# Patient Record
Sex: FEMALE | Race: WHITE | NOT HISPANIC OR LATINO | Employment: OTHER | URBAN - NONMETROPOLITAN AREA
[De-identification: names, ages, dates, MRNs, and addresses within clinical notes are randomized per-mention and may not be internally consistent; named-entity substitution may affect disease eponyms.]

---

## 2017-01-09 ENCOUNTER — HOSPITAL ENCOUNTER (OUTPATIENT)
Dept: PHYSICAL THERAPY | Facility: HOSPITAL | Age: 62
Setting detail: THERAPIES SERIES
End: 2017-01-09
Payer: COMMERCIAL

## 2017-01-09 PROCEDURE — 97110 THERAPEUTIC EXERCISES: CPT | Mod: GP

## 2017-01-09 PROCEDURE — 40000718 ZZHC STATISTIC PT DEPARTMENT ORTHO VISIT

## 2017-01-12 ENCOUNTER — HOSPITAL ENCOUNTER (OUTPATIENT)
Dept: PHYSICAL THERAPY | Facility: HOSPITAL | Age: 62
Setting detail: THERAPIES SERIES
End: 2017-01-12
Payer: COMMERCIAL

## 2017-01-12 PROCEDURE — 97110 THERAPEUTIC EXERCISES: CPT | Mod: GP

## 2017-01-12 PROCEDURE — 40000718 ZZHC STATISTIC PT DEPARTMENT ORTHO VISIT

## 2017-01-16 ENCOUNTER — HOSPITAL ENCOUNTER (OUTPATIENT)
Dept: PHYSICAL THERAPY | Facility: HOSPITAL | Age: 62
Setting detail: THERAPIES SERIES
End: 2017-01-16
Payer: COMMERCIAL

## 2017-01-16 PROCEDURE — 97110 THERAPEUTIC EXERCISES: CPT | Mod: GP

## 2017-01-16 PROCEDURE — 40000718 ZZHC STATISTIC PT DEPARTMENT ORTHO VISIT

## 2017-01-17 ENCOUNTER — OFFICE VISIT (OUTPATIENT)
Dept: CHIROPRACTIC MEDICINE | Facility: OTHER | Age: 62
End: 2017-01-17
Attending: CHIROPRACTOR
Payer: COMMERCIAL

## 2017-01-17 DIAGNOSIS — M99.02 SEGMENTAL AND SOMATIC DYSFUNCTION OF THORACIC REGION: ICD-10-CM

## 2017-01-17 DIAGNOSIS — M99.01 SEGMENTAL AND SOMATIC DYSFUNCTION OF CERVICAL REGION: ICD-10-CM

## 2017-01-17 DIAGNOSIS — M99.03 SEGMENTAL AND SOMATIC DYSFUNCTION OF LUMBAR REGION: Primary | ICD-10-CM

## 2017-01-17 DIAGNOSIS — M54.50 ACUTE BILATERAL LOW BACK PAIN WITHOUT SCIATICA: ICD-10-CM

## 2017-01-17 PROCEDURE — 98941 CHIROPRACT MANJ 3-4 REGIONS: CPT | Mod: AT | Performed by: CHIROPRACTOR

## 2017-01-18 ENCOUNTER — OFFICE VISIT (OUTPATIENT)
Dept: SLEEP MEDICINE | Facility: HOSPITAL | Age: 62
End: 2017-01-18
Attending: INTERNAL MEDICINE
Payer: COMMERCIAL

## 2017-01-18 DIAGNOSIS — F51.02 ADJUSTMENT INSOMNIA: Primary | ICD-10-CM

## 2017-01-18 PROCEDURE — 99211 OFF/OP EST MAY X REQ PHY/QHP: CPT

## 2017-01-18 PROCEDURE — 99211 OFF/OP EST MAY X REQ PHY/QHP: CPT | Performed by: INTERNAL MEDICINE

## 2017-01-18 RX ORDER — CLONAZEPAM 1 MG/1
1 TABLET ORAL
Qty: 20 TABLET | Refills: 0 | Status: SHIPPED | OUTPATIENT
Start: 2017-01-18 | End: 2017-02-22

## 2017-01-18 NOTE — PROGRESS NOTES
62 y/o self referred for insomnia. I see her  who has RBD. She has been dealing with his scary dreams/acting out and over the last 4 months shes developed sleep initiation insomnia. She was previously a good sleeper, now it takes her 2-3 h to fall asleep. She routinely reads before bed, when she turns out the light she tends to wake up more. She has worked on behavioral techniques ie reciting the rosary. Doesn't feel she's been anxious but her husbands situation zack wrt possible future Parkinsons certainly concerns her. Non snorer, really not sleepy during the day, no RLS. On lexapro for mild depression.   There were no vitals taken for this visit.    A/ Prob psychophysiological Insomnia  She tried her husbands clonazepam 1mg and it was very effective. For now I gave her a script for this, we talked about CBT I and sleep hygiene , I suspect over time this will improve. I told her she should only take the clonazepam 2 or max 3 times weekly.

## 2017-01-18 NOTE — PROGRESS NOTES
Subjective Finding:    Chief compalint: Patient presents with:  Back Pain: recovering from knee surgery  Neck Pain  , Pain Scale: 5/10, Intensity: sharp and ache, Duration: 2 days, Change since last visit: worse, Radiating: no.    Date of injury:     Activities that the pain restricts:   Home/household activities: no.  Work duties: no.  Hobbies/social: no.  Sleep: no.  Makes symptoms better: activity and rest.  Makes symptoms worse: lumbar extension and lumbar flexion.  Have you seen anyone else for the symptoms? No.  Work related: no.  Automobile related injury: no.    Objective and Assessment:    Posture Analysis:   High shoulder: .  Head tilt: .  High iliac crest: .  Head carriage: neutral.  Thoracic Kyphosis: neutral.  Lumbar Lordosis: neutral.    Lumbar Range of Motion: flexion decreased and extension decreased.  Cervical Range of Motion: flexion decreased.  Thoracic Range of Motion: .  Extremity Range of Motion: .    Palpation:   Quad lumb: right, referred pain: no    Segmental dysfunction pre-treatment: C2, T6, T7, L4 and L5.    Assessment post-treatment:  Cervical: ROM increased.  Thoracic: pain and tenderness decreased.  Lumbar: pain and tenderness decreased.    Comments: .        Complicating Factors: .    Plan / Procedure:    Expected release date: .  Treatment plan: prn.  Instructed patient: ice 20 minutes every other hour as needed.  Short term goals: reduce pain and increase ROM.  Long term goals: increase patient functional independence.  Prognosis: excellent.

## 2017-01-23 ENCOUNTER — HOSPITAL ENCOUNTER (OUTPATIENT)
Dept: PHYSICAL THERAPY | Facility: HOSPITAL | Age: 62
Setting detail: THERAPIES SERIES
End: 2017-01-23
Payer: COMMERCIAL

## 2017-01-23 PROCEDURE — 97110 THERAPEUTIC EXERCISES: CPT | Mod: GP

## 2017-01-23 PROCEDURE — 40000718 ZZHC STATISTIC PT DEPARTMENT ORTHO VISIT

## 2017-01-24 ENCOUNTER — OFFICE VISIT (OUTPATIENT)
Dept: CHIROPRACTIC MEDICINE | Facility: OTHER | Age: 62
End: 2017-01-24
Attending: CHIROPRACTOR
Payer: COMMERCIAL

## 2017-01-24 DIAGNOSIS — M99.03 SEGMENTAL AND SOMATIC DYSFUNCTION OF LUMBAR REGION: Primary | ICD-10-CM

## 2017-01-24 DIAGNOSIS — M99.01 SEGMENTAL AND SOMATIC DYSFUNCTION OF CERVICAL REGION: ICD-10-CM

## 2017-01-24 DIAGNOSIS — M54.50 ACUTE BILATERAL LOW BACK PAIN WITHOUT SCIATICA: ICD-10-CM

## 2017-01-24 DIAGNOSIS — M99.02 SEGMENTAL AND SOMATIC DYSFUNCTION OF THORACIC REGION: ICD-10-CM

## 2017-01-24 PROCEDURE — 98941 CHIROPRACT MANJ 3-4 REGIONS: CPT | Mod: AT | Performed by: CHIROPRACTOR

## 2017-01-26 ENCOUNTER — HOSPITAL ENCOUNTER (OUTPATIENT)
Dept: PHYSICAL THERAPY | Facility: HOSPITAL | Age: 62
Setting detail: THERAPIES SERIES
End: 2017-01-26
Payer: COMMERCIAL

## 2017-01-26 PROCEDURE — 97110 THERAPEUTIC EXERCISES: CPT | Mod: GP

## 2017-01-26 PROCEDURE — 40000718 ZZHC STATISTIC PT DEPARTMENT ORTHO VISIT

## 2017-01-30 NOTE — PROGRESS NOTES
Subjective Finding:    Chief compalint: Patient presents with:  Back Pain: with hip pain  Neck Pain  , Pain Scale: 5/10, Intensity: sharp and ache, Duration: 2 days, Change since last visit: worse, Radiating: no.    Date of injury:     Activities that the pain restricts:   Home/household activities: no.  Work duties: no.  Hobbies/social: no.  Sleep: no.  Makes symptoms better: activity and rest.  Makes symptoms worse: lumbar extension and lumbar flexion.  Have you seen anyone else for the symptoms? No.  Work related: no.  Automobile related injury: no.    Objective and Assessment:    Posture Analysis:   High shoulder: .  Head tilt: .  High iliac crest: .  Head carriage: neutral.  Thoracic Kyphosis: neutral.  Lumbar Lordosis: neutral.    Lumbar Range of Motion: flexion decreased and extension decreased.  Cervical Range of Motion: flexion decreased.  Thoracic Range of Motion: .  Extremity Range of Motion: .    Palpation:   Quad lumb: right, referred pain: no    Segmental dysfunction pre-treatment: C2, T6, T7, L4 and L5.    Assessment post-treatment:  Cervical: ROM increased.  Thoracic: pain and tenderness decreased.  Lumbar: pain and tenderness decreased.    Comments: .        Complicating Factors: .    Plan / Procedure:    Expected release date: .  Treatment plan: prn.  Instructed patient: ice 20 minutes every other hour as needed.  Short term goals: reduce pain and increase ROM.  Long term goals: increase patient functional independence.  Prognosis: excellent.

## 2017-02-01 ENCOUNTER — HOSPITAL ENCOUNTER (OUTPATIENT)
Dept: PHYSICAL THERAPY | Facility: HOSPITAL | Age: 62
Setting detail: THERAPIES SERIES
End: 2017-02-01
Payer: COMMERCIAL

## 2017-02-01 PROCEDURE — 97110 THERAPEUTIC EXERCISES: CPT | Mod: GP

## 2017-02-01 PROCEDURE — 40000718 ZZHC STATISTIC PT DEPARTMENT ORTHO VISIT

## 2017-02-03 ENCOUNTER — HOSPITAL ENCOUNTER (OUTPATIENT)
Dept: PHYSICAL THERAPY | Facility: HOSPITAL | Age: 62
Setting detail: THERAPIES SERIES
End: 2017-02-03
Payer: COMMERCIAL

## 2017-02-03 PROCEDURE — 40000718 ZZHC STATISTIC PT DEPARTMENT ORTHO VISIT

## 2017-02-03 PROCEDURE — 97110 THERAPEUTIC EXERCISES: CPT | Mod: GP

## 2017-02-06 ENCOUNTER — HOSPITAL ENCOUNTER (OUTPATIENT)
Dept: PHYSICAL THERAPY | Facility: HOSPITAL | Age: 62
Setting detail: THERAPIES SERIES
End: 2017-02-06
Payer: COMMERCIAL

## 2017-02-06 PROCEDURE — 97110 THERAPEUTIC EXERCISES: CPT | Mod: GP

## 2017-02-06 PROCEDURE — 40000718 ZZHC STATISTIC PT DEPARTMENT ORTHO VISIT

## 2017-02-06 NOTE — PROGRESS NOTES
Outpatient Physical Therapy Progress Note     Patient: Aileen Szymanski  : 1955    Referring Provider: Dr. Quinones    Therapy Diagnosis: Left knee revision     Client Self Report: Patient notes she does well with her normal daily activities for the most part.  Pain is not interfering, but she feels some closed chain weakness especially with stairs-going down more so than going up.  Notes a fatigue like feeling.  Also of note is an area just inferior and lateral to the joint line that is quite tender to palpation.  She reports improved ROM after cross friction massage over that area.      Objective Measurements:  ROM is approx -5 to 100 degrees.  MMT reveals 5/5 strength, but with there ex there is some functional weakness noted.        Goals:  Goal Identifier LTG 1   Goal Description Improve strength to 5/5 to allow step over step gait with pain < 2/10.   Target Date 16   Date Met      Progress:     Goal Identifier LTG 2   Goal Description Improve flexion to 110 degrees.   Target Date 16   Date Met      Progress:     Goal Identifier LTG 3   Goal Description Amb community distances without AD and pain < 2/10.   Target Date 16   Date Met      Progress:     Goal Identifier     Goal Description     Target Date     Date Met      Progress:     Goal Identifier     Goal Description     Target Date     Date Met      Progress:     Goal Identifier     Goal Description     Target Date     Date Met      Progress:     Goal Identifier     Goal Description     Target Date     Date Met      Progress:     Goal Identifier     Goal Description     Target Date     Date Met      Progress:     Progress Toward Goals:   Progress limited due to functional weakness          Plan:  Continue therapy per current plan of care.    Discharge:  No

## 2017-02-10 ENCOUNTER — HOSPITAL ENCOUNTER (OUTPATIENT)
Dept: PHYSICAL THERAPY | Facility: HOSPITAL | Age: 62
Setting detail: THERAPIES SERIES
End: 2017-02-10
Payer: COMMERCIAL

## 2017-02-10 PROCEDURE — 97110 THERAPEUTIC EXERCISES: CPT | Mod: GP

## 2017-02-10 PROCEDURE — 40000718 ZZHC STATISTIC PT DEPARTMENT ORTHO VISIT

## 2017-02-13 ENCOUNTER — HOSPITAL ENCOUNTER (OUTPATIENT)
Dept: PHYSICAL THERAPY | Facility: HOSPITAL | Age: 62
Setting detail: THERAPIES SERIES
End: 2017-02-13
Payer: COMMERCIAL

## 2017-02-13 PROCEDURE — 40000718 ZZHC STATISTIC PT DEPARTMENT ORTHO VISIT

## 2017-02-13 PROCEDURE — 97110 THERAPEUTIC EXERCISES: CPT | Mod: GP

## 2017-02-16 ENCOUNTER — HOSPITAL ENCOUNTER (OUTPATIENT)
Dept: PHYSICAL THERAPY | Facility: HOSPITAL | Age: 62
Setting detail: THERAPIES SERIES
End: 2017-02-16
Payer: COMMERCIAL

## 2017-02-16 PROCEDURE — 40000718 ZZHC STATISTIC PT DEPARTMENT ORTHO VISIT

## 2017-02-16 PROCEDURE — 97110 THERAPEUTIC EXERCISES: CPT | Mod: GP

## 2017-02-20 ENCOUNTER — HOSPITAL ENCOUNTER (OUTPATIENT)
Dept: PHYSICAL THERAPY | Facility: HOSPITAL | Age: 62
Setting detail: THERAPIES SERIES
End: 2017-02-20
Payer: COMMERCIAL

## 2017-02-20 PROCEDURE — 40000718 ZZHC STATISTIC PT DEPARTMENT ORTHO VISIT

## 2017-02-20 PROCEDURE — 97110 THERAPEUTIC EXERCISES: CPT | Mod: GP

## 2017-03-14 ENCOUNTER — OFFICE VISIT (OUTPATIENT)
Dept: CHIROPRACTIC MEDICINE | Facility: OTHER | Age: 62
End: 2017-03-14
Attending: CHIROPRACTOR
Payer: COMMERCIAL

## 2017-03-14 ENCOUNTER — OFFICE VISIT (OUTPATIENT)
Dept: SLEEP MEDICINE | Facility: HOSPITAL | Age: 62
End: 2017-03-14
Attending: INTERNAL MEDICINE
Payer: COMMERCIAL

## 2017-03-14 VITALS
RESPIRATION RATE: 12 BRPM | HEIGHT: 65 IN | SYSTOLIC BLOOD PRESSURE: 108 MMHG | BODY MASS INDEX: 27.99 KG/M2 | OXYGEN SATURATION: 99 % | HEART RATE: 63 BPM | DIASTOLIC BLOOD PRESSURE: 72 MMHG | WEIGHT: 168 LBS

## 2017-03-14 DIAGNOSIS — M99.03 SEGMENTAL AND SOMATIC DYSFUNCTION OF LUMBAR REGION: ICD-10-CM

## 2017-03-14 DIAGNOSIS — F51.01 PRIMARY INSOMNIA: Primary | ICD-10-CM

## 2017-03-14 DIAGNOSIS — M99.02 SEGMENTAL AND SOMATIC DYSFUNCTION OF THORACIC REGION: ICD-10-CM

## 2017-03-14 DIAGNOSIS — M54.50 ACUTE BILATERAL LOW BACK PAIN WITHOUT SCIATICA: ICD-10-CM

## 2017-03-14 DIAGNOSIS — M99.01 SEGMENTAL AND SOMATIC DYSFUNCTION OF CERVICAL REGION: Primary | ICD-10-CM

## 2017-03-14 PROCEDURE — 98941 CHIROPRACT MANJ 3-4 REGIONS: CPT | Mod: AT | Performed by: CHIROPRACTOR

## 2017-03-14 PROCEDURE — 99211 OFF/OP EST MAY X REQ PHY/QHP: CPT | Performed by: INTERNAL MEDICINE

## 2017-03-14 PROCEDURE — 99212 OFFICE O/P EST SF 10 MIN: CPT

## 2017-03-14 RX ORDER — CLONAZEPAM 1 MG/1
TABLET ORAL
Qty: 60 TABLET | Refills: 0 | Status: SHIPPED | OUTPATIENT
Start: 2017-03-14 | End: 2017-05-19

## 2017-03-14 NOTE — PROGRESS NOTES
"Sleep is good on clonazepam 1 mg hs, finds she really can't do without it tho. She feels depressed despite Lexapro 20, Winter is hard on her.   /72  Pulse 63  Resp 12  Ht 5' 5\" (1.651 m)  Wt 168 lb (76.2 kg)  SpO2 99%  BMI 27.96 kg/m2    A/ Insomnia and daytime fatigue, for now I'm OK with taking clonipin every nite, will add melatonin and try more intense light exposure during the day, less in the evening hours.  "

## 2017-03-14 NOTE — NURSING NOTE
Patient ID checked with name and date of birth. Reviewed allergies and home medications. Took Vitals and brief history.

## 2017-03-14 NOTE — MR AVS SNAPSHOT
"              After Visit Summary   3/14/2017    Aileen Szymanski    MRN: [id number]           Patient Information     Date Of Birth          1955        Visit Information        Provider Department      3/14/2017 2:00 PM Raphael Hyatt MD [facility]        Today's Diagnoses     Primary insomnia    -  1       Follow-ups after your visit        Your next 10 appointments already scheduled     Mar 27, 2017  9:30 AM CDT   (Arrive by 9:15 AM)   Office Visit with Precious Vogel MD   [facility] ([facility])    [address]  [facility] [address]   [phone]           Bring a current list of meds and any records pertaining to this visit.  For Physicals, please bring immunization records and any forms needing to be filled out.    Please arrive 15 minutes early to complete paperwork and register.              Who to contact     If you have questions or need follow up information about today's clinic visit or your schedule please contact [facility] directly at [phone].  Normal or non-critical lab and imaging results will be communicated to you by [employer], letter or phone within 4 business days after the clinic has received the results. If you do not hear from us within 7 days, please contact the clinic through [employer] or phone. If you have a critical or abnormal lab result, we will notify you by phone as soon as possible.  Submit refill requests through [employer] or call your pharmacy and they will forward the refill request to us. Please allow 3 business days for your refill to be completed.          Additional Information About Your Visit        [employer] Information     [employer] lets you send messages to your doctor, view your test results, renew your prescriptions, schedule appointments and more. To sign up, go to [email or web address] . Click on \"Log in\" on the left side of the screen, which will take you to the Welcome page. Then click on \"Sign up Now\" on the right side of the " "page.     You will be asked to enter the access code listed below, as well as some personal information. Please follow the directions to create your username and password.     Your access code is: [email or web address]  Expires: 2017  2:40 PM     Your access code will  in 90 days. If you need help or a new code, please call your [place] clinic or [phone].        Care EveryWhere ID     This is your Care EveryWhere ID. This could be used by other organizations to access your [place] medical records  [id number]        Your Vitals Were     Pulse Respirations Height Pulse Oximetry BMI (Body Mass Index)       63 12 5' 5\" (1.651 m) 99% 27.96 kg/m2        Blood Pressure from Last 3 Encounters:   17 108/72   16 144/87   16 112/72    Weight from Last 3 Encounters:   17 168 lb (76.2 kg)   16 175 lb 3.2 oz (79.5 kg)   16 168 lb (76.2 kg)              Today, you had the following     No orders found for display         Today's Medication Changes          These changes are accurate as of: 3/14/17  2:40 PM.  If you have any questions, ask your nurse or doctor.               These medicines have changed or have updated prescriptions.        Dose/Directions    * clonazePAM 1 MG tablet   Commonly known as:  klonoPIN   This may have changed:  Another medication with the same name was added. Make sure you understand how and when to take each.   Used for:  Adjustment insomnia   Changed by:  Raphael Hyatt MD        Dose:  1 mg   Take 1 tablet (1 mg) by mouth nightly as needed for anxiety   Quantity:  20 tablet   Refills:  0       * clonazePAM 1 MG tablet   Commonly known as:  klonoPIN   This may have changed:  You were already taking a medication with the same name, and this prescription was added. Make sure you understand how and when to take each.   Used for:  Primary insomnia   Changed by:  Raphael Hyatt MD        1 po qhs   Quantity:  60 tablet   Refills:  0       * Notice:  This " list has 2 medication(s) that are the same as other medications prescribed for you. Read the directions carefully, and ask your doctor or other care provider to review them with you.         Where to get your medicines      Some of these will need a paper prescription and others can be bought over the counter.  Ask your nurse if you have questions.     Bring a paper prescription for each of these medications     clonazePAM 1 MG tablet                Primary Care Provider    None       No address on file        Thank you!     Thank you for choosing [facility]  for your care. Our goal is always to provide you with excellent care. Hearing back from our patients is one way we can continue to improve our services. Please take a few minutes to complete the written survey that you may receive in the mail after your visit with us. Thank you!             Your Updated Medication List - Protect others around you: Learn how to safely use, store and throw away your medicines at www.disposemymeds.org.          This list is accurate as of: 3/14/17  2:40 PM.  Always use your most recent med list.                   Brand Name Dispense Instructions for use    * clonazePAM 1 MG tablet    klonoPIN    20 tablet    Take 1 tablet (1 mg) by mouth nightly as needed for anxiety       * clonazePAM 1 MG tablet    klonoPIN    60 tablet    1 po qhs       escitalopram 20 MG tablet    LEXAPRO    90 tablet    Take 1 tablet (20 mg) by mouth daily       simvastatin 40 MG tablet    ZOCOR    90 tablet    TAKE 1 TABLET BY MOUTH DAILY WITH SUPPER. GENER IC FOR ZOCOR.       sulindac 200 MG tablet    CLINORIL    90 tablet    Take 1 tablet (200 mg) by mouth 2 times daily       TYLENOL PO      Take 500 mg by mouth every 4 hours as needed for mild pain or fever       VITAMIN D (CHOLECALCIFEROL) PO      Take 2,000 Units by mouth 2 times daily       * Notice:  This list has 2 medication(s) that are the same as other medications prescribed for you. Read the  directions carefully, and ask your doctor or other care provider to review them with you.

## 2017-03-14 NOTE — MR AVS SNAPSHOT
After Visit Summary   3/14/2017    Aileen Szymanski    MRN: [id number]           Patient Information     Date Of Birth          1955        Visit Information        Provider Department      3/14/2017 11:20 AM [facility]        Today's Diagnoses     Segmental and somatic dysfunction of cervical region    -  1    Acute bilateral low back pain without sciatica        Segmental and somatic dysfunction of lumbar region        Segmental and somatic dysfunction of thoracic region           Follow-ups after your visit        Your next 10 appointments already scheduled     Mar 27, 2017  9:30 AM CDT   (Arrive by 9:15 AM)   Office Visit with Precious Vogel MD   [facility] ([facility])    [address]  [facility] [address]   [phone]           Bring a current list of meds and any records pertaining to this visit.  For Physicals, please bring immunization records and any forms needing to be filled out.    Please arrive 15 minutes early to complete paperwork and register.              Who to contact     If you have questions or need follow up information about today's clinic visit or your schedule please contact  [facility] directly at [phone].  Normal or non-critical lab and imaging results will be communicated to you by [employer], letter or phone within 4 business days after the clinic has received the results. If you do not hear from us within 7 days, please contact the clinic through [employer] or phone. If you have a critical or abnormal lab result, we will notify you by phone as soon as possible.  Submit refill requests through [employer] or call your pharmacy and they will forward the refill request to us. Please allow 3 business days for your refill to be completed.          Additional Information About Your Visit        [employer] Information     [employer] lets you send messages to your doctor, view your test results, renew your  "prescriptions, schedule appointments and more. To sign up, go to [email or web address] . Click on \"Log in\" on the left side of the screen, which will take you to the Welcome page. Then click on \"Sign up Now\" on the right side of the page.     You will be asked to enter the access code listed below, as well as some personal information. Please follow the directions to create your username and password.     Your access code is: [email or web address]  Expires: 2017  2:40 PM     Your access code will  in 90 days. If you need help or a new code, please call your [place] clinic or [phone].        Care EveryWhere ID     This is your Care EveryWhere ID. This could be used by other organizations to access your [place] medical records  [id number]         Blood Pressure from Last 3 Encounters:   17 108/72   16 144/87   16 112/72    Weight from Last 3 Encounters:   17 168 lb (76.2 kg)   16 175 lb 3.2 oz (79.5 kg)   16 168 lb (76.2 kg)              We Performed the Following     CHIROPRAC MANIP,SPINAL,3-4 REGIONS          Today's Medication Changes          These changes are accurate as of: 3/14/17 11:59 PM.  If you have any questions, ask your nurse or doctor.               These medicines have changed or have updated prescriptions.        Dose/Directions    * clonazePAM 1 MG tablet   Commonly known as:  klonoPIN   This may have changed:  Another medication with the same name was added. Make sure you understand how and when to take each.   Used for:  Adjustment insomnia   Changed by:  Raphael Hyatt MD        Dose:  1 mg   Take 1 tablet (1 mg) by mouth nightly as needed for anxiety   Quantity:  20 tablet   Refills:  0       * clonazePAM 1 MG tablet   Commonly known as:  klonoPIN   This may have changed:  You were already taking a medication with the same name, and this prescription was added. Make sure you understand how and when to take each.   Used for:  Primary insomnia "   Changed by:  Raphael Hyatt MD        1 po qhs   Quantity:  60 tablet   Refills:  0       * Notice:  This list has 2 medication(s) that are the same as other medications prescribed for you. Read the directions carefully, and ask your doctor or other care provider to review them with you.         Where to get your medicines      Some of these will need a paper prescription and others can be bought over the counter.  Ask your nurse if you have questions.     Bring a paper prescription for each of these medications     clonazePAM 1 MG tablet                Primary Care Provider    None       No address on file        Thank you!     Thank you for choosing  [facility]  for your care. Our goal is always to provide you with excellent care. Hearing back from our patients is one way we can continue to improve our services. Please take a few minutes to complete the written survey that you may receive in the mail after your visit with us. Thank you!             Your Updated Medication List - Protect others around you: Learn how to safely use, store and throw away your medicines at www.disposemymeds.org.          This list is accurate as of: 3/14/17 11:59 PM.  Always use your most recent med list.                   Brand Name Dispense Instructions for use    * clonazePAM 1 MG tablet    klonoPIN    20 tablet    Take 1 tablet (1 mg) by mouth nightly as needed for anxiety       * clonazePAM 1 MG tablet    klonoPIN    60 tablet    1 po qhs       escitalopram 20 MG tablet    LEXAPRO    90 tablet    Take 1 tablet (20 mg) by mouth daily       simvastatin 40 MG tablet    ZOCOR    90 tablet    TAKE 1 TABLET BY MOUTH DAILY WITH SUPPER. GENER IC FOR ZOCOR.       sulindac 200 MG tablet    CLINORIL    90 tablet    Take 1 tablet (200 mg) by mouth 2 times daily       TYLENOL PO      Take 500 mg by mouth every 4 hours as needed for mild pain or fever       VITAMIN D (CHOLECALCIFEROL) PO      Take 2,000 Units by mouth 2 times  daily       * Notice:  This list has 2 medication(s) that are the same as other medications prescribed for you. Read the directions carefully, and ask your doctor or other care provider to review them with you.

## 2017-03-15 NOTE — PROGRESS NOTES
Subjective Finding:    Chief compalint: Patient presents with:  Neck Pain: from travel  Back Pain  , Pain Scale: 5/10, Intensity: sharp and ache, Duration: 2 days, Change since last visit: worse, Radiating: no.    Date of injury:     Activities that the pain restricts:   Home/household activities: no.  Work duties: no.  Hobbies/social: no.  Sleep: no.  Makes symptoms better: activity and rest.  Makes symptoms worse: lumbar extension and lumbar flexion.  Have you seen anyone else for the symptoms? No.  Work related: no.  Automobile related injury: no.    Objective and Assessment:    Posture Analysis:   High shoulder: .  Head tilt: .  High iliac crest: .  Head carriage: neutral.  Thoracic Kyphosis: neutral.  Lumbar Lordosis: neutral.    Lumbar Range of Motion: flexion decreased and extension decreased.  Cervical Range of Motion: flexion decreased.  Thoracic Range of Motion: .  Extremity Range of Motion: .    Palpation:   Quad lumb: right, referred pain: no    Segmental dysfunction pre-treatment: C2, T6, T7, L4 and L5.    Assessment post-treatment:  Cervical: ROM increased.  Thoracic: pain and tenderness decreased.  Lumbar: pain and tenderness decreased.    Comments: .        Complicating Factors: .    Plan / Procedure:    Expected release date: .  Treatment plan: prn.  Instructed patient: ice 20 minutes every other hour as needed.  Short term goals: reduce pain and increase ROM.  Long term goals: increase patient functional independence.  Prognosis: excellent.

## 2017-03-27 ENCOUNTER — OFFICE VISIT (OUTPATIENT)
Dept: FAMILY MEDICINE | Facility: OTHER | Age: 62
End: 2017-03-27
Attending: FAMILY MEDICINE
Payer: COMMERCIAL

## 2017-03-27 VITALS
WEIGHT: 168 LBS | DIASTOLIC BLOOD PRESSURE: 80 MMHG | OXYGEN SATURATION: 98 % | SYSTOLIC BLOOD PRESSURE: 102 MMHG | TEMPERATURE: 97.4 F | BODY MASS INDEX: 27.96 KG/M2 | HEART RATE: 64 BPM

## 2017-03-27 DIAGNOSIS — Z12.31 ENCOUNTER FOR SCREENING MAMMOGRAM FOR BREAST CANCER: ICD-10-CM

## 2017-03-27 DIAGNOSIS — F33.9 EPISODE OF RECURRENT MAJOR DEPRESSIVE DISORDER, UNSPECIFIED DEPRESSION EPISODE SEVERITY (H): ICD-10-CM

## 2017-03-27 DIAGNOSIS — Z76.89 ESTABLISHING CARE WITH NEW DOCTOR, ENCOUNTER FOR: Primary | ICD-10-CM

## 2017-03-27 DIAGNOSIS — Z23 NEED FOR SHINGLES VACCINE: ICD-10-CM

## 2017-03-27 DIAGNOSIS — E78.5 HYPERLIPIDEMIA, UNSPECIFIED HYPERLIPIDEMIA TYPE: ICD-10-CM

## 2017-03-27 DIAGNOSIS — Z11.59 NEED FOR HEPATITIS C SCREENING TEST: ICD-10-CM

## 2017-03-27 DIAGNOSIS — E55.9 VITAMIN D DEFICIENCY: ICD-10-CM

## 2017-03-27 LAB
ALBUMIN SERPL-MCNC: 3.6 G/DL (ref 3.4–5)
ALP SERPL-CCNC: 75 U/L (ref 40–150)
ALT SERPL W P-5'-P-CCNC: 11 U/L (ref 0–50)
ANION GAP SERPL CALCULATED.3IONS-SCNC: 5 MMOL/L (ref 3–14)
AST SERPL W P-5'-P-CCNC: 13 U/L (ref 0–45)
BILIRUB SERPL-MCNC: 0.6 MG/DL (ref 0.2–1.3)
BUN SERPL-MCNC: 17 MG/DL (ref 7–30)
CALCIUM SERPL-MCNC: 8.8 MG/DL (ref 8.5–10.1)
CHLORIDE SERPL-SCNC: 106 MMOL/L (ref 94–109)
CHOLEST SERPL-MCNC: 233 MG/DL
CO2 SERPL-SCNC: 31 MMOL/L (ref 20–32)
CREAT SERPL-MCNC: 0.86 MG/DL (ref 0.52–1.04)
GFR SERPL CREATININE-BSD FRML MDRD: 67 ML/MIN/1.7M2
GLUCOSE SERPL-MCNC: 86 MG/DL (ref 70–99)
HDLC SERPL-MCNC: 88 MG/DL
LDLC SERPL CALC-MCNC: 125 MG/DL
NONHDLC SERPL-MCNC: 145 MG/DL
POTASSIUM SERPL-SCNC: 4.1 MMOL/L (ref 3.4–5.3)
PROT SERPL-MCNC: 7.3 G/DL (ref 6.8–8.8)
SODIUM SERPL-SCNC: 142 MMOL/L (ref 133–144)
TRIGL SERPL-MCNC: 99 MG/DL

## 2017-03-27 PROCEDURE — 80061 LIPID PANEL: CPT | Performed by: FAMILY MEDICINE

## 2017-03-27 PROCEDURE — 82306 VITAMIN D 25 HYDROXY: CPT | Mod: 90 | Performed by: FAMILY MEDICINE

## 2017-03-27 PROCEDURE — 86803 HEPATITIS C AB TEST: CPT | Mod: 90 | Performed by: FAMILY MEDICINE

## 2017-03-27 PROCEDURE — 99000 SPECIMEN HANDLING OFFICE-LAB: CPT | Performed by: FAMILY MEDICINE

## 2017-03-27 PROCEDURE — 36415 COLL VENOUS BLD VENIPUNCTURE: CPT | Performed by: FAMILY MEDICINE

## 2017-03-27 PROCEDURE — 99214 OFFICE O/P EST MOD 30 MIN: CPT | Performed by: FAMILY MEDICINE

## 2017-03-27 PROCEDURE — 80053 COMPREHEN METABOLIC PANEL: CPT | Performed by: FAMILY MEDICINE

## 2017-03-27 RX ORDER — ASPIRIN 325 MG/1
325 TABLET, FILM COATED ORAL DAILY
COMMUNITY
End: 2017-11-02

## 2017-03-27 RX ORDER — ESCITALOPRAM OXALATE 20 MG/1
20 TABLET ORAL DAILY
Qty: 90 TABLET | Refills: 3 | Status: SHIPPED | OUTPATIENT
Start: 2017-03-27 | End: 2018-04-30

## 2017-03-27 ASSESSMENT — ANXIETY QUESTIONNAIRES
3. WORRYING TOO MUCH ABOUT DIFFERENT THINGS: SEVERAL DAYS
7. FEELING AFRAID AS IF SOMETHING AWFUL MIGHT HAPPEN: NOT AT ALL
5. BEING SO RESTLESS THAT IT IS HARD TO SIT STILL: NOT AT ALL
1. FEELING NERVOUS, ANXIOUS, OR ON EDGE: SEVERAL DAYS
2. NOT BEING ABLE TO STOP OR CONTROL WORRYING: SEVERAL DAYS
6. BECOMING EASILY ANNOYED OR IRRITABLE: SEVERAL DAYS
GAD7 TOTAL SCORE: 5
IF YOU CHECKED OFF ANY PROBLEMS ON THIS QUESTIONNAIRE, HOW DIFFICULT HAVE THESE PROBLEMS MADE IT FOR YOU TO DO YOUR WORK, TAKE CARE OF THINGS AT HOME, OR GET ALONG WITH OTHER PEOPLE: SOMEWHAT DIFFICULT

## 2017-03-27 ASSESSMENT — PATIENT HEALTH QUESTIONNAIRE - PHQ9: 5. POOR APPETITE OR OVEREATING: SEVERAL DAYS

## 2017-03-27 ASSESSMENT — PAIN SCALES - GENERAL: PAINLEVEL: NO PAIN (0)

## 2017-03-27 NOTE — NURSING NOTE
"Chief Complaint   Patient presents with     Establish Care       Initial /80 (BP Location: Right arm, Patient Position: Chair)  Pulse 64  Temp 97.4  F (36.3  C)  Wt 168 lb (76.2 kg)  SpO2 98%  BMI 27.96 kg/m2 Estimated body mass index is 27.96 kg/(m^2) as calculated from the following:    Height as of 3/14/17: 5' 5\" (1.651 m).    Weight as of this encounter: 168 lb (76.2 kg).  Medication Reconciliation: complete       Lucila Jaimes      "

## 2017-03-27 NOTE — MR AVS SNAPSHOT
"              After Visit Summary   3/27/2017    Aileen Szymanski    MRN: [id number]           Patient Information     Date Of Birth          1955        Visit Information        Provider Department      3/27/2017 9:30 AM Precious Vogel MD [facility]        Today's Diagnoses     Establishing care with new doctor, encounter for    -  1    Hyperlipidemia, unspecified hyperlipidemia type        Vitamin D deficiency        Episode of recurrent major depressive disorder, unspecified depression episode severity (H)        Need for hepatitis C screening test        Encounter for screening mammogram for breast cancer        Need for shingles vaccine          Care Instructions    Will call with lab results.  Mammogram ordered.  Flu shot declined.  Script for Zostavax given.  Continue current medications.  Will reassess need for statin based on today's labs.        Follow-ups after your visit        Who to contact     If you have questions or need follow up information about today's clinic visit or your schedule please contact [facility] directly at [phone].  Normal or non-critical lab and imaging results will be communicated to you by Tizor Systemshart, letter or phone within 4 business days after the clinic has received the results. If you do not hear from us within 7 days, please contact the clinic through Tizor Systemshart or phone. If you have a critical or abnormal lab result, we will notify you by phone as soon as possible.  Submit refill requests through [facility] or call your pharmacy and they will forward the refill request to us. Please allow 3 business days for your refill to be completed.          Additional Information About Your Visit        [facility] Information     [facility] lets you send messages to your doctor, view your test results, renew your prescriptions, schedule appointments and more. To sign up, go to [email or web address] . Click on \"Log in\" on the left side of the screen, which " "will take you to the Welcome page. Then click on \"Sign up Now\" on the right side of the page.     You will be asked to enter the access code listed below, as well as some personal information. Please follow the directions to create your username and password.     Your access code is: [email or web address]  Expires: 2017  2:40 PM     Your access code will  in 90 days. If you need help or a new code, please call your [facility] or [phone].        Care EveryWhere ID     This is your Care EveryWhere ID. This could be used by other organizations to access your [place] medical records  [id number]        Your Vitals Were     Pulse Temperature Pulse Oximetry BMI (Body Mass Index)          64 97.4  F (36.3  C) 98% 27.96 kg/m2         Blood Pressure from Last 3 Encounters:   17 102/80   17 108/72   16 144/87    Weight from Last 3 Encounters:   17 168 lb (76.2 kg)   17 168 lb (76.2 kg)   16 175 lb 3.2 oz (79.5 kg)              We Performed the Following     Comprehensive metabolic panel     Hepatitis C antibody     Lipid Profile (Chol, Trig, HDL, LDL calc)     MA Screening Digital Bilateral     Vitamin D Deficiency          Today's Medication Changes          These changes are accurate as of: 3/27/17 10:12 AM.  If you have any questions, ask your nurse or doctor.               Start taking these medicines.        Dose/Directions    zoster vaccine live (PF) injection   Commonly known as:  ZOSTAVAX   Used for:  Need for shingles vaccine   Started by:  Precious Vogel MD        Dose:  1 each   Inject 0.65 mLs Subcutaneous once for 1 dose   Quantity:  0.65 mL   Refills:  0         These medicines have changed or have updated prescriptions.        Dose/Directions    escitalopram 20 MG tablet   Commonly known as:  LEXAPRO   This may have changed:  additional instructions        Dose:  20 mg   Take 1 tablet (20 mg) by mouth daily   Quantity:  90 tablet   Refills:  3         Stop " taking these medicines if you haven't already. Please contact your care team if you have questions.     TYLENOL PO   Stopped by:  Precious Vogel MD                Where to get your medicines      These medications were sent to [id number] - [place], [place] - [address]  [address] [address]     Phone:  [phone]     escitalopram 20 MG tablet         Some of these will need a paper prescription and others can be bought over the counter.  Ask your nurse if you have questions.     Bring a paper prescription for each of these medications     zoster vaccine live (PF) injection                Primary Care Provider    None       No address on file        Thank you!     Thank you for choosing [facility]  for your care. Our goal is always to provide you with excellent care. Hearing back from our patients is one way we can continue to improve our services. Please take a few minutes to complete the written survey that you may receive in the mail after your visit with us. Thank you!             Your Updated Medication List - Protect others around you: Learn how to safely use, store and throw away your medicines at www.disposemymeds.org.          This list is accurate as of: 3/27/17 10:12 AM.  Always use your most recent med list.                   Brand Name Dispense Instructions for use    aspirin - buffered 325 MG Tabs tablet    ASCRIPTIN     Take 325 mg by mouth daily       * clonazePAM 1 MG tablet    klonoPIN    20 tablet    Take 1 tablet (1 mg) by mouth nightly as needed for anxiety       * clonazePAM 1 MG tablet    klonoPIN    60 tablet    1 po qhs       escitalopram 20 MG tablet    LEXAPRO    90 tablet    Take 1 tablet (20 mg) by mouth daily       MELATONIN PO      Take 6 mg by mouth nightly as needed       sulindac 200 MG tablet    CLINORIL    90 tablet    Take 1 tablet (200 mg) by mouth 2 times daily       VITAMIN D (CHOLECALCIFEROL) PO      Take 2,000 Units by mouth  2 times daily       zoster vaccine live (PF) injection    ZOSTAVAX    0.65 mL    Inject 0.65 mLs Subcutaneous once for 1 dose       * Notice:  This list has 2 medication(s) that are the same as other medications prescribed for you. Read the directions carefully, and ask your doctor or other care provider to review them with you.

## 2017-03-27 NOTE — PROGRESS NOTES
Outpatient Physical Therapy Discharge Note     Patient: Aileen Szymanski  : 1955    Referring Provider: Dr. Quinones    Therapy Diagnosis: Left knee revision     Client Self Report: Hoping for injection on 17, but was told it is not possible.  Patient reports Fri she walked all day in [place] at the [employer] and felt great, other than going down stairs which causes pain.  Sat she did well, but last night had restless legs and today is much more stiff and painful.  Dr. Quinones recommended she try [place] technique with a PT in [place].    Goals:  Goal Identifier LTG 1   Goal Description Improve strength to 5/5 to allow step over step gait with pain < 2/10.   Target Date 16   Date Met      Progress:     Goal Identifier LTG 2   Goal Description Improve flexion to 110 degrees.   Target Date 16   Date Met      Progress:     Goal Identifier LTG 3   Goal Description Amb community distances without AD and pain < 2/10.   Target Date 16   Date Met      Progress:     Goal Identifier     Goal Description     Target Date     Date Met      Progress:     Goal Identifier     Goal Description     Target Date     Date Met      Progress:     Goal Identifier     Goal Description     Target Date     Date Met      Progress:     Goal Identifier     Goal Description     Target Date     Date Met      Progress:     Goal Identifier     Goal Description     Target Date     Date Met      Progress:     Progress Toward Goals:   Progress limited due to lateral knee pain and restricted knee flexion.          Plan:  Discharge from therapy.    Discharge:  yes

## 2017-03-27 NOTE — PROGRESS NOTES
SUBJECTIVE:  Aileen is a 62 year old female who comes in today for establish care.  Has history of insomnia, followed by Dr. Hyatt, and treated with Klonopin HS and Melatonin.  Works well.    Has hyperlipidemia, but stopped her Zocor for some reason a year ago.  No specific reason or side effect.    Has depression, on medication for years, stable with Lexapro.  Some seasonal affective component.  Declines counseling, which she has done with Wade Childers in the past.    Has history of vitamin D deficiency.    Is overdue for physical - mammogram, pelvic, etc.  Colonoscopy up to date.  Declines flu shot.    Current Outpatient Prescriptions   Medication     MELATONIN PO     aspirin - buffered (ASCRIPTIN) 325 MG TABS tablet     escitalopram (LEXAPRO) 20 MG tablet     zoster vaccine live, PF, (ZOSTAVAX) injection     clonazePAM (KLONOPIN) 1 MG tablet     VITAMIN D, CHOLECALCIFEROL, PO     [DISCONTINUED] clonazePAM (KLONOPIN) 1 MG tablet     sulindac (CLINORIL) 200 MG tablet     [DISCONTINUED] escitalopram (LEXAPRO) 20 MG tablet     No current facility-administered medications for this visit.         Allergies   Allergen Reactions     Lovenox [Enoxaparin] Other (See Comments)     Bleeding       Past Medical History:   Diagnosis Date     Dizziness and giddiness 07/31/2007     Enthesopathy of knee, unspecified 06/13/2002     Nonallopathic lesion of cervical region, not elsewhere classified 07/25/2001     Nonallopathic lesion of thoracic region, not elsewhere classified 06/13/2002     Pain in joint, lower leg 06/24/2002     Past Surgical History:   Procedure Laterality Date     ABDOMEN SURGERY  2008     lap band     ABDOMEN SURGERY  2007    hysteroscopy     ARTHROPLASTY REVISION KNEE Left 10/2016     CHOLECYSTECTOMY      removal     COLONOSCOPY  04/13/2010    Dr Quevedo; negative      left knee replacement  7/21/2014    Dr Quinones/ [facility]     Family History   Problem Relation Age of Onset     Lung Cancer Mother       HEART DISEASE Mother      Chronic Obstructive Pulmonary Disease Father      Social History     Social History     Marital status:      Spouse name: N/A     Number of children: N/A     Years of education: N/A     Occupational History     Not on file.     Social History Main Topics     Smoking status: Never Smoker     Smokeless tobacco: Not on file     Alcohol use Yes      Comment: rare     Drug use: No     Sexual activity: Yes     Other Topics Concern     Parent/Sibling W/ Cabg, Mi Or Angioplasty Before 65f 55m? Yes     mother MI age 60     Blood Transfusions No     PERMITS     Social History Narrative         ROS:  General: negative for, fever, chills, weight gain  Skin: negative for, rash  Resp: No dyspnea on exertion and No cough  CV: negative for, palpitations, chest pain and lower extremity edema  GI: negative for, poor appetite, nausea, vomiting, abdominal pain, constipation and diarrhea  : negative for, dysuria, frequency and hematuria  Musculoskeletal: positive for arthritis and joint pain  Psychiatric: positive for as above and depression stable  Endocrine: negative for and diabetes    PHQ-9 SCORE 3/27/2017   Total Score 11     AURELIANO-7 SCORE 3/27/2017   Total Score 5       OBJECTIVE:  Vitals:    03/27/17 0932   BP: 102/80   BP Location: Right arm   Patient Position: Chair   Pulse: 64   Temp: 97.4  F (36.3  C)   SpO2: 98%   Weight: 168 lb (76.2 kg)     GENERAL APPEARANCE: healthy, alert and no distress  NECK: no adenopathy, no asymmetry, masses, or scars and thyroid normal to palpation  RESP: lungs clear to auscultation - no rales, rhonchi or wheezes  CV: regular rates and rhythm, normal S1 S2, no S3 or S4 and no murmur, click or rub -  MS: extremities normal- no gross deformities noted, no evidence of inflammation in joints, FROM in all extremities.  PSYCH: mentation appears normal. and affect normal/bright    ASSESSMENT/ORDERS:    ICD-10-CM    1. Establishing care with new doctor, encounter for  Z71.89    2. Hyperlipidemia, unspecified hyperlipidemia type E78.5 Comprehensive metabolic panel     Lipid Profile (Chol, Trig, HDL, LDL calc)   3. Vitamin D deficiency E55.9 Vitamin D Deficiency   4. Episode of recurrent major depressive disorder, unspecified depression episode severity (H) F33.9    5. Need for hepatitis C screening test Z11.59 Hepatitis C antibody   6. Encounter for screening mammogram for breast cancer Z12.31 MA Screening Digital Bilateral     MA Screening Digital Bilateral   7. Need for shingles vaccine Z23 zoster vaccine live, PF, (ZOSTAVAX) injection     PLAN:    Patient Instructions   Will call with lab results.  Mammogram ordered.  Flu shot declined.  Script for Zostavax given.  Continue current medications.  Will reassess need for statin based on today's labs.    Encouraged preventative physical.      Precious Valdez

## 2017-03-27 NOTE — PATIENT INSTRUCTIONS
Will call with lab results.  Mammogram ordered.  Flu shot declined.  Script for Zostavax given.  Continue current medications.  Will reassess need for statin based on today's labs.

## 2017-03-28 LAB
DEPRECATED CALCIDIOL+CALCIFEROL SERPL-MC: 27 UG/L (ref 20–75)
HCV AB SERPL QL IA: NORMAL

## 2017-03-28 RX ORDER — ERGOCALCIFEROL 1.25 MG/1
50000 CAPSULE, LIQUID FILLED ORAL
Qty: 8 CAPSULE | Refills: 0 | Status: SHIPPED | OUTPATIENT
Start: 2017-03-28 | End: 2017-05-17

## 2017-03-28 ASSESSMENT — ANXIETY QUESTIONNAIRES: GAD7 TOTAL SCORE: 5

## 2017-03-28 ASSESSMENT — PATIENT HEALTH QUESTIONNAIRE - PHQ9: SUM OF ALL RESPONSES TO PHQ QUESTIONS 1-9: 11

## 2017-03-30 ENCOUNTER — OFFICE VISIT (OUTPATIENT)
Dept: CHIROPRACTIC MEDICINE | Facility: OTHER | Age: 62
End: 2017-03-30
Attending: CHIROPRACTOR
Payer: COMMERCIAL

## 2017-03-30 DIAGNOSIS — M99.01 SEGMENTAL AND SOMATIC DYSFUNCTION OF CERVICAL REGION: ICD-10-CM

## 2017-03-30 DIAGNOSIS — M99.03 SEGMENTAL AND SOMATIC DYSFUNCTION OF LUMBAR REGION: Primary | ICD-10-CM

## 2017-03-30 DIAGNOSIS — M99.02 SEGMENTAL AND SOMATIC DYSFUNCTION OF THORACIC REGION: ICD-10-CM

## 2017-03-30 DIAGNOSIS — M54.50 ACUTE BILATERAL LOW BACK PAIN WITHOUT SCIATICA: ICD-10-CM

## 2017-03-30 PROCEDURE — 98941 CHIROPRACT MANJ 3-4 REGIONS: CPT | Mod: AT | Performed by: CHIROPRACTOR

## 2017-03-30 NOTE — MR AVS SNAPSHOT
"              After Visit Summary   3/30/2017    Aileen Szymanski    MRN: [id number]           Patient Information     Date Of Birth          1955        Visit Information        Provider Department      3/30/2017 9:50 AM Sul[facility]        Today's Diagnoses     Segmental and somatic dysfunction of lumbar region    -  1    Acute bilateral low back pain without sciatica        Segmental and somatic dysfunction of thoracic region        Segmental and somatic dysfunction of cervical region           Follow-ups after your visit        Who to contact     If you have questions or need follow up information about today's clinic visit or your schedule please contact  [facility] directly at [phone].  Normal or non-critical lab and imaging results will be communicated to you by Flareohart, letter or phone within 4 business days after the clinic has received the results. If you do not hear from us within 7 days, please contact the clinic through MyChart or phone. If you have a critical or abnormal lab result, we will notify you by phone as soon as possible.  Submit refill requests through [facility] or call your pharmacy and they will forward the refill request to us. Please allow 3 business days for your refill to be completed.          Additional Information About Your Visit        MyChart Information     [facility] lets you send messages to your doctor, view your test results, renew your prescriptions, schedule appointments and more. To sign up, go to [email or web address] . Click on \"Log in\" on the left side of the screen, which will take you to the Welcome page. Then click on \"Sign up Now\" on the right side of the page.     You will be asked to enter the access code listed below, as well as some personal information. Please follow the directions to create your username and password.     Your access code is: [email or web address]  Expires: 6/12/2017  2:40 PM     Your access code will "  in 90 days. If you need help or a new code, please call your [place] clinic or [phone].        Care EveryWhere ID     This is your Care EveryWhere ID. This could be used by other organizations to access your [place] medical records  [id number]         Blood Pressure from Last 3 Encounters:   17 102/80   17 108/72   16 144/87    Weight from Last 3 Encounters:   17 168 lb (76.2 kg)   17 168 lb (76.2 kg)   16 175 lb 3.2 oz (79.5 kg)              We Performed the Following     CHIROPRAC MANIP,SPINAL,3-4 REGIONS        Primary Care Provider    None       No address on file        Thank you!     Thank you for choosing  [facility]  for your care. Our goal is always to provide you with excellent care. Hearing back from our patients is one way we can continue to improve our services. Please take a few minutes to complete the written survey that you may receive in the mail after your visit with us. Thank you!             Your Updated Medication List - Protect others around you: Learn how to safely use, store and throw away your medicines at www.disposemymeds.org.          This list is accurate as of: 3/30/17 12:05 PM.  Always use your most recent med list.                   Brand Name Dispense Instructions for use    aspirin - buffered 325 MG Tabs tablet    ASCRIPTIN     Take 325 mg by mouth daily       clonazePAM 1 MG tablet    klonoPIN    60 tablet    1 po qhs       escitalopram 20 MG tablet    LEXAPRO    90 tablet    Take 1 tablet (20 mg) by mouth daily       MELATONIN PO      Take 6 mg by mouth nightly as needed       sulindac 200 MG tablet    CLINORIL    90 tablet    Take 1 tablet (200 mg) by mouth 2 times daily       VITAMIN D (CHOLECALCIFEROL) PO      Take 2,000 Units by mouth 2 times daily       vitamin D 62816 UNIT capsule    ERGOCALCIFEROL    8 capsule    Take 1 capsule (50,000 Units) by mouth every 7 days for 8 doses

## 2017-03-30 NOTE — PROGRESS NOTES
Subjective Finding:    Chief compalint: Patient presents with:  Back Pain  Neck Pain  , Pain Scale: 5/10, Intensity: sharp and ache, Duration: 2 days, Change since last visit: worse, Radiating: no.    Date of injury:     Activities that the pain restricts:   Home/household activities: no.  Work duties: no.  Hobbies/social: no.  Sleep: no.  Makes symptoms better: activity and rest.  Makes symptoms worse: lumbar extension and lumbar flexion.  Have you seen anyone else for the symptoms? No.  Work related: no.  Automobile related injury: no.    Objective and Assessment:    Posture Analysis:   High shoulder: .  Head tilt: .  High iliac crest: .  Head carriage: neutral.  Thoracic Kyphosis: neutral.  Lumbar Lordosis: neutral.    Lumbar Range of Motion: flexion decreased and extension decreased.  Cervical Range of Motion: flexion decreased.  Thoracic Range of Motion: .  Extremity Range of Motion: .    Palpation:   Quad lumb: right, referred pain: no    Segmental dysfunction pre-treatment: C2, T6, T7, L4 and L5.    Assessment post-treatment:  Cervical: ROM increased.  Thoracic: pain and tenderness decreased.  Lumbar: pain and tenderness decreased.    Comments: .        Complicating Factors: .    Plan / Procedure:    Expected release date: .  Treatment plan: prn.  Instructed patient: ice 20 minutes every other hour as needed.  Short term goals: reduce pain and increase ROM.  Long term goals: increase patient functional independence.  Prognosis: excellent.

## 2017-05-18 ENCOUNTER — OFFICE VISIT (OUTPATIENT)
Dept: CHIROPRACTIC MEDICINE | Facility: OTHER | Age: 62
End: 2017-05-18
Attending: CHIROPRACTOR
Payer: COMMERCIAL

## 2017-05-18 DIAGNOSIS — M99.02 SEGMENTAL AND SOMATIC DYSFUNCTION OF THORACIC REGION: ICD-10-CM

## 2017-05-18 DIAGNOSIS — M99.01 SEGMENTAL AND SOMATIC DYSFUNCTION OF CERVICAL REGION: ICD-10-CM

## 2017-05-18 DIAGNOSIS — M54.50 ACUTE BILATERAL LOW BACK PAIN WITHOUT SCIATICA: ICD-10-CM

## 2017-05-18 DIAGNOSIS — M99.03 SEGMENTAL AND SOMATIC DYSFUNCTION OF LUMBAR REGION: Primary | ICD-10-CM

## 2017-05-18 PROCEDURE — 98941 CHIROPRACT MANJ 3-4 REGIONS: CPT | Mod: AT | Performed by: CHIROPRACTOR

## 2017-05-18 NOTE — MR AVS SNAPSHOT
After Visit Summary   5/18/2017    Aileen Szymanski    MRN: [id number]           Patient Information     Date Of Birth          1955        Visit Information        Provider Department      5/18/2017 11:00 AM Sulaiman Hollingsworth DC  [facility] [place] [facility]        Today's Diagnoses     Segmental and somatic dysfunction of lumbar region    -  1    Acute bilateral low back pain without sciatica        Segmental and somatic dysfunction of thoracic region        Segmental and somatic dysfunction of cervical region           Follow-ups after your visit        Who to contact     If you have questions or need follow up information about today's clinic visit or your schedule please contact  [facility] directly at [phone].  Normal or non-critical lab and imaging results will be communicated to you by MyChart, letter or phone within 4 business days after the clinic has received the results. If you do not hear from us within 7 days, please contact the clinic through PodPonicshart or phone. If you have a critical or abnormal lab result, we will notify you by phone as soon as possible.  Submit refill requests through [employer] or call your pharmacy and they will forward the refill request to us. Please allow 3 business days for your refill to be completed.          Additional Information About Your Visit        MyChart Information     [facility] gives you secure access to your electronic health record. If you see a primary care provider, you can also send messages to your care team and make appointments. If you have questions, please call your primary care clinic.  If you do not have a primary care provider, please call [phone] and they will assist you.        Care EveryWhere ID     This is your Care EveryWhere ID. This could be used by other organizations to access your [place] medical records  [id number]         Blood Pressure from Last 3 Encounters:   03/27/17 102/80   03/14/17 108/72   09/30/16  144/87    Weight from Last 3 Encounters:   03/27/17 168 lb (76.2 kg)   03/14/17 168 lb (76.2 kg)   09/30/16 175 lb 3.2 oz (79.5 kg)              We Performed the Following     CHIROPRAC MANIP,SPINAL,3-4 REGIONS        Primary Care Provider    None       No address on file        Thank you!     Thank you for choosing  [facility]  for your care. Our goal is always to provide you with excellent care. Hearing back from our patients is one way we can continue to improve our services. Please take a few minutes to complete the written survey that you may receive in the mail after your visit with us. Thank you!             Your Updated Medication List - Protect others around you: Learn how to safely use, store and throw away your medicines at www.disposemymeds.org.          This list is accurate as of: 5/18/17 11:59 PM.  Always use your most recent med list.                   Brand Name Dispense Instructions for use    aspirin - buffered 325 MG Tabs tablet    ASCRIPTIN     Take 325 mg by mouth daily       escitalopram 20 MG tablet    LEXAPRO    90 tablet    Take 1 tablet (20 mg) by mouth daily       MELATONIN PO      Take 6 mg by mouth nightly as needed       sulindac 200 MG tablet    CLINORIL    90 tablet    Take 1 tablet (200 mg) by mouth 2 times daily       VITAMIN D (CHOLECALCIFEROL) PO      Take 2,000 Units by mouth 2 times daily

## 2017-05-19 DIAGNOSIS — F51.01 PRIMARY INSOMNIA: ICD-10-CM

## 2017-05-23 RX ORDER — CLONAZEPAM 1 MG/1
TABLET ORAL
Qty: 60 TABLET | Refills: 0 | Status: SHIPPED | OUTPATIENT
Start: 2017-05-23 | End: 2017-07-18

## 2017-05-24 ENCOUNTER — TELEPHONE (OUTPATIENT)
Dept: FAMILY MEDICINE | Facility: OTHER | Age: 62
End: 2017-05-24

## 2017-05-24 NOTE — TELEPHONE ENCOUNTER
2:08 PM    Reason for Call: Phone Call    Description: Pt called to set up follow up for 05/31/2017. She would like to do her fasting labs on Friday 05/26/17 in the AM to recheck her lipids, vitamin D and hemoglobin. Please call her back at [phone]    Was an appointment offered for this call? Yes    Preferred method for responding to this message: Telephone Call    If we cannot reach you directly, may we leave a detailed response at the number you provided? Yes    Can this message wait until your PCP/provider returns, if available today? Not applicable    Tejal Britt

## 2017-05-24 NOTE — TELEPHONE ENCOUNTER
Vit d level was checked 2 months ago.  It was low at 27, so given weekly replacement.  No need to recheck.  Just resume 2000 units OTC daily.    Lipids were also checked 2 months ago.  Did she lose weight or change diet?  Or why would we recheck again now?

## 2017-05-24 NOTE — TELEPHONE ENCOUNTER
Patient wants cholestrol and vitamin D rechecked as she had low vitamin D level and restarted cholestrol medication. Please order labs

## 2017-05-26 DIAGNOSIS — Z12.31 VISIT FOR SCREENING MAMMOGRAM: Primary | ICD-10-CM

## 2017-05-26 PROCEDURE — G0202 SCR MAMMO BI INCL CAD: HCPCS | Mod: TC | Performed by: RADIOLOGY

## 2017-05-31 ENCOUNTER — TELEPHONE (OUTPATIENT)
Dept: FAMILY MEDICINE | Facility: OTHER | Age: 62
End: 2017-05-31

## 2017-05-31 DIAGNOSIS — E78.2 MIXED HYPERLIPIDEMIA: Primary | ICD-10-CM

## 2017-05-31 RX ORDER — SIMVASTATIN 40 MG
40 TABLET ORAL AT BEDTIME
Qty: 90 TABLET | Refills: 3 | Status: SHIPPED | OUTPATIENT
Start: 2017-05-31 | End: 2018-04-30

## 2017-06-13 ENCOUNTER — OFFICE VISIT (OUTPATIENT)
Dept: CHIROPRACTIC MEDICINE | Facility: OTHER | Age: 62
End: 2017-06-13
Attending: CHIROPRACTOR
Payer: COMMERCIAL

## 2017-06-13 DIAGNOSIS — M99.01 SEGMENTAL AND SOMATIC DYSFUNCTION OF CERVICAL REGION: ICD-10-CM

## 2017-06-13 DIAGNOSIS — M54.50 ACUTE BILATERAL LOW BACK PAIN WITHOUT SCIATICA: ICD-10-CM

## 2017-06-13 DIAGNOSIS — M99.03 SEGMENTAL AND SOMATIC DYSFUNCTION OF LUMBAR REGION: Primary | ICD-10-CM

## 2017-06-13 DIAGNOSIS — M99.02 SEGMENTAL AND SOMATIC DYSFUNCTION OF THORACIC REGION: ICD-10-CM

## 2017-06-13 PROCEDURE — 98941 CHIROPRACT MANJ 3-4 REGIONS: CPT | Mod: AT | Performed by: CHIROPRACTOR

## 2017-06-13 NOTE — MR AVS SNAPSHOT
After Visit Summary   6/13/2017    Aileen Szymanski    MRN: [id number]           Patient Information     Date Of Birth          1955        Visit Information        Provider Department      6/13/2017 11:20 AM Sulaiman Hollingsworth DC  [facility] Mario Alberto Boswell        Today's Diagnoses     Segmental and somatic dysfunction of lumbar region    -  1    Acute bilateral low back pain without sciatica        Segmental and somatic dysfunction of thoracic region        Segmental and somatic dysfunction of cervical region           Follow-ups after your visit        Who to contact     If you have questions or need follow up information about today's clinic visit or your schedule please contact  [facility] MARIO ALBERTO BOSWELL directly at [phone].  Normal or non-critical lab and imaging results will be communicated to you by [employer]hart, letter or phone within 4 business days after the clinic has received the results. If you do not hear from us within 7 days, please contact the clinic through [employer]hart or phone. If you have a critical or abnormal lab result, we will notify you by phone as soon as possible.  Submit refill requests through [employer] or call your pharmacy and they will forward the refill request to us. Please allow 3 business days for your refill to be completed.          Additional Information About Your Visit        MyChart Information     [employer] gives you secure access to your electronic health record. If you see a primary care provider, you can also send messages to your care team and make appointments. If you have questions, please call your primary care clinic.  If you do not have a primary care provider, please call [phone] and they will assist you.        Care EveryWhere ID     This is your Care EveryWhere ID. This could be used by other organizations to access your [place] medical records  [id number]         Blood Pressure from Last 3 Encounters:   03/27/17 102/80   03/14/17 108/72   09/30/16  144/87    Weight from Last 3 Encounters:   03/27/17 168 lb (76.2 kg)   03/14/17 168 lb (76.2 kg)   09/30/16 175 lb 3.2 oz (79.5 kg)              We Performed the Following     CHIROPRAC MANIP,SPINAL,3-4 REGIONS        Primary Care Provider Office Phone # Fax #    Precious Vogel [phone]       [facility] [address] [place] [address] AVE  [place] [address]        Thank you!     Thank you for choosing  [facility]  for your care. Our goal is always to provide you with excellent care. Hearing back from our patients is one way we can continue to improve our services. Please take a few minutes to complete the written survey that you may receive in the mail after your visit with us. Thank you!             Your Updated Medication List - Protect others around you: Learn how to safely use, store and throw away your medicines at www.disposemymeds.org.          This list is accurate as of: 6/13/17 11:59 PM.  Always use your most recent med list.                   Brand Name Dispense Instructions for use    aspirin - buffered 325 MG Tabs tablet    ASCRIPTIN     Take 325 mg by mouth daily       clonazePAM 1 MG tablet    klonoPIN    60 tablet    1 po qhs       escitalopram 20 MG tablet    LEXAPRO    90 tablet    Take 1 tablet (20 mg) by mouth daily       MELATONIN PO      Take 6 mg by mouth nightly as needed       simvastatin 40 MG tablet    ZOCOR    90 tablet    Take 1 tablet (40 mg) by mouth At Bedtime       sulindac 200 MG tablet    CLINORIL    90 tablet    Take 1 tablet (200 mg) by mouth 2 times daily       VITAMIN D (CHOLECALCIFEROL) PO      Take 2,000 Units by mouth 2 times daily

## 2017-07-07 ENCOUNTER — OFFICE VISIT (OUTPATIENT)
Dept: CHIROPRACTIC MEDICINE | Facility: OTHER | Age: 62
End: 2017-07-07
Attending: CHIROPRACTOR
Payer: COMMERCIAL

## 2017-07-07 DIAGNOSIS — M99.03 SEGMENTAL AND SOMATIC DYSFUNCTION OF LUMBAR REGION: ICD-10-CM

## 2017-07-07 DIAGNOSIS — M99.01 SEGMENTAL AND SOMATIC DYSFUNCTION OF CERVICAL REGION: Primary | ICD-10-CM

## 2017-07-07 DIAGNOSIS — M54.2 CERVICALGIA: ICD-10-CM

## 2017-07-07 DIAGNOSIS — M99.02 SEGMENTAL AND SOMATIC DYSFUNCTION OF THORACIC REGION: ICD-10-CM

## 2017-07-07 PROCEDURE — 98941 CHIROPRACT MANJ 3-4 REGIONS: CPT | Mod: AT | Performed by: CHIROPRACTOR

## 2017-07-07 NOTE — MR AVS SNAPSHOT
After Visit Summary   7/7/2017    Aileen Szymanski    MRN: [id number]           Patient Information     Date Of Birth          1955        Visit Information        Provider Department      7/7/2017 12:10 PM Sulaiman Hollingsworth DC  [facility]        Today's Diagnoses     Segmental and somatic dysfunction of cervical region    -  1    Cervicalgia        Segmental and somatic dysfunction of lumbar region        Segmental and somatic dysfunction of thoracic region           Follow-ups after your visit        Who to contact     If you have questions or need follow up information about today's clinic visit or your schedule please contact  [facility] MARIO ALBERTO BOSWELL directly at [phone].  Normal or non-critical lab and imaging results will be communicated to you by [employer]hart, letter or phone within 4 business days after the clinic has received the results. If you do not hear from us within 7 days, please contact the clinic through [employer]hart or phone. If you have a critical or abnormal lab result, we will notify you by phone as soon as possible.  Submit refill requests through eyeOS or call your pharmacy and they will forward the refill request to us. Please allow 3 business days for your refill to be completed.          Additional Information About Your Visit        MyChart Information     [employer]OS gives you secure access to your electronic health record. If you see a primary care provider, you can also send messages to your care team and make appointments. If you have questions, please call your primary care clinic.  If you do not have a primary care provider, please call [phone] and they will assist you.        Care EveryWhere ID     This is your Care EveryWhere ID. This could be used by other organizations to access your [place] medical records  [id number]         Blood Pressure from Last 3 Encounters:   03/27/17 102/80   03/14/17 108/72   09/30/16 144/87    Weight from Last 3 Encounters:    03/27/17 168 lb (76.2 kg)   03/14/17 168 lb (76.2 kg)   09/30/16 175 lb 3.2 oz (79.5 kg)              We Performed the Following     CHIROPRAC MANIP,SPINAL,3-4 REGIONS        Primary Care Provider Office Phone # Fax #    Precious Vogel [phone]       [facility] [address] [facility] [address]        Equal Access to Services     [facility] : Hadii aad ku hadasho Soomaali, waaxda luqadaha, qaybta kaalmada adeegyada, waxay idiin hayaan adeeg kharash la'aan . So [facility] [phone].    ATENCIÓN: Si habla español, tiene a ambrosio disposición servicios gratuitos de asistencia lingüística. [facility] [phone].    We comply with applicable federal civil rights laws and [place] laws. We do not discriminate on the basis of race, color, national origin, age, disability sex, sexual orientation or gender identity.            Thank you!     Thank you for choosing  [facility]  for your care. Our goal is always to provide you with excellent care. Hearing back from our patients is one way we can continue to improve our services. Please take a few minutes to complete the written survey that you may receive in the mail after your visit with us. Thank you!             Your Updated Medication List - Protect others around you: Learn how to safely use, store and throw away your medicines at www.disposemymeds.org.          This list is accurate as of: 7/7/17 11:59 PM.  Always use your most recent med list.                   Brand Name Dispense Instructions for use Diagnosis    aspirin - buffered 325 MG Tabs tablet    ASCRIPTIN     Take 325 mg by mouth daily        clonazePAM 1 MG tablet    klonoPIN    60 tablet    1 po qhs    Primary insomnia       escitalopram 20 MG tablet    LEXAPRO    90 tablet    Take 1 tablet (20 mg) by mouth daily        MELATONIN PO      Take 6 mg by mouth nightly as needed        simvastatin 40 MG tablet    ZOCOR    90 tablet    Take 1 tablet (40 mg) by mouth At  Bedtime    Mixed hyperlipidemia       sulindac 200 MG tablet    CLINORIL    90 tablet    Take 1 tablet (200 mg) by mouth 2 times daily        VITAMIN D (CHOLECALCIFEROL) PO      Take 2,000 Units by mouth 2 times daily

## 2017-07-18 DIAGNOSIS — F51.01 PRIMARY INSOMNIA: ICD-10-CM

## 2017-07-18 RX ORDER — CLONAZEPAM 1 MG/1
TABLET ORAL
Qty: 60 TABLET | Refills: 0 | Status: SHIPPED | OUTPATIENT
Start: 2017-07-18 | End: 2017-09-13

## 2017-08-15 ENCOUNTER — OFFICE VISIT (OUTPATIENT)
Dept: CHIROPRACTIC MEDICINE | Facility: OTHER | Age: 62
End: 2017-08-15
Attending: CHIROPRACTOR
Payer: COMMERCIAL

## 2017-08-15 DIAGNOSIS — M99.01 SEGMENTAL AND SOMATIC DYSFUNCTION OF CERVICAL REGION: Primary | ICD-10-CM

## 2017-08-15 DIAGNOSIS — M54.2 CERVICALGIA: ICD-10-CM

## 2017-08-15 DIAGNOSIS — M99.02 SEGMENTAL AND SOMATIC DYSFUNCTION OF THORACIC REGION: ICD-10-CM

## 2017-08-15 DIAGNOSIS — M99.03 SEGMENTAL AND SOMATIC DYSFUNCTION OF LUMBAR REGION: ICD-10-CM

## 2017-08-15 PROCEDURE — 98941 CHIROPRACT MANJ 3-4 REGIONS: CPT | Mod: AT | Performed by: CHIROPRACTOR

## 2017-08-15 NOTE — MR AVS SNAPSHOT
After Visit Summary   8/15/2017    Aileen Szymanski    MRN: [id number]           Patient Information     Date Of Birth          1955        Visit Information        Provider Department      8/15/2017 11:40 AM Sulaiman Hollingsworth DC  [facility]        Today's Diagnoses     Segmental and somatic dysfunction of cervical region    -  1    Cervicalgia        Segmental and somatic dysfunction of lumbar region        Segmental and somatic dysfunction of thoracic region           Follow-ups after your visit        Who to contact     If you have questions or need follow up information about today's clinic visit or your schedule please contact  [facility] MARIO ALBERTO BOSWELL directly at [phone].  Normal or non-critical lab and imaging results will be communicated to you by [facility], letter or phone within 4 business days after the clinic has received the results. If you do not hear from us within 7 days, please contact the clinic through [facility] or phone. If you have a critical or abnormal lab result, we will notify you by phone as soon as possible.  Submit refill requests through [employer] or call your pharmacy and they will forward the refill request to us. Please allow 3 business days for your refill to be completed.          Additional Information About Your Visit        MyChart Information     [employer] gives you secure access to your electronic health record. If you see a primary care provider, you can also send messages to your care team and make appointments. If you have questions, please call your primary care clinic.  If you do not have a primary care provider, please call [phone] and they will assist you.        Care EveryWhere ID     This is your Care EveryWhere ID. This could be used by other organizations to access your [place] medical records  [id number]         Blood Pressure from Last 3 Encounters:   03/27/17 102/80   03/14/17 108/72   09/30/16 144/87    Weight from Last 3  Encounters:   03/27/17 168 lb (76.2 kg)   03/14/17 168 lb (76.2 kg)   09/30/16 175 lb 3.2 oz (79.5 kg)              We Performed the Following     CHIROPRAC MANIP,SPINAL,3-4 REGIONS        Primary Care Provider Office Phone # Fax #    Precious Vogel [phone]       [facility] [address]  [facility] [address]        Equal Access to Services     [facility] : Hadii aad ku hadasho Soomaali, waaxda luqadaha, qaybta kaalmada adeegyada, waxay idiin hayaan adeeg kharash la'aan . So [facility] [phone].    ATENCIÓN: Si habla español, tiene a ambrosio disposición servicios gratuitos de asistencia lingüística. [facility] [phone].    We comply with applicable federal civil rights laws and [place] laws. We do not discriminate on the basis of race, color, national origin, age, disability sex, sexual orientation or gender identity.            Thank you!     Thank you for choosing  [facility] [place]  [facility] your [facility]. Our goal is always to provide you with excellent care. Hearing back from our patients is one way we can continue to improve our services. Please take a few minutes to complete the written survey that you may receive in the mail after your visit with us. Thank you!             Your Updated Medication List - Protect others around you: Learn how to safely use, store and throw away your medicines at www.disposemymeds.org.          This list is accurate as of: 8/15/17  2:19 PM.  Always use your most recent med list.                   Brand Name Dispense Instructions for use Diagnosis    aspirin - buffered 325 MG Tabs tablet    ASCRIPTIN     Take 325 mg by mouth daily        clonazePAM 1 MG tablet    klonoPIN    60 tablet    1 po qhs    Primary insomnia       escitalopram 20 MG tablet    LEXAPRO    90 tablet    Take 1 tablet (20 mg) by mouth daily        MELATONIN PO      Take 6 mg by mouth nightly as needed        simvastatin 40 MG tablet    ZOCOR    90 tablet    Take 1 tablet (40 mg)  by mouth At Bedtime    Mixed hyperlipidemia       sulindac 200 MG tablet    CLINORIL    90 tablet    Take 1 tablet (200 mg) by mouth 2 times daily        VITAMIN D (CHOLECALCIFEROL) PO      Take 2,000 Units by mouth 2 times daily

## 2017-08-31 ENCOUNTER — TELEPHONE (OUTPATIENT)
Dept: FAMILY MEDICINE | Facility: OTHER | Age: 62
End: 2017-08-31

## 2017-08-31 PROBLEM — F32.9 MAJOR DEPRESSIVE DISORDER WITHOUT PSYCHOTIC FEATURES: Status: ACTIVE | Noted: 2017-08-31

## 2017-09-13 DIAGNOSIS — F51.01 PRIMARY INSOMNIA: ICD-10-CM

## 2017-09-14 RX ORDER — CLONAZEPAM 1 MG/1
TABLET ORAL
Qty: 60 TABLET | Refills: 0 | Status: SHIPPED | OUTPATIENT
Start: 2017-09-14 | End: 2017-11-10

## 2017-09-20 ENCOUNTER — TELEPHONE (OUTPATIENT)
Dept: FAMILY MEDICINE | Facility: OTHER | Age: 62
End: 2017-09-20

## 2017-09-20 ASSESSMENT — ANXIETY QUESTIONNAIRES
7. FEELING AFRAID AS IF SOMETHING AWFUL MIGHT HAPPEN: NOT AT ALL
IF YOU CHECKED OFF ANY PROBLEMS ON THIS QUESTIONNAIRE, HOW DIFFICULT HAVE THESE PROBLEMS MADE IT FOR YOU TO DO YOUR WORK, TAKE CARE OF THINGS AT HOME, OR GET ALONG WITH OTHER PEOPLE: NOT DIFFICULT AT ALL
4. TROUBLE RELAXING: NOT AT ALL
6. BECOMING EASILY ANNOYED OR IRRITABLE: NOT AT ALL
2. NOT BEING ABLE TO STOP OR CONTROL WORRYING: NOT AT ALL
GAD7 TOTAL SCORE: 0
5. BEING SO RESTLESS THAT IT IS HARD TO SIT STILL: NOT AT ALL
3. WORRYING TOO MUCH ABOUT DIFFERENT THINGS: NOT AT ALL
1. FEELING NERVOUS, ANXIOUS, OR ON EDGE: NOT AT ALL

## 2017-09-20 ASSESSMENT — PATIENT HEALTH QUESTIONNAIRE - PHQ9: SUM OF ALL RESPONSES TO PHQ QUESTIONS 1-9: 0

## 2017-09-21 ASSESSMENT — ANXIETY QUESTIONNAIRES: GAD7 TOTAL SCORE: 0

## 2017-10-10 ENCOUNTER — OFFICE VISIT (OUTPATIENT)
Dept: CHIROPRACTIC MEDICINE | Facility: OTHER | Age: 62
End: 2017-10-10
Attending: CHIROPRACTOR
Payer: COMMERCIAL

## 2017-10-10 DIAGNOSIS — M99.03 SEGMENTAL AND SOMATIC DYSFUNCTION OF LUMBAR REGION: Primary | ICD-10-CM

## 2017-10-10 DIAGNOSIS — M99.02 SEGMENTAL AND SOMATIC DYSFUNCTION OF THORACIC REGION: ICD-10-CM

## 2017-10-10 DIAGNOSIS — M99.01 SEGMENTAL AND SOMATIC DYSFUNCTION OF CERVICAL REGION: ICD-10-CM

## 2017-10-10 DIAGNOSIS — M54.50 ACUTE BILATERAL LOW BACK PAIN WITHOUT SCIATICA: ICD-10-CM

## 2017-10-10 PROCEDURE — 98941 CHIROPRACT MANJ 3-4 REGIONS: CPT | Mod: AT | Performed by: CHIROPRACTOR

## 2017-10-10 NOTE — MR AVS SNAPSHOT
After Visit Summary   10/10/2017    Aileen Szymanski    MRN: [id number]           Patient Information     Date Of Birth          1955        Visit Information        Provider Department      10/10/2017 11:20 AM Sulaiman Hollingsworth DC  [facility]        Today's Diagnoses     Segmental and somatic dysfunction of lumbar region    -  1    Acute bilateral low back pain without sciatica        Segmental and somatic dysfunction of thoracic region        Segmental and somatic dysfunction of cervical region           Follow-ups after your visit        Who to contact     If you have questions or need follow up information about today's clinic visit or your schedule please contact  [facility] MARIO ALBERTO BOSWELL directly at [phone].  Normal or non-critical lab and imaging results will be communicated to you by in2appshart, letter or phone within 4 business days after the clinic has received the results. If you do not hear from us within 7 days, please contact the clinic through in2appshart or phone. If you have a critical or abnormal lab result, we will notify you by phone as soon as possible.  Submit refill requests through [employer]um or call your pharmacy and they will forward the refill request to us. Please allow 3 business days for your refill to be completed.          Additional Information About Your Visit        MyChart Information     [employer] gives you secure access to your electronic health record. If you see a primary care provider, you can also send messages to your care team and make appointments. If you have questions, please call your primary care clinic.  If you do not have a primary care provider, please call [phone] and they will assist you.        Care EveryWhere ID     This is your Care EveryWhere ID. This could be used by other organizations to access your [place] medical records  [id number]         Blood Pressure from Last 3 Encounters:   03/27/17 102/80   03/14/17 108/72   09/30/16  144/87    Weight from Last 3 Encounters:   03/27/17 168 lb (76.2 kg)   03/14/17 168 lb (76.2 kg)   09/30/16 175 lb 3.2 oz (79.5 kg)              We Performed the Following     CHIROPRAC MANIP,SPINAL,3-4 REGIONS        Primary Care Provider Office Phone # Fax #    Precious Vogel [phone]       [facility] [address] [address]        Equal Access to Services     [facility]: Hadii aad ku hadasho Soomaali, waaxda luqadaha, qaybta kaalmada adeegyada, waxay idiin hayaan adeeg kharash lanoman . So [facility] [phone].    ATENCIÓN: Si habla español, tiene a ambrosio disposición servicios gratuitos de asistencia lingüística. [facility] [phone].    We comply with applicable federal civil rights laws and [place] laws. We do not discriminate on the basis of race, color, national origin, age, disability, sex, sexual orientation, or gender identity.            Thank you!     Thank you for choosing  [facility] [place]  [facility] your [facility]. Our goal is always to provide you with excellent care. Hearing back from our patients is one way we can continue to improve our services. Please take a few minutes to complete the written survey that you may receive in the mail after your visit with us. Thank you!             Your Updated Medication List - Protect others around you: Learn how to safely use, store and throw away your medicines at www.disposemymeds.org.          This list is accurate as of: 10/10/17 11:59 PM.  Always use your most recent med list.                   Brand Name Dispense Instructions for use Diagnosis    aspirin - buffered 325 MG Tabs tablet    ASCRIPTIN     Take 325 mg by mouth daily        clonazePAM 1 MG tablet    klonoPIN    60 tablet    1 po qhs    Primary insomnia       escitalopram 20 MG tablet    LEXAPRO    90 tablet    Take 1 tablet (20 mg) by mouth daily        MELATONIN PO      Take 6 mg by mouth nightly as needed        simvastatin 40 MG tablet    ZOCOR    90  tablet    Take 1 tablet (40 mg) by mouth At Bedtime    Mixed hyperlipidemia       sulindac 200 MG tablet    CLINORIL    90 tablet    Take 1 tablet (200 mg) by mouth 2 times daily        VITAMIN D (CHOLECALCIFEROL) PO      Take 2,000 Units by mouth 2 times daily

## 2017-11-02 ENCOUNTER — APPOINTMENT (OUTPATIENT)
Dept: CT IMAGING | Facility: HOSPITAL | Age: 62
End: 2017-11-02
Attending: INTERNAL MEDICINE
Payer: COMMERCIAL

## 2017-11-02 ENCOUNTER — HOSPITAL ENCOUNTER (EMERGENCY)
Facility: HOSPITAL | Age: 62
Discharge: HOME | End: 2017-11-03
Attending: INTERNAL MEDICINE | Admitting: INTERNAL MEDICINE
Payer: COMMERCIAL

## 2017-11-02 ENCOUNTER — APPOINTMENT (OUTPATIENT)
Dept: GENERAL RADIOLOGY | Facility: HOSPITAL | Age: 62
End: 2017-11-02
Attending: INTERNAL MEDICINE
Payer: COMMERCIAL

## 2017-11-02 DIAGNOSIS — S02.40CA: ICD-10-CM

## 2017-11-02 DIAGNOSIS — S02.2XXA CLOSED FRACTURE OF NASAL BONE, INITIAL ENCOUNTER: ICD-10-CM

## 2017-11-02 DIAGNOSIS — S92.351A CLOSED DISPLACED FRACTURE OF FIFTH METATARSAL BONE OF RIGHT FOOT, INITIAL ENCOUNTER: ICD-10-CM

## 2017-11-02 PROCEDURE — 99283 EMERGENCY DEPT VISIT LOW MDM: CPT | Performed by: INTERNAL MEDICINE

## 2017-11-02 PROCEDURE — 70450 CT HEAD/BRAIN W/O DYE: CPT | Mod: TC

## 2017-11-02 PROCEDURE — 25000125 ZZHC RX 250: Performed by: INTERNAL MEDICINE

## 2017-11-02 PROCEDURE — 70486 CT MAXILLOFACIAL W/O DYE: CPT | Mod: TC

## 2017-11-02 PROCEDURE — 25000132 ZZH RX MED GY IP 250 OP 250 PS 637: Performed by: INTERNAL MEDICINE

## 2017-11-02 PROCEDURE — 99284 EMERGENCY DEPT VISIT MOD MDM: CPT | Mod: 25

## 2017-11-02 PROCEDURE — 73610 X-RAY EXAM OF ANKLE: CPT | Mod: TC,RT

## 2017-11-02 RX ORDER — HYDROCODONE BITARTRATE AND ACETAMINOPHEN 5; 325 MG/1; MG/1
1-2 TABLET ORAL EVERY 6 HOURS PRN
Qty: 10 TABLET | Refills: 0 | Status: SHIPPED | OUTPATIENT
Start: 2017-11-02 | End: 2017-11-05

## 2017-11-02 RX ORDER — MULTIVITAMIN,THERAPEUTIC
1 TABLET ORAL DAILY
COMMUNITY

## 2017-11-02 RX ORDER — HYDROCODONE BITARTRATE AND ACETAMINOPHEN 5; 325 MG/1; MG/1
1 TABLET ORAL ONCE
Status: COMPLETED | OUTPATIENT
Start: 2017-11-02 | End: 2017-11-02

## 2017-11-02 RX ORDER — ONDANSETRON 4 MG/1
4 TABLET, ORALLY DISINTEGRATING ORAL ONCE
Status: COMPLETED | OUTPATIENT
Start: 2017-11-02 | End: 2017-11-02

## 2017-11-02 RX ADMIN — ONDANSETRON 4 MG: 4 TABLET, ORALLY DISINTEGRATING ORAL at 22:20

## 2017-11-02 RX ADMIN — HYDROCODONE BITARTRATE AND ACETAMINOPHEN 1 TABLET: 5; 325 TABLET ORAL at 23:02

## 2017-11-02 RX ADMIN — AMOXICILLIN AND CLAVULANATE POTASSIUM 1 TABLET: 875; 125 TABLET, FILM COATED ORAL at 23:44

## 2017-11-02 NOTE — ED AVS SNAPSHOT
[facility]    [address]    [place] [address]    Phone:  [phone]                                       Aileen Szymanski   MRN: [id number]    Department:  [facility]   Date of Visit:  11/2/2017           After Visit Summary Signature Page     I have received my discharge instructions, and my questions have been answered. I have discussed any challenges I see with this plan with the nurse or doctor.    ..........................................................................................................................................  Patient/Patient Representative Signature      ..........................................................................................................................................  Patient Representative Print Name and Relationship to Patient    ..................................................               ................................................  Date                                            Time    ..........................................................................................................................................  Reviewed by Signature/Title    ...................................................              ..............................................  Date                                                            Time

## 2017-11-02 NOTE — ED AVS SNAPSHOT
[facility]    [address] [place] [address]    [place] [address]    Phone:  [phone]                                       Aileen Szymanski   MRN: [id number]    Department:  [facility]   Date of Visit:  11/2/2017           Patient Information     Date Of Birth          1955        Your diagnoses for this visit were:     Closed fracture of nasal bone, initial encounter     Maxillary fracture, right side, initial encounter for closed fracture (H)     Closed displaced fracture of fifth metatarsal bone of right foot, initial encounter        You were seen by Estrada Eng MD.      Follow-up Information     Schedule an appointment as soon as possible for a visit with Destiny Vanegas MD.    Specialty:  Otolaryngology    Contact information:    [facility]  [address] [place] [address]  [phone]          Follow up with Devan Johns MD. Call in 1 day.    Specialty:  Plastic Surgery    Contact information:    [facility]  [address] [facility] [address]  [facility] [address]  [phone]          Discharge Instructions         Facial Fracture     You have a broken bone, or fracture, in your face. This may be a small crack in the bone. Or it may be a major break, with the bone moved out of place.  Depending on where the break is, you may have pain when you chew. You may also have nasal congestion, sinus pain, and nose bleeding.   During the first 24 hours after injury, you may have more swelling or bruising where the break is, or around your eyes. A blow to the face strong enough to cause a broken bone may also cause a concussion or more serious brain injury.  Home care    Use an ice pack on the injured area for no more than 15 to 20 minutes at a time. Do this every 1 to 2 hours for the first 24 to 48 hours. Then use the ice pack as needed to ease pain and swelling. To make an ice pack, put ice cubes in a plastic bag that seals at the top. Wrap the bag in  a clean, thin towel or cloth. Never put ice or an ice pack directly on the skin.    You may use over-the-counter pain medicine to control pain, unless another pain medicine was prescribed. Talk with your provider before using this medicine if you have chronic liver or kidney disease.    Sleep with your head raised on 2 or more pillows to ease swelling.    If you have facial pain when eating, don t eat crunchy or chewy foods. A softer diet will be more comfortable for the first 2 to 3 weeks.    If you were given antibiotics to prevent an infection, take them as directed until you have finished the prescription.    If your nose bleeds, sit up and lean forward. Pinch your nostrils together for 10 to 15 minutes. If the bleeding doesn t stop, keep pinching your nostrils and call your healthcare provider. Don t blow your nose for 12 hours after the bleeding stops. This will allow a strong blood clot to form. Don t pick your nose.  Special note on concussions  If you had any symptoms of a concussion today, don t return to sports or any activity that could result in another head injury.  These are symptoms of a concussion:    Nausea    Vomiting    Dizziness    Confusion    Headache    Memory loss    Loss of consciousness  Wait until all of your symptoms are gone and your provider says it s OK to resume your activity. Having a second head injury before you fully recover from the first one can lead to serious brain injury.  Follow-up care  Follow up with your healthcare provider in 1 week, or as advised. This is to make sure the bone is healing as it should.  If you had X-rays or CT scans taken, you will be told of any new findings that may affect your care.  When to seek medical advice  Call your healthcare provider right away if any of these occur:    Swelling or pain in your face that gets worse    Redness, warmth, or pus draining from the injured area    Fever of 100.4 F (38 C) or above lasting for 24 to 48  hours    Double vision  Call 911   Call 911 if you have:    Repeated vomiting    Severe headache or dizziness    Headache or dizziness that gets worse    Abnormal drowsiness, or unable to wake up as usual    Confusion or change in behavior or speech    Convulsion, or seizure   Date Last Reviewed: 12/3/2015    8320-3861 [employer]. [address] [place] [address]. All rights reserved. This information is not intended as a substitute for professional medical care. Always follow your healthcare professional's instructions.          Understanding Fifth Metatarsal Fracture    A fifth metatarsal fracture is a type of broken bone in your foot. You have 5 metatarsals. They are the middle bones in your feet, between your toes and your anklebones (tarsals). The fifth metatarsal connects your smallest toe to your ankle. These bones help with arch support and balance.     How to say it  met--KHAI-sal   What causes a fifth metatarsal fracture?  A direct blow to the bone is often the cause of a fracture of the fifth metatarsal. That may happen if you drop a heavy object on your foot or land wrong on your foot or ankle. Twisting activities can also break the bone. Pivoting while playing basketball is one example.  Repeatedly placing too much stress on the bone can also cause a fracture of the fifth metatarsal. This is called a stress fracture. People who do physical activities like dancing or running tend to be more prone to stress fractures.  Symptoms of a fifth metatarsal fracture  Sudden pain along the outside of your foot is the main symptom. A stress fracture may develop more slowly. You may feel chronic pain for a period of time. Your foot may also swell up and bruise. You may have trouble walking.  Treatment for a fifth metatarsal fracture  Treatment for this type of fracture depends on where the bone is broken and how severe the breakage is. Healing can take up to several months. Treatment may  include:    Cold therapy. Putting ice on the area may reduce swelling and pain, especially in the first few days after injury.    Elevation. Propping up the foot so it is above the level of your heart may ease swelling.    Prescription or over-the-counter pain medicines. These help reduce pain and swelling.    Immobilization. Devices such as a splint, cast, or walking boot can protect the bone and ease pain. They can help keep the bone in place so it heals properly. You may need to avoid putting any weight on the broken bone for a period of time. Severe fractures usually need a longer limit on weight-bearing activities.    Stretching and strengthening exercises. Certain exercises can help you regain flexibility and strength in your foot.    Surgery. You usually will not need surgery. But you may need it if the bone is broken into 2 or more pieces and is not aligned (displaced), doesn t heal properly, or takes a long time to heal.  Possible complications of a fifth metatarsal fracture    The bone doesn t heal correctly    Acute compartment syndrome. This is when pressure builds up in the muscles of the foot and affects blood flow.     When to call your healthcare provider  Call your healthcare provider right away if you have any of these:    Fever of 100.4 F (38 C) or higher, or as directed    Symptoms that don t get better, or get worse    Numbness or coldness in your foot    Toe nails that turn blue or grey in color    New symptoms   Date Last Reviewed: 3/10/2016    1592-7369 [employer]. [address] [place] [address]. All rights reserved. This information is not intended as a substitute for professional medical care. Always follow your healthcare professional's instructions.             Review of your medicines      START taking        Dose / Directions Last dose taken    amoxicillin-clavulanate 875-125 MG per tablet   Commonly known as:  AUGMENTIN   Dose:  1 tablet   Quantity:  10 tablet         Take 1 tablet by mouth 2 times daily for 5 days   Refills:  0        HYDROcodone-acetaminophen 5-325 MG per tablet   Commonly known as:  NORCO   Dose:  1-2 tablet   Quantity:  10 tablet        Take 1-2 tablets by mouth every 6 hours as needed for moderate to severe pain   Refills:  0          Our records show that you are taking the medicines listed below. If these are incorrect, please call your family doctor or clinic.        Dose / Directions Last dose taken    clonazePAM 1 MG tablet   Commonly known as:  klonoPIN   Quantity:  60 tablet        1 po qhs   Refills:  0        escitalopram 20 MG tablet   Commonly known as:  LEXAPRO   Dose:  20 mg   Quantity:  90 tablet        Take 1 tablet (20 mg) by mouth daily   Refills:  3        MELATONIN PO   Dose:  5 mg        Take 5 mg by mouth nightly as needed   Refills:  0        multivitamin, therapeutic Tabs tablet   Dose:  1 tablet        Take 1 tablet by mouth daily   Refills:  0        simvastatin 40 MG tablet   Commonly known as:  ZOCOR   Dose:  40 mg   Quantity:  90 tablet        Take 1 tablet (40 mg) by mouth At Bedtime   Refills:  3        sulindac 200 MG tablet   Commonly known as:  CLINORIL   Dose:  200 mg   Quantity:  90 tablet        Take 1 tablet (200 mg) by mouth 2 times daily   Refills:  0        VITAMIN D (CHOLECALCIFEROL) PO   Dose:  3000 Units        Take 3,000 Units by mouth daily   Refills:  0                Prescriptions were sent or printed at these locations (2 Prescriptions)                   Other Prescriptions                Printed at Department/Unit printer (2 of 2)         amoxicillin-clavulanate (AUGMENTIN) 875-125 MG per tablet               HYDROcodone-acetaminophen (NORCO) 5-325 MG per tablet                Procedures and tests performed during your visit     Ankle XR, G/E 3 views, right    CT Head w/o Contrast    CT Maxillofacial w/o Contrast      Orders Needing Specimen Collection     None      Pending Results     Date and Time  Order Name Status Description    11/2/2017 2133 Ankle XR, G/E 3 views, right In process     11/2/2017 2133 CT Head w/o Contrast In process     11/2/2017 2133 CT Maxillofacial w/o Contrast In process             Pending Culture Results     No orders found from 10/31/2017 to 11/3/2017.            Thank you for choosing [facility]       Thank you for choosing [facility] for your care. Our goal is always to provide you with excellent care. Hearing back from our patients is one way we can continue to improve our services. Please take a few minutes to complete the written survey that you may receive in the mail after you visit with us. Thank you!        [facility] Information     [facility] gives you secure access to your electronic health record. If you see a primary care provider, you can also send messages to your care team and make appointments. If you have questions, please call your primary care clinic.  If you do not have a primary care provider, please call [phone] and they will assist you.        Care EveryWhere ID     This is your Care EveryWhere ID. This could be used by other organizations to access your [facility] medical records  [id number]        Equal Access to Services     ALLEN AGUIRRE : Jayce Elam, nikole luevano, lilian jansen. So [facility] [phone].    ATENCIÓN: Si habla español, tiene a ambrosio disposición servicios gratuitos de asistencia lingüística. Joelle al [phone].    We comply with applicable federal civil rights laws and [place] laws. We do not discriminate on the basis of race, color, national origin, age, disability, sex, sexual orientation, or gender identity.            After Visit Summary       This is your record. Keep this with you and show to your community pharmacist(s) and doctor(s) at your next visit.

## 2017-11-03 VITALS
TEMPERATURE: 98.8 F | DIASTOLIC BLOOD PRESSURE: 76 MMHG | RESPIRATION RATE: 16 BRPM | SYSTOLIC BLOOD PRESSURE: 110 MMHG | OXYGEN SATURATION: 98 % | HEART RATE: 69 BPM

## 2017-11-03 NOTE — DISCHARGE INSTRUCTIONS
Facial Fracture     You have a broken bone, or fracture, in your face. This may be a small crack in the bone. Or it may be a major break, with the bone moved out of place.  Depending on where the break is, you may have pain when you chew. You may also have nasal congestion, sinus pain, and nose bleeding.   During the first 24 hours after injury, you may have more swelling or bruising where the break is, or around your eyes. A blow to the face strong enough to cause a broken bone may also cause a concussion or more serious brain injury.  Home care    Use an ice pack on the injured area for no more than 15 to 20 minutes at a time. Do this every 1 to 2 hours for the first 24 to 48 hours. Then use the ice pack as needed to ease pain and swelling. To make an ice pack, put ice cubes in a plastic bag that seals at the top. Wrap the bag in a clean, thin towel or cloth. Never put ice or an ice pack directly on the skin.    You may use over-the-counter pain medicine to control pain, unless another pain medicine was prescribed. Talk with your provider before using this medicine if you have chronic liver or kidney disease.    Sleep with your head raised on 2 or more pillows to ease swelling.    If you have facial pain when eating, don t eat crunchy or chewy foods. A softer diet will be more comfortable for the first 2 to 3 weeks.    If you were given antibiotics to prevent an infection, take them as directed until you have finished the prescription.    If your nose bleeds, sit up and lean forward. Pinch your nostrils together for 10 to 15 minutes. If the bleeding doesn t stop, keep pinching your nostrils and call your healthcare provider. Don t blow your nose for 12 hours after the bleeding stops. This will allow a strong blood clot to form. Don t pick your nose.  Special note on concussions  If you had any symptoms of a concussion today, don t return to sports or any activity that could result in another head injury.  These  are symptoms of a concussion:    Nausea    Vomiting    Dizziness    Confusion    Headache    Memory loss    Loss of consciousness  Wait until all of your symptoms are gone and your provider says it s OK to resume your activity. Having a second head injury before you fully recover from the first one can lead to serious brain injury.  Follow-up care  Follow up with your healthcare provider in 1 week, or as advised. This is to make sure the bone is healing as it should.  If you had X-rays or CT scans taken, you will be told of any new findings that may affect your care.  When to seek medical advice  Call your healthcare provider right away if any of these occur:    Swelling or pain in your face that gets worse    Redness, warmth, or pus draining from the injured area    Fever of 100.4 F (38 C) or above lasting for 24 to 48 hours    Double vision  Call 911   Call 911 if you have:    Repeated vomiting    Severe headache or dizziness    Headache or dizziness that gets worse    Abnormal drowsiness, or unable to wake up as usual    Confusion or change in behavior or speech    Convulsion, or seizure   Date Last Reviewed: 12/3/2015    3225-3178 [employer]. [address] [place] [address]. All rights reserved. This information is not intended as a substitute for professional medical care. Always follow your healthcare professional's instructions.          Understanding Fifth Metatarsal Fracture    A fifth metatarsal fracture is a type of broken bone in your foot. You have 5 metatarsals. They are the middle bones in your feet, between your toes and your anklebones (tarsals). The fifth metatarsal connects your smallest toe to your ankle. These bones help with arch support and balance.     How to say it  met--KHAI-sal   What causes a fifth metatarsal fracture?  A direct blow to the bone is often the cause of a fracture of the fifth metatarsal. That may happen if you drop a heavy object on your foot or  land wrong on your foot or ankle. Twisting activities can also break the bone. Pivoting while playing basketball is one example.  Repeatedly placing too much stress on the bone can also cause a fracture of the fifth metatarsal. This is called a stress fracture. People who do physical activities like dancing or running tend to be more prone to stress fractures.  Symptoms of a fifth metatarsal fracture  Sudden pain along the outside of your foot is the main symptom. A stress fracture may develop more slowly. You may feel chronic pain for a period of time. Your foot may also swell up and bruise. You may have trouble walking.  Treatment for a fifth metatarsal fracture  Treatment for this type of fracture depends on where the bone is broken and how severe the breakage is. Healing can take up to several months. Treatment may include:    Cold therapy. Putting ice on the area may reduce swelling and pain, especially in the first few days after injury.    Elevation. Propping up the foot so it is above the level of your heart may ease swelling.    Prescription or over-the-counter pain medicines. These help reduce pain and swelling.    Immobilization. Devices such as a splint, cast, or walking boot can protect the bone and ease pain. They can help keep the bone in place so it heals properly. You may need to avoid putting any weight on the broken bone for a period of time. Severe fractures usually need a longer limit on weight-bearing activities.    Stretching and strengthening exercises. Certain exercises can help you regain flexibility and strength in your foot.    Surgery. You usually will not need surgery. But you may need it if the bone is broken into 2 or more pieces and is not aligned (displaced), doesn t heal properly, or takes a long time to heal.  Possible complications of a fifth metatarsal fracture    The bone doesn t heal correctly    Acute compartment syndrome. This is when pressure builds up in the muscles of the  foot and affects blood flow.     When to call your healthcare provider  Call your healthcare provider right away if you have any of these:    Fever of 100.4 F (38 C) or higher, or as directed    Symptoms that don t get better, or get worse    Numbness or coldness in your foot    Toe nails that turn blue or grey in color    New symptoms   Date Last Reviewed: 3/10/2016    1160-3130 [employer]. [address] [place] [address], [place] [id number]. All rights reserved. This information is not intended as a substitute for professional medical care. Always follow your healthcare professional's instructions.

## 2017-11-03 NOTE — ED NOTES
Patient states that she is still nauseated and that Zofran has not started to help yet. She states she feels too nauseated to take her pain medication at this time. Patient states she will use her call light when she feels she is able to take the pain medication.  remains in room.

## 2017-11-03 NOTE — PROGRESS NOTES
Right Ankle XR - IMPRESSION: Findings suggest lateral ligamentous injury possibly including an avulsion fracture at the fibular attachment. The ankle mortise is congruent without evidence of fracture or dislocation otherwise.  Report routed to PCP, Dr. SAULO Vogel.

## 2017-11-03 NOTE — ED NOTES
Patient states that her stomach is less nauseated and that she feels comfortable taking the pain medicine now. Pain medicine is given to patient.

## 2017-11-03 NOTE — PROGRESS NOTES
CT Maxillofacial w/o Contrast - IMPRESSION: Comminuted nasal bone fracture displaced to the left.  There is a fracture fragment at the tip of the nose which is angulated  about 45 degrees convex anteriorly there appears to be a nasal septal fracture seen as well. There is widening of the nasal frontal suture.  Advised to call and schedule and appt ASAP with Dr. Vanegas.  Appt scheduled with Dr. Vanegas for 11/6/17.  Advised to call Dr. Devan Johns at [facility] Surgery in one day.  Report routed to PCP, Dr. SAULO Vogel.

## 2017-11-03 NOTE — PROGRESS NOTES
CT Head w/o Contrast - Impression - Normal brain comminuted nasal bone fracture.  Advised to call and schedule and appt ASAP with Dr. Vanegas.  Appt scheduled with Dr. Vanegas for 11/6/17.  Advised to call Dr. Devan Johns at [facility] Plastic Surgery in one day.  Report routed to PCP, Dr. SAULO Vogel.

## 2017-11-03 NOTE — ED NOTES
Discharge instructions reviewed with patient, disk given to patient, and Rx x2. Pt verbalized understanding and wheeled to exit.

## 2017-11-06 ENCOUNTER — OFFICE VISIT (OUTPATIENT)
Dept: OTOLARYNGOLOGY | Facility: OTHER | Age: 62
End: 2017-11-06
Attending: NURSE PRACTITIONER
Payer: COMMERCIAL

## 2017-11-06 ENCOUNTER — OFFICE VISIT (OUTPATIENT)
Dept: OTOLARYNGOLOGY | Facility: OTHER | Age: 62
End: 2017-11-06
Attending: OTOLARYNGOLOGY
Payer: COMMERCIAL

## 2017-11-06 VITALS
TEMPERATURE: 97.5 F | RESPIRATION RATE: 16 BRPM | OXYGEN SATURATION: 98 % | SYSTOLIC BLOOD PRESSURE: 132 MMHG | DIASTOLIC BLOOD PRESSURE: 78 MMHG | HEART RATE: 80 BPM | WEIGHT: 165 LBS | HEIGHT: 65 IN | BODY MASS INDEX: 27.49 KG/M2

## 2017-11-06 VITALS
BODY MASS INDEX: 28.32 KG/M2 | HEIGHT: 65 IN | TEMPERATURE: 97.7 F | SYSTOLIC BLOOD PRESSURE: 120 MMHG | WEIGHT: 170 LBS | DIASTOLIC BLOOD PRESSURE: 80 MMHG | HEART RATE: 78 BPM

## 2017-11-06 DIAGNOSIS — S02.2XXB: ICD-10-CM

## 2017-11-06 DIAGNOSIS — S02.2XXA CLOSED FRACTURE OF NASAL BONE, INITIAL ENCOUNTER: ICD-10-CM

## 2017-11-06 DIAGNOSIS — J34.3 NASAL TURBINATE HYPERTROPHY: ICD-10-CM

## 2017-11-06 DIAGNOSIS — Z01.818 PREOP GENERAL PHYSICAL EXAM: Primary | ICD-10-CM

## 2017-11-06 DIAGNOSIS — J34.2 DNS (DEVIATED NASAL SEPTUM): ICD-10-CM

## 2017-11-06 DIAGNOSIS — S02.2XXB: Primary | ICD-10-CM

## 2017-11-06 LAB
HGB BLD-MCNC: 13 G/DL (ref 11.7–15.7)
INR PPP: 1 (ref 0.8–1.2)

## 2017-11-06 PROCEDURE — 36415 COLL VENOUS BLD VENIPUNCTURE: CPT | Performed by: NURSE PRACTITIONER

## 2017-11-06 PROCEDURE — 85018 HEMOGLOBIN: CPT | Performed by: NURSE PRACTITIONER

## 2017-11-06 PROCEDURE — 99204 OFFICE O/P NEW MOD 45 MIN: CPT | Mod: 25 | Performed by: OTOLARYNGOLOGY

## 2017-11-06 PROCEDURE — 85610 PROTHROMBIN TIME: CPT | Performed by: NURSE PRACTITIONER

## 2017-11-06 PROCEDURE — 99214 OFFICE O/P EST MOD 30 MIN: CPT | Performed by: NURSE PRACTITIONER

## 2017-11-06 PROCEDURE — 31231 NASAL ENDOSCOPY DX: CPT | Performed by: OTOLARYNGOLOGY

## 2017-11-06 ASSESSMENT — PAIN SCALES - GENERAL
PAINLEVEL: NO PAIN (0)
PAINLEVEL: MILD PAIN (2)

## 2017-11-06 NOTE — PATIENT INSTRUCTIONS
Thank you for allowing Dr. Vanegas and our ENT team to participate in your care.  If you have a scheduling or an appointment question please contact [facility] Unit Coordinator at their direct line [phone].   ALL nursing questions or concerns can be directed to your ENT nurse at: [phone] Jamie Roxana    Use Shannon Nasal Spray 5-6 times daily  Follow up in surgery tomorrow. Nothing to eat/drink after midnight    HOW TO PREPARE-      You need to have a scheduled Pre-Op with your primary care physician within 30 days of your scheduled surgery. You should be set up with this before you leave today.       You need a friend or family member available to drive you home AND stay with you for 24 hours after you leave the hospital. You will not be allowed to drive yourself. IF you need to take a taxi or the bus you MUST have a responsible person to ride with you. YOUR PROCEDURE WILL BE CANCELLED IF YOU DO NOT HAVE A RESPONSIBLE ADULT TO DRIVE YOU HOME.       You CANNOT have anything to eat or drink after midnight the night before your surgery, including water and coffee. Your stomach needs to be completely empty. Do NOT chew gum, suck on hard candy, or smoke. You can brush your teeth the morning of surgery.       [facility] will call you  1-2 weeks prior to your surgery date at  [phone] or toll free [phone]. Please have your medication and allergy lists ready.      Stop your aspirin or other NSAIDs(Ibuprofen, Motrin, Aleve, Celebrex, Naproxen, etc...) 7 days before your surgery.      Hospital admitting will call you the day before your surgery with your exact arrival time.       Please call your primary care physician if you should become ill within 24 hours of scheduled surgery. (ex.vomiting, diarrhea, fever)          You will need to wash the night before AND the morning of you procedure with a liquid antibacterial soap, like Dial.

## 2017-11-06 NOTE — NURSING NOTE
"Chief Complaint   Patient presents with     Pre-Op Exam     Pt will be having a closed reduction nasal septal fracture on 11/7/17 [place] [facility]       Initial /80 (BP Location: Right arm, Cuff Size: Adult Regular)  Pulse 78  Temp 97.7  F (36.5  C) (Tympanic)  Ht 5' 5\" (1.651 m)  Wt 170 lb (77.1 kg)  BMI 28.29 kg/m2 Estimated body mass index is 28.29 kg/(m^2) as calculated from the following:    Height as of this encounter: 5' 5\" (1.651 m).    Weight as of this encounter: 170 lb (77.1 kg).  Medication Reconciliation: complete   Cely Guadarrama LPN      "

## 2017-11-06 NOTE — PROGRESS NOTES
Otolaryngology Consultation    Patient: Aileen Szymanski  : 1955    Patient presents with:  Consult: Nasal fracture consult seen in ER.      HPI:  Aileen Szymanski is a 62 year old female seen today for a nasal fracture.  This was sustained on  after a fall onto steps.  She landed on her face onto cement steps wearing glasses.  Her glasses left a laceration across her upper dorsum.  Aileen had initial heavy epistaxis which resolved.  She presented to the ED and CT facial bones was performed confirming a nasal bone fracture.  She has had congestion but no chronic clear rhinorrhea  No change in vision  She denies maxillary numbness or dental paresthesias    Aileen denies any prior nasal fractures or nasal surgery    Aileen states she is safe, no repeated falls or trauma        Current Outpatient Rx   Medication Sig Dispense Refill     multivitamin, therapeutic (THERA-VIT) TABS tablet Take 1 tablet by mouth daily       amoxicillin-clavulanate (AUGMENTIN) 875-125 MG per tablet Take 1 tablet by mouth 2 times daily for 5 days 10 tablet 0     clonazePAM (KLONOPIN) 1 MG tablet 1 po qhs (Patient taking differently: 1 mg At Bedtime 1 po qhs) 60 tablet 0     simvastatin (ZOCOR) 40 MG tablet Take 1 tablet (40 mg) by mouth At Bedtime 90 tablet 3     MELATONIN PO Take 5 mg by mouth nightly as needed        escitalopram (LEXAPRO) 20 MG tablet Take 1 tablet (20 mg) by mouth daily 90 tablet 3     sulindac (CLINORIL) 200 MG tablet Take 1 tablet (200 mg) by mouth 2 times daily 90 tablet 0     VITAMIN D, CHOLECALCIFEROL, PO Take 3,000 Units by mouth daily          Allergies: Lovenox [enoxaparin]     Past Medical History:   Diagnosis Date     Dizziness and giddiness 2007     Enthesopathy of knee, unspecified 2002     Major depressive disorder without psychotic features 2017     Nonallopathic lesion of cervical region, not elsewhere classified 2001     Nonallopathic lesion of thoracic region, not elsewhere  "classified 06/13/2002     Pain in joint, lower leg 06/24/2002       Past Surgical History:   Procedure Laterality Date     ABDOMEN SURGERY  2008     lap band     ABDOMEN SURGERY  2007    hysteroscopy     ARTHROPLASTY REVISION KNEE Left 10/2016     CHOLECYSTECTOMY      removal     COLONOSCOPY  04/13/2010    Dr Quevedo; negative      left knee replacement  7/21/2014    Dr Quinones/ [facility]       ENT family history reviewed    Social History   Substance Use Topics     Smoking status: Never Smoker     Smokeless tobacco: Never Used     Alcohol use Yes      Comment: rare       Review of Systems  ROS: 10 point ROS neg other than the symptoms noted above in the HPI and neck and joint pain, eye floaters, depression    Physical Exam  /78  Pulse 80  Temp 97.5  F (36.4  C) (Tympanic)  Resp 16  Ht 5' 5\" (1.651 m)  Wt 165 lb (74.8 kg)  SpO2 98%  BMI 27.46 kg/m2  General - The patient is well nourished and well developed, and appears to have good nutritional status.  Alert and oriented to person and place, answers questions and cooperates with examination appropriately.   Head and Face - Normocephalic and atraumatic, with infraorbital echymosis.    The facial nerve is intact, with strong symmetric movements.  Voice and Breathing - The patient was breathing comfortably without the use of accessory muscles. There was no wheezing, stridor, or stertor.  The patients voice was clear and strong, and had appropriate pitch and quality.  Ears -The external auditory canals are patent, the tympanic membranes are intact without effusion, retraction or mass.  Bony landmarks are intact.  Eyes - Extraocular movements intact, and the pupils were reactive to light.  Sclera were not icteric or injected, conjunctiva were pink and moist.  No IOR palpable step off  Jaw opening adequete  Mouth - Examination of the oral cavity showed pink, healthy oral mucosa. No lesions or ulcerations noted.  The tongue was mobile and midline, " and the dentition were in good condition.    Throat - The walls of the oropharynx were smooth, pink, moist, symmetric, and had no lesions or ulcerations.  The tonsillar pillars and soft palate were symmetric.  The uvula was midline on elevation.    Neck - Normal midline excursion of the laryngotracheal complex during swallowing.  Full range of motion on passive movement.  Palpation of the occipital, submental, submandibular, internal jugular chain, and supraclavicular nodes did not demonstrate any abnormal lymph nodes or masses.  Palpation of the thyroid was soft and smooth, with no nodules or goiter appreciated.  The trachea was mobile and midline.  Nose - External contour is asymmetric. Dorsum skewed left with mid dorsal depression and step off.  There is a healing small laceration across the upper dorsum.  The Nasal mucosa is pink and moist with no abnormal mucus.  The septum and turbinates were evaluated: caudal DNS left with mid deviation right, 80% obstructions.  No polyps, masses, or purulence noted on examination.  No clear rhinorrhea with neck flexion    To evaluate the nose and sinuses in the post operative state, I performed rigid nasal endoscopy. The LPN had previously sprayed both nares with lidocaine and neosynephrine.    I began with the LEFT side using a 0 degree rigid nasal endoscope, and then similarly examined the RIGHT side    Findings:  Inferior turbinates:  Edematous  Right mid septal with fracture site evident with mucosal breach and oozingMiddle turbinate and middle meatus:  No purulence, no polyposis, but severe mucosal edema  Nasopharynx is without mass  The patient tolerated the procedure well    CT facial bones reviewed with Aileen:    There is non-displaced nasal frontal fracture with widening of the nasalfrontal suture  No anterior table frontal sinus fracture  Bilaterally displaced nasal fractures with an angulated downward comminuted and depressed [facility] bone [facility]   There is a nasal septal fracture with DNS left and bilateral  inferior turbinate hypertrophy     The maxilla, mandible, pterygoids are intact    Impression and Plan- Aileen Szymanski is a 62 year old female with:    ICD-10-CM    1. Fracture of nasal septum, open, initial encounter S02.2XXB    2. Closed fracture of nasal bone, initial encounter S02.2XXA    3. DNS (deviated nasal septum) J34.2    4. Nasal turbinate hypertrophy J34.3          Finish her augmentin prescribed in the ED  Nasal saline  Risks and complications of closed reduction nasal septal fracture were discussed include general anesthesia, bleeding, infection, septal perforation, anosmia, epiphora, CSF rhinorrhea, atrophic rhinitis, scar formation, numbness over the incision, need for additional surgery and no guarantee is made that the nasal bones will be completely straight.  Understanding is expressed, all questions are answered and wishes are to proceed with surgical intervention    Rhinoplasty photos taken    eDstiny Vanegas D.O.  Otolaryngology/Head and Neck Surgery  Allergy

## 2017-11-06 NOTE — MR AVS SNAPSHOT
After Visit Summary   11/6/2017    Aileen Szymanski    MRN: [id number]           Patient Information     Date Of Birth          1955        Visit Information        Provider Department      11/6/2017 3:30 PM Lexie Hicks APRN CNP [facility]        Today's Diagnoses     Preop general physical exam    -  1    Fracture of nasal septum, open, initial encounter        Closed fracture of nasal bone, initial encounter          Care Instructions      Before Your Surgery      Call your surgeon if there is any change in your health. This includes signs of a cold or flu (such as a sore throat, runny nose, cough, rash or fever).    Do not smoke, drink alcohol or take over the counter medicine (unless your surgeon or primary care doctor tells you to) for the 24 hours before and after surgery.    If you take prescribed drugs: Follow your doctor s orders about which medicines to take and which to stop until after surgery.    Eating and drinking prior to surgery: follow the instructions from your surgeon    Take a shower or bath the night before surgery. Use the soap your surgeon gave you to gently clean your skin. If you do not have soap from your surgeon, use your regular soap. Do not shave or scrub the surgery site.  Wear clean pajamas and have clean sheets on your bed.           Follow-ups after your visit        Follow-up notes from your care team     Return if symptoms worsen or fail to improve.      Your next 10 appointments already scheduled     Nov 07, 2017   Procedure with Destiny Vanegas MD   [facility] ([address] [address] [phone]              Who to contact     If you have questions or need follow up information about today's clinic visit or your schedule please contact [facility] directly at [phone].  Normal or non-critical lab and imaging results will be communicated to you by Rob  "letter or phone within 4 business days after the clinic has received the results. If you do not hear from us within 7 days, please contact the clinic through [employer] or phone. If you have a critical or abnormal lab result, we will notify you by phone as soon as possible.  Submit refill requests through [employer] or call your pharmacy and they will forward the refill request to us. Please allow 3 business days for your refill to be completed.          Additional Information About Your Visit        [employer] Information     [employer] gives you secure access to your electronic health record. If you see a primary care provider, you can also send messages to your care team and make appointments. If you have questions, please call your primary care clinic.  If you do not have a primary care provider, please call [phone] and they will assist you.        Care EveryWhere ID     This is your Care EveryWhere ID. This could be used by other organizations to access your [facility] medical records  [id number]        Your Vitals Were     Pulse Temperature Height BMI (Body Mass Index)          78 97.7  F (36.5  C) (Tympanic) 5' 5\" (1.651 m) 28.29 kg/m2         Blood Pressure from Last 3 Encounters:   11/06/17 120/80   11/06/17 132/78   11/03/17 110/76    Weight from Last 3 Encounters:   11/06/17 170 lb (77.1 kg)   11/06/17 165 lb (74.8 kg)   03/27/17 168 lb (76.2 kg)              We Performed the Following     EKG 12-LEAD CLINIC READ     Hemoglobin     INR          Today's Medication Changes          These changes are accurate as of: 11/6/17 11:59 PM.  If you have any questions, ask your nurse or doctor.               These medicines have changed or have updated prescriptions.        Dose/Directions    clonazePAM 1 MG tablet   Commonly known as:  klonoPIN   This may have changed:    - how much to take  - when to take this  - additional instructions   Used for:  Primary insomnia        1 po qhs   Quantity:  60 tablet   Refills:  0    "             Primary Care Provider Office Phone # Fax #    Precious Vogel [phone]       [facility] [address]  [place] [address]        Equal Access to Services     ALLEN AGUIRRE : Hadii sharan ku hadrichardo Soomaali, waaxda luqadaha, qaybta kaalmada adeegyada, waxstefan askewn brendon dick laShannoncandace alvarez. So [facility] [phone].    ATENCIÓN: Si habla español, tiene a ambrosio disposición servicios gratuitos de asistencia lingüística. Llame al [phone].    We comply with applicable federal civil rights laws and [place] laws. We do not discriminate on the basis of race, color, national origin, age, disability, sex, sexual orientation, or gender identity.            Thank you!     Thank you for choosing [facility]  for your care. Our goal is always to provide you with excellent care. Hearing back from our patients is one way we can continue to improve our services. Please take a few minutes to complete the written survey that you may receive in the mail after your visit with us. Thank you!             Your Updated Medication List - Protect others around you: Learn how to safely use, store and throw away your medicines at www.disposemymeds.org.          This list is accurate as of: 11/6/17 11:59 PM.  Always use your most recent med list.                   Brand Name Dispense Instructions for use Diagnosis    amoxicillin-clavulanate 875-125 MG per tablet    AUGMENTIN    10 tablet    Take 1 tablet by mouth 2 times daily for 5 days        clonazePAM 1 MG tablet    klonoPIN    60 tablet    1 po qhs    Primary insomnia       escitalopram 20 MG tablet    LEXAPRO    90 tablet    Take 1 tablet (20 mg) by mouth daily        MELATONIN PO      Take 5 mg by mouth nightly as needed        multivitamin, therapeutic Tabs tablet      Take 1 tablet by mouth daily        simvastatin 40 MG tablet    ZOCOR    90 tablet    Take 1 tablet (40 mg) by mouth At Bedtime    Mixed hyperlipidemia        sulindac 200 MG tablet    CLINORIL    90 tablet    Take 1 tablet (200 mg) by mouth 2 times daily        VITAMIN D (CHOLECALCIFEROL) PO      Take 3,000 Units by mouth daily

## 2017-11-06 NOTE — PROGRESS NOTES
[facility] [place]  [address]  [place] [address]  [phone]  Dept: [phone]    PRE-OP EVALUATION:  Today's date: 2017    Aileen Szymanski (: 1955) presents for pre-operative evaluation assessment as requested by Dr. Vanegas.  She requires evaluation and anesthesia risk assessment prior to undergoing surgery/procedure for treatment of nasal septal fracture .  Proposed procedure: closed reduction nasal septal fracture    Date of Surgery/ Procedure: 17  Time of Surgery/ Procedure: unknown  Hospital/Surgical Facility: [facility]    Primary Physician: Precious Vogel  Type of Anesthesia Anticipated: General    Patient has a Health Care Directive or Living Will:  YES at home    1. NO - Do you have a history of heart attack, stroke, stent, bypass or surgery on an artery in the head, neck, heart or legs?  2. NO - Do you ever have any pain or discomfort in your chest?  3. NO - Do you have a history of  Heart Failure?  4. NO - Are you troubled by shortness of breath when: walking on the level, up a slight hill or at night?  5. NO - Do you currently have a cold, bronchitis or other respiratory infection?  6. NO - Do you have a cough, shortness of breath or wheezing?  7. NO - Do you sometimes get pains in the calves of your legs when you walk?  8. NO - Do you or anyone in your family have previous history of blood clots?  9. NO - Do you or does anyone in your family have a serious bleeding problem such as prolonged bleeding following surgeries or cuts?  10. NO - Have you ever had problems with anemia or been told to take iron pills?  11. NO - Have you had any abnormal blood loss such as black, tarry or bloody stools, or abnormal vaginal bleeding?  12. YES - HAVE YOU EVER HAD A BLOOD TRANSFUSION? Yes,   13. NO - Have you or any of your relatives ever had problems with anesthesia?  14. NO - Do you have sleep apnea, excessive snoring or daytime drowsiness?  15. NO - Do you have any  prosthetic heart valves?  16. YES - DO YOU HAVE PROSTHETIC JOINTS? Left knee  17. NO - Is there any chance that you may be pregnant?    HPI:                                                      Brief HPI related to upcoming procedure: Aileen [place] [employer] [place] night, fell directly on face. Did not lose consciousness. Went into ED same night where imaging was done and showed comminuted nasal bone fracture displaced to the left. Head CT showed normal brain, comminuted nasal bone fracture.  No double vision reported today. No fever, recent infection or signs of bleeding.     Denies prior sinus/nasal surgery    Did take 1 ibuprofen today this AM. No aspirin use.     See problem list for active medical problems.  Problems all longstanding and stable, except as noted/documented.  See ROS for pertinent symptoms related to these conditions.                                                                                                  .    MEDICAL HISTORY:                                                    Patient Active Problem List    Diagnosis Date Noted     Major depressive disorder without psychotic features 08/31/2017     Priority: Medium     Mood disorder (H) 09/19/2016     Priority: Medium     Mixed hyperlipidemia 09/19/2016     Priority: Medium      Past Medical History:   Diagnosis Date     Dizziness and giddiness 07/31/2007     Enthesopathy of knee, unspecified 06/13/2002     Major depressive disorder without psychotic features 8/31/2017     Nonallopathic lesion of cervical region, not elsewhere classified 07/25/2001     Nonallopathic lesion of thoracic region, not elsewhere classified 06/13/2002     Pain in joint, lower leg 06/24/2002     Past Surgical History:   Procedure Laterality Date     ABDOMEN SURGERY  2008     lap band     ABDOMEN SURGERY  2007    hysteroscopy     ARTHROPLASTY REVISION KNEE Left 10/2016     CHOLECYSTECTOMY      removal     COLONOSCOPY  04/13/2010     Dr Quevedo; negative      left knee replacement  7/21/2014    Dr Quinones/ [facility]     Current Outpatient Prescriptions   Medication Sig Dispense Refill     multivitamin, therapeutic (THERA-VIT) TABS tablet Take 1 tablet by mouth daily       amoxicillin-clavulanate (AUGMENTIN) 875-125 MG per tablet Take 1 tablet by mouth 2 times daily for 5 days 10 tablet 0     clonazePAM (KLONOPIN) 1 MG tablet 1 po qhs (Patient taking differently: 1 mg At Bedtime 1 po qhs) 60 tablet 0     simvastatin (ZOCOR) 40 MG tablet Take 1 tablet (40 mg) by mouth At Bedtime 90 tablet 3     MELATONIN PO Take 5 mg by mouth nightly as needed        escitalopram (LEXAPRO) 20 MG tablet Take 1 tablet (20 mg) by mouth daily 90 tablet 3     sulindac (CLINORIL) 200 MG tablet Take 1 tablet (200 mg) by mouth 2 times daily 90 tablet 0     VITAMIN D, CHOLECALCIFEROL, PO Take 3,000 Units by mouth daily        OTC products: None, except as noted above    Allergies   Allergen Reactions     Lovenox [Enoxaparin] Other (See Comments)     Bleeding      Latex Allergy: NO    Social History   Substance Use Topics     Smoking status: Never Smoker     Smokeless tobacco: Never Used     Alcohol use Yes      Comment: rare     History   Drug Use No       REVIEW OF SYSTEMS:                                                    C: NEGATIVE for fever, chills, change in weight  I: NEGATIVE for worrisome rashes, moles or lesions  E: NEGATIVE for vision changes or irritation  ENT/MOUTH: POSITIVE for nasal pain  R: NEGATIVE for significant cough or SOB  B: NEGATIVE for masses, tenderness or discharge  CV: NEGATIVE for chest pain, palpitations or peripheral edema  GI: NEGATIVE for nausea, abdominal pain, heartburn, or change in bowel habits. History of gastric bypass with port.  : NEGATIVE for frequency, dysuria, or hematuria  MUSCULOSKELETAL: chronic left knee pain s/p left knee replacement, right foot pain d/t recent 5th metatarsal fracture  N: NEGATIVE for weakness,  "dizziness or paresthesias  E: NEGATIVE for temperature intolerance, skin/hair changes  H: NEGATIVE for bleeding problems  PSYCHIATRIC: Treated for depression, controlled.     EXAM:                                                    /80 (BP Location: Right arm, Cuff Size: Adult Regular)  Pulse 78  Temp 97.7  F (36.5  C) (Tympanic)  Ht 5' 5\" (1.651 m)  Wt 170 lb (77.1 kg)  BMI 28.29 kg/m2    GENERAL APPEARANCE: healthy, alert and no distress     EYES: EOMI, PERRL, bilateral ecchymosis under eyes     HENT: ear canals and TM's normal and nose and mouth without ulcers or lesions. Tender with palpation around nose.      NECK: no adenopathy, no asymmetry, masses, or scars and thyroid normal to palpation     RESP: lungs clear to auscultation - no rales, rhonchi or wheezes     CV: regular rates and rhythm, normal S1 S2, no S3 or S4 and no murmur, click or rub     ABDOMEN:  soft, nontender, no HSM or masses and bowel sounds normal. Palpable gastric bypass port LUQ.     MS: extremities normal- no gross deformities noted, no evidence of inflammation in joints, FROM in all extremities.     SKIN: Ecchymosis under bilateral eyes, left knee and left calf. No other rashes or lesions.      NEURO: Normal strength and tone, sensory exam grossly normal, mentation intact and speech normal     PSYCH: mentation appears normal. and affect normal/bright     LYMPHATICS: No axillary, cervical, or supraclavicular nodes    DIAGNOSTICS:                                                    EKG: Sinus rhythm with occasional PVC, otherwise normal EKG.   Labs Resulted Today:   Results for orders placed or performed in visit on 11/06/17   INR   Result Value Ref Range    INR 1.00 0.80 - 1.20   Hemoglobin   Result Value Ref Range    Hemoglobin 13.0 11.7 - 15.7 g/dL     IMPRESSION:                                                    Reason for surgery/procedure: nasal septal fracture  Diagnosis/reason for consult: Pre-operative examination    The " proposed surgical procedure is considered LOW risk.    REVISED CARDIAC RISK INDEX  The patient has the following serious cardiovascular risks for perioperative complications such as (MI, PE, VFib and 3  AV Block):  No serious cardiac risks  INTERPRETATION: 0 risks: Class I (very low risk - 0.4% complication rate)    The patient has the following additional risks for perioperative complications:  No identified additional risks      ICD-10-CM    1. Preop general physical exam Z01.818 INR     Hemoglobin     EKG 12-LEAD CLINIC READ   2. Fracture of nasal septum, open, initial encounter S02.2XXB    3. Closed fracture of nasal bone, initial encounter S02.2XXA        RECOMMENDATIONS:                                                      Hold all AM medications, can take at HS. Instructed to take no further ibuprofen/aspirin/NSAID products. Can take tylenol PRN for pain.     APPROVAL GIVEN to proceed with proposed procedure, without further diagnostic evaluation     Signed Electronically by: ULYSSES Nielsen CNP    Copy of this evaluation report is provided to requesting physician.    [facility] Preop Guidelines

## 2017-11-06 NOTE — MR AVS SNAPSHOT
After Visit Summary   11/6/2017    Aileen Szymanski    MRN: [id number]           Patient Information     Date Of Birth          1955        Visit Information        Provider Department      11/6/2017 2:30 PM Destiny Vanegas MD [facility]        Care Instructions    Thank you for allowing Dr. Vanegas and our ENT team to participate in your care.  If you have a scheduling or an appointment question please contact [facility] Unit Coordinator at their direct line [phone].   ALL nursing questions or concerns can be directed to your ENT nurse at: [phone] - [place]    Use [place] Nasal Spray 5-6 times daily  Follow up in surgery tomorrow. Nothing to eat/drink after midnight    HOW TO PREPARE-      You need to have a scheduled Pre-Op with your primary care physician within 30 days of your scheduled surgery. You should be set up with this before you leave today.       You need a friend or family member available to drive you home AND stay with you for 24 hours after you leave the hospital. You will not be allowed to drive yourself. IF you need to take a taxi or the bus you MUST have a responsible person to ride with you. YOUR PROCEDURE WILL BE CANCELLED IF YOU DO NOT HAVE A RESPONSIBLE ADULT TO DRIVE YOU HOME.       You CANNOT have anything to eat or drink after midnight the night before your surgery, including water and coffee. Your stomach needs to be completely empty. Do NOT chew gum, suck on hard candy, or smoke. You can brush your teeth the morning of surgery.       [facility] will call you  1-2 weeks prior to your surgery date at  [phone] or toll free [phone]. Please have your medication and allergy lists ready.      Stop your aspirin or other NSAIDs(Ibuprofen, Motrin, Aleve, Celebrex, Naproxen, etc...) 7 days before your surgery.      Hospital admitting will call you the day before your surgery with your exact arrival time.       Please  call your primary care physician if you should become ill within 24 hours of scheduled surgery. (ex.vomiting, diarrhea, fever)          You will need to wash the night before AND the morning of you procedure with a liquid antibacterial soap, like Dial.           Follow-ups after your visit        Your next 10 appointments already scheduled     Nov 07, 2017 11:30 AM CST   Return Visit with Sulaiman Hollingsworth DC   [facility] [place] [facility] ([facility])    [address]  [place] [address]   [phone]              Who to contact     If you have questions or need follow up information about today's clinic visit or your schedule please contact [facility] directly at [phone].  Normal or non-critical lab and imaging results will be communicated to you by MyChart, letter or phone within 4 business days after the clinic has received the results. If you do not hear from us within 7 days, please contact the clinic through [facility] or phone. If you have a critical or abnormal lab result, we will notify you by phone as soon as possible.  Submit refill requests through [employer] or call your pharmacy and they will forward the refill request to us. Please allow 3 business days for your refill to be completed.          Additional Information About Your Visit        MyChart Information     [employer] gives you secure access to your electronic health record. If you see a primary care provider, you can also send messages to your care team and make appointments. If you have questions, please call your primary care clinic.  If you do not have a primary care provider, please call [phone] and they will assist you.        Care EveryWhere ID     This is your Care EveryWhere ID. This could be used by other organizations to access your [facility] medical records  [id number]        Your Vitals Were     Pulse Temperature Respirations Height Pulse Oximetry BMI (Body Mass Index)    80 97.5  F (36.4  C) (Tympanic) 16 5'  "5\" (1.651 m) 98% 27.46 kg/m2       Blood Pressure from Last 3 Encounters:   11/06/17 132/78   11/03/17 110/76   03/27/17 102/80    Weight from Last 3 Encounters:   11/06/17 165 lb (74.8 kg)   03/27/17 168 lb (76.2 kg)   03/14/17 168 lb (76.2 kg)              Today, you had the following     No orders found for display         Today's Medication Changes          These changes are accurate as of: 11/6/17  3:34 PM.  If you have any questions, ask your nurse or doctor.               These medicines have changed or have updated prescriptions.        Dose/Directions    clonazePAM 1 MG tablet   Commonly known as:  klonoPIN   This may have changed:    - how much to take  - when to take this  - additional instructions   Used for:  Primary insomnia        1 po qhs   Quantity:  60 tablet   Refills:  0                Primary Care Provider Office Phone # Fax #    Precious Vogel [phone]       [facility] [address] [place] [address] AVE  [place] [address]        Equal Access to Services     [facility] AH: Hadii sharan ku hadasho Soomaali, waaxda luqadaha, qaybta kaalmada adeegyada, lilian cordoba . So [facility] [phone].    ATENCIÓN: Si habla español, tiene a ambrosio disposición servicios gratuitos de asistencia lingüística. Llame al [phone].    We comply with applicable federal civil rights laws and [place] laws. We do not discriminate on the basis of race, color, national origin, age, disability, sex, sexual orientation, or gender identity.            Thank you!     Thank you for choosing [facility]  for your care. Our goal is always to provide you with excellent care. Hearing back from our patients is one way we can continue to improve our services. Please take a few minutes to complete the written survey that you may receive in the mail after your visit with us. Thank you!             Your Updated Medication List - Protect others around you: Learn how to safely use, " store and throw away your medicines at www.disposemymeds.org.          This list is accurate as of: 11/6/17  3:34 PM.  Always use your most recent med list.                   Brand Name Dispense Instructions for use Diagnosis    amoxicillin-clavulanate 875-125 MG per tablet    AUGMENTIN    10 tablet    Take 1 tablet by mouth 2 times daily for 5 days        clonazePAM 1 MG tablet    klonoPIN    60 tablet    1 po qhs    Primary insomnia       escitalopram 20 MG tablet    LEXAPRO    90 tablet    Take 1 tablet (20 mg) by mouth daily        MELATONIN PO      Take 5 mg by mouth nightly as needed        multivitamin, therapeutic Tabs tablet      Take 1 tablet by mouth daily        simvastatin 40 MG tablet    ZOCOR    90 tablet    Take 1 tablet (40 mg) by mouth At Bedtime    Mixed hyperlipidemia       sulindac 200 MG tablet    CLINORIL    90 tablet    Take 1 tablet (200 mg) by mouth 2 times daily        VITAMIN D (CHOLECALCIFEROL) PO      Take 3,000 Units by mouth daily

## 2017-11-06 NOTE — NURSING NOTE
"Chief Complaint   Patient presents with     Consult     Nasal fracture consult seen in ER.       Initial /78  Pulse 80  Temp 97.5  F (36.4  C) (Tympanic)  Resp 16  Ht 5' 5\" (1.651 m)  Wt 165 lb (74.8 kg)  SpO2 98%  BMI 27.46 kg/m2 Estimated body mass index is 27.46 kg/(m^2) as calculated from the following:    Height as of this encounter: 5' 5\" (1.651 m).    Weight as of this encounter: 165 lb (74.8 kg).  Medication Reconciliation: complete   Morena Baltazar      "

## 2017-11-07 ENCOUNTER — ANESTHESIA (OUTPATIENT)
Dept: SURGERY | Facility: HOSPITAL | Age: 62
End: 2017-11-07
Payer: COMMERCIAL

## 2017-11-07 ENCOUNTER — ANESTHESIA EVENT (OUTPATIENT)
Dept: SURGERY | Facility: HOSPITAL | Age: 62
End: 2017-11-07
Payer: COMMERCIAL

## 2017-11-07 ENCOUNTER — HOSPITAL ENCOUNTER (OUTPATIENT)
Facility: HOSPITAL | Age: 62
Discharge: HOME | End: 2017-11-07
Attending: OTOLARYNGOLOGY | Admitting: OTOLARYNGOLOGY
Payer: COMMERCIAL

## 2017-11-07 VITALS
DIASTOLIC BLOOD PRESSURE: 79 MMHG | WEIGHT: 169 LBS | RESPIRATION RATE: 16 BRPM | SYSTOLIC BLOOD PRESSURE: 138 MMHG | OXYGEN SATURATION: 97 % | HEIGHT: 65 IN | TEMPERATURE: 97.7 F | BODY MASS INDEX: 28.16 KG/M2

## 2017-11-07 DIAGNOSIS — Z98.890 POST-OPERATIVE STATE: Primary | ICD-10-CM

## 2017-11-07 PROCEDURE — 25000125 ZZHC RX 250: Performed by: NURSE ANESTHETIST, CERTIFIED REGISTERED

## 2017-11-07 PROCEDURE — 40000305 ZZH STATISTIC PRE PROC ASSESS I: Performed by: OTOLARYNGOLOGY

## 2017-11-07 PROCEDURE — 71000014 ZZH RECOVERY PHASE 1 LEVEL 2 FIRST HR: Performed by: OTOLARYNGOLOGY

## 2017-11-07 PROCEDURE — 27210794 ZZH OR GENERAL SUPPLY STERILE: Performed by: OTOLARYNGOLOGY

## 2017-11-07 PROCEDURE — 01999 UNLISTED ANES PROCEDURE: CPT | Performed by: NURSE ANESTHETIST, CERTIFIED REGISTERED

## 2017-11-07 PROCEDURE — 36000046 ZZH SURGERY LEVEL 1 EA 15 ADDTL MIN: Performed by: OTOLARYNGOLOGY

## 2017-11-07 PROCEDURE — 25000128 H RX IP 250 OP 636: Performed by: ANESTHESIOLOGY

## 2017-11-07 PROCEDURE — 21320 CLSD TX NSL FX W/MNPJ&STABLJ: CPT | Mod: 59 | Performed by: OTOLARYNGOLOGY

## 2017-11-07 PROCEDURE — 21337 CLOSED TX SEPTAL&NOSE FX: CPT | Performed by: OTOLARYNGOLOGY

## 2017-11-07 PROCEDURE — 37000009 ZZH ANESTHESIA TECHNICAL FEE, EACH ADDTL 15 MIN: Performed by: OTOLARYNGOLOGY

## 2017-11-07 PROCEDURE — 71000027 ZZH RECOVERY PHASE 2 EACH 15 MINS: Performed by: OTOLARYNGOLOGY

## 2017-11-07 PROCEDURE — 36000044 ZZH SURGERY LEVEL 1 1ST 30 MIN: Performed by: OTOLARYNGOLOGY

## 2017-11-07 PROCEDURE — 25000125 ZZHC RX 250: Performed by: ANESTHESIOLOGY

## 2017-11-07 PROCEDURE — 27110028 ZZH OR GENERAL SUPPLY NON-STERILE: Performed by: OTOLARYNGOLOGY

## 2017-11-07 PROCEDURE — 25000128 H RX IP 250 OP 636: Performed by: NURSE ANESTHETIST, CERTIFIED REGISTERED

## 2017-11-07 PROCEDURE — 37000008 ZZH ANESTHESIA TECHNICAL FEE, 1ST 30 MIN: Performed by: OTOLARYNGOLOGY

## 2017-11-07 PROCEDURE — 25000566 ZZH SEVOFLURANE, EA 15 MIN: Performed by: NURSE ANESTHETIST, CERTIFIED REGISTERED

## 2017-11-07 PROCEDURE — 25000125 ZZHC RX 250: Performed by: OTOLARYNGOLOGY

## 2017-11-07 RX ORDER — DEXAMETHASONE SODIUM PHOSPHATE 4 MG/ML
4 INJECTION, SOLUTION INTRA-ARTICULAR; INTRALESIONAL; INTRAMUSCULAR; INTRAVENOUS; SOFT TISSUE EVERY 10 MIN PRN
Status: DISCONTINUED | OUTPATIENT
Start: 2017-11-07 | End: 2017-11-07 | Stop reason: HOSPADM

## 2017-11-07 RX ORDER — SODIUM CHLORIDE, SODIUM LACTATE, POTASSIUM CHLORIDE, CALCIUM CHLORIDE 600; 310; 30; 20 MG/100ML; MG/100ML; MG/100ML; MG/100ML
INJECTION, SOLUTION INTRAVENOUS CONTINUOUS
Status: DISCONTINUED | OUTPATIENT
Start: 2017-11-07 | End: 2017-11-07 | Stop reason: HOSPADM

## 2017-11-07 RX ORDER — ONDANSETRON 4 MG/1
4 TABLET, ORALLY DISINTEGRATING ORAL EVERY 30 MIN PRN
Status: DISCONTINUED | OUTPATIENT
Start: 2017-11-07 | End: 2017-11-07 | Stop reason: HOSPADM

## 2017-11-07 RX ORDER — PROPOFOL 10 MG/ML
INJECTION, EMULSION INTRAVENOUS PRN
Status: DISCONTINUED | OUTPATIENT
Start: 2017-11-07 | End: 2017-11-07

## 2017-11-07 RX ORDER — ONDANSETRON 2 MG/ML
4 INJECTION INTRAMUSCULAR; INTRAVENOUS EVERY 30 MIN PRN
Status: DISCONTINUED | OUTPATIENT
Start: 2017-11-07 | End: 2017-11-07 | Stop reason: HOSPADM

## 2017-11-07 RX ORDER — DEXAMETHASONE SODIUM PHOSPHATE 10 MG/ML
INJECTION, SOLUTION INTRAMUSCULAR; INTRAVENOUS PRN
Status: DISCONTINUED | OUTPATIENT
Start: 2017-11-07 | End: 2017-11-07

## 2017-11-07 RX ORDER — OXYMETAZOLINE HYDROCHLORIDE 0.05 G/100ML
SPRAY NASAL
Status: DISCONTINUED
Start: 2017-11-07 | End: 2017-11-07 | Stop reason: HOSPADM

## 2017-11-07 RX ORDER — KETOROLAC TROMETHAMINE 30 MG/ML
30 INJECTION, SOLUTION INTRAMUSCULAR; INTRAVENOUS EVERY 6 HOURS PRN
Status: DISCONTINUED | OUTPATIENT
Start: 2017-11-07 | End: 2017-11-07 | Stop reason: HOSPADM

## 2017-11-07 RX ORDER — DEXAMETHASONE 4 MG/1
TABLET ORAL
Qty: 17 TABLET | Refills: 0 | Status: SHIPPED | OUTPATIENT
Start: 2017-11-07 | End: 2017-11-21

## 2017-11-07 RX ORDER — PROMETHAZINE HYDROCHLORIDE 25 MG/ML
12.5 INJECTION, SOLUTION INTRAMUSCULAR; INTRAVENOUS
Status: DISCONTINUED | OUTPATIENT
Start: 2017-11-07 | End: 2017-11-07 | Stop reason: HOSPADM

## 2017-11-07 RX ORDER — OXYMETAZOLINE HYDROCHLORIDE 0.05 G/100ML
3 SPRAY NASAL EVERY 10 MIN PRN
Status: DISCONTINUED | OUTPATIENT
Start: 2017-11-07 | End: 2017-11-07 | Stop reason: HOSPADM

## 2017-11-07 RX ORDER — ECHINACEA PURPUREA EXTRACT 125 MG
TABLET ORAL
Qty: 1 BOTTLE | Refills: 3 | Status: SHIPPED | OUTPATIENT
Start: 2017-11-07 | End: 2018-04-30

## 2017-11-07 RX ORDER — FENTANYL CITRATE 50 UG/ML
25-50 INJECTION, SOLUTION INTRAMUSCULAR; INTRAVENOUS
Status: DISCONTINUED | OUTPATIENT
Start: 2017-11-07 | End: 2017-11-07 | Stop reason: HOSPADM

## 2017-11-07 RX ORDER — HYDROCODONE BITARTRATE AND ACETAMINOPHEN 5; 325 MG/1; MG/1
1 TABLET ORAL EVERY 6 HOURS PRN
Qty: 5 TABLET | Refills: 0 | Status: SHIPPED | OUTPATIENT
Start: 2017-11-07 | End: 2017-11-21

## 2017-11-07 RX ORDER — LIDOCAINE HYDROCHLORIDE AND EPINEPHRINE 10; 10 MG/ML; UG/ML
INJECTION, SOLUTION INFILTRATION; PERINEURAL PRN
Status: DISCONTINUED | OUTPATIENT
Start: 2017-11-07 | End: 2017-11-07 | Stop reason: HOSPADM

## 2017-11-07 RX ORDER — ONDANSETRON 2 MG/ML
INJECTION INTRAMUSCULAR; INTRAVENOUS PRN
Status: DISCONTINUED | OUTPATIENT
Start: 2017-11-07 | End: 2017-11-07

## 2017-11-07 RX ORDER — NEOSTIGMINE METHYLSULFATE 1 MG/ML
VIAL (ML) INJECTION PRN
Status: DISCONTINUED | OUTPATIENT
Start: 2017-11-07 | End: 2017-11-07

## 2017-11-07 RX ORDER — MEPERIDINE HYDROCHLORIDE 25 MG/ML
12.5 INJECTION INTRAMUSCULAR; INTRAVENOUS; SUBCUTANEOUS
Status: DISCONTINUED | OUTPATIENT
Start: 2017-11-07 | End: 2017-11-07 | Stop reason: HOSPADM

## 2017-11-07 RX ORDER — GLYCOPYRROLATE 0.2 MG/ML
INJECTION, SOLUTION INTRAMUSCULAR; INTRAVENOUS PRN
Status: DISCONTINUED | OUTPATIENT
Start: 2017-11-07 | End: 2017-11-07

## 2017-11-07 RX ORDER — NALOXONE HYDROCHLORIDE 0.4 MG/ML
.1-.4 INJECTION, SOLUTION INTRAMUSCULAR; INTRAVENOUS; SUBCUTANEOUS
Status: DISCONTINUED | OUTPATIENT
Start: 2017-11-07 | End: 2017-11-07 | Stop reason: HOSPADM

## 2017-11-07 RX ORDER — HYDRALAZINE HYDROCHLORIDE 20 MG/ML
2.5-5 INJECTION INTRAMUSCULAR; INTRAVENOUS EVERY 10 MIN PRN
Status: DISCONTINUED | OUTPATIENT
Start: 2017-11-07 | End: 2017-11-07 | Stop reason: HOSPADM

## 2017-11-07 RX ORDER — HYDROMORPHONE HYDROCHLORIDE 1 MG/ML
.3-.5 INJECTION, SOLUTION INTRAMUSCULAR; INTRAVENOUS; SUBCUTANEOUS EVERY 10 MIN PRN
Status: DISCONTINUED | OUTPATIENT
Start: 2017-11-07 | End: 2017-11-07 | Stop reason: HOSPADM

## 2017-11-07 RX ORDER — SCOLOPAMINE TRANSDERMAL SYSTEM 1 MG/1
1 PATCH, EXTENDED RELEASE TRANSDERMAL ONCE
Status: COMPLETED | OUTPATIENT
Start: 2017-11-07 | End: 2017-11-07

## 2017-11-07 RX ORDER — ALBUTEROL SULFATE 0.83 MG/ML
2.5 SOLUTION RESPIRATORY (INHALATION) EVERY 4 HOURS PRN
Status: DISCONTINUED | OUTPATIENT
Start: 2017-11-07 | End: 2017-11-07 | Stop reason: HOSPADM

## 2017-11-07 RX ORDER — FENTANYL CITRATE 50 UG/ML
INJECTION, SOLUTION INTRAMUSCULAR; INTRAVENOUS PRN
Status: DISCONTINUED | OUTPATIENT
Start: 2017-11-07 | End: 2017-11-07

## 2017-11-07 RX ORDER — LIDOCAINE HYDROCHLORIDE 20 MG/ML
INJECTION, SOLUTION INFILTRATION; PERINEURAL PRN
Status: DISCONTINUED | OUTPATIENT
Start: 2017-11-07 | End: 2017-11-07

## 2017-11-07 RX ORDER — LABETALOL HYDROCHLORIDE 5 MG/ML
10 INJECTION, SOLUTION INTRAVENOUS
Status: DISCONTINUED | OUTPATIENT
Start: 2017-11-07 | End: 2017-11-07 | Stop reason: HOSPADM

## 2017-11-07 RX ORDER — CEPHALEXIN 500 MG/1
500 CAPSULE ORAL 3 TIMES DAILY
Qty: 21 CAPSULE | Refills: 0 | Status: SHIPPED | OUTPATIENT
Start: 2017-11-07 | End: 2017-11-07

## 2017-11-07 RX ADMIN — PROPOFOL 150 MG: 10 INJECTION, EMULSION INTRAVENOUS at 14:39

## 2017-11-07 RX ADMIN — GLYCOPYRROLATE 0.6 MG: 0.2 INJECTION, SOLUTION INTRAMUSCULAR; INTRAVENOUS at 15:07

## 2017-11-07 RX ADMIN — ROCURONIUM BROMIDE 30 MG: 10 INJECTION INTRAVENOUS at 14:39

## 2017-11-07 RX ADMIN — SODIUM CHLORIDE, POTASSIUM CHLORIDE, SODIUM LACTATE AND CALCIUM CHLORIDE: 600; 310; 30; 20 INJECTION, SOLUTION INTRAVENOUS at 10:57

## 2017-11-07 RX ADMIN — FENTANYL CITRATE 100 MCG: 50 INJECTION, SOLUTION INTRAMUSCULAR; INTRAVENOUS at 14:36

## 2017-11-07 RX ADMIN — Medication 3 MG: at 15:07

## 2017-11-07 RX ADMIN — OXYMETAZOLINE HYDROCHLORIDE 3 SPRAY: 5 SPRAY NASAL at 12:46

## 2017-11-07 RX ADMIN — DEXAMETHASONE SODIUM PHOSPHATE 12 MG: 10 INJECTION, SOLUTION INTRAMUSCULAR; INTRAVENOUS at 14:48

## 2017-11-07 RX ADMIN — OXYMETAZOLINE HYDROCHLORIDE 3 SPRAY: 5 SPRAY NASAL at 13:03

## 2017-11-07 RX ADMIN — SCOPALAMINE 1 PATCH: 1 PATCH, EXTENDED RELEASE TRANSDERMAL at 10:55

## 2017-11-07 RX ADMIN — ONDANSETRON 4 MG: 2 INJECTION INTRAMUSCULAR; INTRAVENOUS at 14:57

## 2017-11-07 RX ADMIN — FENTANYL CITRATE 100 MCG: 50 INJECTION, SOLUTION INTRAMUSCULAR; INTRAVENOUS at 14:49

## 2017-11-07 RX ADMIN — LIDOCAINE HYDROCHLORIDE 40 MG: 20 INJECTION, SOLUTION INFILTRATION; PERINEURAL at 14:39

## 2017-11-07 RX ADMIN — OXYMETAZOLINE HYDROCHLORIDE 3 SPRAY: 5 SPRAY NASAL at 12:54

## 2017-11-07 NOTE — ANESTHESIA PREPROCEDURE EVALUATION
Anesthesia Evaluation     . Pt has had prior anesthetic.     No history of anesthetic complications          ROS/MED HX    ENT/Pulmonary:  - neg pulmonary ROS     Neurologic:  - neg neurologic ROS     Cardiovascular:     (+) Dyslipidemia, ----. : . . . :. . Previous cardiac testing date:results:date: results:ECG reviewed date:11/6/2017 results:Sinus rhythm with occasional PVC, otherwise normal EKG. date: results:          METS/Exercise Tolerance:     Hematologic:     (+) History of Transfusion no previous transfusion reaction -      Musculoskeletal:   (+) arthritis, , , other musculoskeletal- NASAL FRACTURE, DJD      GI/Hepatic:     (+) Other GI/Hepatic s/p Lap Band 2008      Renal/Genitourinary:     (+) chronic renal disease (Stage 2-3 (mild-moderate)), type: CRI,       Endo:  - neg endo ROS       Psychiatric:     (+) psychiatric history depression and anxiety      Infectious Disease:  - neg infectious disease ROS       Malignancy:      - no malignancy   Other:    - neg other ROS                 Physical Exam  Normal systems: dental    Airway   Mallampati: III  TM distance: >3 FB  Neck ROM: full    Dental     Cardiovascular   Rhythm and rate: regular and normal      Pulmonary    breath sounds clear to auscultation                    Anesthesia Plan      History & Physical Review  History and physical reviewed and following examination; no interval change.    ASA Status:  2 .    NPO Status:  > 8 hours    Plan for General, RSI and ETT with Intravenous and Propofol induction. Maintenance will be Balanced.    PONV prophylaxis:  Ondansetron (or other 5HT-3), Scopolamine patch and Dexamethasone or Solumedrol       Postoperative Care  Postoperative pain management:  IV analgesics and Oral pain medications.      Consents  Anesthetic plan, risks, benefits and alternatives discussed with:  Patient..                          .

## 2017-11-07 NOTE — DISCHARGE INSTRUCTIONS
Post-Anesthesia Patient Instructions    IMMEDIATELY FOLLOWING SURGERY:  Do not drive or operate machinery for the first twenty four hours after surgery.  Do not make any important decisions for twenty four hours after surgery or while taking narcotic pain medications or sedatives.  If you develop intractable nausea and vomiting or a severe headache please notify your doctor immediately.    FOLLOW-UP:  Please make an appointment with your surgeon as instructed. You do not need to follow up with anesthesia unless specifically instructed to do so.    WOUND CARE INSTRUCTIONS (if applicable):  Keep a dry clean dressing on the anesthesia/puncture wound site if there is drainage.  Once the wound has quit draining you may leave it open to air.  Generally you should leave the bandage intact for twenty four hours unless there is drainage.  If the epidural site drains for more than 36-48 hours please call the anesthesia department.    QUESTIONS?:  Please feel free to call your physician or the hospital  if you have any questions, and they will be happy to assist you.       Instructions for Nasal Fracture Repair    Recovery - Repair of a broken nose requires re-breaking and movement of the fragments back into correct position during the surgery. This causes discomfort in the face and around the eyes, along with a headache. Also, everyone recovers differently from a general anesthetic. Symptoms such as fatigue, nausea, lightheadedness, and sometimes a low grade fever (up to 100 degrees) are not unusual. As your body removes the anesthetic drugs from circulation, these symptoms will resolve. Your throat will be sore for several days, and possibly up to a week after surgery.     Medications - Resume all your previous home medications after surgery. You were sent home with narcotic pain medication and antibiotics. If you can tolerate the discomfort during your recovery by using just plain Tylenol or ibuprofen (advil),  please do so. However, do not hesitate to use the stronger pain medication if needed. If you were sent home with an antibiotic, it is primarily used to help the healing process. If it causes loose bowel movements or other signs of intolerance, it is appropriate to discontinue it. By far the most important measure you can take to give the best possible chance of repair is not to rub/touch/bump the nose. Dab your nose for drainage, do not blow it. Use the [place] Nasal spray as directed.     Activity - Please avoid any contact sports, vigorous physical activity, or lifting greater than 20 pounds for 2 weeks, or otherwise indicated at your postoperative visit.  Sleep with your head elevated, or in a reclining chair for at least three days after surgery. There are no restrictions on diet after this surgery.     Complications - Problems related to nasal fracture surgery are rare. If you experience fevers greater than 100.5, or change in vision, you should call the office right away.     Follow-up - The follow up is approximately one week, and will consist of removing all of the splints and packing from the nose. This is not a painful visit, and the relief of congestion and facial pressure will be immediate. Keep in mind that even after the follow up visit, it is very important not to bump or rub the nose hard for an entire month after surgery. The nasal bones are paper-thin and can easily become moved as they heal.     See Candy Flores ENT PA in 1 week.  Photos will be taken at that time and at one month follow up with Lexie Hicks NP .  See Dr. Vanegas as needed.    If there are any questions or issues with the above, or if there are other issues that concern you, always feel free to call the clinic at [phone]. If there is an emergency after hours, call the ENT on call [phone].    Destiny Vanegas D.O.  Otolaryngology/Head and Neck Surgery  Allergy

## 2017-11-07 NOTE — IP AVS SNAPSHOT
[facility] Preop/Phase II    [address] [address]    Phone:  [phone]                                       After Visit Summary   11/7/2017    Aileen Szymanski    MRN: [id number]           After Visit Summary Signature Page     I have received my discharge instructions, and my questions have been answered. I have discussed any challenges I see with this plan with the nurse or doctor.    ..........................................................................................................................................  Patient/Patient Representative Signature      ..........................................................................................................................................  Patient Representative Print Name and Relationship to Patient    ..................................................               ................................................  Date                                            Time    ..........................................................................................................................................  Reviewed by Signature/Title    ...................................................              ..............................................  Date                                                            Time

## 2017-11-07 NOTE — OP NOTE
PREOPERATIVE DIAGNOSIS: Closed nasal fracture.   POSTOPERATIVE DIAGNOSIS: Closed nasal and septal fracture.   PROCEDURE: Closed reduction of nasal and septal fracture.   SURGEON: Destiny Vanegas D.O.  BLOOD LOSS: 1 mL.   COMPLICATIONS: None.   SPECIMENS: None.   ANESTHESIA: GETA.   INDICATIONS: Aileen Szymanski  sustained a nasal fracture during a fall. Examination demonstrated significant deformity of the nose.  Therefore, my recommendation was for closed reduction of the nasal fracture. Preoperatively, risks discussed included the risks of infection, bleeding, the risks of general anesthesia, the possibility of less than ideal cosmetic outcome, and even the possibility of failure of the surgery to resolve the nasal obstruction and issues brought on by the trauma. They understood and wished to proceed.   OPERATIVE PROCEDURE: After being taken to the operating room and induction of general endotracheal tube anesthesia, the bed was rotated 90 degrees. I began by injecting local anesthetic in between the superficial surface of the nasal bones and the subcutaneous soft tissues bilaterally. After waiting 5 minutes, I then applied topical anesthetic to both sides of the nose in the form of 2 cottonoids on each side which had been soaked with a total of 4 mL of 4% liquid cocaine. After the nose was well anesthetized, I then entered the right nasal cavity with a [place] elevator. By palpation, I could feel that I was underneath the collapsed portion of the nasal bone and deflected it outward to reduce the fracture. This immediately restored the normal symmetric cosmetic appearance of the nose. After this was done, I was able to appreciate a slight skewing of the dorsum of the nose, and therefore I used Pravene forceps placed inside the nose, grasping both sides of the superior portion of the cartilaginous septum, and attempted to shift the dorsum of the nose toward the midline. It did move slightly and appeared to be in the  midline when I was done. Now that I had reduced the nasal fracture,  I reexamined the nose and there was good hemostasis and externally it was smooth and symmetric.  Iced 4x4s were used intermittently throughout the procedure to control mild subcutaneous edema.   I then placed a [place] splint on the dorsum of the nose per 's recommendations, and the procedure was complete.   The patient was awakened, extubated and sent to the recovery room in good condition.

## 2017-11-07 NOTE — ANESTHESIA CARE TRANSFER NOTE
Patient: Aileen Szymanski    Procedure(s):  CLOSED REDUCTION NASAL SEPTAL FRACTURE - Wound Class: I-Clean    Diagnosis: NASAL FRACTURE  Diagnosis Additional Information: No value filed.    Anesthesia Type:   General, RSI, ETT     Note:  Airway :Face Mask  Patient transferred to:PACU  Handoff Report: Identifed the Patient, Identified the Reponsible Provider, Reviewed the pertinent medical history, Discussed the surgical course, Reviewed Intra-OP anesthesia mangement and issues during anesthesia, Set expectations for post-procedure period and Allowed opportunity for questions and acknowledgement of understanding      Vitals: (Last set prior to Anesthesia Care Transfer)    CRNA VITALS  11/7/2017 1449 - 11/7/2017 1523      11/7/2017             Resp Rate (set): 8                Electronically Signed By: ULYSSES Quinn CRNA  November 7, 2017  3:23 PM

## 2017-11-07 NOTE — IP AVS SNAPSHOT
MRN:[id number]                      After Visit Summary   11/7/2017    Aileen Szymanski    MRN: [id number]           Thank you!     Thank you for choosing [facility] for your care. Our goal is always to provide you with excellent care. Hearing back from our patients is one way we can continue to improve our services. Please take a few minutes to complete the written survey that you may receive in the mail after you visit with us. Thank you!        Patient Information     Date Of Birth          1955        About your hospital stay     You were admitted on:  November 7, 2017 You last received care in the:  [facility]/Phase II    You were discharged on:  November 7, 2017       Who to Call     For medical emergencies, please call 911.  For non-urgent questions about your medical care, please call your primary care provider or clinic, [phone]  For questions related to your surgery, please call your surgery clinic        Attending Provider     Provider Specialty    Destiny Vanegas MD Otolaryngology       Primary Care Provider Office Phone # Fax #    Precious Vogel [phone]      Further instructions from your care team           Post-Anesthesia Patient Instructions    IMMEDIATELY FOLLOWING SURGERY:  Do not drive or operate machinery for the first twenty four hours after surgery.  Do not make any important decisions for twenty four hours after surgery or while taking narcotic pain medications or sedatives.  If you develop intractable nausea and vomiting or a severe headache please notify your doctor immediately.    FOLLOW-UP:  Please make an appointment with your surgeon as instructed. You do not need to follow up with anesthesia unless specifically instructed to do so.    WOUND CARE INSTRUCTIONS (if applicable):  Keep a dry clean dressing on the anesthesia/puncture wound site if there is drainage.  Once the wound has quit draining you may leave it open to air.   Generally you should leave the bandage intact for twenty four hours unless there is drainage.  If the epidural site drains for more than 36-48 hours please call the anesthesia department.    QUESTIONS?:  Please feel free to call your physician or the hospital  if you have any questions, and they will be happy to assist you.       Instructions for Nasal Fracture Repair    Recovery - Repair of a broken nose requires re-breaking and movement of the fragments back into correct position during the surgery. This causes discomfort in the face and around the eyes, along with a headache. Also, everyone recovers differently from a general anesthetic. Symptoms such as fatigue, nausea, lightheadedness, and sometimes a low grade fever (up to 100 degrees) are not unusual. As your body removes the anesthetic drugs from circulation, these symptoms will resolve. Your throat will be sore for several days, and possibly up to a week after surgery.     Medications - Resume all your previous home medications after surgery. You were sent home with narcotic pain medication and antibiotics. If you can tolerate the discomfort during your recovery by using just plain Tylenol or ibuprofen (advil), please do so. However, do not hesitate to use the stronger pain medication if needed. If you were sent home with an antibiotic, it is primarily used to help the healing process. If it causes loose bowel movements or other signs of intolerance, it is appropriate to discontinue it. By far the most important measure you can take to give the best possible chance of repair is not to rub/touch/bump the nose. Dab your nose for drainage, do not blow it. Use the Ocean Nasal spray as directed.     Activity - Please avoid any contact sports, vigorous physical activity, or lifting greater than 20 pounds for 2 weeks, or otherwise indicated at your postoperative visit.  Sleep with your head elevated, or in a reclining chair for at least three days after  "surgery. There are no restrictions on diet after this surgery.     Complications - Problems related to nasal fracture surgery are rare. If you experience fevers greater than 100.5, or change in vision, you should call the office right away.     Follow-up - The follow up is approximately one week, and will consist of removing all of the splints and packing from the nose. This is not a painful visit, and the relief of congestion and facial pressure will be immediate. Keep in mind that even after the follow up visit, it is very important not to bump or rub the nose hard for an entire month after surgery. The nasal bones are paper-thin and can easily become moved as they heal.     See Candy Flores ENT PA in 1 week.  Photos will be taken at that time and at one month follow up with Lexie Hicks NP .  See Dr. Vanegas as needed.    If there are any questions or issues with the above, or if there are other issues that concern you, always feel free to call the clinic at [phone]. If there is an emergency after hours, call the ENT on call [phone].    Destiny Vanegas D.O.  Otolaryngology/Head and Neck Surgery  Allergy        Pending Results     No orders found from 11/5/2017 to 11/8/2017.            Admission Information     Date & Time Provider Department Dept. Phone    11/7/2017 Destiny Vanegas MD [facility] Preop/Phase [phone]      Your Vitals Were     Blood Pressure Temperature Respirations Height Weight Pulse Oximetry    127/64 97.1  F (36.2  C) (Tympanic) 16 1.651 m (5' 5\") 76.7 kg (169 lb) 98%    BMI (Body Mass Index)                   28.12 kg/m2           [facility] [employer] Information     [employer] [facility] gives you secure access to your electronic health record. If you see a primary care provider, you can also send messages to your care team and make appointments. If you have questions, please call your primary care clinic.  If you do not have a primary care provider, please call [phone] and they will " assist you.        Care EveryWhere ID     This is your Care EveryWhere ID. This could be used by other organizations to access your [place] medical records  [id number]        Equal Access to Services     ALLEN AGUIRRE : Jayce Elam, ashleysheri luevano, jean karoxy cortes, lilian askewjamie vargasshahida dick laedeljamie alvarez. So [facility] [phone].    ATENCIÓN: Si habla español, tiene a ambrosio disposición servicios gratuitos de asistencia lingüística. Llame al [phone].    We comply with applicable federal civil rights laws and [place] laws. We do not discriminate on the basis of race, color, national origin, age, disability, sex, sexual orientation, or gender identity.               Review of your medicines      START taking        Dose / Directions    dexamethasone 4 MG tablet   Commonly known as:  DECADRON   Used for:  Post-operative state        12 mg (3 tabs) x 3 days, 8 mg (2 tabs) x 3 days, 4 mg (1 tab) x 2 days   Quantity:  17 tablet   Refills:  0       HYDROcodone-acetaminophen 5-325 MG per tablet   Commonly known as:  NORCO   Used for:  Post-operative state        Dose:  1 tablet   Take 1 tablet by mouth every 6 hours as needed for pain   Quantity:  5 tablet   Refills:  0       sodium chloride 0.65 % nasal spray   Commonly known as:  OCEAN   Used for:  Post-operative state        5-6 sprays into both nostrils daily and as needed   Quantity:  1 Bottle   Refills:  3         CONTINUE these medicines which may have CHANGED, or have new prescriptions. If we are uncertain of the size of tablets/capsules you have at home, strength may be listed as something that might have changed.        Dose / Directions    clonazePAM 1 MG tablet   Commonly known as:  klonoPIN   This may have changed:    - how much to take  - when to take this  - additional instructions   Used for:  Primary insomnia        1 po qhs   Quantity:  60 tablet   Refills:  0         CONTINUE these medicines which have NOT CHANGED         Dose / Directions    amoxicillin-clavulanate 875-125 MG per tablet   Commonly known as:  AUGMENTIN        Dose:  1 tablet   Take 1 tablet by mouth 2 times daily for 5 days   Quantity:  10 tablet   Refills:  0       escitalopram 20 MG tablet   Commonly known as:  LEXAPRO        Dose:  20 mg   Take 1 tablet (20 mg) by mouth daily   Quantity:  90 tablet   Refills:  3       MELATONIN PO        Dose:  5 mg   Take 5 mg by mouth nightly as needed   Refills:  0       multivitamin, therapeutic Tabs tablet        Dose:  1 tablet   Take 1 tablet by mouth daily   Refills:  0       simvastatin 40 MG tablet   Commonly known as:  ZOCOR   Used for:  Mixed hyperlipidemia        Dose:  40 mg   Take 1 tablet (40 mg) by mouth At Bedtime   Quantity:  90 tablet   Refills:  3       sulindac 200 MG tablet   Commonly known as:  CLINORIL        Dose:  200 mg   Take 1 tablet (200 mg) by mouth 2 times daily   Quantity:  90 tablet   Refills:  0       VITAMIN D (CHOLECALCIFEROL) PO        Dose:  3000 Units   Take 3,000 Units by mouth daily   Refills:  0            Where to get your medicines      These medications were sent to [id number] - [place] - [address]  [place] [address]     Phone:  [phone]     dexamethasone 4 MG tablet    sodium chloride 0.65 % nasal spray         Some of these will need a paper prescription and others can be bought over the counter. Ask your nurse if you have questions.     Bring a paper prescription for each of these medications     HYDROcodone-acetaminophen 5-325 MG per tablet                Protect others around you: Learn how to safely use, store and throw away your medicines at www.disposemymeds.org.             Medication List: This is a list of all your medications and when to take them. Check marks below indicate your daily home schedule. Keep this list as a reference.      Medications           Morning Afternoon Evening Bedtime As Needed    amoxicillin-clavulanate  875-125 MG per tablet   Commonly known as:  AUGMENTIN   Take 1 tablet by mouth 2 times daily for 5 days                                clonazePAM 1 MG tablet   Commonly known as:  klonoPIN   1 po qhs                                dexamethasone 4 MG tablet   Commonly known as:  DECADRON   12 mg (3 tabs) x 3 days, 8 mg (2 tabs) x 3 days, 4 mg (1 tab) x 2 days                                escitalopram 20 MG tablet   Commonly known as:  LEXAPRO   Take 1 tablet (20 mg) by mouth daily                                HYDROcodone-acetaminophen 5-325 MG per tablet   Commonly known as:  NORCO   Take 1 tablet by mouth every 6 hours as needed for pain                                MELATONIN PO   Take 5 mg by mouth nightly as needed                                multivitamin, therapeutic Tabs tablet   Take 1 tablet by mouth daily                                simvastatin 40 MG tablet   Commonly known as:  ZOCOR   Take 1 tablet (40 mg) by mouth At Bedtime                                sodium chloride 0.65 % nasal spray   Commonly known as:  OCEAN   5-6 sprays into both nostrils daily and as needed                                sulindac 200 MG tablet   Commonly known as:  CLINORIL   Take 1 tablet (200 mg) by mouth 2 times daily                                VITAMIN D (CHOLECALCIFEROL) PO   Take 3,000 Units by mouth daily

## 2017-11-07 NOTE — OR NURSING
Patient and responsible adult given discharge instructions with no questions regarding instructions. Orly score 19. Pain level 2/10.  Discharged from unit via w/c. Patient discharged to home with .

## 2017-11-09 ASSESSMENT — ENCOUNTER SYMPTOMS
WHEEZING: 0
DIAPHORESIS: 0
CHILLS: 0
BLOOD IN STOOL: 0
DYSURIA: 0
WOUND: 0
CONFUSION: 0
HEMATURIA: 0
VOMITING: 0
CHEST TIGHTNESS: 0
NECK STIFFNESS: 0
LIGHT-HEADEDNESS: 0
HEADACHES: 0
PALPITATIONS: 0
MYALGIAS: 0
COUGH: 0
ANAL BLEEDING: 0
VOICE CHANGE: 0
COLOR CHANGE: 0
FLANK PAIN: 0
ABDOMINAL PAIN: 0
NECK PAIN: 0
BACK PAIN: 0
SHORTNESS OF BREATH: 0
NUMBNESS: 0
NAUSEA: 0
FEVER: 0
DIZZINESS: 0
ARTHRALGIAS: 0
ABDOMINAL DISTENTION: 0

## 2017-11-09 NOTE — ANESTHESIA POSTPROCEDURE EVALUATION
Patient: Aileen Szymanski    Procedure(s):  CLOSED REDUCTION NASAL SEPTAL FRACTURE - Wound Class: I-Clean    Diagnosis:NASAL FRACTURE  Diagnosis Additional Information: No value filed.    Anesthesia Type:  General, RSI, ETT    Note:  Anesthesia Post Evaluation    Patient location during evaluation: Phase 2, PACU and Bedside  Patient participation: Able to fully participate in evaluation  Level of consciousness: awake and alert  Pain management: adequate  Airway patency: patent  Cardiovascular status: acceptable  Respiratory status: acceptable  Hydration status: stable  PONV: none     Anesthetic complications: None          Last vitals:  Vitals:    11/07/17 1615 11/07/17 1630 11/07/17 1645   BP: 127/64 122/99 138/79   Resp: 16  16   Temp:   97.7  F (36.5  C)   SpO2: 98% 98% 97%         Electronically Signed By: Ulysses Stallings MD  November 9, 2017  6:10 AM

## 2017-11-09 NOTE — ED PROVIDER NOTES
History     Chief Complaint   Patient presents with     Fall     Facial Injury     Ankle Pain     Patient is a 62 year old female presenting with fall. The history is provided by the patient.   Trauma  Mechanism of injury: fall  Injury location: face  Injury location detail: face  Incident location: school     Fall:       Fall occurred: down stairs    Current symptoms:       Associated symptoms:             Denies abdominal pain, back pain, chest pain, headache, nausea, neck pain and vomiting.         Problem List:    Patient Active Problem List    Diagnosis Date Noted     Major depressive disorder without psychotic features 08/31/2017     Priority: Medium     Mood disorder (H) 09/19/2016     Priority: Medium     Mixed hyperlipidemia 09/19/2016     Priority: Medium        Past Medical History:    Past Medical History:   Diagnosis Date     Dizziness and giddiness 07/31/2007     Enthesopathy of knee, unspecified 06/13/2002     Major depressive disorder without psychotic features 8/31/2017     Nonallopathic lesion of cervical region, not elsewhere classified 07/25/2001     Nonallopathic lesion of thoracic region, not elsewhere classified 06/13/2002     Pain in joint, lower leg 06/24/2002       Past Surgical History:    Past Surgical History:   Procedure Laterality Date     ABDOMEN SURGERY  2008     lap band     ABDOMEN SURGERY  2007    hysteroscopy     ARTHROPLASTY REVISION KNEE Left 10/2016     CHOLECYSTECTOMY      removal     CLOSED REDUCTION NOSE N/A 11/7/2017    Procedure: CLOSED REDUCTION NOSE;  CLOSED REDUCTION NASAL SEPTAL FRACTURE;  Surgeon: Destiny Vanegas MD;  Location: [facility] OR     COLONOSCOPY  04/13/2010    Dr Quevedo; negative      left knee replacement  7/21/2014    Dr Quinones/ [facility]       Family History:    Family History   Problem Relation Age of Onset     Lung Cancer Mother      HEART DISEASE Mother      Chronic Obstructive Pulmonary Disease Father        Social History:  Marital  Status:   [2]  Social History   Substance Use Topics     Smoking status: Never Smoker     Smokeless tobacco: Never Used     Alcohol use Yes      Comment: rare        Medications:      multivitamin, therapeutic (THERA-VIT) TABS tablet   clonazePAM (KLONOPIN) 1 MG tablet   simvastatin (ZOCOR) 40 MG tablet   MELATONIN PO   escitalopram (LEXAPRO) 20 MG tablet   sulindac (CLINORIL) 200 MG tablet   VITAMIN D, CHOLECALCIFEROL, PO   HYDROcodone-acetaminophen (NORCO) 5-325 MG per tablet   sodium chloride (OCEAN) 0.65 % nasal spray   dexamethasone (DECADRON) 4 MG tablet         Review of Systems   Constitutional: Negative for chills, diaphoresis and fever.   HENT: Negative for voice change.    Eyes: Negative for visual disturbance.   Respiratory: Negative for cough, chest tightness, shortness of breath and wheezing.    Cardiovascular: Negative for chest pain, palpitations and leg swelling.   Gastrointestinal: Negative for abdominal distention, abdominal pain, anal bleeding, blood in stool, nausea and vomiting.   Genitourinary: Negative for decreased urine volume, dysuria, flank pain and hematuria.   Musculoskeletal: Negative for arthralgias, back pain, gait problem, myalgias, neck pain and neck stiffness.   Skin: Negative for color change, pallor, rash and wound.   Neurological: Negative for dizziness, syncope, light-headedness, numbness and headaches.   Psychiatric/Behavioral: Negative for confusion and suicidal ideas.       Physical Exam   BP: 129/96  Pulse: 66  Heart Rate: 59  Temp: 98  F (36.7  C)  Resp: 20  SpO2: 98 %      Physical Exam   Constitutional: She is oriented to person, place, and time. She appears well-developed and well-nourished.   HENT:   Head: Normocephalic. Head is with abrasion and with contusion.       Mouth/Throat: No oropharyngeal exudate.   Eyes: Conjunctivae are normal. Pupils are equal, round, and reactive to light.   Neck: Normal range of motion. Neck supple. No JVD present. No tracheal  deviation present. No thyromegaly present.   Cardiovascular: Normal rate, regular rhythm, normal heart sounds and intact distal pulses.  Exam reveals no gallop and no friction rub.    No murmur heard.  Pulmonary/Chest: Effort normal and breath sounds normal. No stridor. No respiratory distress. She has no wheezes. She has no rales. She exhibits no tenderness.   Abdominal: Soft. Bowel sounds are normal. She exhibits no distension and no mass. There is no tenderness. There is no rebound and no guarding.   Musculoskeletal: Normal range of motion. She exhibits no edema.        Right ankle: She exhibits swelling. She exhibits normal range of motion.        Right foot: There is tenderness, bony tenderness and swelling.   Lymphadenopathy:     She has no cervical adenopathy.   Neurological: She is alert and oriented to person, place, and time.   Skin: Skin is warm and dry. No rash noted. No erythema. No pallor.   Psychiatric: Her behavior is normal.   Nursing note and vitals reviewed.      ED Course     ED Course     Procedures                   Labs Ordered and Resulted from Time of ED Arrival Up to the Time of Departure from the ED - No data to display    Assessments & Plan (with Medical Decision Making)   Mechanical fall while walking downstair st school  Landed on her face and twisted her R ankle  CT : displaced nasal fx , possible maxillar fx  Xray ankle, 5th proximal metatarsal fx  I spoke to Dr Johns, he reommended outpatient follow up in 1 wk when swelling get better  Dc home  I have reviewed the nursing notes.    I have reviewed the findings, diagnosis, plan and need for follow up with the patient.      Discharge Medication List as of 11/2/2017 11:41 PM      START taking these medications    Details   amoxicillin-clavulanate (AUGMENTIN) 875-125 MG per tablet Take 1 tablet by mouth 2 times daily for 5 days, Disp-10 tablet, R-0, Local Print      HYDROcodone-acetaminophen (NORCO) 5-325 MG per tablet Take 1-2  tablets by mouth every 6 hours as needed for moderate to severe pain, Disp-10 tablet, R-0, Local Print             Final diagnoses:   Closed fracture of nasal bone, initial encounter   Maxillary fracture, right side, initial encounter for closed fracture (H)   Closed displaced fracture of fifth metatarsal bone of right foot, initial encounter       11/2/2017   [facility] EMERGENCY DEPARTMENT     Estrada Eng MD  11/09/17 0026

## 2017-11-10 DIAGNOSIS — F51.01 PRIMARY INSOMNIA: ICD-10-CM

## 2017-11-14 ENCOUNTER — TELEPHONE (OUTPATIENT)
Dept: SLEEP MEDICINE | Facility: HOSPITAL | Age: 62
End: 2017-11-14

## 2017-11-14 RX ORDER — CLONAZEPAM 1 MG/1
TABLET ORAL
Qty: 60 TABLET | Refills: 0 | Status: SHIPPED | OUTPATIENT
Start: 2017-11-14 | End: 2018-01-09

## 2017-11-21 ENCOUNTER — OFFICE VISIT (OUTPATIENT)
Dept: OTOLARYNGOLOGY | Facility: OTHER | Age: 62
End: 2017-11-21
Attending: NURSE PRACTITIONER
Payer: COMMERCIAL

## 2017-11-21 VITALS
TEMPERATURE: 98.1 F | HEART RATE: 74 BPM | DIASTOLIC BLOOD PRESSURE: 72 MMHG | BODY MASS INDEX: 28.16 KG/M2 | SYSTOLIC BLOOD PRESSURE: 134 MMHG | WEIGHT: 169 LBS | HEIGHT: 65 IN

## 2017-11-21 DIAGNOSIS — Z09 POSTOPERATIVE EXAMINATION: Primary | ICD-10-CM

## 2017-11-21 DIAGNOSIS — Z98.890 H/O REDUCTION OF NASAL FRACTURE: ICD-10-CM

## 2017-11-21 DIAGNOSIS — Z87.81 H/O REDUCTION OF NASAL FRACTURE: ICD-10-CM

## 2017-11-21 PROCEDURE — 99024 POSTOP FOLLOW-UP VISIT: CPT | Performed by: NURSE PRACTITIONER

## 2017-11-21 ASSESSMENT — PAIN SCALES - GENERAL: PAINLEVEL: NO PAIN (0)

## 2017-11-21 NOTE — MR AVS SNAPSHOT
After Visit Summary   11/21/2017    Aileen Szymanski    MRN: [id number]           Patient Information     Date Of Birth          1955        Visit Information        Provider Department      11/21/2017 11:15 AM Lexie Hicks APRN CNP [facility]        Care Instructions    Continue Ocean Nasal Spray at least twice daily x 3-4 weeks    Low key activity to avoid trauma to the area for the next 3-4 weeks    Do not use use nasal steroids    Follow up in 3-4 weeks for re-evaluation, sooner as needed.      Thank you for allowing Lexie Hicks CNP and our ENT team to participate in your care.  If you have a scheduling or an appointment question please contact our Health Unit Coordinator at their direct line [phone].   ALL nursing questions or concerns can be directed to your ENT nurse at: [phone]             Follow-ups after your visit        Follow-up notes from your care team     Return for 3-4 week f/u s/p closed reduction nasal fracture.      Who to contact     If you have questions or need follow up information about today's clinic visit or your schedule please contact [facility] directly at [phone].  Normal or non-critical lab and imaging results will be communicated to you by MyChart, letter or phone within 4 business days after the clinic has received the results. If you do not hear from us within 7 days, please contact the clinic through [email or web address] or phone. If you have a critical or abnormal lab result, we will notify you by phone as soon as possible.  Submit refill requests through [facility] or call your pharmacy and they will forward the refill request to us. Please allow 3 business days for your refill to be completed.          Additional Information About Your Visit        MyChart Information     [facility] gives you secure access to your electronic health record. If you see a primary care provider, you can also send messages to your care team and  "make appointments. If you have questions, please call your primary care clinic.  If you do not have a primary care provider, please call [phone] and they will assist you.        Care EveryWhere ID     This is your Care EveryWhere ID. This could be used by other organizations to access your [place] medical records  [id number]        Your Vitals Were     Pulse Temperature Height BMI (Body Mass Index)          74 98.1  F (36.7  C) (Tympanic) 5' 5\" (1.651 m) 28.12 kg/m2         Blood Pressure from Last 3 Encounters:   11/21/17 134/72   11/07/17 138/79   11/06/17 120/80    Weight from Last 3 Encounters:   11/21/17 169 lb (76.7 kg)   11/07/17 169 lb (76.7 kg)   11/06/17 170 lb (77.1 kg)              Today, you had the following     No orders found for display       Primary Care Provider Office Phone # Fax #    Precious Vogel [phone]       [facility] [address] [facility] [address]        Equal Access to Services     DE AGUIRRE : Hadii sharan ku hadasho Soomaali, waaxda luqadaha, qaybta kaalmada adeshahidayada, lilian cordoba . So [facility] [phone].    ATENCIÓN: Si habla español, tiene a ambrosio disposición servicios gratuitos de asistencia lingüística. Ll[facility]UMed[facility] [phone].    We comply with applicable federal civil rights laws and [place] laws. We do not discriminate on the basis of race, color, national origin, age, disability, sex, sexual orientation, or gender identity.            Thank you!     Thank you for choosing [facility]  for your care. Our goal is always to provide you with excellent care. Hearing back from our patients is one way we can continue to improve our services. Please take a few minutes to complete the written survey that you may receive in the mail after your visit with us. Thank you!             Your Updated Medication List - Protect others around you: Learn how to safely use, store and throw away your medicines at " www.disposemymeds.org.          This list is accurate as of: 11/21/17 11:22 AM.  Always use your most recent med list.                   Brand Name Dispense Instructions for use Diagnosis    clonazePAM 1 MG tablet    klonoPIN    60 tablet    1 po qhs    Primary insomnia       escitalopram 20 MG tablet    LEXAPRO    90 tablet    Take 1 tablet (20 mg) by mouth daily        MELATONIN PO      Take 5 mg by mouth nightly as needed        multivitamin, therapeutic Tabs tablet      Take 1 tablet by mouth daily        simvastatin 40 MG tablet    ZOCOR    90 tablet    Take 1 tablet (40 mg) by mouth At Bedtime    Mixed hyperlipidemia       sodium chloride 0.65 % nasal spray    OCEAN    1 Bottle    5-6 sprays into both nostrils daily and as needed    Post-operative state       sulindac 200 MG tablet    CLINORIL    90 tablet    Take 1 tablet (200 mg) by mouth 2 times daily        VITAMIN D (CHOLECALCIFEROL) PO      Take 3,000 Units by mouth daily

## 2017-11-21 NOTE — PATIENT INSTRUCTIONS
Continue Ocean Nasal Spray at least twice daily x 3-4 weeks    Low key activity to avoid trauma to the area for the next 3-4 weeks    Do not use use nasal steroids    Follow up in 3-4 weeks for re-evaluation, sooner as needed.      Thank you for allowing Lexie Hicks CNP and our ENT team to participate in your care.  If you have a scheduling or an appointment question please contact our Health Unit Coordinator at their direct line [phone].   ALL nursing questions or concerns can be directed to your ENT nurse at: [phone]

## 2017-11-21 NOTE — PROGRESS NOTES
Patient presents with:  Surgical Followup: S/P Closed Reduction of Nasal and Septal Fracture on 11/7/17      History of Present Illness - Aileen Szymanski is a 62 year old female who is status post closed reduction of nasal and septal fracture on 11/7/17. She states there has been no concerns. No congestion. She is not having any pain postoperatively.  No headaches, fevers, chills, or change in vision. There is no nasal drainage or bleeding. She is doing the Ocean Nasal spray 2 times daily. She completed the oral antibiotic and oral decadron as directed. Feels she is breathing out of both nares without difficulty.     She removed the [place] splint last week without difficulty and has been wearing at night time just for her comfort.    PREOPERATIVE DIAGNOSIS: Closed nasal fracture.   POSTOPERATIVE DIAGNOSIS: Closed nasal and septal fracture.   PROCEDURE: Closed reduction of nasal and septal fracture.   SURGEON: Destiny Vanegas D.O.  BLOOD LOSS: 1 mL.   COMPLICATIONS: None.   SPECIMENS: None.   ANESTHESIA: GETA.    B/P: 134/72, T: 98.1, P: 74, R: Data Unavailable  General - The patient is well nourished and well developed, and appears to have good nutritional status.  Alert and oriented to person and place, answers questions and cooperates with examination appropriately.   Head and Face - Normocephalic and atraumatic, with no gross asymmetry noted of the contour of the facial features.  The facial nerve is intact, with strong symmetric movements.  Eyes - Extraocular movements intact, and the pupils were reactive to light.  Sclera were not icteric or injected, conjunctiva were pink and moist.  Mouth - Examination of the oral cavity shows pink, healthy, moist mucosa.  No lesions or ulceration noted.  The dentition are in good repair.  The tongue is mobile and midline.  Nose - Splint removed by patient last week. Bridge of dorsum with very minimal skewing to the left with minimal caudal deviation to the left. Nasal  mucosa is pink and moist with no abnormal mucous. No polyps, masses or purulence. No clear rhinorrhea.    To evaluate the nose and sinuses in the post operative state, I performed rigid nasal endoscopy. I sprayed both nares with lidocaine and neosynephrine.     I began with the LEFT side using a 0 degree rigid nasal endoscope, and then similarly examined the RIGHT side     Findings:  Inferior turbinates: Mildly edematous with right IT hypertrohpy  Septum: Slight right contact caudal deviation, otherwise intact  Middle turbinate and middle meatus:  No purulence, no polyposis, minimal mucosal edema  Nasopharynx is without mass  No bleeding/oozing  The patient tolerated the procedure well      ICD-10-CM    1. Postoperative examination Z09    2. S/P closed reduction of nasal and septal fracture Z87.81      Patient is doing well with no complications.  Post operative pictures taking and placed in patient's chart.  Continue with Ocean Nasal spray at least twice daily x 3-4 weeks.   Cautioned on further trauma to the area. Stressed importance of light activity and avoidance of trauma for 3-4 weeks.  She is happy with her results.     Follow up in 3-4 weeks for re-evaluation, sooner as needed.

## 2017-11-21 NOTE — NURSING NOTE
"Chief Complaint   Patient presents with     Surgical Followup     S/P Closed Reduction of Nasal and Septal Fracture on 11/7/17       Initial /72 (Cuff Size: Adult Regular)  Pulse 74  Temp 98.1  F (36.7  C) (Tympanic)  Ht 5' 5\" (1.651 m)  Wt 169 lb (76.7 kg)  BMI 28.12 kg/m2 Estimated body mass index is 28.12 kg/(m^2) as calculated from the following:    Height as of this encounter: 5' 5\" (1.651 m).    Weight as of this encounter: 169 lb (76.7 kg).  Medication Reconciliation: complete   Roxana Quiñonez      "

## 2017-11-26 ENCOUNTER — HEALTH MAINTENANCE LETTER (OUTPATIENT)
Age: 62
End: 2017-11-26

## 2017-12-12 ENCOUNTER — OFFICE VISIT (OUTPATIENT)
Dept: CHIROPRACTIC MEDICINE | Facility: OTHER | Age: 62
End: 2017-12-12
Attending: CHIROPRACTOR
Payer: COMMERCIAL

## 2017-12-12 DIAGNOSIS — M54.50 ACUTE BILATERAL LOW BACK PAIN WITHOUT SCIATICA: ICD-10-CM

## 2017-12-12 DIAGNOSIS — M99.03 SEGMENTAL AND SOMATIC DYSFUNCTION OF LUMBAR REGION: Primary | ICD-10-CM

## 2017-12-12 DIAGNOSIS — M99.01 SEGMENTAL AND SOMATIC DYSFUNCTION OF CERVICAL REGION: ICD-10-CM

## 2017-12-12 DIAGNOSIS — M99.02 SEGMENTAL AND SOMATIC DYSFUNCTION OF THORACIC REGION: ICD-10-CM

## 2017-12-12 PROCEDURE — 98941 CHIROPRACT MANJ 3-4 REGIONS: CPT | Mod: AT | Performed by: CHIROPRACTOR

## 2017-12-12 NOTE — MR AVS SNAPSHOT
After Visit Summary   12/12/2017    Aileen Szymanski    MRN: [id number]           Patient Information     Date Of Birth          1955        Visit Information        Provider Department      12/12/2017 10:50 AM Sulaiman Hollings[facility]        Today's Diagnoses     Segmental and somatic dysfunction of lumbar region    -  1    Acute bilateral low back pain without sciatica        Segmental and somatic dysfunction of thoracic region        Segmental and somatic dysfunction of cervical region           Follow-ups after your visit        Your next 10 appointments already scheduled     Dec 18, 2017 10:45 AM CST   (Arrive by 10:30 AM)   Return Visit with ULYSSES Jackson CNP   [facility]    [address]  [facility] [address]   [phone]              Who to contact     If you have questions or need follow up information about today's clinic visit or your schedule please contact  [facility] BOSWELL directly at [phone].  Normal or non-critical lab and imaging results will be communicated to you by [employer], letter or phone within 4 business days after the clinic has received the results. If you do not hear from us within 7 days, please contact the clinic through [employer] or phone. If you have a critical or abnormal lab result, we will notify you by phone as soon as possible.  Submit refill requests through "[employer]" or call your pharmacy and they will forward the refill request to us. Please allow 3 business days for your refill to be completed.          Additional Information About Your Visit        MyChart Information     "[employer]" gives you secure access to your electronic health record. If you see a primary care provider, you can also send messages to your care team and make appointments. If you have questions, please call your primary care clinic.  If you do not have a primary care provider, please call [phone] and  they will assist you.        Care EveryWhere ID     This is your Care EveryWhere ID. This could be used by other organizations to access your [place] medical records  [id number]         Blood Pressure from Last 3 Encounters:   11/21/17 134/72   11/07/17 138/79   11/06/17 120/80    Weight from Last 3 Encounters:   11/21/17 169 lb (76.7 kg)   11/07/17 169 lb (76.7 kg)   11/06/17 170 lb (77.1 kg)              We Performed the Following     CHIROPRAC MANIP,SPINAL,3-4 REGIONS        Primary Care Provider Office Phone # Fax #    Precious Vogel [phone]       [facility] [address] [facility] [address]        Equal Access to Services     ALLEN AGUIRRE : Hadii sharan ku hadasho Soomaali, waaxda luqadaha, qaybta kaalmada adeegyada, waxay idiin haylelen brendon cordoba . So [facility] [phone].    ATENCIÓN: Si habla español, tiene a ambrosio disposición servicios gratuitos de asistencia lingüística. Llame al [phone].    We comply with applicable federal civil rights laws and [place] laws. We do not discriminate on the basis of race, color, national origin, age, disability, sex, sexual orientation, or gender identity.            Thank you!     Thank you for choosing  [facility]  for your care. Our goal is always to provide you with excellent care. Hearing back from our patients is one way we can continue to improve our services. Please take a few minutes to complete the written survey that you may receive in the mail after your visit with us. Thank you!             Your Updated Medication List - Protect others around you: Learn how to safely use, store and throw away your medicines at www.disposemymeds.org.          This list is accurate as of: 12/12/17 11:59 PM.  Always use your most recent med list.                   Brand Name Dispense Instructions for use Diagnosis    clonazePAM 1 MG tablet    klonoPIN    60 tablet    1 po qhs    Primary insomnia       escitalopram 20 MG  tablet    LEXAPRO    90 tablet    Take 1 tablet (20 mg) by mouth daily        MELATONIN PO      Take 5 mg by mouth nightly as needed        multivitamin, therapeutic Tabs tablet      Take 1 tablet by mouth daily        simvastatin 40 MG tablet    ZOCOR    90 tablet    Take 1 tablet (40 mg) by mouth At Bedtime    Mixed hyperlipidemia       sodium chloride 0.65 % nasal spray    OCEAN    1 Bottle    5-6 sprays into both nostrils daily and as needed    Post-operative state       sulindac 200 MG tablet    CLINORIL    90 tablet    Take 1 tablet (200 mg) by mouth 2 times daily        VITAMIN D (CHOLECALCIFEROL) PO      Take 3,000 Units by mouth daily

## 2017-12-13 NOTE — PROGRESS NOTES
Subjective Finding:    Chief compalint: Patient presents with:  Back Pain: from a fall a month ago  Neck Pain  , Pain Scale: 5/10, Intensity: sharp and ache, Duration: 1 month, Change since last visit: worse, Radiating: no.    Date of injury:     Activities that the pain restricts:   Home/household activities: no.  Work duties: no.  Hobbies/social: no.  Sleep: no.  Makes symptoms better: activity and rest.  Makes symptoms worse: lumbar extension and lumbar flexion.  Have you seen anyone else for the symptoms? No.  Work related: no.  Automobile related injury: no.    Objective and Assessment:    Posture Analysis:   High shoulder: .  Head tilt: .  High iliac crest: .  Head carriage: neutral.  Thoracic Kyphosis: neutral.  Lumbar Lordosis: neutral.    Lumbar Range of Motion: flexion decreased and extension decreased.  Cervical Range of Motion: flexion decreased.  Thoracic Range of Motion: .  Extremity Range of Motion: .    Palpation:   Quad lumb: right, referred pain: no    Segmental dysfunction pre-treatment: C2, T6, T7, L4 and L5.    Assessment post-treatment:  Cervical: ROM increased.  Thoracic: pain and tenderness decreased.  Lumbar: pain and tenderness decreased.    Comments: .        Complicating Factors: .    Plan / Procedure:    Expected release date: .  Treatment plan: prn.  Instructed patient: ice 20 minutes every other hour as needed.  Short term goals: reduce pain and increase ROM.  Long term goals: increase patient functional independence.  Prognosis: excellent.

## 2017-12-18 ENCOUNTER — OFFICE VISIT (OUTPATIENT)
Dept: OTOLARYNGOLOGY | Facility: OTHER | Age: 62
End: 2017-12-18
Attending: NURSE PRACTITIONER
Payer: COMMERCIAL

## 2017-12-18 VITALS
TEMPERATURE: 98.3 F | OXYGEN SATURATION: 100 % | BODY MASS INDEX: 28.16 KG/M2 | RESPIRATION RATE: 16 BRPM | SYSTOLIC BLOOD PRESSURE: 102 MMHG | HEART RATE: 75 BPM | HEIGHT: 65 IN | WEIGHT: 169 LBS | DIASTOLIC BLOOD PRESSURE: 60 MMHG

## 2017-12-18 DIAGNOSIS — Z87.81 H/O REDUCTION OF NASAL FRACTURE: Primary | ICD-10-CM

## 2017-12-18 DIAGNOSIS — Z98.890 H/O REDUCTION OF NASAL FRACTURE: Primary | ICD-10-CM

## 2017-12-18 DIAGNOSIS — J34.2 DNS (DEVIATED NASAL SEPTUM): ICD-10-CM

## 2017-12-18 PROCEDURE — 31231 NASAL ENDOSCOPY DX: CPT | Mod: 58 | Performed by: NURSE PRACTITIONER

## 2017-12-18 PROCEDURE — 99024 POSTOP FOLLOW-UP VISIT: CPT | Performed by: NURSE PRACTITIONER

## 2017-12-18 ASSESSMENT — PAIN SCALES - GENERAL: PAINLEVEL: MODERATE PAIN (4)

## 2017-12-18 NOTE — PROGRESS NOTES
"Otolaryngology Progress Note           Chief Complaint:     Patient presents with:  Surgical Followup: S/P closed reduction on nasal and septal fracture 11/07/2017, last seen in ENT 11/21/2017. States having headaches in the AM'S for the last couple weeks wodnering if related to surgery.          History of Present Illness:     Aileen Szymanski is a 62 year old female s/p closed reduction nasal and septal fracture performed by Dr. Vanegas on 11/7/17. I last saw her 11/21/17 which was her 1st post operative visit, she was doing well. Had her continue with Ocean Nasal spray BID and recommended she f/u today.    Today Aileen reports that for the last couple weeks, she has been waking up with a headache above her eyes and into the bridge of her nose. Tylenol will typically take it away. No vision changes, N/V. No history of migraines. Sleeping throughout the night. She takes clonopin. No snoring or sleep apnea. No nasal congestion. She has noted that she will get an occasional nasal drip for the last couple weeks. Good airflow through both naris. Mild PND. No problems with seasonal allergies.     PREOPERATIVE DIAGNOSIS: Closed nasal fracture.   POSTOPERATIVE DIAGNOSIS: Closed nasal and septal fracture.   PROCEDURE: Closed reduction of nasal and septal fracture.   SURGEON: Destiny Vanegas D.O.  BLOOD LOSS: 1 mL.   COMPLICATIONS: None.   SPECIMENS: None.   ANESTHESIA: GETA.          Physical Exam:     /60  Pulse 75  Temp 98.3  F (36.8  C) (Tympanic)  Resp 16  Ht 5' 5\" (1.651 m)  Wt 169 lb (76.7 kg)  SpO2 100%  BMI 28.12 kg/m2  General - The patient is well nourished and well developed, and appears to have good nutritional status.  Alert and oriented to person and place, interactive.  Head and Face - Normocephalic and atraumatic, with no gross asymmetry noted of the contour of the facial features.  The facial nerve is intact, with strong symmetric movements.  Neck-no palpable lymphadenopathy or thyroid " mass.  Trachea is midline.  Eyes - Extraocular movements intact.   Ears- External ears normal. Canals clear. Right tympanic membrane intact without effusion, worrisome retraction or mass. Left tympanic membrane intact without effusion, worrisome retraction or mass.    Nose - Nasal mucosa is pink and moist with no abnormal mucus.  The septum very minimal irregularity to the left, otherwise no ecchymosis or swelling. Turbinates of normal size and position.  No polyps, masses, or purulence noted on examination. Right caudal deviation with contact to IT.   Mouth - Examination of the oral cavity shows pink, healthy, moist mucosa. Dentition in good condition.  No lesions or ulceration noted. The tongue is mobile and midline.  Throat - The walls of the oropharynx were smooth, pink, moist, symmetric, and had no lesions or ulcerations.  The tonsillar pillars and soft palate were symmetric.  The uvula was midline on elevation.      To evaluate the nose and sinuses in the post operative state, I performed rigid nasal endoscopy. I sprayed both nares with lidocaine and neosynephrine.      I began with the LEFT side using a 0 degree rigid nasal endoscope, and then similarly examined the RIGHT side      Findings:  Inferior turbinates: Mildly edematous with right IT hypertrohpy  Septum: Slight right caudal deviation with contact to IT.  Middle turbinate and middle meatus:  No purulence, no polyposis, normal appearing mucosa.  Nasopharynx is without mass    The patient tolerated the procedure well         Assessment and Plan:       ICD-10-CM    1. S/P closed reduction of nasal and septal fracture Z87.81    2. DNS (deviated nasal septum) J34.2     right caudal       Aileen is overall doing very well.  She has had some post operative headaches recently start that are controlled with PRN Tylenol.  Did discuss this with Dr. Vanegas. This is common and can occur up until 6 months postoperative, with a gradual resolution in  symptoms.    Encouraged to continue with PRN tylenol and ice. Return if headaches worsen at any time or if they continue beyond the 6 month post operative time frame.    Ocean Nasal spray is PRN at this time.     Ongoing surveillance of deviated right caudal septum. Overall asymptomatic and does not wish for anything to be done at this time.    She is encouraged to f/u in ENT as needed at this point.     Lexie Hicks NP  ENT  [facility]  [phone]

## 2017-12-18 NOTE — MR AVS SNAPSHOT
After Visit Summary   12/18/2017    Aileen Szymanski    MRN: [id number]           Patient Information     Date Of Birth          1955        Visit Information        Provider Department      12/18/2017 10:45 AM Lexie Hicks APRN CNP [facility]        Today's Diagnoses     Postoperative examination    -  1    S/P closed reduction of nasal and septal fracture          Care Instructions    Continue Ocean Nasal spray as needed.    Continue with tylenol as needed.  If headaches continue past a couple weeks or get worse at any time, return to ENT.     Otherwise follow up PRN.     Thank you for allowing Lexie Hicks CNP and our ENT team to participate in your care.  If you have a scheduling or an appointment question please contact our Health Unit Coordinator at their direct line [phone].   ALL nursing questions or concerns can be directed to your ENT nurse at: [phone]             Follow-ups after your visit        Follow-up notes from your care team     Return if symptoms worsen or fail to improve.      Who to contact     If you have questions or need follow up information about today's clinic visit or your schedule please contact [facility] JENELLE directly at [phone].  Normal or non-critical lab and imaging results will be communicated to you by MyChart, letter or phone within 4 business days after the clinic has received the results. If you do not hear from us within 7 days, please contact the clinic through [employer] or phone. If you have a critical or abnormal lab result, we will notify you by phone as soon as possible.  Submit refill requests through [employer] or call your pharmacy and they will forward the refill request to us. Please allow 3 business days for your refill to be completed.          Additional Information About Your Visit        MyChart Information     [employer] gives you secure access to your electronic health record. If you see a primary care  "provider, you can also send messages to your care team and make appointments. If you have questions, please call your primary care clinic.  If you do not have a primary care provider, please call [phone] and they will assist you.        Care EveryWhere ID     This is your Care EveryWhere ID. This could be used by other organizations to access your [place] medical records  [id number]        Your Vitals Were     Pulse Temperature Respirations Height Pulse Oximetry BMI (Body Mass Index)    75 98.3  F (36.8  C) (Tympanic) 16 5' 5\" (1.651 m) 100% 28.12 kg/m2       Blood Pressure from Last 3 Encounters:   12/18/17 102/60   11/21/17 134/72   11/07/17 138/79    Weight from Last 3 Encounters:   12/18/17 169 lb (76.7 kg)   11/21/17 169 lb (76.7 kg)   11/07/17 169 lb (76.7 kg)              Today, you had the following     No orders found for display       Primary Care Provider Office Phone # Fax #    Precious Vogel [phone]       [facility] [address] [facility] [address]        Equal Access to Services     DE AGUIRRE AH: Hadii aad ku hadasho Soomaali, waaxda luqadaha, qaybta kaalmada adeegyada, waxay kennethin haylelen brendon dick lanoman ah. So [facility] [phone].    ATENCIÓN: Si habla español, tiene a ambrosio disposición servicios gratuitos de asistencia lingüística. Llame al [phone].    We comply with applicable federal civil rights laws and [place] laws. We do not discriminate on the basis of race, color, national origin, age, disability, sex, sexual orientation, or gender identity.            Thank you!     Thank you for choosing [facility]  for your care. Our goal is always to provide you with excellent care. Hearing back from our patients is one way we can continue to improve our services. Please take a few minutes to complete the written survey that you may receive in the mail after your visit with us. Thank you!             Your Updated Medication List - Protect " others around you: Learn how to safely use, store and throw away your medicines at www.disposemymeds.org.          This list is accurate as of: 12/18/17 11:10 AM.  Always use your most recent med list.                   Brand Name Dispense Instructions for use Diagnosis    clonazePAM 1 MG tablet    klonoPIN    60 tablet    1 po qhs    Primary insomnia       escitalopram 20 MG tablet    LEXAPRO    90 tablet    Take 1 tablet (20 mg) by mouth daily        MELATONIN PO      Take 5 mg by mouth nightly as needed        multivitamin, therapeutic Tabs tablet      Take 1 tablet by mouth daily        simvastatin 40 MG tablet    ZOCOR    90 tablet    Take 1 tablet (40 mg) by mouth At Bedtime    Mixed hyperlipidemia       sodium chloride 0.65 % nasal spray    OCEAN    1 Bottle    5-6 sprays into both nostrils daily and as needed    Post-operative state       sulindac 200 MG tablet    CLINORIL    90 tablet    Take 1 tablet (200 mg) by mouth 2 times daily        VITAMIN D (CHOLECALCIFEROL) PO      Take 3,000 Units by mouth daily

## 2017-12-18 NOTE — NURSING NOTE
"Chief Complaint   Patient presents with     Surgical Followup     S/P closed reduction on nasal and septal fracture 11/07/2017, last seen in ENT 11/21/2017. States having headaches in the AM'S for the last couple weeks wodnering if related to surgery.        Initial /60  Pulse 75  Temp 98.3  F (36.8  C) (Tympanic)  Resp 16  Ht 5' 5\" (1.651 m)  Wt 169 lb (76.7 kg)  SpO2 100%  BMI 28.12 kg/m2 Estimated body mass index is 28.12 kg/(m^2) as calculated from the following:    Height as of this encounter: 5' 5\" (1.651 m).    Weight as of this encounter: 169 lb (76.7 kg).  Medication Reconciliation: complete   Morena Baltazar      "

## 2017-12-18 NOTE — PATIENT INSTRUCTIONS
Continue Ocean Nasal spray as needed.    Continue with tylenol as needed.  If headaches continue past a couple weeks or get worse at any time, return to ENT.     Otherwise follow up PRN.     Thank you for allowing Lexie Hicks CNP and our ENT team to participate in your care.  If you have a scheduling or an appointment question please contact our Health Unit Coordinator at their direct line [phone].   ALL nursing questions or concerns can be directed to your ENT nurse at: [phone]

## 2018-01-05 ENCOUNTER — OFFICE VISIT (OUTPATIENT)
Dept: CHIROPRACTIC MEDICINE | Facility: OTHER | Age: 63
End: 2018-01-05
Attending: CHIROPRACTOR
Payer: COMMERCIAL

## 2018-01-05 DIAGNOSIS — M54.2 CERVICALGIA: ICD-10-CM

## 2018-01-05 DIAGNOSIS — M99.03 SEGMENTAL AND SOMATIC DYSFUNCTION OF LUMBAR REGION: ICD-10-CM

## 2018-01-05 DIAGNOSIS — M99.01 SEGMENTAL AND SOMATIC DYSFUNCTION OF CERVICAL REGION: Primary | ICD-10-CM

## 2018-01-05 DIAGNOSIS — M99.02 SEGMENTAL AND SOMATIC DYSFUNCTION OF THORACIC REGION: ICD-10-CM

## 2018-01-05 PROCEDURE — 98941 CHIROPRACT MANJ 3-4 REGIONS: CPT | Mod: AT | Performed by: CHIROPRACTOR

## 2018-01-05 NOTE — PROGRESS NOTES
Subjective Finding:    Chief compalint: Patient presents with:  Back Pain: IT band pain right sided  Neck Pain: arm pain  , Pain Scale: 5/10, Intensity: sharp and ache, Duration: 1 month, Change since last visit: worse, Radiating: no.    Date of injury:     Activities that the pain restricts:   Home/household activities: no.  Work duties: no.  Hobbies/social: no.  Sleep: no.  Makes symptoms better: activity and rest.  Makes symptoms worse: lumbar extension and lumbar flexion.  Have you seen anyone else for the symptoms? No.  Work related: no.  Automobile related injury: no.    Objective and Assessment:    Posture Analysis:   High shoulder: .  Head tilt: .  High iliac crest: .  Head carriage: neutral.  Thoracic Kyphosis: neutral.  Lumbar Lordosis: neutral.    Lumbar Range of Motion: flexion decreased and extension decreased.  Cervical Range of Motion: flexion decreased.  Thoracic Range of Motion: .  Extremity Range of Motion: .    Palpation:   Quad lumb: right, referred pain: no    Segmental dysfunction pre-treatment: C2, T6, T7, L4 and L5.    Assessment post-treatment:  Cervical: ROM increased.  Thoracic: pain and tenderness decreased.  Lumbar: pain and tenderness decreased.    Comments: .        Complicating Factors: .    Plan / Procedure:    Expected release date: .  Treatment plan: prn.  Instructed patient: ice 20 minutes every other hour as needed.  Short term goals: reduce pain and increase ROM.  Long term goals: increase patient functional independence.  Prognosis: excellent.

## 2018-01-05 NOTE — MR AVS SNAPSHOT
After Visit Summary   1/5/2018    Aileen Szymanski    MRN: [id number]           Patient Information     Date Of Birth          1955        Visit Information        Provider Department      1/5/2018 11:30 AM Sulaiman Hollingsworth DC  [facility] Mario Alberto [facility]        Today's Diagnoses     Segmental and somatic dysfunction of cervical region    -  1    Cervicalgia        Segmental and somatic dysfunction of lumbar region        Segmental and somatic dysfunction of thoracic region           Follow-ups after your visit        Who to contact     If you have questions or need follow up information about today's clinic visit or your schedule please contact  [facility] MARIO ALBERTO BOSWELL directly at [phone].  Normal or non-critical lab and imaging results will be communicated to you by EATONhart, letter or phone within 4 business days after the clinic has received the results. If you do not hear from us within 7 days, please contact the clinic through EATONhart or phone. If you have a critical or abnormal lab result, we will notify you by phone as soon as possible.  Submit refill requests through CaseTrek or call your pharmacy and they will forward the refill request to us. Please allow 3 business days for your refill to be completed.          Additional Information About Your Visit        MyChart Information     CaseTrek gives you secure access to your electronic health record. If you see a primary care provider, you can also send messages to your care team and make appointments. If you have questions, please call your primary care clinic.  If you do not have a primary care provider, please call [phone] and they will assist you.        Care EveryWhere ID     This is your Care EveryWhere ID. This could be used by other organizations to access your [place] medical records  [id number]         Blood Pressure from Last 3 Encounters:   12/18/17 102/60   11/21/17 134/72   11/07/17 138/79    Weight from Last 3 Encounters:    12/18/17 169 lb (76.7 kg)   11/21/17 169 lb (76.7 kg)   11/07/17 169 lb (76.7 kg)              We Performed the Following     CHIROPRAC MANIP,SPINAL,3-4 REGIONS        Primary Care Provider Office Phone # Fax #    Precious Vogel [phone]       [facility] [address] [facility] [address]        Equal Access to Services     [facility]: Hadii aad ku hadasho Soomaali, waaxda luqadaha, qaybta kaalmada adeegyada, waxay idiin hayaan adeeg kharash la'aan . So [facility] [phone].    ATENCIÓN: Si hira lee, tiene a ambrosio disposición servicios gratuitos de asistencia lingüística. [facility] [phone].    We comply with applicable federal civil rights laws and [place] laws. We do not discriminate on the basis of race, color, national origin, age, disability, sex, sexual orientation, or gender identity.            Thank you!     Thank you for choosing  [facility]  for your care. Our goal is always to provide you with excellent care. Hearing back from our patients is one way we can continue to improve our services. Please take a few minutes to complete the written survey that you may receive in the mail after your visit with us. Thank you!             Your Updated Medication List - Protect others around you: Learn how to safely use, store and throw away your medicines at www.disposemymeds.org.          This list is accurate as of: 1/5/18 11:44 AM.  Always use your most recent med list.                   Brand Name Dispense Instructions for use Diagnosis    clonazePAM 1 MG tablet    klonoPIN    60 tablet    1 po qhs    Primary insomnia       escitalopram 20 MG tablet    LEXAPRO    90 tablet    Take 1 tablet (20 mg) by mouth daily        MELATONIN PO      Take 5 mg by mouth nightly as needed        multivitamin, therapeutic Tabs tablet      Take 1 tablet by mouth daily        simvastatin 40 MG tablet    ZOCOR    90 tablet    Take 1 tablet (40 mg) by mouth At Bedtime    Mixed  hyperlipidemia       sodium chloride 0.65 % nasal spray    OCEAN    1 Bottle    5-6 sprays into both nostrils daily and as needed    Post-operative state       sulindac 200 MG tablet    CLINORIL    90 tablet    Take 1 tablet (200 mg) by mouth 2 times daily        VITAMIN D (CHOLECALCIFEROL) PO      Take 3,000 Units by mouth daily

## 2018-01-09 DIAGNOSIS — F51.01 PRIMARY INSOMNIA: ICD-10-CM

## 2018-01-09 RX ORDER — CLONAZEPAM 1 MG/1
TABLET ORAL
Qty: 60 TABLET | Refills: 0 | Status: SHIPPED | OUTPATIENT
Start: 2018-01-09 | End: 2018-03-09

## 2018-01-17 ENCOUNTER — OFFICE VISIT (OUTPATIENT)
Dept: CHIROPRACTIC MEDICINE | Facility: OTHER | Age: 63
End: 2018-01-17
Attending: CHIROPRACTOR
Payer: COMMERCIAL

## 2018-01-17 DIAGNOSIS — M54.50 ACUTE BILATERAL LOW BACK PAIN WITHOUT SCIATICA: ICD-10-CM

## 2018-01-17 DIAGNOSIS — M99.02 SEGMENTAL AND SOMATIC DYSFUNCTION OF THORACIC REGION: ICD-10-CM

## 2018-01-17 DIAGNOSIS — M99.03 SEGMENTAL AND SOMATIC DYSFUNCTION OF LUMBAR REGION: Primary | ICD-10-CM

## 2018-01-17 DIAGNOSIS — M99.01 SEGMENTAL AND SOMATIC DYSFUNCTION OF CERVICAL REGION: ICD-10-CM

## 2018-01-17 PROCEDURE — 98941 CHIROPRACT MANJ 3-4 REGIONS: CPT | Mod: AT | Performed by: CHIROPRACTOR

## 2018-01-17 NOTE — MR AVS SNAPSHOT
After Visit Summary   1/17/2018    Aileen Szymanski    MRN: [id number]           Patient Information     Date Of Birth          1955        Visit Information        Provider Department      1/17/2018 1:50 PM Sulaiman Hollingsworth DC [facility]        Today's Diagnoses     Segmental and somatic dysfunction of lumbar region    -  1    Acute bilateral low back pain without sciatica        Segmental and somatic dysfunction of thoracic region        Segmental and somatic dysfunction of cervical region           Follow-ups after your visit        Your next 10 appointments already scheduled     Feb 05, 2018 11:40 AM CST   [facility]    [address]  [facility] [address]   [phone]              Who to contact     If you have questions or need follow up information about today's clinic visit or your schedule please contact  [facility] JENELLE BOSWELL directly at [phone].  Normal or non-critical lab and imaging results will be communicated to you by MyChart, letter or phone within 4 business days after the clinic has received the results. If you do not hear from us within 7 days, please contact the clinic through Swayhart or phone. If you have a critical or abnormal lab result, we will notify you by phone as soon as possible.  Submit refill requests through [employer] or call your pharmacy and they will forward the refill request to us. Please allow 3 business days for your refill to be completed.          Additional Information About Your Visit        MyChart Information     [employer] gives you secure access to your electronic health record. If you see a primary care provider, you can also send messages to your care team and make appointments. If you have questions, please call your primary care clinic.  If you do not have a primary care provider, please call [phone] and they will assist you.        Care EveryWhere ID      This is your Care EveryWhere ID. This could be used by other organizations to access your [place] medical records  [id number]         Blood Pressure from Last 3 Encounters:   12/18/17 102/60   11/21/17 134/72   11/07/17 138/79    Weight from Last 3 Encounters:   12/18/17 169 lb (76.7 kg)   11/21/17 169 lb (76.7 kg)   11/07/17 169 lb (76.7 kg)              We Performed the Following     CHIROPRAC MANIP,SPINAL,3-4 REGIONS        Primary Care Provider Office Phone # Fax #    Precious Vogel [phone]       [facility] [address] [address]        Equal Access to Services     [facility] : Hadii aad ku hadasho Soomaali, waaxda luqadaha, qaybta kaalmada adeegyada, waxay idiin haycandace cordoba . So [facility] [phone].    ATENCIÓN: Si habla español, tiene a ambrosio disposición servicios gratuitos de asistencia lingüística. [facility] [phone].    We comply with applicable federal civil rights laws and [place] laws. We do not discriminate on the basis of race, color, national origin, age, disability, sex, sexual orientation, or gender identity.            Thank you!     Thank you for choosing  [facility]  for your care. Our goal is always to provide you with excellent care. Hearing back from our patients is one way we can continue to improve our services. Please take a few minutes to complete the written survey that you may receive in the mail after your visit with us. Thank you!             Your Updated Medication List - Protect others around you: Learn how to safely use, store and throw away your medicines at www.disposemymeds.org.          This list is accurate as of: 1/17/18 11:59 PM.  Always use your most recent med list.                   Brand Name Dispense Instructions for use Diagnosis    clonazePAM 1 MG tablet    klonoPIN    60 tablet    1 po qhs    Primary insomnia       escitalopram 20 MG tablet    LEXAPRO    90 tablet    Take 1 tablet (20  mg) by mouth daily        MELATONIN PO      Take 5 mg by mouth nightly as needed        multivitamin, therapeutic Tabs tablet      Take 1 tablet by mouth daily        simvastatin 40 MG tablet    ZOCOR    90 tablet    Take 1 tablet (40 mg) by mouth At Bedtime    Mixed hyperlipidemia       sodium chloride 0.65 % nasal spray    OCEAN    1 Bottle    5-6 sprays into both nostrils daily and as needed    Post-operative state       sulindac 200 MG tablet    CLINORIL    90 tablet    Take 1 tablet (200 mg) by mouth 2 times daily        VITAMIN D (CHOLECALCIFEROL) PO      Take 3,000 Units by mouth daily

## 2018-01-18 NOTE — PROGRESS NOTES
Subjective Finding:    Chief compalint: Patient presents with:  Back Pain: stiffness in low back  Neck Pain  , Pain Scale: 5/10, Intensity: sharp and ache, Duration: 1 month, Change since last visit: worse, Radiating: no.    Date of injury:     Activities that the pain restricts:   Home/household activities: no.  Work duties: no.  Hobbies/social: no.  Sleep: no.  Makes symptoms better: activity and rest.  Makes symptoms worse: lumbar extension and lumbar flexion.  Have you seen anyone else for the symptoms? No.  Work related: no.  Automobile related injury: no.    Objective and Assessment:    Posture Analysis:   High shoulder: .  Head tilt: .  High iliac crest: .  Head carriage: neutral.  Thoracic Kyphosis: neutral.  Lumbar Lordosis: neutral.    Lumbar Range of Motion: flexion decreased and extension decreased.  Cervical Range of Motion: flexion decreased.  Thoracic Range of Motion: .  Extremity Range of Motion: .    Palpation:   Quad lumb: right, referred pain: no    Segmental dysfunction pre-treatment: C2, T6, T7, L4 and L5.    Assessment post-treatment:  Cervical: ROM increased.  Thoracic: pain and tenderness decreased.  Lumbar: pain and tenderness decreased.    Comments: .        Complicating Factors: .    Plan / Procedure:    Expected release date: .  Treatment plan: prn.  Instructed patient: ice 20 minutes every other hour as needed.  Short term goals: reduce pain and increase ROM.  Long term goals: increase patient functional independence.  Prognosis: excellent.

## 2018-02-01 ENCOUNTER — ALLIED HEALTH/NURSE VISIT (OUTPATIENT)
Dept: FAMILY MEDICINE | Facility: OTHER | Age: 63
End: 2018-02-01
Attending: PHYSICIAN ASSISTANT
Payer: COMMERCIAL

## 2018-02-01 DIAGNOSIS — Z23 NEED FOR PROPHYLACTIC VACCINATION AND INOCULATION AGAINST INFLUENZA: Primary | ICD-10-CM

## 2018-02-01 PROCEDURE — 90686 IIV4 VACC NO PRSV 0.5 ML IM: CPT

## 2018-02-01 PROCEDURE — 90471 IMMUNIZATION ADMIN: CPT

## 2018-02-01 NOTE — MR AVS SNAPSHOT
After Visit Summary   2/1/2018    Aileen Szymanski    MRN: [id number]           Patient Information     Date Of Birth          1955        Visit Information        Provider Department      2/1/2018 1:30 PM HC FP NURSE [facility]        Today's Diagnoses     Need for prophylactic vaccination and inoculation against influenza    -  1       Follow-ups after your visit        Your next 10 appointments already scheduled     Feb 05, 2018 11:40 AM CST   Return Visit with Sulaiman Hollingsworth DC   [facility] ([facility])    [address]  [place] [address]   [phone]              Who to contact     If you have questions or need follow up information about today's clinic visit or your schedule please contact [facility] directly at [phone].  Normal or non-critical lab and imaging results will be communicated to you by MyChart, letter or phone within 4 business days after the clinic has received the results. If you do not hear from us within 7 days, please contact the clinic through CiraNovahart or phone. If you have a critical or abnormal lab result, we will notify you by phone as soon as possible.  Submit refill requests through Vonage or call your pharmacy and they will forward the refill request to us. Please allow 3 business days for your refill to be completed.          Additional Information About Your Visit        MyChart Information     Vonage gives you secure access to your electronic health record. If you see a primary care provider, you can also send messages to your care team and make appointments. If you have questions, please call your primary care clinic.  If you do not have a primary care provider, please call [phone] and they will assist you.        Care EveryWhere ID     This is your Care EveryWhere ID. This could be used by other organizations to access your [place] medical records  [id number]         Blood Pressure from Last  3 Encounters:   12/18/17 102/60   11/21/17 134/72   11/07/17 138/79    Weight from Last 3 Encounters:   12/18/17 169 lb (76.7 kg)   11/21/17 169 lb (76.7 kg)   11/07/17 169 lb (76.7 kg)              We Performed the Following     HC FLU VAC PRESRV FREE QUAD SPLIT VIR 3+YRS IM     Vaccine Administration, Initial [13166]        Primary Care Provider Office Phone # Fax #    Precious Vogel [phone]       [facility] [address]  [facility] [address]        Equal Access to Services     [facility]: Hadii sharan carmen hadsterling Soantonio, waaxda lubenjamínadaha, qaybta kaalmada sebastian, lilian cordoba . So [facility] [phone].    ATENCIÓN: Si habla español, tiene a ambrosio disposición servicios gratuitos de asistencia lingüística. Llame al [phone].    We comply with applicable federal civil rights laws and [place] laws. We do not discriminate on the basis of race, color, national origin, age, disability, sex, sexual orientation, or gender identity.            Thank you!     Thank you for choosing [facility]  for your care. Our goal is always to provide you with excellent care. Hearing back from our patients is one way we can continue to improve our services. Please take a few minutes to complete the written survey that you may receive in the mail after your visit with us. Thank you!             Your Updated Medication List - Protect others around you: Learn how to safely use, store and throw away your medicines at www.disposemymeds.org.          This list is accurate as of 2/1/18  1:43 PM.  Always use your most recent med list.                   Brand Name Dispense Instructions for use Diagnosis    clonazePAM 1 MG tablet    klonoPIN    60 tablet    1 po qhs    Primary insomnia       escitalopram 20 MG tablet    LEXAPRO    90 tablet    Take 1 tablet (20 mg) by mouth daily        MELATONIN PO      Take 5 mg by mouth nightly as needed        multivitamin,  therapeutic Tabs tablet      Take 1 tablet by mouth daily        simvastatin 40 MG tablet    ZOCOR    90 tablet    Take 1 tablet (40 mg) by mouth At Bedtime    Mixed hyperlipidemia       sodium chloride 0.65 % nasal spray    OCEAN    1 Bottle    5-6 sprays into both nostrils daily and as needed    Post-operative state       sulindac 200 MG tablet    CLINORIL    90 tablet    Take 1 tablet (200 mg) by mouth 2 times daily        VITAMIN D (CHOLECALCIFEROL) PO      Take 3,000 Units by mouth daily

## 2018-02-01 NOTE — PROGRESS NOTES

## 2018-02-05 ENCOUNTER — OFFICE VISIT (OUTPATIENT)
Dept: CHIROPRACTIC MEDICINE | Facility: OTHER | Age: 63
End: 2018-02-05
Attending: CHIROPRACTOR
Payer: COMMERCIAL

## 2018-02-05 DIAGNOSIS — M54.2 CERVICALGIA: ICD-10-CM

## 2018-02-05 DIAGNOSIS — M99.02 SEGMENTAL AND SOMATIC DYSFUNCTION OF THORACIC REGION: ICD-10-CM

## 2018-02-05 DIAGNOSIS — M99.03 SEGMENTAL AND SOMATIC DYSFUNCTION OF LUMBAR REGION: ICD-10-CM

## 2018-02-05 DIAGNOSIS — M99.01 SEGMENTAL AND SOMATIC DYSFUNCTION OF CERVICAL REGION: Primary | ICD-10-CM

## 2018-02-05 PROCEDURE — 98941 CHIROPRACT MANJ 3-4 REGIONS: CPT | Mod: AT | Performed by: CHIROPRACTOR

## 2018-02-05 NOTE — MR AVS SNAPSHOT
After Visit Summary   2/5/2018    Aileen Szymanski    MRN: [id number]           Patient Information     Date Of Birth          1955        Visit Information        Provider Department      2/5/2018 11:40 AM Sulaiman Hollingsworth DC  [facility] Mario Alberto [facility]        Today's Diagnoses     Segmental and somatic dysfunction of cervical region    -  1    Cervicalgia        Segmental and somatic dysfunction of lumbar region        Segmental and somatic dysfunction of thoracic region           Follow-ups after your visit        Who to contact     If you have questions or need follow up information about today's clinic visit or your schedule please contact  [facility] MARIO ALBERTO BOSWELL directly at [phone].  Normal or non-critical lab and imaging results will be communicated to you by [employer] Markethart, letter or phone within 4 business days after the clinic has received the results. If you do not hear from us within 7 days, please contact the clinic through [employer] Mark[employer] or phone. If you have a critical or abnormal lab result, we will notify you by phone as soon as possible.  Submit refill requests through AppDirect or call your pharmacy and they will forward the refill request to us. Please allow 3 business days for your refill to be completed.          Additional Information About Your Visit        MyChart Information     AppDirect gives you secure access to your electronic health record. If you see a primary care provider, you can also send messages to your care team and make appointments. If you have questions, please call your primary care clinic.  If you do not have a primary care provider, please call [phone] and they will assist you.        Care EveryWhere ID     This is your Care EveryWhere ID. This could be used by other organizations to access your [place] medical records  [id number]         Blood Pressure from Last 3 Encounters:   12/18/17 102/60   11/21/17 134/72   11/07/17 138/79    Weight from Last 3 Encounters:    12/18/17 169 lb (76.7 kg)   11/21/17 169 lb (76.7 kg)   11/07/17 169 lb (76.7 kg)              We Performed the Following     CHIROPRAC MANIP,SPINAL,3-4 REGIONS        Primary Care Provider Office Phone # Fax #    Precious Vogel [phone]       [facility] [address] [address]        Equal Access to Services     [facility]: Hadii aad ku hadasho Soomaali, waaxda luqadaha, qaybta kaalmada adeegyada, waxay idiin hayaan adeeg kharash la'lelen . So [facility] [phone].    ATENCIÓN: Si hira lee, tiene a ambrosio disposición servicios gratuitos de asistencia lingüística. [facility] [phone].    We comply with applicable federal civil rights laws and [place] laws. We do not discriminate on the basis of race, color, national origin, age, disability, sex, sexual orientation, or gender identity.            Thank you!     Thank you for choosing  [facility]  for your care. Our goal is always to provide you with excellent care. Hearing back from our patients is one way we can continue to improve our services. Please take a few minutes to complete the written survey that you may receive in the mail after your visit with us. Thank you!             Your Updated Medication List - Protect others around you: Learn how to safely use, store and throw away your medicines at www.disposemymeds.org.          This list is accurate as of 2/5/18 12:00 PM.  Always use your most recent med list.                   Brand Name Dispense Instructions for use Diagnosis    clonazePAM 1 MG tablet    klonoPIN    60 tablet    1 po qhs    Primary insomnia       escitalopram 20 MG tablet    LEXAPRO    90 tablet    Take 1 tablet (20 mg) by mouth daily        MELATONIN PO      Take 5 mg by mouth nightly as needed        multivitamin, therapeutic Tabs tablet      Take 1 tablet by mouth daily        simvastatin 40 MG tablet    ZOCOR    90 tablet    Take 1 tablet (40 mg) by mouth At Bedtime    Mixed  hyperlipidemia       sodium chloride 0.65 % nasal spray    OCEAN    1 Bottle    5-6 sprays into both nostrils daily and as needed    Post-operative state       sulindac 200 MG tablet    CLINORIL    90 tablet    Take 1 tablet (200 mg) by mouth 2 times daily        VITAMIN D (CHOLECALCIFEROL) PO      Take 3,000 Units by mouth daily

## 2018-02-05 NOTE — PROGRESS NOTES
Subjective Finding:    Chief compalint: Patient presents with:  Neck Pain  Back Pain  , Pain Scale: 5/10, Intensity: sharp and ache, Duration: 1 month, Change since last visit: worse, Radiating: no.    Date of injury:     Activities that the pain restricts:   Home/household activities: no.  Work duties: no.  Hobbies/social: no.  Sleep: no.  Makes symptoms better: activity and rest.  Makes symptoms worse: lumbar extension and lumbar flexion.  Have you seen anyone else for the symptoms? No.  Work related: no.  Automobile related injury: no.    Objective and Assessment:    Posture Analysis:   High shoulder: .  Head tilt: .  High iliac crest: .  Head carriage: neutral.  Thoracic Kyphosis: neutral.  Lumbar Lordosis: neutral.    Lumbar Range of Motion: flexion decreased and extension decreased.  Cervical Range of Motion: flexion decreased.  Thoracic Range of Motion: .  Extremity Range of Motion: .    Palpation:   Quad lumb: right, referred pain: no    Segmental dysfunction pre-treatment: C2, T6, T7, L4 and L5.    Assessment post-treatment:  Cervical: ROM increased.  Thoracic: pain and tenderness decreased.  Lumbar: pain and tenderness decreased.    Comments: .        Complicating Factors: .    Plan / Procedure:    Expected release date: .  Treatment plan: prn.  Instructed patient: ice 20 minutes every other hour as needed.  Short term goals: reduce pain and increase ROM.  Long term goals: increase patient functional independence.  Prognosis: excellent.

## 2018-03-09 DIAGNOSIS — F51.01 PRIMARY INSOMNIA: ICD-10-CM

## 2018-03-13 RX ORDER — CLONAZEPAM 1 MG/1
TABLET ORAL
Qty: 60 TABLET | Refills: 0 | Status: SHIPPED | OUTPATIENT
Start: 2018-03-13 | End: 2018-04-30

## 2018-04-24 ENCOUNTER — OFFICE VISIT (OUTPATIENT)
Dept: CHIROPRACTIC MEDICINE | Facility: OTHER | Age: 63
End: 2018-04-24
Attending: CHIROPRACTOR
Payer: COMMERCIAL

## 2018-04-24 DIAGNOSIS — M99.03 SEGMENTAL AND SOMATIC DYSFUNCTION OF LUMBAR REGION: Primary | ICD-10-CM

## 2018-04-24 DIAGNOSIS — M99.02 SEGMENTAL AND SOMATIC DYSFUNCTION OF THORACIC REGION: ICD-10-CM

## 2018-04-24 DIAGNOSIS — M54.50 ACUTE BILATERAL LOW BACK PAIN WITHOUT SCIATICA: ICD-10-CM

## 2018-04-24 DIAGNOSIS — M99.01 SEGMENTAL AND SOMATIC DYSFUNCTION OF CERVICAL REGION: ICD-10-CM

## 2018-04-24 PROCEDURE — 98941 CHIROPRACT MANJ 3-4 REGIONS: CPT | Mod: AT | Performed by: CHIROPRACTOR

## 2018-04-24 NOTE — MR AVS SNAPSHOT
After Visit Summary   4/24/2018    Aileen Szymanski    MRN: [id number]           Patient Information     Date Of Birth          1955        Visit Information        Provider Department      4/24/2018 11:20 AM [facility]        Today's Diagnoses     Segmental and somatic dysfunction of lumbar region    -  1    Acute bilateral low back pain without sciatica        Segmental and somatic dysfunction of thoracic region        Segmental and somatic dysfunction of cervical region           Follow-ups after your visit        Your next 10 appointments already scheduled     Apr 30, 2018  2:30 PM CDT   (Arrive by 2:15 PM)   Office Visit with AMERICA Gonsales   [facility] [place] ([facility] )    [address]  [place] [address]   [phone]              Who to contact     If you have questions or need follow up information about today's clinic visit or your schedule please contact  [facility] directly at [phone].  Normal or non-critical lab and imaging results will be communicated to you by Notegraphyhart, letter or phone within 4 business days after the clinic has received the results. If you do not hear from us within 7 days, please contact the clinic through Notegraphyhart or phone. If you have a critical or abnormal lab result, we will notify you by phone as soon as possible.  Submit refill requests through Perkville or call your pharmacy and they will forward the refill request to us. Please allow 3 business days for your refill to be completed.          Additional Information About Your Visit        Notegraphyhart Information     Perkville gives you secure access to your electronic health record. If you see a primary care provider, you can also send messages to your care team and make appointments. If you have questions, please call your primary care clinic.  If you do not have a primary care provider, please call [phone] and they will  assist you.        Care EveryWhere ID     This is your Care EveryWhere ID. This could be used by other organizations to access your [place] medical records  [id number]         Blood Pressure from Last 3 Encounters:   12/18/17 102/60   11/21/17 134/72   11/07/17 138/79    Weight from Last 3 Encounters:   12/18/17 169 lb (76.7 kg)   11/21/17 169 lb (76.7 kg)   11/07/17 169 lb (76.7 kg)              We Performed the Following     CHIROPRAC MANIP,SPINAL,3-4 REGIONS        Primary Care Provider Office Phone # Fax #    Precious Vogel [phone] [address] [facility] [address]        Equal Access to Services     DE AGUIRRE : Hadii aad ku hadasho Soomaali, waaxda luqadaha, qaybta kaalmada adeegyada, waxay kennethin haycandace cordoba . So [facility] [phone].    ATENCIÓN: Si habla español, tiene a ambrosio disposición servicios gratuitos de asistencia lingüística. [facility] [phone].    We comply with applicable federal civil rights laws and [place] laws. We do not discriminate on the basis of race, color, national origin, age, disability, sex, sexual orientation, or gender identity.            Thank you!     Thank you for choosing  [facility]  for your care. Our goal is always to provide you with excellent care. Hearing back from our patients is one way we can continue to improve our services. Please take a few minutes to complete the written survey that you may receive in the mail after your visit with us. Thank you!             Your Updated Medication List - Protect others around you: Learn how to safely use, store and throw away your medicines at www.disposemymeds.org.          This list is accurate as of 4/24/18 11:59 PM.  Always use your most recent med list.                   Brand Name Dispense Instructions for use Diagnosis    clonazePAM 1 MG tablet    klonoPIN    60 tablet    1 po qhs    Primary insomnia       escitalopram 20 MG tablet    LEXAPRO    90 tablet     Take 1 tablet (20 mg) by mouth daily        MELATONIN PO      Take 5 mg by mouth nightly as needed        multivitamin, therapeutic Tabs tablet      Take 1 tablet by mouth daily        simvastatin 40 MG tablet    ZOCOR    90 tablet    Take 1 tablet (40 mg) by mouth At Bedtime    Mixed hyperlipidemia       sodium chloride 0.65 % nasal spray    OCEAN    1 Bottle    5-6 sprays into both nostrils daily and as needed    Post-operative state       sulindac 200 MG tablet    CLINORIL    90 tablet    Take 1 tablet (200 mg) by mouth 2 times daily        VITAMIN D (CHOLECALCIFEROL) PO      Take 3,000 Units by mouth daily

## 2018-04-25 NOTE — PROGRESS NOTES
Subjective Finding:    Chief compalint: Patient presents with:  Back Pain  Neck Pain  , Pain Scale: 5/10, Intensity: sharp and ache, Duration: 1 month, Change since last visit: worse, Radiating: no.    Date of injury:     Activities that the pain restricts:   Home/household activities: no.  Work duties: no.  Hobbies/social: no.  Sleep: no.  Makes symptoms better: activity and rest.  Makes symptoms worse: lumbar extension and lumbar flexion.  Have you seen anyone else for the symptoms? No.  Work related: no.  Automobile related injury: no.    Objective and Assessment:    Posture Analysis:   High shoulder: .  Head tilt: .  High iliac crest: .  Head carriage: neutral.  Thoracic Kyphosis: neutral.  Lumbar Lordosis: neutral.    Lumbar Range of Motion: flexion decreased and extension decreased.  Cervical Range of Motion: flexion decreased.  Thoracic Range of Motion: .  Extremity Range of Motion: .    Palpation:   Quad lumb: right, referred pain: no    Segmental dysfunction pre-treatment: C2, T6, T7, L4 and L5.    Assessment post-treatment:  Cervical: ROM increased.  Thoracic: pain and tenderness decreased.  Lumbar: pain and tenderness decreased.    Comments: .        Complicating Factors: .    Plan / Procedure:    Expected release date: .  Treatment plan: prn.  Instructed patient: ice 20 minutes every other hour as needed.  Short term goals: reduce pain and increase ROM.  Long term goals: increase patient functional independence.  Prognosis: excellent.

## 2018-04-30 ENCOUNTER — OFFICE VISIT (OUTPATIENT)
Dept: FAMILY MEDICINE | Facility: OTHER | Age: 63
End: 2018-04-30
Attending: PHYSICIAN ASSISTANT
Payer: COMMERCIAL

## 2018-04-30 VITALS
DIASTOLIC BLOOD PRESSURE: 64 MMHG | BODY MASS INDEX: 27.66 KG/M2 | HEIGHT: 65 IN | WEIGHT: 166 LBS | OXYGEN SATURATION: 99 % | HEART RATE: 80 BPM | TEMPERATURE: 97.7 F | SYSTOLIC BLOOD PRESSURE: 108 MMHG

## 2018-04-30 DIAGNOSIS — F33.2 SEVERE EPISODE OF RECURRENT MAJOR DEPRESSIVE DISORDER, WITHOUT PSYCHOTIC FEATURES (H): ICD-10-CM

## 2018-04-30 DIAGNOSIS — Z78.0 ASYMPTOMATIC POSTMENOPAUSAL ESTROGEN DEFICIENCY: ICD-10-CM

## 2018-04-30 DIAGNOSIS — E78.2 MIXED HYPERLIPIDEMIA: ICD-10-CM

## 2018-04-30 DIAGNOSIS — F51.01 PRIMARY INSOMNIA: Primary | ICD-10-CM

## 2018-04-30 DIAGNOSIS — Z76.89 ESTABLISHING CARE WITH NEW DOCTOR, ENCOUNTER FOR: ICD-10-CM

## 2018-04-30 PROCEDURE — 99203 OFFICE O/P NEW LOW 30 MIN: CPT | Performed by: PHYSICIAN ASSISTANT

## 2018-04-30 RX ORDER — SIMVASTATIN 40 MG
40 TABLET ORAL AT BEDTIME
Qty: 90 TABLET | Refills: 3 | Status: SHIPPED | OUTPATIENT
Start: 2018-04-30 | End: 2020-01-22

## 2018-04-30 RX ORDER — ESCITALOPRAM OXALATE 20 MG/1
20 TABLET ORAL DAILY
Qty: 90 TABLET | Refills: 3 | Status: SHIPPED | OUTPATIENT
Start: 2018-04-30 | End: 2018-10-11

## 2018-04-30 RX ORDER — CLONAZEPAM 1 MG/1
TABLET ORAL
Qty: 60 TABLET | Refills: 3 | Status: SHIPPED | OUTPATIENT
Start: 2018-04-30 | End: 2018-10-11

## 2018-04-30 ASSESSMENT — ANXIETY QUESTIONNAIRES
3. WORRYING TOO MUCH ABOUT DIFFERENT THINGS: NOT AT ALL
2. NOT BEING ABLE TO STOP OR CONTROL WORRYING: NOT AT ALL
7. FEELING AFRAID AS IF SOMETHING AWFUL MIGHT HAPPEN: NOT AT ALL
GAD7 TOTAL SCORE: 0
1. FEELING NERVOUS, ANXIOUS, OR ON EDGE: NOT AT ALL
4. TROUBLE RELAXING: NOT AT ALL
5. BEING SO RESTLESS THAT IT IS HARD TO SIT STILL: NOT AT ALL
6. BECOMING EASILY ANNOYED OR IRRITABLE: NOT AT ALL

## 2018-04-30 ASSESSMENT — PAIN SCALES - GENERAL: PAINLEVEL: MILD PAIN (2)

## 2018-04-30 NOTE — MR AVS SNAPSHOT
After Visit Summary   4/30/2018    Aileen Szymanski    MRN: [id number]           Patient Information     Date Of Birth          1955        Visit Information        Provider Department      4/30/2018 2:30 PM Poppy Haynes PA [facility]        Today's Diagnoses     Primary insomnia    -  1    Mixed hyperlipidemia        Severe episode of recurrent major depressive disorder, without psychotic features (H)        Establishing care with new doctor, encounter for        Asymptomatic postmenopausal estrogen deficiency          Care Instructions      Thank you for choosing [facility].   I have office hours 8:00 am to 4:30 pm on Monday's, Wednesday's, Thursday's and Friday's. My nurse and I are out of the office every Tuesday.    Following your visit, when your labs and diagnostic testing have returned, I will review then and you will be contacted by my nurse.  If you are on My Chart, you can also view results there.    For refills, notify your pharmacy regarding what you need and the pharmacy will generate a refill request. Do not call my nurse as she is unable to process refill request. Please plan ahead and allow 3-5 days for refill requests.    You will generally receive a reminder call the day prior to your appointment.  If you cannot attend your appointment, please cancel your appointment with as much notice as possible.  If there is a pattern of failure to present for your appointments, I cannot provide consistent, meaningful, ongoing care for you. It is very important to me that you come in for your care, so we can best assist you with your health care needs.    IMPORTANT:  Please note that it is my standard of practice to NOT participate in prescribing ongoing requested Narcotic Analgesic therapy, and/or participate in the prescribing of other controlled substances.  My nurse and I am happy to assist you with the process of referral for alternative pain management  as needed, and other treatment modalities including but not limited to:  Physical Therapy, Physical Medicine and Rehab, Counseling, Chiropractic Care, Orthopedic Care, and non-narcotic medication management.     In the event that you need to be seen for emergent concerns and I am out of office,  please see one of my colleagues for acute concerns.  You may also present to UC or ER.  I appreciate the opportunity to serve you and look forward to supporting your healthcare needs in the future. Please contact me with any questions or concerns that you may have.    Sincerely,      Poppy Haynes RN, PA-C               Follow-ups after your visit        Future tests that were ordered for you today     Open Future Orders        Priority Expected Expires Ordered    TSH with free T4 reflex Routine  5/1/2019 4/30/2018    Lipid Profile Routine  4/30/2019 4/30/2018    CBC with platelets differential Routine  4/30/2019 4/30/2018    Comprehensive metabolic panel Routine  4/30/2019 4/30/2018            Who to contact     If you have questions or need follow up information about today's clinic visit or your schedule please contact [facility] directly at [phone].  Normal or non-critical lab and imaging results will be communicated to you by Geosophichart, letter or phone within 4 business days after the clinic has received the results. If you do not hear from us within 7 days, please contact the clinic through Geosophichart or phone. If you have a critical or abnormal lab result, we will notify you by phone as soon as possible.  Submit refill requests through Baravento or call your pharmacy and they will forward the refill request to us. Please allow 3 business days for your refill to be completed.          Additional Information About Your Visit        Baravento Information     Baravento gives you secure access to your electronic health record. If you see a primary care provider, you can also send messages to your care team and  "make appointments. If you have questions, please call your primary care clinic.  If you do not have a primary care provider, please call [phone] and they will assist you.        Care EveryWhere ID     This is your Care EveryWhere ID. This could be used by other organizations to access your [place] medical records  [id number]        Your Vitals Were     Pulse Temperature Height Pulse Oximetry Breastfeeding? BMI (Body Mass Index)    80 97.7  F (36.5  C) (Tympanic) 5' 4.5\" (1.638 m) 99% No 28.05 kg/m2       Blood Pressure from Last 3 Encounters:   04/30/18 108/64   12/18/17 102/60   11/21/17 134/72    Weight from Last 3 Encounters:   04/30/18 166 lb (75.3 kg)   12/18/17 169 lb (76.7 kg)   11/21/17 169 lb (76.7 kg)              We Performed the Following     DX Hip/Pelvis/Spine          Where to get your medicines      These medications were sent to [id number] - [place] - [address]  [address] [place] [address]     Phone:  [phone]     escitalopram 20 MG tablet    simvastatin 40 MG tablet         Some of these will need a paper prescription and others can be bought over the counter.  Ask your nurse if you have questions.     Bring a paper prescription for each of these medications     clonazePAM 1 MG tablet          Primary Care Provider Office Phone # Fax #    Precious Vogel [phone]       [address] [facility] [place] [address]        Equal Access to Services     [facility] AH: Hadii sharan carmen hadasho Soomaali, waaxda luqadaha, qaybta kaalmada adeegyada, lilian alvarez. So [facility] [phone].    ATENCIÓN: Si habla español, tiene a ambrosio disposición servicios gratuitos de asistencia lingüística. Llame al [phone].    We comply with applicable federal civil rights laws and [place] laws. We do not discriminate on the basis of race, color, national origin, age, disability, sex, sexual orientation, or gender identity.          "   Thank you!     [facility]  for your care. Our goal is always to provide you with excellent care. Hearing back from our patients is one way we can continue to improve our services. Please take a few minutes to complete the written survey that you may receive in the mail after your visit with us. Thank you!             Your Updated Medication List - Protect others around you: Learn how to safely use, store and throw away your medicines at www.disposemymeds.org.          This list is accurate as of 4/30/18  3:30 PM.  Always use your most recent med list.                   Brand Name Dispense Instructions for use Diagnosis    clonazePAM 1 MG tablet    klonoPIN    60 tablet    1 po qhs    Primary insomnia       escitalopram 20 MG tablet    LEXAPRO    90 tablet    Take 1 tablet (20 mg) by mouth daily    Severe episode of recurrent major depressive disorder, without psychotic features (H)       MELATONIN PO      Take 5 mg by mouth nightly as needed        multivitamin, therapeutic Tabs tablet      Take 1 tablet by mouth daily        simvastatin 40 MG tablet    ZOCOR    90 tablet    Take 1 tablet (40 mg) by mouth At Bedtime    Mixed hyperlipidemia       sulindac 200 MG tablet    CLINORIL    90 tablet    Take 1 tablet (200 mg) by mouth 2 times daily        VITAMIN D (CHOLECALCIFEROL) PO      Take 3,000 Units by mouth daily

## 2018-04-30 NOTE — PROGRESS NOTES
SUBJECTIVE:   Aileen Szymanski is a 63 year old female who presents to clinic today for the following health issues:        New Patient/Transfer of Care  Hyperlipidemia Follow-Up      Rate your low fat/cholesterol diet?: not monitoring fat    Taking statin?  Yes, no muscle aches from statin    Other lipid medications/supplements?:  none    Depression and Anxiety Follow-Up    Status since last visit: Improved     Other associated symptoms:None    Complicating factors:     Significant life event: mother in-law passed away     Current substance abuse: None    PHQ-9 3/27/2017 9/20/2017   Total Score 11 0   Q9: Suicide Ideation Not at all Not at all     AURELIANO-7 SCORE 3/27/2017 9/20/2017   Total Score 5 0       PHQ-9  English  PHQ-9   Any Language  AURELIANO-7  Suicide Assessment Five-step Evaluation and Treatment (SAFE-T)    Amount of exercise or physical activity: None    Problems taking medications regularly: No    Medication side effects: none    Diet: regular (no restrictions)        Hyperlipidemia Follow-Up      Rate your low fat/cholesterol diet?: fair    Taking statin?  Stopped due to needing labs.     Other lipid medications/supplements?:  She has Zocor.     Chronic Pain Follow-Up       Type / Location of Pain: left anterior leg.   Analgesia/pain control:       Recent changes:  same      Overall control: Tolerable with discomfort  Activity level/function:      Daily activities:  Able to do light housework, cooking    Work:  not applicable  Adverse effects:  Yes   Adherance    Taking medication as directed?  Yes    Participating in other treatments: yes  Risk Factors:    Sleep:  Good    Mood/anxiety:  controlled    Recent family or social stressors:  none noted    Other aggravating factors: none  PHQ-9 SCORE 3/27/2017 9/20/2017 4/30/2018   Total Score 11 0 0     AURELIANO-7 SCORE 3/27/2017 9/20/2017 4/30/2018   Total Score 5 0 0     Encounter-Level CSA:     There are no encounter-level csa.          Problem list and histories  reviewed & adjusted, as indicated.  Additional history: as documented    Patient Active Problem List   Diagnosis     Mood disorder (H)     Mixed hyperlipidemia     Major depressive disorder without psychotic features     Past Surgical History:   Procedure Laterality Date     ABDOMEN SURGERY  2008     lap band     ABDOMEN SURGERY  2007    hysteroscopy     ARTHROPLASTY REVISION KNEE Left 10/2016     CHOLECYSTECTOMY      removal     CLOSED REDUCTION NOSE N/A 11/7/2017    Procedure: CLOSED REDUCTION NOSE;  CLOSED REDUCTION NASAL SEPTAL FRACTURE;  Surgeon: Destiny Vanegas MD;  Location: [facility]     COLONOSCOPY  04/13/2010    Dr Quevedo; negative      left knee replacement  7/21/2014    Dr Quinones/ [facility]       Social History   Substance Use Topics     Smoking status: Never Smoker     Smokeless tobacco: Never Used     Alcohol use Yes      Comment: rare     Family History   Problem Relation Age of Onset     Lung Cancer Mother      HEART DISEASE Mother      Chronic Obstructive Pulmonary Disease Father          Current Outpatient Prescriptions   Medication Sig Dispense Refill     clonazePAM (KLONOPIN) 1 MG tablet 1 po qhs 60 tablet 3     escitalopram (LEXAPRO) 20 MG tablet Take 1 tablet (20 mg) by mouth daily 90 tablet 3     MELATONIN PO Take 5 mg by mouth nightly as needed        multivitamin, therapeutic (THERA-VIT) TABS tablet Take 1 tablet by mouth daily       simvastatin (ZOCOR) 40 MG tablet Take 1 tablet (40 mg) by mouth At Bedtime 90 tablet 3     VITAMIN D, CHOLECALCIFEROL, PO Take 3,000 Units by mouth daily        sulindac (CLINORIL) 200 MG tablet Take 1 tablet (200 mg) by mouth 2 times daily (Patient not taking: Reported on 4/30/2018) 90 tablet 0     [DISCONTINUED] clonazePAM (KLONOPIN) 1 MG tablet 1 po qhs 60 tablet 0     [DISCONTINUED] escitalopram (LEXAPRO) 20 MG tablet Take 1 tablet (20 mg) by mouth daily 90 tablet 3     [DISCONTINUED] simvastatin (ZOCOR) 40 MG tablet Take 1 tablet (40 mg) by  "mouth At Bedtime (Patient not taking: Reported on 4/30/2018) 90 tablet 3     Allergies   Allergen Reactions     Lovenox [Enoxaparin] Other (See Comments)     Bleeding     BP Readings from Last 3 Encounters:   04/30/18 108/64   12/18/17 102/60   11/21/17 134/72    Wt Readings from Last 3 Encounters:   04/30/18 166 lb (75.3 kg)   12/18/17 169 lb (76.7 kg)   11/21/17 169 lb (76.7 kg)                    Reviewed and updated as needed this visit by clinical staff  Tobacco  Allergies  Meds       Reviewed and updated as needed this visit by Provider         ROS:  Constitutional, neuro, ENT, endocrine, pulmonary, cardiac, gastrointestinal, genitourinary, musculoskeletal, integument and psychiatric systems are negative, except as otherwise noted.    OBJECTIVE:                                                    /64 (BP Location: Left arm, Patient Position: Chair, Cuff Size: Adult Large)  Pulse 80  Temp 97.7  F (36.5  C) (Tympanic)  Ht 5' 4.5\" (1.638 m)  Wt 166 lb (75.3 kg)  SpO2 99%  Breastfeeding? No  BMI 28.05 kg/m2  Body mass index is 28.05 kg/(m^2).  GENERAL APPEARANCE: healthy, alert and no distress  EYES: Eyes grossly normal to inspection, PERRL and conjunctivae and sclerae normal  HENT: ear canals and TM's normal and nose and mouth without ulcers or lesions  NECK: no adenopathy, no asymmetry, masses, or scars and thyroid normal to palpation  RESP: lungs clear to auscultation - no rales, rhonchi or wheezes  CV: regular rates and rhythm, normal S1 S2, no S3 or S4 and no murmur, click or rub  ABDOMEN: soft, nontender, without hepatosplenomegaly or masses and bowel sounds normal  MS: pain in left anterior thigh.  Can get leg to 103 degrees.  Pain in Psoas.     SKIN: no suspicious lesions or rashes  NEURO: Normal strength and tone, mentation intact and speech normal  PSYCH: mentation appears normal and affect normal/bright    Diagnostic test results:  Diagnostic Test Results:  none      ASSESSMENT/PLAN:      "                                               1. Primary insomnia  Has limb movement and keeps her wake   - clonazePAM (KLONOPIN) 1 MG tablet; 1 po qhs  Dispense: 60 tablet; Refill: 3    2. Mixed hyperlipidemia  Due for labs.  Will check. Weight loss recommended. Exercise.   - simvastatin (ZOCOR) 40 MG tablet; Take 1 tablet (40 mg) by mouth At Bedtime  Dispense: 90 tablet; Refill: 3    3. Severe episode of recurrent major depressive disorder, without psychotic features (H)  She has hx of depression and this is helped in the past.  Counseling not wanted at this time.    - escitalopram (LEXAPRO) 20 MG tablet; Take 1 tablet (20 mg) by mouth daily  Dispense: 90 tablet; Refill: 3    - CBC with platelets differential; Future  - Comprehensive metabolic panel; Future  - TSH with free T4 reflex; Future    4. Establishing care with new doctor, encounter for  accepted    5. Asymptomatic postmenopausal estrogen deficiency  She is going to be having labs. See us back as recommended.   - Comprehensive metabolic panel; Future      See Patient Instructions    AMERICA Lepe  [facility]

## 2018-04-30 NOTE — NURSING NOTE
"Chief Complaint   Patient presents with     Establish Care     Lipids     Depression       Initial /64 (BP Location: Left arm, Patient Position: Chair, Cuff Size: Adult Large)  Pulse 80  Temp 97.7  F (36.5  C) (Tympanic)  Ht 5' 4.5\" (1.638 m)  Wt 166 lb (75.3 kg)  SpO2 99%  Breastfeeding? No  BMI 28.05 kg/m2 Estimated body mass index is 28.05 kg/(m^2) as calculated from the following:    Height as of this encounter: 5' 4.5\" (1.638 m).    Weight as of this encounter: 166 lb (75.3 kg).  Medication Reconciliation: complete   Renetta Zamora CMA(AAMA)   "

## 2018-04-30 NOTE — PATIENT INSTRUCTIONS
Thank you for choosing [facility] [place].   I have office hours 8:00 am to 4:30 pm on Monday's, Wednesday's, Thursday's and Friday's. My nurse and I are out of the office every Tuesday.    Following your visit, when your labs and diagnostic testing have returned, I will review then and you will be contacted by my nurse.  If you are on My Chart, you can also view results there.    For refills, notify your pharmacy regarding what you need and the pharmacy will generate a refill request. Do not call my nurse as she is unable to process refill request. Please plan ahead and allow 3-5 days for refill requests.    You will generally receive a reminder call the day prior to your appointment.  If you cannot attend your appointment, please cancel your appointment with as much notice as possible.  If there is a pattern of failure to present for your appointments, I cannot provide consistent, meaningful, ongoing care for you. It is very important to me that you come in for your care, so we can best assist you with your health care needs.    IMPORTANT:  Please note that it is my standard of practice to NOT participate in prescribing ongoing requested Narcotic Analgesic therapy, and/or participate in the prescribing of other controlled substances.  My nurse and I am happy to assist you with the process of referral for alternative pain management as needed, and other treatment modalities including but not limited to:  Physical Therapy, Physical Medicine and Rehab, Counseling, Chiropractic Care, Orthopedic Care, and non-narcotic medication management.     In the event that you need to be seen for emergent concerns and I am out of office,  please see one of my colleagues for acute concerns.  You may also present to  or ER.  I appreciate the opportunity to serve you and look forward to supporting your healthcare needs in the future. Please contact me with any questions or concerns that you may  have.    Sincerely,      Poppy Haynes RN, PA-C

## 2018-05-01 ASSESSMENT — PATIENT HEALTH QUESTIONNAIRE - PHQ9: SUM OF ALL RESPONSES TO PHQ QUESTIONS 1-9: 0

## 2018-05-01 ASSESSMENT — ANXIETY QUESTIONNAIRES: GAD7 TOTAL SCORE: 0

## 2018-05-08 DIAGNOSIS — F33.2 SEVERE EPISODE OF RECURRENT MAJOR DEPRESSIVE DISORDER, WITHOUT PSYCHOTIC FEATURES (H): ICD-10-CM

## 2018-05-08 DIAGNOSIS — E78.2 MIXED HYPERLIPIDEMIA: ICD-10-CM

## 2018-05-08 DIAGNOSIS — Z78.0 ASYMPTOMATIC POSTMENOPAUSAL ESTROGEN DEFICIENCY: ICD-10-CM

## 2018-05-08 LAB
ALBUMIN SERPL-MCNC: 3.6 G/DL (ref 3.4–5)
ALP SERPL-CCNC: 71 U/L (ref 40–150)
ALT SERPL W P-5'-P-CCNC: 15 U/L (ref 0–50)
ANION GAP SERPL CALCULATED.3IONS-SCNC: 7 MMOL/L (ref 3–14)
AST SERPL W P-5'-P-CCNC: 15 U/L (ref 0–45)
BILIRUB SERPL-MCNC: 0.7 MG/DL (ref 0.2–1.3)
BUN SERPL-MCNC: 20 MG/DL (ref 7–30)
CALCIUM SERPL-MCNC: 8.6 MG/DL (ref 8.5–10.1)
CHLORIDE SERPL-SCNC: 107 MMOL/L (ref 94–109)
CHOLEST SERPL-MCNC: 175 MG/DL
CO2 SERPL-SCNC: 29 MMOL/L (ref 20–32)
CREAT SERPL-MCNC: 0.79 MG/DL (ref 0.52–1.04)
DIFFERENTIAL METHOD BLD: NORMAL
ERYTHROCYTE [DISTWIDTH] IN BLOOD BY AUTOMATED COUNT: 12.7 % (ref 10–15)
GFR SERPL CREATININE-BSD FRML MDRD: 73 ML/MIN/1.7M2
GLUCOSE SERPL-MCNC: 96 MG/DL (ref 70–99)
HCT VFR BLD AUTO: 39.8 % (ref 35–47)
HDLC SERPL-MCNC: 76 MG/DL
HGB BLD-MCNC: 13 G/DL (ref 11.7–15.7)
LDLC SERPL CALC-MCNC: 84 MG/DL
MCH RBC QN AUTO: 28.5 PG (ref 26.5–33)
MCHC RBC AUTO-ENTMCNC: 32.7 G/DL (ref 31.5–36.5)
MCV RBC AUTO: 87 FL (ref 78–100)
NONHDLC SERPL-MCNC: 99 MG/DL
PLATELET # BLD AUTO: 190 10E9/L (ref 150–450)
POTASSIUM SERPL-SCNC: 4.1 MMOL/L (ref 3.4–5.3)
PROT SERPL-MCNC: 7.3 G/DL (ref 6.8–8.8)
RBC # BLD AUTO: 4.56 10E12/L (ref 3.8–5.2)
SODIUM SERPL-SCNC: 143 MMOL/L (ref 133–144)
TRIGL SERPL-MCNC: 74 MG/DL
TSH SERPL DL<=0.005 MIU/L-ACNC: 2.32 MU/L (ref 0.4–4)
WBC # BLD AUTO: 5 10E9/L (ref 4–11)

## 2018-05-08 PROCEDURE — 80061 LIPID PANEL: CPT | Performed by: PHYSICIAN ASSISTANT

## 2018-05-08 PROCEDURE — 36415 COLL VENOUS BLD VENIPUNCTURE: CPT | Performed by: PHYSICIAN ASSISTANT

## 2018-05-08 PROCEDURE — 80050 GENERAL HEALTH PANEL: CPT | Performed by: PHYSICIAN ASSISTANT

## 2018-05-14 ENCOUNTER — HOSPITAL ENCOUNTER (OUTPATIENT)
Dept: BONE DENSITY | Facility: HOSPITAL | Age: 63
Discharge: HOME | End: 2018-05-14
Attending: PHYSICIAN ASSISTANT | Admitting: PHYSICIAN ASSISTANT
Payer: COMMERCIAL

## 2018-05-14 DIAGNOSIS — Z78.0 ASYMPTOMATIC POSTMENOPAUSAL ESTROGEN DEFICIENCY: ICD-10-CM

## 2018-05-14 PROCEDURE — 77080 DXA BONE DENSITY AXIAL: CPT | Mod: TC

## 2018-06-20 ENCOUNTER — OFFICE VISIT (OUTPATIENT)
Dept: CHIROPRACTIC MEDICINE | Facility: OTHER | Age: 63
End: 2018-06-20
Attending: CHIROPRACTOR
Payer: COMMERCIAL

## 2018-06-20 DIAGNOSIS — M54.50 ACUTE BILATERAL LOW BACK PAIN WITHOUT SCIATICA: ICD-10-CM

## 2018-06-20 DIAGNOSIS — M99.03 SEGMENTAL AND SOMATIC DYSFUNCTION OF LUMBAR REGION: Primary | ICD-10-CM

## 2018-06-20 DIAGNOSIS — M99.02 SEGMENTAL AND SOMATIC DYSFUNCTION OF THORACIC REGION: ICD-10-CM

## 2018-06-20 DIAGNOSIS — M99.01 SEGMENTAL AND SOMATIC DYSFUNCTION OF CERVICAL REGION: ICD-10-CM

## 2018-06-20 PROCEDURE — 98941 CHIROPRACT MANJ 3-4 REGIONS: CPT | Mod: AT | Performed by: CHIROPRACTOR

## 2018-06-20 NOTE — MR AVS SNAPSHOT
After Visit Summary   6/20/2018    Aileen Szymanski    MRN: [id number]           Patient Information     Date Of Birth          1955        Visit Information        Provider Department      6/20/2018 11:10 AM Sulaiman Hollingsworth DC  [facility]        Today's Diagnoses     Segmental and somatic dysfunction of lumbar region    -  1    Acute bilateral low back pain without sciatica        Segmental and somatic dysfunction of thoracic region        Segmental and somatic dysfunction of cervical region           Follow-ups after your visit        Who to contact     If you have questions or need follow up information about today's clinic visit or your schedule please contact  [facility]ERTO [facility] directly at [phone].  Normal or non-critical lab and imaging results will be communicated to you by MyChart, letter or phone within 4 business days after the clinic has received the results. If you do not hear from us within 7 days, please contact the clinic through hakuhart or phone. If you have a critical or abnormal lab result, we will notify you by phone as soon as possible.  Submit refill requests through [employer] or call your pharmacy and they will forward the refill request to us. Please allow 3 business days for your refill to be completed.          Additional Information About Your Visit        MyChart Information     [employer] gives you secure access to your electronic health record. If you see a primary care provider, you can also send messages to your care team and make appointments. If you have questions, please call your primary care clinic.  If you do not have a primary care provider, please call [phone] and they will assist you.        Care EveryWhere ID     This is your Care EveryWhere ID. This could be used by other organizations to access your [place] medical records  [id number]         Blood Pressure from Last 3 Encounters:   04/30/18 108/64   12/18/17 102/60   11/21/17  134/72    Weight from Last 3 Encounters:   04/30/18 166 lb (75.3 kg)   12/18/17 169 lb (76.7 kg)   11/21/17 169 lb (76.7 kg)              We Performed the Following     CHIROPRAC MANIP,SPINAL,3-4 REGIONS        Primary Care Provider Office Phone # Fax #    Poppy AMERICA Armendariz [phone]       [address] [address]        Equal Access to Services     DE AGUIRRE : Hadii aad ku hadasho Soomaali, waaxda luqadaha, qaybta kaalmada adeegyada, waxay idiin hayaan adeeg kharash la'aan . So [facility] [phone].    ATENCIÓN: Si habla español, tiene a ambrosio disposición servicios gratuitos de asistencia lingüística. [facility] [phone].    We comply with applicable federal civil rights laws and [place] laws. We do not discriminate on the basis of race, color, national origin, age, disability, sex, sexual orientation, or gender identity.            Thank you!     Thank you for choosing  [facility]  for your care. Our goal is always to provide you with excellent care. Hearing back from our patients is one way we can continue to improve our services. Please take a few minutes to complete the written survey that you may receive in the mail after your visit with us. Thank you!             Your Updated Medication List - Protect others around you: Learn how to safely use, store and throw away your medicines at www.disposemymeds.org.          This list is accurate as of 6/20/18  1:48 PM.  Always use your most recent med list.                   Brand Name Dispense Instructions for use Diagnosis    clonazePAM 1 MG tablet    klonoPIN    60 tablet    1 po qhs    Primary insomnia       escitalopram 20 MG tablet    LEXAPRO    90 tablet    Take 1 tablet (20 mg) by mouth daily    Severe episode of recurrent major depressive disorder, without psychotic features (H)       MELATONIN PO      Take 5 mg by mouth nightly as needed        multivitamin, therapeutic Tabs tablet      Take 1 tablet by  mouth daily        simvastatin 40 MG tablet    ZOCOR    90 tablet    Take 1 tablet (40 mg) by mouth At Bedtime    Mixed hyperlipidemia       sulindac 200 MG tablet    CLINORIL    90 tablet    Take 1 tablet (200 mg) by mouth 2 times daily        VITAMIN D (CHOLECALCIFEROL) PO      Take 3,000 Units by mouth daily

## 2018-07-19 ENCOUNTER — OFFICE VISIT (OUTPATIENT)
Dept: CHIROPRACTIC MEDICINE | Facility: OTHER | Age: 63
End: 2018-07-19
Attending: CHIROPRACTOR
Payer: COMMERCIAL

## 2018-07-19 DIAGNOSIS — M99.01 SEGMENTAL AND SOMATIC DYSFUNCTION OF CERVICAL REGION: ICD-10-CM

## 2018-07-19 DIAGNOSIS — M99.02 SEGMENTAL AND SOMATIC DYSFUNCTION OF THORACIC REGION: ICD-10-CM

## 2018-07-19 DIAGNOSIS — M54.50 ACUTE BILATERAL LOW BACK PAIN WITHOUT SCIATICA: ICD-10-CM

## 2018-07-19 DIAGNOSIS — M99.03 SEGMENTAL AND SOMATIC DYSFUNCTION OF LUMBAR REGION: Primary | ICD-10-CM

## 2018-07-19 PROCEDURE — 98941 CHIROPRACT MANJ 3-4 REGIONS: CPT | Mod: AT | Performed by: CHIROPRACTOR

## 2018-07-19 NOTE — MR AVS SNAPSHOT
After Visit Summary   7/19/2018    Aileen Szymanski    MRN: [id number]           Patient Information     Date Of Birth          1955        Visit Information        Provider Department      7/19/2018 11:10 AM Sluaiman Hollingsworth DC  [facility]        Today's Diagnoses     Segmental and somatic dysfunction of lumbar region    -  1    Acute bilateral low back pain without sciatica        Segmental and somatic dysfunction of thoracic region        Segmental and somatic dysfunction of cervical region           Follow-ups after your visit        Who to contact     If you have questions or need follow up information about today's clinic visit or your schedule please contact  [facility] directly at [phone].  Normal or non-critical lab and imaging results will be communicated to you by MyChart, letter or phone within 4 business days after the clinic has received the results. If you do not hear from us within 7 days, please contact the clinic through Acoriohart or phone. If you have a critical or abnormal lab result, we will notify you by phone as soon as possible.  Submit refill requests through "[employer] or call your pharmacy and they will forward the refill request to us. Please allow 3 business days for your refill to be completed.          Additional Information About Your Visit        MyChart Information     "[employer] gives you secure access to your electronic health record. If you see a primary care provider, you can also send messages to your care team and make appointments. If you have questions, please call your primary care clinic.  If you do not have a primary care provider, please call [phone] and they will assist you.        Care EveryWhere ID     This is your Care EveryWhere ID. This could be used by other organizations to access your [place] medical records  [id number]         Blood Pressure from Last 3 Encounters:   04/30/18 108/64   12/18/17 102/60   11/21/17  134/72    Weight from Last 3 Encounters:   04/30/18 166 lb (75.3 kg)   12/18/17 169 lb (76.7 kg)   11/21/17 169 lb (76.7 kg)              We Performed the Following     CHIROPRAC MANIP,SPINAL,3-4 REGIONS        Primary Care Provider Office Phone # Fax #    Poppy AMERICA Armendariz [phone]       [address] [address]        Equal Access to Services     DE AGUIRRE : Hadii aad ku hadasho Soomaali, waaxda luqadaha, qaybta kaalmada adeegyada, waxay idiin hayaan adeeg kharash la'aan . So [facility] [phone].    ATENCIÓN: Si habla español, tiene a ambrosio disposición servicios gratuitos de asistencia lingüística. [facility] [phone].    We comply with applicable federal civil rights laws and [place] laws. We do not discriminate on the basis of race, color, national origin, age, disability, sex, sexual orientation, or gender identity.            Thank you!     Thank you for choosing  [facility]  for your care. Our goal is always to provide you with excellent care. Hearing back from our patients is one way we can continue to improve our services. Please take a few minutes to complete the written survey that you may receive in the mail after your visit with us. Thank you!             Your Updated Medication List - Protect others around you: Learn how to safely use, store and throw away your medicines at www.disposemymeds.org.          This list is accurate as of 7/19/18 11:59 PM.  Always use your most recent med list.                   Brand Name Dispense Instructions for use Diagnosis    clonazePAM 1 MG tablet    klonoPIN    60 tablet    1 po qhs    Primary insomnia       escitalopram 20 MG tablet    LEXAPRO    90 tablet    Take 1 tablet (20 mg) by mouth daily    Severe episode of recurrent major depressive disorder, without psychotic features (H)       MELATONIN PO      Take 5 mg by mouth nightly as needed        multivitamin, therapeutic Tabs tablet      Take 1 tablet by  mouth daily        simvastatin 40 MG tablet    ZOCOR    90 tablet    Take 1 tablet (40 mg) by mouth At Bedtime    Mixed hyperlipidemia       sulindac 200 MG tablet    CLINORIL    90 tablet    Take 1 tablet (200 mg) by mouth 2 times daily        VITAMIN D (CHOLECALCIFEROL) PO      Take 3,000 Units by mouth daily

## 2018-10-10 NOTE — PROGRESS NOTES
SUBJECTIVE:   Aileen Szymanski is a 63 year old female who presents to clinic today for the following health issues:      lesions      Duration: several years    Description (location/character/radiation): scaly bump    Intensity:  mild    Accompanying signs and symptoms: Gets crusty and itchy,    History (similar episodes/previous evaluation): None    Precipitating or alleviating factors: None    Therapies tried and outcome: None       Abnormal Mood Symptoms      Duration: over 2 months    Description:  Depression: YES- due to situation.   Anxiety: no  Panic attacks: no     Accompanying signs and symptoms: see PHQ-9 and AURELIANO scores    History (similar episodes/previous evaluation): None    Precipitating or alleviating factors: None    Therapies tried and outcome: Lexapro.       Problem list and histories reviewed & adjusted, as indicated.  Additional history: as documented    Patient Active Problem List   Diagnosis     Mood disorder (H)     Mixed hyperlipidemia     Major depressive disorder without psychotic features     Past Surgical History:   Procedure Laterality Date     ABDOMEN SURGERY  2008     lap band     ABDOMEN SURGERY  2007    hysteroscopy     ARTHROPLASTY REVISION KNEE Left 10/2016     CHOLECYSTECTOMY      removal     CLOSED REDUCTION NOSE N/A 11/7/2017    Procedure: CLOSED REDUCTION NOSE;  CLOSED REDUCTION NASAL SEPTAL FRACTURE;  Surgeon: Destiny Vanegas MD;  Location: [facility]     COLONOSCOPY  04/13/2010    Dr Quevedo; negative      left knee replacement  7/21/2014    Dr Quinones/ [facility]       Social History   Substance Use Topics     Smoking status: Never Smoker     Smokeless tobacco: Never Used     Alcohol use Yes      Comment: rare     Family History   Problem Relation Age of Onset     Lung Cancer Mother      HEART DISEASE Mother      Chronic Obstructive Pulmonary Disease Father          Current Outpatient Prescriptions   Medication Sig Dispense Refill     clonazePAM (KLONOPIN) 1 MG  "tablet 1 po qhs 60 tablet 3     escitalopram (LEXAPRO) 20 MG tablet Take 1 tablet (20 mg) by mouth daily 90 tablet 3     MELATONIN PO Take 5 mg by mouth nightly as needed        multivitamin, therapeutic (THERA-VIT) TABS tablet Take 1 tablet by mouth daily       VITAMIN D, CHOLECALCIFEROL, PO Take 3,000 Units by mouth daily        simvastatin (ZOCOR) 40 MG tablet Take 1 tablet (40 mg) by mouth At Bedtime (Patient not taking: Reported on 10/11/2018) 90 tablet 3     Allergies   Allergen Reactions     Lovenox [Enoxaparin] Other (See Comments)     Bleeding     Recent Labs   Lab Test  05/08/18   1111  03/27/17   1021  09/19/16   1335  08/21/15   1050   LDL  84  125*  65  58   HDL  76  88  68  85   TRIG  74  99  79  83   ALT  15  11   --   15   CR  0.79  0.86  0.89  0.80   GFRESTIMATED  73  67  64  73   GFRESTBLACK  88  81  78  88   POTASSIUM  4.1  4.1  4.1  4.1   TSH  2.32   --   2.16   --       BP Readings from Last 3 Encounters:   10/11/18 108/78   04/30/18 108/64   12/18/17 102/60    Wt Readings from Last 3 Encounters:   10/11/18 164 lb (74.4 kg)   04/30/18 166 lb (75.3 kg)   12/18/17 169 lb (76.7 kg)                    Reviewed and updated as needed this visit by clinical staff       Reviewed and updated as needed this visit by Provider         ROS:  Constitutional, neuro, ENT, endocrine, pulmonary, cardiac, gastrointestinal, genitourinary, musculoskeletal, integument and psychiatric systems are negative, except as otherwise noted.    OBJECTIVE:                                                    /78  Pulse 62  Temp 97.5  F (36.4  C) (Tympanic)  Ht 5' 4.5\" (1.638 m)  Wt 164 lb (74.4 kg)  SpO2 100%  BMI 27.72 kg/m2  Body mass index is 27.72 kg/(m^2).  GENERAL APPEARANCE: healthy, alert and no distress  EYES: Eyes grossly normal to inspection, PERRL and conjunctivae and sclerae normal  HENT: ear canals and TM's normal and nose and mouth without ulcers or lesions  NECK: no adenopathy, no asymmetry, masses, or " scars and thyroid normal to palpation  RESP: lungs clear to auscultation - no rales, rhonchi or wheezes  CV: regular rates and rhythm, normal S1 S2, no S3 or S4 and no murmur, click or rub  LYMPHATICS: no cervical adenopathy  ABDOMEN: soft, nontender, without hepatosplenomegaly or masses and bowel sounds normal  MS: extremities normal- no gross deformities noted  SKIN: many inflamed lesions on scalp.   NEURO: Normal strength and tone, mentation intact and speech normal  PSYCH: mentation appears normal and affect normal/bright. Long discussion on her stressors.     Diagnostic test results:  Diagnostic Test Results:  No results found for this or any previous visit (from the past 24 hour(s)).     ASSESSMENT/PLAN:                                                    1. Primary insomnia  Has REM sleeping disorder. Helps her sleep.   - clonazePAM (KLONOPIN) 1 MG tablet; 1 po qhs  Dispense: 60 tablet; Refill: 3    2. Severe episode of recurrent major depressive disorder, without psychotic features (H)  She is going to continue. Working well for her except her dealth of her sister that is impending.   - escitalopram (LEXAPRO) 20 MG tablet; Take 1 tablet (20 mg) by mouth daily  Dispense: 90 tablet; Refill: 3    3. Inflamed seborrheic keratosis  If not improving. Will bx scalp on right parietal region.   - ketoconazole (NIZORAL) 2 % shampoo; Apply to the affected area and wash off after 5 minutes.  Dispense: 120 mL; Refill: 1  spense: 120 mL; Refill: 1    4. History of weight loss surgery  Needing follow up on labs.  Feeling well weight is stable.   - Lipid Profile; Future  - Comprehensive metabolic panel; Future  - TSH with free T4 reflex; Future  - CBC with platelets differential; Future  - UA with Microscopic reflex to Culture; Future    5. Need for prophylactic vaccination and inoculation against influenza  Given.   - Vaccine Administration, Initial [[id number]]    See Patient Instructions    FRANCA Lepe  [facility]

## 2018-10-11 ENCOUNTER — OFFICE VISIT (OUTPATIENT)
Dept: FAMILY MEDICINE | Facility: OTHER | Age: 63
End: 2018-10-11
Attending: PHYSICIAN ASSISTANT
Payer: COMMERCIAL

## 2018-10-11 VITALS
DIASTOLIC BLOOD PRESSURE: 78 MMHG | HEART RATE: 62 BPM | HEIGHT: 65 IN | OXYGEN SATURATION: 100 % | TEMPERATURE: 97.5 F | WEIGHT: 164 LBS | SYSTOLIC BLOOD PRESSURE: 108 MMHG | BODY MASS INDEX: 27.32 KG/M2

## 2018-10-11 DIAGNOSIS — F51.01 PRIMARY INSOMNIA: Primary | ICD-10-CM

## 2018-10-11 DIAGNOSIS — L82.0 INFLAMED SEBORRHEIC KERATOSIS: ICD-10-CM

## 2018-10-11 DIAGNOSIS — F33.2 SEVERE EPISODE OF RECURRENT MAJOR DEPRESSIVE DISORDER, WITHOUT PSYCHOTIC FEATURES (H): ICD-10-CM

## 2018-10-11 DIAGNOSIS — Z23 NEED FOR PROPHYLACTIC VACCINATION AND INOCULATION AGAINST INFLUENZA: ICD-10-CM

## 2018-10-11 DIAGNOSIS — Z98.84 HISTORY OF WEIGHT LOSS SURGERY: ICD-10-CM

## 2018-10-11 PROCEDURE — 90682 RIV4 VACC RECOMBINANT DNA IM: CPT | Performed by: PHYSICIAN ASSISTANT

## 2018-10-11 PROCEDURE — 90471 IMMUNIZATION ADMIN: CPT | Performed by: PHYSICIAN ASSISTANT

## 2018-10-11 PROCEDURE — 99214 OFFICE O/P EST MOD 30 MIN: CPT | Mod: 25 | Performed by: PHYSICIAN ASSISTANT

## 2018-10-11 RX ORDER — CLONAZEPAM 1 MG/1
TABLET ORAL
Qty: 60 TABLET | Refills: 3 | Status: SHIPPED | OUTPATIENT
Start: 2018-10-11 | End: 2019-05-09

## 2018-10-11 RX ORDER — KETOCONAZOLE 20 MG/ML
SHAMPOO TOPICAL
Qty: 120 ML | Refills: 1 | Status: SHIPPED | OUTPATIENT
Start: 2018-10-11 | End: 2019-12-28

## 2018-10-11 RX ORDER — ESCITALOPRAM OXALATE 20 MG/1
20 TABLET ORAL DAILY
Qty: 90 TABLET | Refills: 3 | Status: SHIPPED | OUTPATIENT
Start: 2018-10-11 | End: 2019-10-06

## 2018-10-11 ASSESSMENT — PATIENT HEALTH QUESTIONNAIRE - PHQ9: 5. POOR APPETITE OR OVEREATING: NOT AT ALL

## 2018-10-11 ASSESSMENT — ANXIETY QUESTIONNAIRES
2. NOT BEING ABLE TO STOP OR CONTROL WORRYING: SEVERAL DAYS
3. WORRYING TOO MUCH ABOUT DIFFERENT THINGS: SEVERAL DAYS
1. FEELING NERVOUS, ANXIOUS, OR ON EDGE: SEVERAL DAYS
5. BEING SO RESTLESS THAT IT IS HARD TO SIT STILL: NOT AT ALL
6. BECOMING EASILY ANNOYED OR IRRITABLE: SEVERAL DAYS

## 2018-10-11 ASSESSMENT — PAIN SCALES - GENERAL: PAINLEVEL: NO PAIN (0)

## 2018-10-11 NOTE — NURSING NOTE
"Chief Complaint   Patient presents with     Lesion       Initial /78  Pulse 62  Temp 97.5  F (36.4  C) (Tympanic)  Ht 5' 4.5\" (1.638 m)  Wt 164 lb (74.4 kg)  SpO2 100%  BMI 27.72 kg/m2 Estimated body mass index is 27.72 kg/(m^2) as calculated from the following:    Height as of this encounter: 5' 4.5\" (1.638 m).    Weight as of this encounter: 164 lb (74.4 kg).  Medication Reconciliation: complete    Mary Wang LPN  "

## 2018-10-11 NOTE — LETTER
My Depression Action Plan  Name: Aileen Szymanski   Date of Birth 1955  Date: 10/10/2018    My doctor: Poppy Haynes   [facility] [address] [facility] [address]  [phone]          GREEN    ZONE   Good Control    What it looks like:     Things are going generally well. You have normal up s and down s. You may even feel depressed from time to time, but bad moods usually last less than a day.   What you need to do:  1. Continue to care for yourself (see self care plan)  2. Check your depression survival kit and update it as needed  3. Follow your physician s recommendations including any medication.  4. Do not stop taking medication unless you consult with your physician first.           YELLOW         ZONE Getting Worse    What it looks like:     Depression is starting to interfere with your life.     It may be hard to get out of bed; you may be starting to isolate yourself from others.    Symptoms of depression are starting to last most all day and this has happened for several days.     You may have suicidal thoughts but they are not constant.   What you need to do:     1. Call your care team, your response to treatment will improve if you keep your care team informed of your progress. Yellow periods are signs an adjustment may need to be made.     2. Continue your self-care, even if you have to fake it!    3. Talk to someone in your support network    4. Open up your depression survival kit           RED    ZONE Medical Alert - Get Help    What it looks like:     Depression is seriously interfering with your life.     You may experience these or other symptoms: You can t get out of bed most days, can t work or engage in other necessary activities, you have trouble taking care of basic hygiene, or basic responsibilities, thoughts of suicide or death that will not go away, self-injurious behavior.     What you need to do:  1. Call your care team and  request a same-day appointment. If they are not available (weekends or after hours) call your local crisis line, emergency room or 911.            Depression Self Care Plan / Survival Kit    Self-Care for Depression  Here s the deal. Your body and mind are really not as separate as most people think.  What you do and think affects how you feel and how you feel influences what you do and think. This means if you do things that people who feel good do, it will help you feel better.  Sometimes this is all it takes.  There is also a place for medication and therapy depending on how severe your depression is, so be sure to consult with your medical provider and/ or Behavioral Health Consultant if your symptoms are worsening or not improving.     In order to better manage my stress, I will:    Exercise  Get some form of exercise, every day. This will help reduce pain and release endorphins, the  feel good  chemicals in your brain. This is almost as good as taking antidepressants!  This is not the same as joining a gym and then never going! (they count on that by the way ) It can be as simple as just going for a walk or doing some gardening, anything that will get you moving.      Hygiene   Maintain good hygiene (Get out of bed in the morning, Make your bed, Brush your teeth, Take a shower, and Get dressed like you were going to work, even if you are unemployed).  If your clothes don't fit try to get ones that do.    Diet  I will strive to eat foods that are good for me, drink plenty of water, and avoid excessive sugar, caffeine, alcohol, and other mood-altering substances.  Some foods that are helpful in depression are: complex carbohydrates, B vitamins, flaxseed, fish or fish oil, fresh fruits and vegetables.    Psychotherapy  I agree to participate in Individual Therapy (if recommended).    Medication  If prescribed medications, I agree to take them.  Missing doses can result in serious side effects.  I understand that  drinking alcohol, or other illicit drug use, may cause potential side effects.  I will not stop my medication abruptly without first discussing it with my provider.    Staying Connected With Others  I will stay in touch with my friends, family members, and my primary care provider/team.    Use your imagination  Be creative.  We all have a creative side; it doesn t matter if it s oil painting, sand castles, or mud pies! This will also kick up the endorphins.    Witness Beauty  (AKA stop and smell the roses) Take a look outside, even in mid-winter. Notice colors, textures. Watch the squirrels and birds.     Service to others  Be of service to others.  There is always someone else in need.  By helping others we can  get out of ourselves  and remember the really important things.  This also provides opportunities for practicing all the other parts of the program.    Humor  Laugh and be silly!  Adjust your TV habits for less news and crime-drama and more comedy.    Control your stress  Try breathing deep, massage therapy, biofeedback, and meditation. Find time to relax each day.     My support system    Clinic Contact:  Phone number:    Contact 1:  Phone number:    Contact 2:  Phone number:    [employer]:  Phone number:    Therapist:  Phone number:    Local crisis center:    Phone number:    Other community support:  Phone number:

## 2018-10-11 NOTE — MR AVS SNAPSHOT
After Visit Summary   10/11/2018    Aileen Szymanski    MRN: [id number]           Patient Information     Date Of Birth          1955        Visit Information        Provider Department      10/11/2018 2:40 PM Poppy Haynes PA [facility]        Today's Diagnoses     Primary insomnia    -  1    Severe episode of recurrent major depressive disorder, without psychotic features (H)        Inflamed seborrheic keratosis        History of weight loss surgery          Care Instructions      Thank you for choosing [facility].   I have office hours 8:00 am to 4:30 pm on Monday's, Wednesday's, Thursday's and Friday's. My nurse and I are out of the office every Tuesday.    Following your visit, when your labs and diagnostic testing have returned, I will review then and you will be contacted by my nurse.  If you are on My Chart, you can also view results there.    For refills, notify your pharmacy regarding what you need and the pharmacy will generate a refill request. Do not call my nurse as she is unable to process refill request. Please plan ahead and allow 3-5 days for refill requests.    You will generally receive a reminder call the day prior to your appointment.  If you cannot attend your appointment, please cancel your appointment with as much notice as possible.  If there is a pattern of failure to present for your appointments, I cannot provide consistent, meaningful, ongoing care for you. It is very important to me that you come in for your care, so we can best assist you with your health care needs.    IMPORTANT:  Please note that it is my standard of practice to NOT participate in prescribing ongoing requested Narcotic Analgesic therapy, and/or participate in the prescribing of other controlled substances.  My nurse and I am happy to assist you with the process of referral for alternative pain management as needed, and other treatment modalities including  but not limited to:  Physical Therapy, Physical Medicine and Rehab, Counseling, Chiropractic Care, Orthopedic Care, and non-narcotic medication management.     In the event that you need to be seen for emergent concerns and I am out of office,  please see one of my colleagues for acute concerns.  You may also present to UC or ER.  I appreciate the opportunity to serve you and look forward to supporting your healthcare needs in the future. Please contact me with any questions or concerns that you may have.    Sincerely,      Poppy Haynes RN, PA-C               Follow-ups after your visit        Future tests that were ordered for you today     Open Future Orders        Priority Expected Expires Ordered    Lipid Profile Routine  10/11/2019 10/11/2018    Comprehensive metabolic panel Routine  10/11/2019 10/11/2018    TSH with free T4 reflex Routine  10/12/2019 10/11/2018    CBC with platelets differential Routine  10/11/2019 10/11/2018    UA with Microscopic reflex to Culture Routine  10/11/2019 10/11/2018            Who to contact     If you have questions or need follow up information about today's clinic visit or your schedule please contact [facility] directly at [phone].  Normal or non-critical lab and imaging results will be communicated to you by MyChart, letter or phone within 4 business days after the clinic has received the results. If you do not hear from us within 7 days, please contact the clinic through "[employer]hart or phone. If you have a critical or abnormal lab result, we will notify you by phone as soon as possible.  Submit refill requests through [employer] or call your pharmacy and they will forward the refill request to us. Please allow 3 business days for your refill to be completed.          Additional Information About Your Visit        "[employer] Information     [employer] gives you secure access to your electronic health record. If you see a primary care provider, you can also  "send messages to your care team and make appointments. If you have questions, please call your primary care clinic.  If you do not have a primary care provider, please call [phone] and they will assist you.        Care EveryWhere ID     This is your Care EveryWhere ID. This could be used by other organizations to access your [facility] medical records  [id number]        Your Vitals Were     Pulse Temperature Height Pulse Oximetry BMI (Body Mass Index)       62 97.5  F (36.4  C) (Tympanic) 5' 4.5\" (1.638 m) 100% 27.72 kg/m2        Blood Pressure from Last 3 Encounters:   10/11/18 108/78   04/30/18 108/64   12/18/17 102/60    Weight from Last 3 Encounters:   10/11/18 164 lb (74.4 kg)   04/30/18 166 lb (75.3 kg)   12/18/17 169 lb (76.7 kg)                 Today's Medication Changes          These changes are accurate as of 10/11/18  3:24 PM.  If you have any questions, ask your nurse or doctor.               Start taking these medicines.        Dose/Directions    ketoconazole 2 % shampoo   Commonly known as:  NIZORAL   Used for:  Inflamed seborrheic keratosis   Started by:  Poppy Haynes PA        Apply to the affected area and wash off after 5 minutes.   Quantity:  120 mL   Refills:  1         Stop taking these medicines if you haven't already. Please contact your care team if you have questions.     sulindac 200 MG tablet   Commonly known as:  CLINORIL   Stopped by:  Poppy Haynes PA                Where to get your medicines      These medications were sent to [id number] - [place] - [address]  [address] [place] [address]     Phone:  [phone]     escitalopram 20 MG tablet    ketoconazole 2 % shampoo         Some of these will need a paper prescription and others can be bought over the counter.  Ask your nurse if you have questions.     Bring a paper prescription for each of these medications     clonazePAM 1 MG tablet                Primary Care Provider Office " Phone # Fax #    AMERICA Gonsales [phone]       [facility] [address] [address]        Equal Access to Services     ALLEN AGUIRRE : Jayce carmen ángela Elam, watoanda luqadaha, qaybta kaalmada sebastian, lilian dick laedeljamie alvarez. So [facility] [phone].    ATENCIÓN: Si habla español, tiene a ambrosio disposición servicios gratuitos de asistencia lingüística. Llame al [phone].    We comply with applicable federal civil rights laws and [place] laws. We do not discriminate on the basis of race, color, national origin, age, disability, sex, sexual orientation, or gender identity.            Thank you!     Thank you for choosing [facility]  for your care. Our goal is always to provide you with excellent care. Hearing back from our patients is one way we can continue to improve our services. Please take a few minutes to complete the written survey that you may receive in the mail after your visit with us. Thank you!             Your Updated Medication List - Protect others around you: Learn how to safely use, store and throw away your medicines at www.disposemymeds.org.          This list is accurate as of 10/11/18  3:24 PM.  Always use your most recent med list.                   Brand Name Dispense Instructions for use Diagnosis    clonazePAM 1 MG tablet    klonoPIN    60 tablet    1 po qhs    Primary insomnia       escitalopram 20 MG tablet    LEXAPRO    90 tablet    Take 1 tablet (20 mg) by mouth daily    Severe episode of recurrent major depressive disorder, without psychotic features (H)       ketoconazole 2 % shampoo    NIZORAL    120 mL    Apply to the affected area and wash off after 5 minutes.    Inflamed seborrheic keratosis       MELATONIN PO      Take 5 mg by mouth nightly as needed        multivitamin, therapeutic Tabs tablet      Take 1 tablet by mouth daily        simvastatin 40 MG tablet    ZOCOR    90 tablet    Take 1 tablet  (40 mg) by mouth At Bedtime    Mixed hyperlipidemia       VITAMIN D (CHOLECALCIFEROL) PO      Take 3,000 Units by mouth daily

## 2018-10-11 NOTE — PROGRESS NOTES

## 2018-10-11 NOTE — PATIENT INSTRUCTIONS
Thank you for choosing [facility].   I have office hours 8:00 am to 4:30 pm on Monday's, Wednesday's, Thursday's and Friday's. My nurse and I are out of the office every Tuesday.    Following your visit, when your labs and diagnostic testing have returned, I will review then and you will be contacted by my nurse.  If you are on My Chart, you can also view results there.    For refills, notify your pharmacy regarding what you need and the pharmacy will generate a refill request. Do not call my nurse as she is unable to process refill request. Please plan ahead and allow 3-5 days for refill requests.    You will generally receive a reminder call the day prior to your appointment.  If you cannot attend your appointment, please cancel your appointment with as much notice as possible.  If there is a pattern of failure to present for your appointments, I cannot provide consistent, meaningful, ongoing care for you. It is very important to me that you come in for your care, so we can best assist you with your health care needs.    IMPORTANT:  Please note that it is my standard of practice to NOT participate in prescribing ongoing requested Narcotic Analgesic therapy, and/or participate in the prescribing of other controlled substances.  My nurse and I am happy to assist you with the process of referral for alternative pain management as needed, and other treatment modalities including but not limited to:  Physical Therapy, Physical Medicine and Rehab, Counseling, Chiropractic Care, Orthopedic Care, and non-narcotic medication management.     In the event that you need to be seen for emergent concerns and I am out of office,  please see one of my colleagues for acute concerns.  You may also present to  or ER.  I appreciate the opportunity to serve you and look forward to supporting your healthcare needs in the future. Please contact me with any questions or concerns that you may  have.    Sincerely,      Poppy Haynes RN, PA-C

## 2018-10-12 ASSESSMENT — PATIENT HEALTH QUESTIONNAIRE - PHQ9: SUM OF ALL RESPONSES TO PHQ QUESTIONS 1-9: 1

## 2018-10-21 ENCOUNTER — HOSPITAL ENCOUNTER (EMERGENCY)
Facility: HOSPITAL | Age: 63
Discharge: HOME | End: 2018-10-21
Attending: NURSE PRACTITIONER | Admitting: NURSE PRACTITIONER
Payer: COMMERCIAL

## 2018-10-21 VITALS
DIASTOLIC BLOOD PRESSURE: 62 MMHG | SYSTOLIC BLOOD PRESSURE: 125 MMHG | TEMPERATURE: 97.3 F | RESPIRATION RATE: 16 BRPM | OXYGEN SATURATION: 98 % | HEART RATE: 61 BPM

## 2018-10-21 DIAGNOSIS — N30.01 ACUTE CYSTITIS WITH HEMATURIA: ICD-10-CM

## 2018-10-21 LAB
ALBUMIN UR-MCNC: 10 MG/DL
APPEARANCE UR: CLEAR
BACTERIA #/AREA URNS HPF: ABNORMAL /HPF
BILIRUB UR QL STRIP: NEGATIVE
COLOR UR AUTO: YELLOW
GLUCOSE UR STRIP-MCNC: NEGATIVE MG/DL
HGB UR QL STRIP: ABNORMAL
KETONES UR STRIP-MCNC: NEGATIVE MG/DL
LEUKOCYTE ESTERASE UR QL STRIP: ABNORMAL
MUCOUS THREADS #/AREA URNS LPF: PRESENT /LPF
NITRATE UR QL: NEGATIVE
PH UR STRIP: 6.5 PH (ref 4.7–8)
RBC #/AREA URNS AUTO: 11 /HPF (ref 0–2)
SOURCE: ABNORMAL
SP GR UR STRIP: 1.02 (ref 1–1.03)
UROBILINOGEN UR STRIP-MCNC: 2 MG/DL (ref 0–2)
WBC #/AREA URNS AUTO: 31 /HPF (ref 0–5)

## 2018-10-21 PROCEDURE — 87086 URINE CULTURE/COLONY COUNT: CPT | Performed by: NURSE PRACTITIONER

## 2018-10-21 PROCEDURE — 99213 OFFICE O/P EST LOW 20 MIN: CPT | Performed by: NURSE PRACTITIONER

## 2018-10-21 PROCEDURE — 81001 URINALYSIS AUTO W/SCOPE: CPT | Performed by: NURSE PRACTITIONER

## 2018-10-21 PROCEDURE — G0463 HOSPITAL OUTPT CLINIC VISIT: HCPCS

## 2018-10-21 RX ORDER — SULFAMETHOXAZOLE/TRIMETHOPRIM 800-160 MG
1 TABLET ORAL 2 TIMES DAILY
Qty: 14 TABLET | Refills: 0 | Status: SHIPPED | OUTPATIENT
Start: 2018-10-21 | End: 2018-10-28

## 2018-10-21 ASSESSMENT — ENCOUNTER SYMPTOMS
DYSURIA: 1
FEVER: 0
FLANK PAIN: 0
MUSCULOSKELETAL NEGATIVE: 1
HEMATURIA: 0
FREQUENCY: 1

## 2018-10-21 NOTE — ED AVS SNAPSHOT
[facility]    [address]    [facility] [address]    Phone:  [phone]                                       Aileen Szymanski   MRN: [id number]    Department:  [facility]   Date of Visit:  10/21/2018           Patient Information     Date Of Birth          1955        Your diagnoses for this visit were:     Acute cystitis with hematuria        You were seen by Sarah Faustin NP.      Follow-up Information     Follow up with Poppy Haynes PA In 2 days.    Specialty:  Family Practice    Why:  if not improving or sooner if symptoms worsen    Contact information:    [address]  [place] [address] [phone]          Follow up with [facility].    Specialty:  EMERGENCY MEDICINE    Why:  if concerns develop    Contact information:    [address] [address] [phone]    Additional information:    From [facility]: Take [address]. Turn left at [address]. Turn left at the first stoplight on [address] [place] [address]. At the first stop sign, take a right onto [address]. Take a left into the parking lot and continue through until you reach the North enterance of the building.       From [place]: Take [address]. Take the [address] ramp towards [place]. Turn left onto [address]. Take a slight right onto [address]. Turn left at the first stoplight on [address]. At the first stop sign, take a right onto [address]. Take a left into the parking lot and continue through until you reach the North enterance of the building.       From [place]: Take [address]. Take a right at [address]. At the first stop sign, take a right onto [address]. Take a left into the parking lot and continue through until you reach the North enterance of the building.         Discharge Instructions       Push fluids. Take medications as directed.   Do labs on  Wednesday.  See PCP if symptoms not completely resolved in 3 days, sooner if symptoms worsen.   Return here if concerns develop.       Discharge References/Attachments     BLADDER INFECTION, FEMALE (ADULT) (ENGLISH)         Review of your medicines      START taking        Dose / Directions Last dose taken    sulfamethoxazole-trimethoprim 800-160 MG per tablet   Commonly known as:  BACTRIM DS   Dose:  1 tablet   Quantity:  14 tablet        Take 1 tablet by mouth 2 times daily for 7 days   Refills:  0          Our records show that you are taking the medicines listed below. If these are incorrect, please call your family doctor or clinic.        Dose / Directions Last dose taken    clonazePAM 1 MG tablet   Commonly known as:  klonoPIN   Quantity:  60 tablet        1 po qhs   Refills:  3        escitalopram 20 MG tablet   Commonly known as:  LEXAPRO   Dose:  20 mg   Quantity:  90 tablet        Take 1 tablet (20 mg) by mouth daily   Refills:  3        ketoconazole 2 % shampoo   Commonly known as:  NIZORAL   Quantity:  120 mL        Apply to the affected area and wash off after 5 minutes.   Refills:  1        MELATONIN PO   Dose:  5 mg        Take 5 mg by mouth nightly as needed   Refills:  0        multivitamin, therapeutic Tabs tablet   Dose:  1 tablet        Take 1 tablet by mouth daily   Refills:  0        simvastatin 40 MG tablet   Commonly known as:  ZOCOR   Dose:  40 mg   Quantity:  90 tablet        Take 1 tablet (40 mg) by mouth At Bedtime   Refills:  3        VITAMIN D (CHOLECALCIFEROL) PO   Dose:  3000 Units        Take 3,000 Units by mouth daily   Refills:  0                Prescriptions were sent or printed at these locations (1 Prescription)                   [id number] - [place] - [address] [place] [address]    Telephone:  [phone]   Fax:  [phone]   Hours:                  E-Prescribed (1 of 1)         sulfamethoxazole-trimethoprim (BACTRIM DS) 800-160  MG per tablet                Procedures and tests performed during your visit     UA reflex to Microscopic and Culture    Urine Culture Aerobic Bacterial      Orders Needing Specimen Collection     None      Pending Results     Date and Time Order Name Status Description    10/21/2018 1237 Urine Culture Aerobic Bacterial In process             Pending Culture Results     Date and Time Order Name Status Description    10/21/2018 1237 Urine Culture Aerobic Bacterial In process             Thank you for choosing [facility]       Thank you for choosing [facility] for your care. Our goal is always to provide you with excellent care. Hearing back from our patients is one way we can continue to improve our services. Please take a few minutes to complete the written survey that you may receive in the mail after you visit with us. Thank you!        [employer] Information     [employer] gives you secure access to your electronic health record. If you see a primary care provider, you can also send messages to your care team and make appointments. If you have questions, please call your primary care clinic.  If you do not have a primary care provider, please call [phone] and they will assist you.        Care EveryWhere ID     This is your Care EveryWhere ID. This could be used by other organizations to access your [facility] medical records  [id number]        Equal Access to Services     DE AGUIRRE : Hadmarlo Elam, waserena luevano, qalilian galicia. So [facility] [phone].    ATENCIÓN: Si habla español, tiene a ambrosio disposición servicios gratuitos de asistencia lingüística. Joelle al [phone].    We comply with applicable federal civil rights laws and [place] laws. We do not discriminate on the basis of race, color, national origin, age, disability, sex, sexual orientation, or gender identity.            After Visit Summary       This is your record. Keep this  with you and show to your community pharmacist(s) and doctor(s) at your next visit.

## 2018-10-21 NOTE — ED AVS SNAPSHOT
[facility]    [address]    [place] [address]    Phone:  [phone]                                       Aileen Szymanski   MRN: [id number]    Department:  [facility]   Date of Visit:  10/21/2018           After Visit Summary Signature Page     I have received my discharge instructions, and my questions have been answered. I have discussed any challenges I see with this plan with the nurse or doctor.    ..........................................................................................................................................  Patient/Patient Representative Signature      ..........................................................................................................................................  Patient Representative Print Name and Relationship to Patient    ..................................................               ................................................  Date                                   Time    ..........................................................................................................................................  Reviewed by Signature/Title    ...................................................              ..............................................  Date                                               Time          22EPIC Rev 08/18

## 2018-10-21 NOTE — ED PROVIDER NOTES
History     Chief Complaint   Patient presents with     Dysuria     The history is provided by the patient. No  was used.     Aileen Szymanski is a 63 year old female who presents with painful urination and frequency that started this morning. Some ache in her back, no fever, no belly pain.   Was traveling last week and not drinking enough fluids.     Problem List:    Patient Active Problem List    Diagnosis Date Noted     Major depressive disorder without psychotic features 08/31/2017     Priority: Medium     Mood disorder (H) 09/19/2016     Priority: Medium     Mixed hyperlipidemia 09/19/2016     Priority: Medium        Past Medical History:    Past Medical History:   Diagnosis Date     Depressive disorder      Dizziness and giddiness 07/31/2007     Enthesopathy of knee, unspecified 06/13/2002     History of blood transfusion 2014?     Major depressive disorder without psychotic features 8/31/2017     Nonallopathic lesion of cervical region, not elsewhere classified 07/25/2001     Nonallopathic lesion of thoracic region, not elsewhere classified 06/13/2002     Pain in joint, lower leg 06/24/2002       Past Surgical History:    Past Surgical History:   Procedure Laterality Date     ABDOMEN SURGERY  2008     lap band     ABDOMEN SURGERY  2007    hysteroscopy     ARTHROPLASTY REVISION KNEE Left 10/2016     CHOLECYSTECTOMY      removal     CLOSED REDUCTION NOSE N/A 11/7/2017    Procedure: CLOSED REDUCTION NOSE;  CLOSED REDUCTION NASAL SEPTAL FRACTURE;  Surgeon: Destiny Vanegas MD;  Location: [facility]     COLONOSCOPY  04/13/2010    Dr Quevedo; negative      left knee replacement  7/21/2014    Dr Quinoens/ Jamie Taylor       Family History:    Family History   Problem Relation Age of Onset     Lung Cancer Mother      HEART DISEASE Mother      Chronic Obstructive Pulmonary Disease Father        Social History:  Marital Status:   [2]  Social History   Substance Use Topics     Smoking  status: Never Smoker     Smokeless tobacco: Never Used     Alcohol use Yes      Comment: rare        Medications:      clonazePAM (KLONOPIN) 1 MG tablet   escitalopram (LEXAPRO) 20 MG tablet   ketoconazole (NIZORAL) 2 % shampoo   MELATONIN PO   multivitamin, therapeutic (THERA-VIT) TABS tablet   simvastatin (ZOCOR) 40 MG tablet   sulfamethoxazole-trimethoprim (BACTRIM DS) 800-160 MG per tablet   VITAMIN D, CHOLECALCIFEROL, PO         Review of Systems   Constitutional: Negative for fever.   Genitourinary: Positive for dysuria, frequency and urgency. Negative for flank pain and hematuria.   Musculoskeletal: Negative.        Physical Exam   BP: 125/62  Pulse: 61  Temp: 97.3  F (36.3  C)  Resp: 16  SpO2: 98 %      Physical Exam   Constitutional: She is oriented to person, place, and time. She appears well-developed and well-nourished. No distress.   Cardiovascular: Normal rate, regular rhythm and normal heart sounds.    Pulmonary/Chest: Effort normal and breath sounds normal.   Abdominal: Soft. Bowel sounds are normal. There is no CVA tenderness.   Neurological: She is alert and oriented to person, place, and time.   Skin: Skin is warm, dry and intact. She is not diaphoretic.   Psychiatric: She has a normal mood and affect. Her speech is normal.   Nursing note and vitals reviewed.      ED Course     ED Course     Procedures      Results for orders placed or performed during the hospital encounter of 10/21/18 (from the past 24 hour(s))   UA reflex to Microscopic and Culture   Result Value Ref Range    Color Urine Yellow     Appearance Urine Clear     Glucose Urine Negative NEG^Negative mg/dL    Bilirubin Urine Negative NEG^Negative    Ketones Urine Negative NEG^Negative mg/dL    Specific Gravity Urine 1.019 1.003 - 1.035    Blood Urine Trace (A) NEG^Negative    pH Urine 6.5 4.7 - 8.0 pH    Protein Albumin Urine 10 (A) NEG^Negative mg/dL    Urobilinogen mg/dL 2.0 0.0 - 2.0 mg/dL    Nitrite Urine Negative NEG^Negative     Leukocyte Esterase Urine Moderate (A) NEG^Negative    Source Midstream Urine     RBC Urine 11 (H) 0 - 2 /HPF    WBC Urine 31 (H) 0 - 5 /HPF    Bacteria Urine Few (A) NEG^Negative /HPF    Mucous Urine Present (A) NEG^Negative /LPF       Medications - No data to display    Assessments & Plan (with Medical Decision Making)     I have reviewed the nursing notes.  I have reviewed the findings, diagnosis, plan and need for follow up with the patient.  UA positive for leukocyte esterase, RBC, WBC and bacteria.   Reviewing her CMP/BMP for the past 2 years, Cr and BUN have been stable.   She is afebrile with no flank pain.   Will treat with Bactrim x 1 week.   Advised to push fluids and rest.   F/u for labs in 3 days which are already ordered by PCP.   See PCP in 2-3 days if not improving.  Return here if sx worsen.   Given Epic educational materials.     New Prescriptions    SULFAMETHOXAZOLE-TRIMETHOPRIM (BACTRIM DS) 800-160 MG PER TABLET    Take 1 tablet by mouth 2 times daily for 7 days       Final diagnoses:   Acute cystitis with hematuria       10/21/2018   [facility]     Sarah Faustin NP  10/21/18 2379

## 2018-10-21 NOTE — DISCHARGE INSTRUCTIONS
Push fluids. Take medications as directed.   Do labs on Wednesday.  See PCP if symptoms not completely resolved in 3 days, sooner if symptoms worsen.   Return here if concerns develop.

## 2018-10-22 LAB
BACTERIA SPEC CULT: NORMAL
SPECIMEN SOURCE: NORMAL

## 2018-10-24 ENCOUNTER — MYC MEDICAL ADVICE (OUTPATIENT)
Dept: FAMILY MEDICINE | Facility: OTHER | Age: 63
End: 2018-10-24

## 2018-10-24 ENCOUNTER — OFFICE VISIT (OUTPATIENT)
Dept: CHIROPRACTIC MEDICINE | Facility: OTHER | Age: 63
End: 2018-10-24
Attending: CHIROPRACTOR
Payer: COMMERCIAL

## 2018-10-24 DIAGNOSIS — M99.01 SEGMENTAL AND SOMATIC DYSFUNCTION OF CERVICAL REGION: ICD-10-CM

## 2018-10-24 DIAGNOSIS — M54.50 ACUTE BILATERAL LOW BACK PAIN WITHOUT SCIATICA: ICD-10-CM

## 2018-10-24 DIAGNOSIS — M99.03 SEGMENTAL AND SOMATIC DYSFUNCTION OF LUMBAR REGION: Primary | ICD-10-CM

## 2018-10-24 DIAGNOSIS — M99.02 SEGMENTAL AND SOMATIC DYSFUNCTION OF THORACIC REGION: ICD-10-CM

## 2018-10-24 DIAGNOSIS — Z98.84 HISTORY OF WEIGHT LOSS SURGERY: ICD-10-CM

## 2018-10-24 LAB
ALBUMIN SERPL-MCNC: 3.6 G/DL (ref 3.4–5)
ALBUMIN UR-MCNC: NEGATIVE MG/DL
ALP SERPL-CCNC: 75 U/L (ref 40–150)
ALT SERPL W P-5'-P-CCNC: 20 U/L (ref 0–50)
ANION GAP SERPL CALCULATED.3IONS-SCNC: 7 MMOL/L (ref 3–14)
APPEARANCE UR: CLEAR
AST SERPL W P-5'-P-CCNC: 18 U/L (ref 0–45)
BACTERIA #/AREA URNS HPF: ABNORMAL /HPF
BASOPHILS # BLD AUTO: 0 10E9/L (ref 0–0.2)
BASOPHILS NFR BLD AUTO: 0.6 %
BILIRUB SERPL-MCNC: 0.6 MG/DL (ref 0.2–1.3)
BILIRUB UR QL STRIP: NEGATIVE
BUN SERPL-MCNC: 21 MG/DL (ref 7–30)
CALCIUM SERPL-MCNC: 8.4 MG/DL (ref 8.5–10.1)
CHLORIDE SERPL-SCNC: 105 MMOL/L (ref 94–109)
CHOLEST SERPL-MCNC: 205 MG/DL
CO2 SERPL-SCNC: 26 MMOL/L (ref 20–32)
COLOR UR AUTO: YELLOW
CREAT SERPL-MCNC: 1.1 MG/DL (ref 0.52–1.04)
DIFFERENTIAL METHOD BLD: NORMAL
EOSINOPHIL # BLD AUTO: 0.2 10E9/L (ref 0–0.7)
EOSINOPHIL NFR BLD AUTO: 2.9 %
ERYTHROCYTE [DISTWIDTH] IN BLOOD BY AUTOMATED COUNT: 12.7 % (ref 10–15)
GFR SERPL CREATININE-BSD FRML MDRD: 50 ML/MIN/1.7M2
GLUCOSE SERPL-MCNC: 93 MG/DL (ref 70–99)
GLUCOSE UR STRIP-MCNC: NEGATIVE MG/DL
HCT VFR BLD AUTO: 40.4 % (ref 35–47)
HDLC SERPL-MCNC: 78 MG/DL
HGB BLD-MCNC: 13.3 G/DL (ref 11.7–15.7)
HGB UR QL STRIP: NEGATIVE
IMM GRANULOCYTES # BLD: 0 10E9/L (ref 0–0.4)
IMM GRANULOCYTES NFR BLD: 0.2 %
KETONES UR STRIP-MCNC: NEGATIVE MG/DL
LDLC SERPL CALC-MCNC: 112 MG/DL
LEUKOCYTE ESTERASE UR QL STRIP: NEGATIVE
LYMPHOCYTES # BLD AUTO: 1.4 10E9/L (ref 0.8–5.3)
LYMPHOCYTES NFR BLD AUTO: 26.7 %
MCH RBC QN AUTO: 29.2 PG (ref 26.5–33)
MCHC RBC AUTO-ENTMCNC: 32.9 G/DL (ref 31.5–36.5)
MCV RBC AUTO: 89 FL (ref 78–100)
MONOCYTES # BLD AUTO: 0.5 10E9/L (ref 0–1.3)
MONOCYTES NFR BLD AUTO: 8.8 %
MUCOUS THREADS #/AREA URNS LPF: PRESENT /LPF
NEUTROPHILS # BLD AUTO: 3.1 10E9/L (ref 1.6–8.3)
NEUTROPHILS NFR BLD AUTO: 60.8 %
NITRATE UR QL: NEGATIVE
NONHDLC SERPL-MCNC: 127 MG/DL
NRBC # BLD AUTO: 0 10*3/UL
NRBC BLD AUTO-RTO: 0 /100
PH UR STRIP: 6.5 PH (ref 4.7–8)
PLATELET # BLD AUTO: 191 10E9/L (ref 150–450)
POTASSIUM SERPL-SCNC: 4.3 MMOL/L (ref 3.4–5.3)
PROT SERPL-MCNC: 7.2 G/DL (ref 6.8–8.8)
RBC # BLD AUTO: 4.56 10E12/L (ref 3.8–5.2)
RBC #/AREA URNS AUTO: <1 /HPF (ref 0–2)
SODIUM SERPL-SCNC: 138 MMOL/L (ref 133–144)
SOURCE: ABNORMAL
SP GR UR STRIP: 1.01 (ref 1–1.03)
TRIGL SERPL-MCNC: 74 MG/DL
TSH SERPL DL<=0.005 MIU/L-ACNC: 1.88 MU/L (ref 0.4–4)
UROBILINOGEN UR STRIP-MCNC: 2 MG/DL (ref 0–2)
WBC # BLD AUTO: 5.1 10E9/L (ref 4–11)
WBC #/AREA URNS AUTO: <1 /HPF (ref 0–5)

## 2018-10-24 PROCEDURE — 80061 LIPID PANEL: CPT | Performed by: PHYSICIAN ASSISTANT

## 2018-10-24 PROCEDURE — 81001 URINALYSIS AUTO W/SCOPE: CPT | Performed by: PHYSICIAN ASSISTANT

## 2018-10-24 PROCEDURE — 36415 COLL VENOUS BLD VENIPUNCTURE: CPT | Performed by: PHYSICIAN ASSISTANT

## 2018-10-24 PROCEDURE — 80050 GENERAL HEALTH PANEL: CPT | Performed by: PHYSICIAN ASSISTANT

## 2018-10-24 PROCEDURE — 98941 CHIROPRACT MANJ 3-4 REGIONS: CPT | Mod: AT | Performed by: CHIROPRACTOR

## 2018-10-24 NOTE — MR AVS SNAPSHOT
After Visit Summary   10/24/2018    Aileen Szymanski    MRN: [id number]           Patient Information     Date Of Birth          1955        Visit Information        Provider Department      10/24/2018 10:10 AM Sulaiman Hollingsworth DC  [facility]        Today's Diagnoses     Segmental and somatic dysfunction of lumbar region    -  1    Acute bilateral low back pain without sciatica        Segmental and somatic dysfunction of thoracic region        Segmental and somatic dysfunction of cervical region           Follow-ups after your visit        Who to contact     If you have questions or need follow up information about today's clinic visit or your schedule please contact  [facility] directly at [phone].  Normal or non-critical lab and imaging results will be communicated to you by MyChart, letter or phone within 4 business days after the clinic has received the results. If you do not hear from us within 7 days, please contact the clinic through [employer] or phone. If you have a critical or abnormal lab result, we will notify you by phone as soon as possible.  Submit refill requests through [employer] or call your pharmacy and they will forward the refill request to us. Please allow 3 business days for your refill to be completed.          Additional Information About Your Visit        MyChart Information     [employer] gives you secure access to your electronic health record. If you see a primary care provider, you can also send messages to your care team and make appointments. If you have questions, please call your primary care clinic.  If you do not have a primary care provider, please call [phone] and they will assist you.        Care EveryWhere ID     This is your Care EveryWhere ID. This could be used by other organizations to access your [place] medical records  [id number]         Blood Pressure from Last 3 Encounters:   10/21/18 125/62   10/11/18 108/78   04/30/18  108/64    Weight from Last 3 Encounters:   10/11/18 164 lb (74.4 kg)   04/30/18 166 lb (75.3 kg)   12/18/17 169 lb (76.7 kg)              We Performed the Following     CHIROPRAC MANIP,SPINAL,3-4 REGIONS        Primary Care Provider Office Phone # Fax #    Poppy AMERICA Armendariz [phone]       [address] [address]        Equal Access to Services     DE AGUIRRE : Hadii aad ku hadasho Soomaali, waaxda luqadaha, qaybta kaalmada adeegyada, waxay idiin hayaan adeeg kharash la'lelen . So [facility] [phone].    ATENCIÓN: Si habla español, tiene a ambrosio disposición servicios gratuitos de asistencia lingüística. [facility] [phone].    We comply with applicable federal civil rights laws and [place] laws. We do not discriminate on the basis of race, color, national origin, age, disability, sex, sexual orientation, or gender identity.            Thank you!     Thank you for choosing  [facility]  for your care. Our goal is always to provide you with excellent care. Hearing back from our patients is one way we can continue to improve our services. Please take a few minutes to complete the written survey that you may receive in the mail after your visit with us. Thank you!             Your Updated Medication List - Protect others around you: Learn how to safely use, store and throw away your medicines at www.disposemymeds.org.          This list is accurate as of 10/24/18  4:11 PM.  Always use your most recent med list.                   Brand Name Dispense Instructions for use Diagnosis    clonazePAM 1 MG tablet    klonoPIN    60 tablet    1 po qhs    Primary insomnia       escitalopram 20 MG tablet    LEXAPRO    90 tablet    Take 1 tablet (20 mg) by mouth daily    Severe episode of recurrent major depressive disorder, without psychotic features (H)       ketoconazole 2 % shampoo    NIZORAL    120 mL    Apply to the affected area and wash off after 5 minutes.    Inflamed  seborrheic keratosis       MELATONIN PO      Take 5 mg by mouth nightly as needed        multivitamin, therapeutic Tabs tablet      Take 1 tablet by mouth daily        simvastatin 40 MG tablet    ZOCOR    90 tablet    Take 1 tablet (40 mg) by mouth At Bedtime    Mixed hyperlipidemia       sulfamethoxazole-trimethoprim 800-160 MG per tablet    BACTRIM DS    14 tablet    Take 1 tablet by mouth 2 times daily for 7 days        VITAMIN D (CHOLECALCIFEROL) PO      Take 3,000 Units by mouth daily

## 2018-10-26 NOTE — TELEPHONE ENCOUNTER
This is needing to be used for 3 months. Healing is the LUMP she feels. Scabbing and should get better the more she uses this.

## 2018-12-04 ENCOUNTER — OFFICE VISIT (OUTPATIENT)
Dept: CHIROPRACTIC MEDICINE | Facility: OTHER | Age: 63
End: 2018-12-04
Attending: CHIROPRACTOR
Payer: COMMERCIAL

## 2018-12-04 DIAGNOSIS — M99.03 SEGMENTAL AND SOMATIC DYSFUNCTION OF LUMBAR REGION: Primary | ICD-10-CM

## 2018-12-04 DIAGNOSIS — M54.50 ACUTE BILATERAL LOW BACK PAIN WITHOUT SCIATICA: ICD-10-CM

## 2018-12-04 DIAGNOSIS — M99.01 SEGMENTAL AND SOMATIC DYSFUNCTION OF CERVICAL REGION: ICD-10-CM

## 2018-12-04 DIAGNOSIS — M99.02 SEGMENTAL AND SOMATIC DYSFUNCTION OF THORACIC REGION: ICD-10-CM

## 2018-12-04 PROCEDURE — 98941 CHIROPRACT MANJ 3-4 REGIONS: CPT | Mod: AT | Performed by: CHIROPRACTOR

## 2018-12-04 NOTE — MR AVS SNAPSHOT
After Visit Summary   12/4/2018    Aileen Szymanski    MRN: [id number]           Patient Information     Date Of Birth          1955        Visit Information        Provider Department      12/4/2018 11:00 AM Sulaiman Hollingsworth DC  [facility] Mario [facility]Aberto [facility]        Today's Diagnoses     Segmental and somatic dysfunction of lumbar region    -  1    Acute bilateral low back pain without sciatica        Segmental and somatic dysfunction of thoracic region        Segmental and somatic dysfunction of cervical region           Follow-ups after your visit        Who to contact     If you have questions or need follow up information about today's clinic visit or your schedule please contact  [facility] directly at [phone].  Normal or non-critical lab and imaging results will be communicated to you by MyChart, letter or phone within 4 business days after the clinic has received the results. If you do not hear from us within 7 days, please contact the clinic through [employer] or phone. If you have a critical or abnormal lab result, we will notify you by phone as soon as possible.  Submit refill requests through [employer] or call your pharmacy and they will forward the refill request to us. Please allow 3 business days for your refill to be completed.          Additional Information About Your Visit        MyChart Information     [employer] gives you secure access to your electronic health record. If you see a primary care provider, you can also send messages to your care team and make appointments. If you have questions, please call your primary care clinic.  If you do not have a primary care provider, please call [phone] and they will assist you.        Care EveryWhere ID     This is your Care EveryWhere ID. This could be used by other organizations to access your [place] medical records  [id number]         Blood Pressure from Last 3 Encounters:   10/21/18 125/62   10/11/18 108/78   04/30/18  108/64    Weight from Last 3 Encounters:   10/11/18 164 lb (74.4 kg)   04/30/18 166 lb (75.3 kg)   12/18/17 169 lb (76.7 kg)              We Performed the Following     CHIROPRAC MANIP,SPINAL,3-4 REGIONS        Primary Care Provider Office Phone # Fax #    Poppy AMERICA Armendariz [phone]       [address] [address]        Equal Access to Services     DE AGUIRRE : Hadii aad ku hadasho Soomaali, waaxda luqadaha, qaybta kaalmada adeegyada, waxay idiin hayaan adeeg kharash la'lelen . So [facility] [phone].    ATENCIÓN: Si habla español, tiene a ambrosio disposición servicios gratuitos de asistencia lingüística. [facility] [phone].    We comply with applicable federal civil rights laws and [place] laws. We do not discriminate on the basis of race, color, national origin, age, disability, sex, sexual orientation, or gender identity.            Thank you!     Thank you for choosing  [facility]  for your care. Our goal is always to provide you with excellent care. Hearing back from our patients is one way we can continue to improve our services. Please take a few minutes to complete the written survey that you may receive in the mail after your visit with us. Thank you!             Your Updated Medication List - Protect others around you: Learn how to safely use, store and throw away your medicines at www.disposemymeds.org.          This list is accurate as of 12/4/18 11:59 PM.  Always use your most recent med list.                   Brand Name Dispense Instructions for use Diagnosis    clonazePAM 1 MG tablet    klonoPIN    60 tablet    1 po qhs    Primary insomnia       escitalopram 20 MG tablet    LEXAPRO    90 tablet    Take 1 tablet (20 mg) by mouth daily    Severe episode of recurrent major depressive disorder, without psychotic features (H)       ketoconazole 2 % external shampoo    NIZORAL    120 mL    Apply to the affected area and wash off after 5 minutes.     Inflamed seborrheic keratosis       MELATONIN PO      Take 5 mg by mouth nightly as needed        multivitamin, therapeutic Tabs tablet      Take 1 tablet by mouth daily        simvastatin 40 MG tablet    ZOCOR    90 tablet    Take 1 tablet (40 mg) by mouth At Bedtime    Mixed hyperlipidemia       VITAMIN D (CHOLECALCIFEROL) PO      Take 3,000 Units by mouth daily

## 2019-01-31 ENCOUNTER — OFFICE VISIT (OUTPATIENT)
Dept: CHIROPRACTIC MEDICINE | Facility: OTHER | Age: 64
End: 2019-01-31
Attending: CHIROPRACTOR
Payer: COMMERCIAL

## 2019-01-31 DIAGNOSIS — M54.50 ACUTE BILATERAL LOW BACK PAIN WITHOUT SCIATICA: ICD-10-CM

## 2019-01-31 DIAGNOSIS — M99.01 SEGMENTAL AND SOMATIC DYSFUNCTION OF CERVICAL REGION: ICD-10-CM

## 2019-01-31 DIAGNOSIS — M99.02 SEGMENTAL AND SOMATIC DYSFUNCTION OF THORACIC REGION: ICD-10-CM

## 2019-01-31 DIAGNOSIS — M99.03 SEGMENTAL AND SOMATIC DYSFUNCTION OF LUMBAR REGION: Primary | ICD-10-CM

## 2019-01-31 PROCEDURE — 98941 CHIROPRACT MANJ 3-4 REGIONS: CPT | Mod: AT | Performed by: CHIROPRACTOR

## 2019-02-18 ENCOUNTER — OFFICE VISIT (OUTPATIENT)
Dept: CHIROPRACTIC MEDICINE | Facility: OTHER | Age: 64
End: 2019-02-18
Attending: CHIROPRACTOR
Payer: COMMERCIAL

## 2019-02-18 DIAGNOSIS — M99.03 SEGMENTAL AND SOMATIC DYSFUNCTION OF LUMBAR REGION: Primary | ICD-10-CM

## 2019-02-18 DIAGNOSIS — M54.50 ACUTE BILATERAL LOW BACK PAIN WITHOUT SCIATICA: ICD-10-CM

## 2019-02-18 DIAGNOSIS — M99.01 SEGMENTAL AND SOMATIC DYSFUNCTION OF CERVICAL REGION: ICD-10-CM

## 2019-02-18 DIAGNOSIS — M99.02 SEGMENTAL AND SOMATIC DYSFUNCTION OF THORACIC REGION: ICD-10-CM

## 2019-02-18 PROCEDURE — 98941 CHIROPRACT MANJ 3-4 REGIONS: CPT | Mod: AT | Performed by: CHIROPRACTOR

## 2019-02-21 ENCOUNTER — TRANSFERRED RECORDS (OUTPATIENT)
Dept: HEALTH INFORMATION MANAGEMENT | Facility: CLINIC | Age: 64
End: 2019-02-21

## 2019-02-27 ENCOUNTER — HOSPITAL ENCOUNTER (OUTPATIENT)
Dept: MRI IMAGING | Facility: HOSPITAL | Age: 64
Discharge: HOME | End: 2019-02-27
Attending: NURSE PRACTITIONER | Admitting: NURSE PRACTITIONER
Payer: MEDICARE

## 2019-02-27 DIAGNOSIS — R53.1 WEAKNESS: ICD-10-CM

## 2019-02-27 PROCEDURE — 72148 MRI LUMBAR SPINE W/O DYE: CPT | Mod: TC

## 2019-03-01 ENCOUNTER — TRANSFERRED RECORDS (OUTPATIENT)
Dept: HEALTH INFORMATION MANAGEMENT | Facility: CLINIC | Age: 64
End: 2019-03-01

## 2019-03-15 ENCOUNTER — HOSPITAL ENCOUNTER (OUTPATIENT)
Dept: PHYSICAL THERAPY | Facility: HOSPITAL | Age: 64
Setting detail: THERAPIES SERIES
End: 2019-03-15
Attending: NURSE PRACTITIONER
Payer: MEDICARE

## 2019-03-15 PROCEDURE — 97110 THERAPEUTIC EXERCISES: CPT | Mod: GP | Performed by: PHYSICAL THERAPIST

## 2019-03-15 PROCEDURE — 97162 PT EVAL MOD COMPLEX 30 MIN: CPT | Mod: GP | Performed by: PHYSICAL THERAPIST

## 2019-03-15 NOTE — PROGRESS NOTES
03/15/19 0700   General Information   Type of Visit Initial OP Ortho PT Evaluation   Start of Care Date 03/15/19   Referring Physician Feliz Otoole   Patient/Family Goals Statement less pain   Orders Evaluate and Treat   Date of Order 03/05/19   Insurance Type Medicare   Medical Diagnosis LBP with leg pain   Surgical/Medical history reviewed Yes   Precautions/Limitations no known precautions/limitations   Body Part(s)   Body Part(s) Lumbar Spine/SI   Presentation and Etiology   Pertinent history of current problem (include personal factors and/or comorbidities that impact the POC) Patient presents with a long-standing history of L knee pain after a TKA and a revision over the past few years.  She states that she has had persistent pain in her L knee and it does not go fullly straight and does not bend much beyond 108 degrees, however the past 3 weeks she has noted more burning in both of her knees L>R and her referring provider links to issues in her lower back. She had imaging that indicated degenerative changes in her lower spine and she will be having an injection in her back next week. She was referred to PT to work on her back issues as they relate to her lower extremity pain.   Impairments A. Pain;B. Decreased WB tolerance;C. Swelling;D. Decreased ROM;E. Decreased flexibility;F. Decreased strength and endurance;H. Impaired gait;J. Burning;K. Numbness;M. Locking or catching   Functional Limitations perform activities of daily living;perform desired leisure / sports activities   Symptom Location B knees L>R   How/Where did it occur (Following L TKA)   Onset date of current episode/exacerbation 03/01/19  (physician order date)   Chronicity Chronic   Pain rating (0-10 point scale) Best (/10);Worst (/10)   Best (/10) 2   Worst (/10) 8   Pain quality B. Dull;C. Aching;G. Cramping   Frequency of pain/symptoms (sit to stand and walking)   Pain/symptoms are: Worse during the day   Pain/symptoms exacerbated by A.  Sitting;B. Walking;E. Rest;G. Certain positions;K. Home tasks   Pain/symptoms eased by D. Nothing;E. Changing positions;F. Certain positions;I. OTC medication(s)   Progression of symptoms since onset: Worsened   Current / Previous Interventions   Diagnostic Tests: MRI   MRI Results Results   MRI results DDD lower lumbar L4-S1   Current Level of Function   Current Community Support Family/friend caregiver   Patient role/employment history F. Retired   Fall Risk Screen   Fall screen completed by PT   Have you fallen 2 or more times in the past year? No   Have you fallen and had an injury in the past year? Yes   Is patient a fall risk? No   Fall screen comments Missed a step and broke chana and bone in foot in 2017   Lumbar Spine/SI Objective Findings   Observation no acute distress   Posture rounded shoulders, decreased lumbar lordosis   Gait/Locomotion slightly antalgic, decreased terminal knee EXT in L knee   Balance/Proprioception (Single Leg Stance) Impaired B   Flexion ROM to floor with bent knees + pull in HSs   Extension ROM mod cook + pinch in L lower back   Right Side Bending ROM pull on L   Left Side Bending ROM pinch on L   Repeated Extension-Standing ROM Increased motion and decreased pull in legs with forward flexion   Hip Screen WNL   Transversus Abdominus Strength (Jamin Leg Lowering-deg) poor 2-/5   Hip Flexion (L2) Strength 4/5   Hip Abduction Strength 4-/5   Hip Adduction Strength 5/5   Hip Extension Strength 4/5   Knee Flexion Strength 5-/5   Knee Extension (L3) Strength 4+/5   Ankle Dorsiflexion (L4) Strength 5-/5   Hip Flexor Flexibility hypo   Quadricep Flexibility hypo   SLR neg   Repeated Extension Prone incr motion - difficult to perform due to significant UE weakness - less pain in leg and increased motion following through   Slump Test +   Segmental Mobility Hypo L4-S1   Sensation Testing Impaired in quad and  distal thigh due to previous surgeries   Palpation Noted quad atrophy and calf  atrophy. TTP LS paraspinals and QL L>R   Planned Therapy Interventions   Planned Therapy Interventions balance training;joint mobilization;manual therapy;neuromuscular re-education;ROM;strengthening;stretching   Planned Modality Interventions   Planned Modality Interventions (DN as needed)   Clinical Impression   Criteria for Skilled Therapeutic Interventions Met yes, treatment indicated   PT Diagnosis B knee pain, lower back pain L>R with nerve irritation   Influenced by the following impairments pain and weakness   Functional limitations due to impairments difficulty performing home tasks   Clinical Presentation Evolving/Changing   Clinical Presentation Rationale due to chronicity of symptoms   Clinical Decision Making (Complexity) Moderate complexity   Therapy Frequency 2 times/Week   Predicted Duration of Therapy Intervention (days/wks) up to 8 weeks   Risk & Benefits of therapy have been explained Yes   Patient, Family & other staff in agreement with plan of care Yes   Clinical Impression Comments Presentation is consistent with B knee pain L>R with associated nerve irritation likely due to degnerative changes in her LS that is expected to improve with skilled PT intervention   Education Assessment   Preferred Learning Style Demonstration   Barriers to Learning No barriers   ORTHO GOALS   PT Ortho Eval Goals 1;2;3   Ortho Goal 1   Goal Identifier STG 1   Goal Description Patient will be compliant with HEP in order to make progress while at home   Target Date 04/05/19   Ortho Goal 2   Goal Identifier LTG 1   Goal Description Patient will report overall 30% or more improvement in B knee pain and function in order to perform all home tasks   Target Date 04/12/19   Ortho Goal 3   Goal Identifier LTG 2   Goal Description Patient will report overall 60% or more improvement in her B knee and LE function in order to perform all home tasks   Target Date 05/10/19   Total Evaluation Time   PT Eval, Moderate Complexity  Minutes (15766) 58

## 2019-03-18 ENCOUNTER — HOSPITAL ENCOUNTER (OUTPATIENT)
Dept: PHYSICAL THERAPY | Facility: HOSPITAL | Age: 64
Setting detail: THERAPIES SERIES
End: 2019-03-18
Attending: PHYSICIAN ASSISTANT
Payer: MEDICARE

## 2019-03-18 PROCEDURE — 97110 THERAPEUTIC EXERCISES: CPT | Mod: GP | Performed by: PHYSICAL THERAPIST

## 2019-03-22 ENCOUNTER — HOSPITAL ENCOUNTER (OUTPATIENT)
Dept: INTERVENTIONAL RADIOLOGY/VASCULAR | Facility: HOSPITAL | Age: 64
Discharge: HOME | End: 2019-03-22
Attending: NURSE PRACTITIONER | Admitting: NURSE PRACTITIONER
Payer: MEDICARE

## 2019-03-22 DIAGNOSIS — M51.369 DDD (DEGENERATIVE DISC DISEASE), LUMBAR: ICD-10-CM

## 2019-03-22 PROCEDURE — 25000128 H RX IP 250 OP 636: Performed by: RADIOLOGY

## 2019-03-22 PROCEDURE — 62323 NJX INTERLAMINAR LMBR/SAC: CPT | Mod: TC

## 2019-03-22 PROCEDURE — 25000125 ZZHC RX 250: Performed by: RADIOLOGY

## 2019-03-22 RX ORDER — METHYLPREDNISOLONE ACETATE 80 MG/ML
INJECTION, SUSPENSION INTRA-ARTICULAR; INTRALESIONAL; INTRAMUSCULAR; SOFT TISSUE
Status: DISPENSED
Start: 2019-03-22 | End: 2019-03-22

## 2019-03-22 RX ORDER — METHYLPREDNISOLONE ACETATE 80 MG/ML
80 INJECTION, SUSPENSION INTRA-ARTICULAR; INTRALESIONAL; INTRAMUSCULAR; SOFT TISSUE ONCE
Status: COMPLETED | OUTPATIENT
Start: 2019-03-22 | End: 2019-03-22

## 2019-03-22 RX ORDER — IOPAMIDOL 612 MG/ML
15 INJECTION, SOLUTION INTRATHECAL ONCE
Status: COMPLETED | OUTPATIENT
Start: 2019-03-22 | End: 2019-03-22

## 2019-03-22 RX ADMIN — METHYLPREDNISOLONE ACETATE 80 MG: 80 INJECTION, SUSPENSION INTRA-ARTICULAR; INTRALESIONAL; INTRAMUSCULAR; SOFT TISSUE at 10:50

## 2019-03-22 RX ADMIN — IOPAMIDOL 4 ML: 612 INJECTION, SOLUTION INTRATHECAL at 10:50

## 2019-03-22 NOTE — DISCHARGE INSTRUCTIONS
Home number on file [phone] (home)  Is it ok to leave a message at this number(s)? Yes    Dr. Soliman completed your procedure on 3/22/2019.    Current Pain Level (0-10 Scale): 6/10  Post Pain Level (0-10):  2/10    Radiology Discharge instructions for Steroid Injection    Activity Level:     Do not do any heavy activity or exercise for 24 hours.   Do not drive for 4 hours after your injection.  Diet:   Return to your normal diet.  Medications:   If you have stopped taking your Aspirin, Coumadin/Warfarin, Ibuprofen, or any   other blood thinner for this procedure you may resume in the morning unless   your primary care provider has given you other instructions.    Diabetics may see an increase in blood sugar after steroid injections. If you are concerned about your blood sugar, please contact your family doctor.    Site Care:  Remove the bandage and bathe or shower the morning after the procedure.      Please allow two weeks to experience improvement in your pain.  If you have any further issues, please contact your provider.    Call your Primary Care Provider if you have the following (if your primary care provider is not available please seek emergency care):   Nausea with vomiting   Severe headache   Drowsiness or confusion   Redness or drainage at the injection or puncture site   Temperature over 101 degrees F   Other concerns   Worsening back pain   Stiff neck

## 2019-03-22 NOTE — IP AVS SNAPSHOT
[facility]  [address] [address]  Phone:  [phone]  Fax:  [phone]                                    After Visit Summary   3/22/2019    Aileen Szymanski    MRN: [id number]           After Visit Summary Signature Page    I have received my discharge instructions, and my questions have been answered. I have discussed any challenges I see with this plan with the nurse or doctor.    ..........................................................................................................................................  Patient/Patient Representative Signature      ..........................................................................................................................................  Patient Representative Print Name and Relationship to Patient    ..................................................               ................................................  Date                                   Time    ..........................................................................................................................................  Reviewed by Signature/Title    ...................................................              ..............................................  Date                                               Time          22EPIC Rev 08/18

## 2019-03-26 ENCOUNTER — HOSPITAL ENCOUNTER (OUTPATIENT)
Dept: PHYSICAL THERAPY | Facility: HOSPITAL | Age: 64
Setting detail: THERAPIES SERIES
End: 2019-03-26
Attending: NURSE PRACTITIONER
Payer: MEDICARE

## 2019-03-26 PROCEDURE — 97140 MANUAL THERAPY 1/> REGIONS: CPT | Mod: GP | Performed by: PHYSICAL THERAPIST

## 2019-03-29 ENCOUNTER — HOSPITAL ENCOUNTER (OUTPATIENT)
Dept: PHYSICAL THERAPY | Facility: HOSPITAL | Age: 64
Setting detail: THERAPIES SERIES
End: 2019-03-29
Attending: NURSE PRACTITIONER
Payer: MEDICARE

## 2019-03-29 PROCEDURE — 97140 MANUAL THERAPY 1/> REGIONS: CPT | Mod: GP | Performed by: PHYSICAL THERAPIST

## 2019-04-01 ENCOUNTER — HOSPITAL ENCOUNTER (OUTPATIENT)
Dept: PHYSICAL THERAPY | Facility: HOSPITAL | Age: 64
Setting detail: THERAPIES SERIES
End: 2019-04-01
Attending: PHYSICIAN ASSISTANT
Payer: MEDICARE

## 2019-04-01 PROCEDURE — 97140 MANUAL THERAPY 1/> REGIONS: CPT | Mod: GP | Performed by: PHYSICAL THERAPIST

## 2019-04-03 ENCOUNTER — HOSPITAL ENCOUNTER (OUTPATIENT)
Dept: PHYSICAL THERAPY | Facility: HOSPITAL | Age: 64
Setting detail: THERAPIES SERIES
End: 2019-04-03
Attending: PHYSICIAN ASSISTANT
Payer: MEDICARE

## 2019-04-03 PROCEDURE — 97140 MANUAL THERAPY 1/> REGIONS: CPT | Mod: GP | Performed by: PHYSICAL THERAPIST

## 2019-04-05 ENCOUNTER — TELEPHONE (OUTPATIENT)
Dept: INTERVENTIONAL RADIOLOGY/VASCULAR | Facility: HOSPITAL | Age: 64
End: 2019-04-05

## 2019-04-05 NOTE — TELEPHONE ENCOUNTER
INJECTION POST CALL    Procedure: Epidural Lumbar TL L4-5  Radiologist(s): Dr. Troy Soliman  Date of Procedure:  3/22/2019    Patient did not answer. Message was left.       Shelbie Rowe

## 2019-04-08 ENCOUNTER — TELEPHONE (OUTPATIENT)
Dept: INTERVENTIONAL RADIOLOGY/VASCULAR | Facility: HOSPITAL | Age: 64
End: 2019-04-08

## 2019-04-08 NOTE — TELEPHONE ENCOUNTER
INJECTION POST CALL     Procedure:    Epidural Lumbar TL L4-5  Radiologist(s):        Dr. Troy Soliman  Date of Procedure:  3/22/2019     Patient did not call back. Post card was sent.         Shelbie Rowe

## 2019-04-09 ENCOUNTER — TELEPHONE (OUTPATIENT)
Dept: INTERVENTIONAL RADIOLOGY/VASCULAR | Facility: HOSPITAL | Age: 64
End: 2019-04-09

## 2019-04-09 NOTE — TELEPHONE ENCOUNTER
INJECTION POST CALL    Procedure: Epidural Lumbar TL L4-5  Radiologist(s): Dr. Troy Soliman  Date of Procedure:  3/22/2019    Pre-procedure pain score was: 6 (See pre-procedure score)  Post-procedure pain score as of today is: 0  Percentage of pain improvement today?  100%  Where is the pain located? No pain on the left, but right side a little   Is this new pain? No  Would you like to be scheduled for an additional injection?  No      The patient had no questions or concerns.    Patient appeared happy and seemed to have full pain relief from the injection.     Patient will contact the provider if there are any issues.      Shelbie Rowe

## 2019-04-10 ENCOUNTER — HOSPITAL ENCOUNTER (OUTPATIENT)
Dept: PHYSICAL THERAPY | Facility: HOSPITAL | Age: 64
Setting detail: THERAPIES SERIES
End: 2019-04-10
Attending: PHYSICIAN ASSISTANT
Payer: MEDICARE

## 2019-04-10 PROCEDURE — 97140 MANUAL THERAPY 1/> REGIONS: CPT | Mod: GP | Performed by: PHYSICAL THERAPIST

## 2019-04-17 ENCOUNTER — HOSPITAL ENCOUNTER (OUTPATIENT)
Dept: PHYSICAL THERAPY | Facility: HOSPITAL | Age: 64
Setting detail: THERAPIES SERIES
End: 2019-04-17
Attending: PHYSICIAN ASSISTANT
Payer: MEDICARE

## 2019-04-17 PROCEDURE — 97140 MANUAL THERAPY 1/> REGIONS: CPT | Mod: GP | Performed by: PHYSICAL THERAPIST

## 2019-04-22 ENCOUNTER — HOSPITAL ENCOUNTER (OUTPATIENT)
Dept: PHYSICAL THERAPY | Facility: HOSPITAL | Age: 64
Setting detail: THERAPIES SERIES
End: 2019-04-22
Attending: PHYSICIAN ASSISTANT
Payer: MEDICARE

## 2019-04-22 PROCEDURE — 97140 MANUAL THERAPY 1/> REGIONS: CPT | Mod: GP | Performed by: PHYSICAL THERAPIST

## 2019-04-26 ENCOUNTER — HOSPITAL ENCOUNTER (OUTPATIENT)
Dept: PHYSICAL THERAPY | Facility: HOSPITAL | Age: 64
Setting detail: THERAPIES SERIES
End: 2019-04-26
Attending: PHYSICIAN ASSISTANT
Payer: MEDICARE

## 2019-04-26 PROCEDURE — 97140 MANUAL THERAPY 1/> REGIONS: CPT | Mod: GP | Performed by: PHYSICAL THERAPIST

## 2019-04-30 NOTE — PROGRESS NOTES
Progress Report     04/26/19 1300   Signing Clinician's Name / Credentials   Signing clinician's name / credentials Maida Shah DPT, OCS, Cert DN. Gabby Kimble SPT.   Session Number   Session Number 10   Progress Note/Recertification   Progress Note Completed Date 04/26/19   Adult Goals   PT Ortho Eval Goals 1;2;3   Ortho Goal 1   Goal Identifier STG 1   Goal Description Patient will be compliant with HEP in order to make progress while at home   Target Date 04/05/19   Date Met 04/26/19   Ortho Goal 2   Goal Identifier LTG 1   Goal Description Patient will report overall 30% or more improvement in B knee pain and function in order to perform all home tasks   Target Date 05/21/19   Ortho Goal 3   Goal Identifier LTG 2   Goal Description Patient will report overall 60% or more improvement in her B knee and LE function in order to perform all home tasks   Target Date 06/25/19   Subjective Report   Subjective Report Pt reports that she's been having a lot of pain and difficulty walking. 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 Patient reports that dry needling helped relieve pain for one day post treatment, and that pain returned after that.   Treatment Interventions   Interventions Manual Therapy   Manual Therapy   Manual Therapy: Mobilization, MFR, MLD, friction massage minutes (12119) 30   Skilled Intervention man ther   Patient Response good   Treatment Detail STM using skin rolling to scar tissue on L knee to increase tissue mobility. STM to lateral head of gastroc and proximal fibularis longus to improve L knee extension. Posterior oscillatory mobs grades III and IV to fibular head to improve L ankle mobility.   Assessments Completed   Assessments Completed Patient did not report significant improvement in pain and mobility from dry needling. Patient's L knee extension and flexion ROM are still limited due to knee muscle tightness and scar tissue over  anterior knee.   Plan   Home program continue HEP, including massaging the soft tissues around the knee as often as possible.   Plan for next session Continue per POC- STM and add in mobility exercises if appropriate.   Total Session Time   Timed Code Treatment Minutes 30   Total Treatment Time (sum of timed and untimed services) 30

## 2019-05-06 ENCOUNTER — HOSPITAL ENCOUNTER (OUTPATIENT)
Dept: PHYSICAL THERAPY | Facility: HOSPITAL | Age: 64
Setting detail: THERAPIES SERIES
End: 2019-05-06
Attending: PHYSICIAN ASSISTANT
Payer: MEDICARE

## 2019-05-06 PROCEDURE — 97110 THERAPEUTIC EXERCISES: CPT | Mod: GP | Performed by: PHYSICAL THERAPIST

## 2019-05-08 ENCOUNTER — HOSPITAL ENCOUNTER (OUTPATIENT)
Dept: PHYSICAL THERAPY | Facility: HOSPITAL | Age: 64
Setting detail: THERAPIES SERIES
End: 2019-05-08
Attending: PHYSICIAN ASSISTANT
Payer: MEDICARE

## 2019-05-08 PROCEDURE — 97110 THERAPEUTIC EXERCISES: CPT | Mod: GP | Performed by: PHYSICAL THERAPIST

## 2019-05-09 DIAGNOSIS — F51.01 PRIMARY INSOMNIA: ICD-10-CM

## 2019-05-09 RX ORDER — CLONAZEPAM 1 MG/1
TABLET ORAL
Qty: 60 TABLET | Refills: 3 | Status: SHIPPED | OUTPATIENT
Start: 2019-05-09 | End: 2019-12-31

## 2019-05-09 NOTE — TELEPHONE ENCOUNTER
clonazePAM (KLONOPIN) 1 MG tablet      Last Written Prescription Date:  10/11/18  Last Fill Quantity: 60,   # refills: 3  Last Office Visit: 10/11/18  Future Office visit:       Routing refill request to provider for review/approval because:  Drug not on the [facility], [facility] or [facility] refill protocol or controlled substance

## 2019-05-20 ENCOUNTER — HOSPITAL ENCOUNTER (OUTPATIENT)
Dept: PHYSICAL THERAPY | Facility: HOSPITAL | Age: 64
Setting detail: THERAPIES SERIES
End: 2019-05-20
Attending: PHYSICIAN ASSISTANT
Payer: MEDICARE

## 2019-05-20 PROCEDURE — 97110 THERAPEUTIC EXERCISES: CPT | Mod: GP | Performed by: PHYSICAL THERAPIST

## 2019-05-22 ENCOUNTER — HOSPITAL ENCOUNTER (OUTPATIENT)
Dept: PHYSICAL THERAPY | Facility: HOSPITAL | Age: 64
Setting detail: THERAPIES SERIES
End: 2019-05-22
Attending: PHYSICIAN ASSISTANT
Payer: MEDICARE

## 2019-05-22 PROCEDURE — 97110 THERAPEUTIC EXERCISES: CPT | Mod: GP | Performed by: PHYSICAL THERAPIST

## 2019-05-29 ENCOUNTER — HOSPITAL ENCOUNTER (OUTPATIENT)
Dept: PHYSICAL THERAPY | Facility: HOSPITAL | Age: 64
Setting detail: THERAPIES SERIES
End: 2019-05-29
Attending: PHYSICIAN ASSISTANT
Payer: MEDICARE

## 2019-05-29 PROCEDURE — 97140 MANUAL THERAPY 1/> REGIONS: CPT | Mod: GP | Performed by: PHYSICAL THERAPIST

## 2019-05-29 PROCEDURE — 97110 THERAPEUTIC EXERCISES: CPT | Mod: GP | Performed by: PHYSICAL THERAPIST

## 2019-06-04 ENCOUNTER — HOSPITAL ENCOUNTER (OUTPATIENT)
Dept: PHYSICAL THERAPY | Facility: HOSPITAL | Age: 64
Setting detail: THERAPIES SERIES
End: 2019-06-04
Attending: PHYSICIAN ASSISTANT
Payer: MEDICARE

## 2019-06-04 PROCEDURE — 97110 THERAPEUTIC EXERCISES: CPT | Mod: GP | Performed by: PHYSICAL THERAPIST

## 2019-06-07 ENCOUNTER — HOSPITAL ENCOUNTER (OUTPATIENT)
Dept: PHYSICAL THERAPY | Facility: HOSPITAL | Age: 64
Setting detail: THERAPIES SERIES
End: 2019-06-07
Attending: PHYSICIAN ASSISTANT
Payer: MEDICARE

## 2019-06-07 PROCEDURE — 97140 MANUAL THERAPY 1/> REGIONS: CPT | Mod: GP | Performed by: PHYSICAL THERAPIST

## 2019-06-07 PROCEDURE — 97110 THERAPEUTIC EXERCISES: CPT | Mod: GP | Performed by: PHYSICAL THERAPIST

## 2019-06-11 ENCOUNTER — HOSPITAL ENCOUNTER (OUTPATIENT)
Dept: PHYSICAL THERAPY | Facility: HOSPITAL | Age: 64
Setting detail: THERAPIES SERIES
End: 2019-06-11
Attending: PHYSICIAN ASSISTANT
Payer: MEDICARE

## 2019-06-11 PROCEDURE — 97140 MANUAL THERAPY 1/> REGIONS: CPT | Mod: GP | Performed by: PHYSICAL THERAPIST

## 2019-06-11 PROCEDURE — 97110 THERAPEUTIC EXERCISES: CPT | Mod: GP | Performed by: PHYSICAL THERAPIST

## 2019-06-14 ENCOUNTER — HOSPITAL ENCOUNTER (OUTPATIENT)
Dept: PHYSICAL THERAPY | Facility: HOSPITAL | Age: 64
Setting detail: THERAPIES SERIES
End: 2019-06-14
Attending: PHYSICIAN ASSISTANT
Payer: MEDICARE

## 2019-06-14 PROCEDURE — 97110 THERAPEUTIC EXERCISES: CPT | Mod: GP | Performed by: PHYSICAL THERAPIST

## 2019-06-24 ENCOUNTER — HOSPITAL ENCOUNTER (OUTPATIENT)
Dept: PHYSICAL THERAPY | Facility: HOSPITAL | Age: 64
Setting detail: THERAPIES SERIES
End: 2019-06-24
Attending: PHYSICIAN ASSISTANT
Payer: MEDICARE

## 2019-06-24 PROCEDURE — 97110 THERAPEUTIC EXERCISES: CPT | Mod: GP | Performed by: PHYSICAL THERAPIST

## 2019-06-28 ENCOUNTER — HOSPITAL ENCOUNTER (OUTPATIENT)
Dept: PHYSICAL THERAPY | Facility: HOSPITAL | Age: 64
Setting detail: THERAPIES SERIES
End: 2019-06-28
Attending: PHYSICIAN ASSISTANT
Payer: MEDICARE

## 2019-06-28 PROCEDURE — 97110 THERAPEUTIC EXERCISES: CPT | Mod: GP | Performed by: PHYSICAL THERAPIST

## 2019-07-01 ENCOUNTER — OFFICE VISIT (OUTPATIENT)
Dept: CHIROPRACTIC MEDICINE | Facility: OTHER | Age: 64
End: 2019-07-01
Attending: CHIROPRACTOR
Payer: COMMERCIAL

## 2019-07-01 DIAGNOSIS — M99.03 SEGMENTAL AND SOMATIC DYSFUNCTION OF LUMBAR REGION: Primary | ICD-10-CM

## 2019-07-01 DIAGNOSIS — M54.50 ACUTE LEFT-SIDED LOW BACK PAIN WITHOUT SCIATICA: ICD-10-CM

## 2019-07-01 DIAGNOSIS — M99.01 SEGMENTAL AND SOMATIC DYSFUNCTION OF CERVICAL REGION: ICD-10-CM

## 2019-07-01 DIAGNOSIS — M99.02 SEGMENTAL AND SOMATIC DYSFUNCTION OF THORACIC REGION: ICD-10-CM

## 2019-07-01 PROCEDURE — 98941 CHIROPRACT MANJ 3-4 REGIONS: CPT | Mod: AT | Performed by: CHIROPRACTOR

## 2019-07-02 ENCOUNTER — HOSPITAL ENCOUNTER (OUTPATIENT)
Dept: PHYSICAL THERAPY | Facility: HOSPITAL | Age: 64
Setting detail: THERAPIES SERIES
End: 2019-07-02
Attending: NURSE PRACTITIONER
Payer: MEDICARE

## 2019-07-02 PROCEDURE — 97110 THERAPEUTIC EXERCISES: CPT | Mod: GP | Performed by: PHYSICAL THERAPIST

## 2019-07-02 PROCEDURE — 97140 MANUAL THERAPY 1/> REGIONS: CPT | Mod: GP | Performed by: PHYSICAL THERAPIST

## 2019-07-05 ENCOUNTER — HOSPITAL ENCOUNTER (OUTPATIENT)
Dept: INTERVENTIONAL RADIOLOGY/VASCULAR | Facility: HOSPITAL | Age: 64
Discharge: HOME | End: 2019-07-05
Attending: NURSE PRACTITIONER | Admitting: NURSE PRACTITIONER
Payer: MEDICARE

## 2019-07-05 DIAGNOSIS — M54.16 RADICULOPATHY OF LUMBAR REGION: ICD-10-CM

## 2019-07-05 PROCEDURE — 62323 NJX INTERLAMINAR LMBR/SAC: CPT | Mod: TC

## 2019-07-05 PROCEDURE — 25000128 H RX IP 250 OP 636: Performed by: RADIOLOGY

## 2019-07-05 PROCEDURE — 25500064 ZZH RX 255 OP 636: Performed by: RADIOLOGY

## 2019-07-05 RX ORDER — METHYLPREDNISOLONE ACETATE 80 MG/ML
INJECTION, SUSPENSION INTRA-ARTICULAR; INTRALESIONAL; INTRAMUSCULAR; SOFT TISSUE
Status: DISPENSED
Start: 2019-07-05 | End: 2019-07-05

## 2019-07-05 RX ORDER — METHYLPREDNISOLONE ACETATE 80 MG/ML
80 INJECTION, SUSPENSION INTRA-ARTICULAR; INTRALESIONAL; INTRAMUSCULAR; SOFT TISSUE ONCE
Status: COMPLETED | OUTPATIENT
Start: 2019-07-05 | End: 2019-07-05

## 2019-07-05 RX ORDER — IOPAMIDOL 408 MG/ML
10 INJECTION, SOLUTION INTRATHECAL ONCE
Status: COMPLETED | OUTPATIENT
Start: 2019-07-05 | End: 2019-07-05

## 2019-07-05 RX ADMIN — METHYLPREDNISOLONE ACETATE 80 MG: 80 INJECTION, SUSPENSION INTRA-ARTICULAR; INTRALESIONAL; INTRAMUSCULAR; SOFT TISSUE at 10:45

## 2019-07-05 RX ADMIN — IOPAMIDOL 6 ML: 408 INJECTION, SOLUTION INTRATHECAL at 10:45

## 2019-07-05 NOTE — IP AVS SNAPSHOT
[facility]  [address] [address]  Phone:  [phone]  Fax:  [phone]                                    After Visit Summary   7/5/2019    Aileen Szymanski    MRN: [id number]           After Visit Summary Signature Page    I have received my discharge instructions, and my questions have been answered. I have discussed any challenges I see with this plan with the nurse or doctor.    ..........................................................................................................................................  Patient/Patient Representative Signature      ..........................................................................................................................................  Patient Representative Print Name and Relationship to Patient    ..................................................               ................................................  Date                                   Time    ..........................................................................................................................................  Reviewed by Signature/Title    ...................................................              ..............................................  Date                                               Time          22EPIC Rev 08/18

## 2019-07-05 NOTE — DISCHARGE INSTRUCTIONS
Home number on file [phone] (home)  Is it ok to leave a message at this number(s)? Yes    Dr. Soliman completed your procedure on 7/5/2019.    Current Pain Level (0-10 Scale): 3/10  Post Pain Level (0-10):  0/10    Radiology Discharge instructions for Steroid Injection    Activity Level:     Do not do any heavy activity or exercise for 24 hours.   Do not drive for 4 hours after your injection.  Diet:   Return to your normal diet.  Medications:   If you have stopped taking your Aspirin, Coumadin/Warfarin, Ibuprofen, or any   other blood thinner for this procedure you may resume in the morning unless   your primary care provider has given you other instructions.    Diabetics may see an increase in blood sugar after steroid injections. If you are concerned about your blood sugar, please contact your family doctor.    Site Care:  Remove the bandage and bathe or shower the morning after the procedure.      This is a Pain Management procedure.  You will be contacted in two weeks for follow up.    Call your Primary Care Provider if you have the following (if your primary care provider is not available please seek emergency care):   Nausea with vomiting   Severe headache   Drowsiness or confusion   Redness or drainage at the injection or puncture site   Temperature over 101 degrees F   Other concerns   Worsening back pain   Stiff neck

## 2019-07-09 ENCOUNTER — HOSPITAL ENCOUNTER (OUTPATIENT)
Dept: PHYSICAL THERAPY | Facility: HOSPITAL | Age: 64
Setting detail: THERAPIES SERIES
End: 2019-07-09
Attending: NURSE PRACTITIONER
Payer: MEDICARE

## 2019-07-09 PROCEDURE — 97110 THERAPEUTIC EXERCISES: CPT | Mod: GP | Performed by: PHYSICAL THERAPIST

## 2019-07-16 ENCOUNTER — HOSPITAL ENCOUNTER (OUTPATIENT)
Dept: PHYSICAL THERAPY | Facility: HOSPITAL | Age: 64
Setting detail: THERAPIES SERIES
End: 2019-07-16
Attending: NURSE PRACTITIONER
Payer: MEDICARE

## 2019-07-16 PROCEDURE — 97110 THERAPEUTIC EXERCISES: CPT | Mod: GP | Performed by: PHYSICAL THERAPIST

## 2019-07-19 ENCOUNTER — TELEPHONE (OUTPATIENT)
Dept: INTERVENTIONAL RADIOLOGY/VASCULAR | Facility: HOSPITAL | Age: 64
End: 2019-07-19

## 2019-07-19 ENCOUNTER — HOSPITAL ENCOUNTER (OUTPATIENT)
Dept: PHYSICAL THERAPY | Facility: HOSPITAL | Age: 64
Setting detail: THERAPIES SERIES
End: 2019-07-19
Attending: NURSE PRACTITIONER
Payer: MEDICARE

## 2019-07-19 PROCEDURE — 97110 THERAPEUTIC EXERCISES: CPT | Mod: GP | Performed by: PHYSICAL THERAPIST

## 2019-07-19 NOTE — TELEPHONE ENCOUNTER
INJECTION POST CALL    Procedure: Epidural Lumbar TL L4-5  Radiologist(s): Dr. Troy Soliman  Date of Procedure:  7/5/2019      The patient was not available by telephone. Message was left.          Shelbie Rowe

## 2019-07-22 ENCOUNTER — HOSPITAL ENCOUNTER (OUTPATIENT)
Dept: PHYSICAL THERAPY | Facility: HOSPITAL | Age: 64
Setting detail: THERAPIES SERIES
End: 2019-07-22
Attending: NURSE PRACTITIONER
Payer: MEDICARE

## 2019-07-22 PROCEDURE — 97110 THERAPEUTIC EXERCISES: CPT | Mod: GP | Performed by: PHYSICAL THERAPIST

## 2019-07-23 ENCOUNTER — TELEPHONE (OUTPATIENT)
Dept: INTERVENTIONAL RADIOLOGY/VASCULAR | Facility: HOSPITAL | Age: 64
End: 2019-07-23

## 2019-07-23 NOTE — TELEPHONE ENCOUNTER
INJECTION POST CALL     Procedure:    Epidural Lumbar TL L4-5  Radiologist(s):        Dr. Troy Soliman  Date of Procedure:  7/5/2019        The patient was not available by telephone. Post card sent.              Shelbie Rowe

## 2019-07-24 ENCOUNTER — HOSPITAL ENCOUNTER (OUTPATIENT)
Dept: PHYSICAL THERAPY | Facility: HOSPITAL | Age: 64
Setting detail: THERAPIES SERIES
End: 2019-07-24
Attending: NURSE PRACTITIONER
Payer: MEDICARE

## 2019-07-24 PROCEDURE — 97110 THERAPEUTIC EXERCISES: CPT | Mod: GP | Performed by: PHYSICAL THERAPIST

## 2019-08-06 ENCOUNTER — HOSPITAL ENCOUNTER (OUTPATIENT)
Dept: PHYSICAL THERAPY | Facility: HOSPITAL | Age: 64
Setting detail: THERAPIES SERIES
End: 2019-08-06
Attending: NURSE PRACTITIONER
Payer: MEDICARE

## 2019-08-06 PROCEDURE — 97110 THERAPEUTIC EXERCISES: CPT | Mod: GP | Performed by: PHYSICAL THERAPIST

## 2019-08-07 ENCOUNTER — TELEPHONE (OUTPATIENT)
Dept: INTERVENTIONAL RADIOLOGY/VASCULAR | Facility: HOSPITAL | Age: 64
End: 2019-08-07

## 2019-08-07 NOTE — TELEPHONE ENCOUNTER
INJECTION POST CALL    Procedure: Epidural Lumbar L4-5  Radiologist(s): Dr. Troy Soliman  Date of Procedure:  7/5/19    Pre-procedure pain score was: 3 (See pre-procedure score)  Post-procedure pain score as of today is: 8 when pain appears (Pain comes and goes on and off) otherwise not much pain  Percentage of pain improvement today?  0-50%  Where is the pain located? Right side of low back, hip, and down right leg.  Is this new pain? No  Would you like to be scheduled for an additional injection?  No      The patient had no questions or concerns.    Patient will contact the provider if there are any issues.    Patient states this injection sort of worked, still having some pain on the right side low back, hip, and down right leg. Pain comes and goes when patient is having pain, pain level with go up to an 8. Patient will contact primary if wanting to move forward with an additional injection.      Shelbie Rowe

## 2019-08-09 ENCOUNTER — HOSPITAL ENCOUNTER (OUTPATIENT)
Dept: PHYSICAL THERAPY | Facility: HOSPITAL | Age: 64
Setting detail: THERAPIES SERIES
End: 2019-08-09
Attending: NURSE PRACTITIONER
Payer: MEDICARE

## 2019-08-09 PROCEDURE — 97110 THERAPEUTIC EXERCISES: CPT | Mod: GP | Performed by: PHYSICAL THERAPIST

## 2019-08-09 PROCEDURE — 97140 MANUAL THERAPY 1/> REGIONS: CPT | Mod: GP | Performed by: PHYSICAL THERAPIST

## 2019-08-09 NOTE — PROGRESS NOTES
Outpatient Physical Therapy Discharge Note     Patient: Aileen Szymanski  : 1955    Beginning/End Dates of Reporting Period:  2019 to 2019    Referring Provider: Feliz Gonzales Diagnosis: L knee pain, LBP with nerve involvement, neural tension     Client Self Report: Aileen iJmenez states that while her knee is overall improved with less pain and improved strength, she still has significant difficulty particularly with stairs and states that she is still not happy with her knee. She plans to check back with in Feliz Otoole and Dr Quinones regarding her knee to see if there is anything else they would recommend she try. She is interested in possibly getting another opinion on her knee. We discussed today how she has had some weeks where her knee has barely hurt at all and she had great progress, but then she has had regression after good weeks and it seems her improvements just can't seem to last.  I find that sometimes when she has other complexities come up in life, her knee tends to get worse and I wonder if stress may also play a role in her pain response.  She will continue her nerve gliding and mobility and strengthening exercises and report back to me when she sees Pinky and Dr Quinones.    Objective Measurements:  Objective Measure: Knee ROM and strength  Details: FLEX 112, EXT 7 degrees from neutral after heavy HS work and patellar glides.  Improved quad activation to perform step ups and lateral step downs with very slight use of hands, however patient needs prompting to complete without pushing up with her hand - I think she still doesn't feel she has strength there and that has hindered her in progress as well.  Mild weakness still appreciated in L hip ABDs and glutes, but this is much improved from IE. Will continue working on hip strengthening, nerve gliding, lumbar mobility and quad strengthening on step exercises.         Goals:  Goal Identifier STG 1   Goal Description Patient will be  compliant with HEP in order to make progress while at home   Target Date 04/05/19   Date Met  04/26/19   Progress:     Goal Identifier LTG 1   Goal Description Patient will report overall 30% or more improvement in B knee pain and function in order to perform all home tasks   Target Date 05/21/19   Date Met  08/09/19   Progress:     Goal Identifier LTG 2   Goal Description Patient will report overall 60% or more improvement in her B knee and LE function in order to perform all home tasks   Target Date 06/25/19   Date Met  (Goal not met)   Progress:         Progress Toward Goals:   Progress this reporting period: Patient met some goals, but did not achieve over 60% improvement that would last      Plan:  Discharge from therapy.    Discharge:    Reason for Discharge: No further expectation of progress.    Equipment Issued: None    Discharge Plan: Patient to continue home program.

## 2019-08-22 ENCOUNTER — OFFICE VISIT (OUTPATIENT)
Dept: CHIROPRACTIC MEDICINE | Facility: OTHER | Age: 64
End: 2019-08-22
Attending: CHIROPRACTOR
Payer: COMMERCIAL

## 2019-08-22 DIAGNOSIS — M99.02 SEGMENTAL AND SOMATIC DYSFUNCTION OF THORACIC REGION: ICD-10-CM

## 2019-08-22 DIAGNOSIS — M99.03 SEGMENTAL AND SOMATIC DYSFUNCTION OF LUMBAR REGION: Primary | ICD-10-CM

## 2019-08-22 DIAGNOSIS — M99.01 SEGMENTAL AND SOMATIC DYSFUNCTION OF CERVICAL REGION: ICD-10-CM

## 2019-08-22 DIAGNOSIS — M54.50 ACUTE BILATERAL LOW BACK PAIN WITHOUT SCIATICA: ICD-10-CM

## 2019-08-22 PROCEDURE — 98941 CHIROPRACT MANJ 3-4 REGIONS: CPT | Mod: AT | Performed by: CHIROPRACTOR

## 2019-08-28 ENCOUNTER — TRANSFERRED RECORDS (OUTPATIENT)
Dept: HEALTH INFORMATION MANAGEMENT | Facility: CLINIC | Age: 64
End: 2019-08-28

## 2019-09-16 ENCOUNTER — HOSPITAL ENCOUNTER (OUTPATIENT)
Dept: NUCLEAR MEDICINE | Facility: HOSPITAL | Age: 64
End: 2019-09-16
Attending: NURSE PRACTITIONER
Payer: MEDICARE

## 2019-09-16 ENCOUNTER — OFFICE VISIT (OUTPATIENT)
Dept: CHIROPRACTIC MEDICINE | Facility: OTHER | Age: 64
End: 2019-09-16
Attending: CHIROPRACTOR
Payer: MEDICARE

## 2019-09-16 ENCOUNTER — HOSPITAL ENCOUNTER (OUTPATIENT)
Dept: NUCLEAR MEDICINE | Facility: HOSPITAL | Age: 64
Discharge: HOME | End: 2019-09-16
Attending: NURSE PRACTITIONER | Admitting: NURSE PRACTITIONER
Payer: MEDICARE

## 2019-09-16 DIAGNOSIS — T84.84XA PAIN DUE TO TOTAL LEFT KNEE REPLACEMENT (H): ICD-10-CM

## 2019-09-16 DIAGNOSIS — M99.03 SEGMENTAL AND SOMATIC DYSFUNCTION OF LUMBAR REGION: Primary | ICD-10-CM

## 2019-09-16 DIAGNOSIS — Z96.652 PRESENCE OF LEFT ARTIFICIAL KNEE JOINT: ICD-10-CM

## 2019-09-16 DIAGNOSIS — Z96.652 PAIN DUE TO TOTAL LEFT KNEE REPLACEMENT (H): ICD-10-CM

## 2019-09-16 DIAGNOSIS — M99.02 SEGMENTAL AND SOMATIC DYSFUNCTION OF THORACIC REGION: ICD-10-CM

## 2019-09-16 DIAGNOSIS — M54.50 ACUTE BILATERAL LOW BACK PAIN WITHOUT SCIATICA: ICD-10-CM

## 2019-09-16 DIAGNOSIS — M99.01 SEGMENTAL AND SOMATIC DYSFUNCTION OF CERVICAL REGION: ICD-10-CM

## 2019-09-16 PROCEDURE — 78315 BONE IMAGING 3 PHASE: CPT | Mod: TC

## 2019-09-16 PROCEDURE — 98941 CHIROPRACT MANJ 3-4 REGIONS: CPT | Mod: AT | Performed by: CHIROPRACTOR

## 2019-09-16 PROCEDURE — 34300033 ZZH RX 343: Performed by: RADIOLOGY

## 2019-09-16 PROCEDURE — A9503 TC99M MEDRONATE: HCPCS | Performed by: RADIOLOGY

## 2019-09-16 RX ORDER — TC 99M MEDRONATE 20 MG/10ML
25 INJECTION, POWDER, LYOPHILIZED, FOR SOLUTION INTRAVENOUS ONCE
Status: COMPLETED | OUTPATIENT
Start: 2019-09-16 | End: 2019-09-16

## 2019-09-16 RX ADMIN — TC 99M MEDRONATE 25 MCI.: 20 INJECTION, POWDER, LYOPHILIZED, FOR SOLUTION INTRAVENOUS at 09:05

## 2019-10-06 DIAGNOSIS — F33.2 SEVERE EPISODE OF RECURRENT MAJOR DEPRESSIVE DISORDER, WITHOUT PSYCHOTIC FEATURES (H): ICD-10-CM

## 2019-10-08 ENCOUNTER — OFFICE VISIT (OUTPATIENT)
Dept: CHIROPRACTIC MEDICINE | Facility: OTHER | Age: 64
End: 2019-10-08
Attending: CHIROPRACTOR
Payer: COMMERCIAL

## 2019-10-08 DIAGNOSIS — M99.02 SEGMENTAL AND SOMATIC DYSFUNCTION OF THORACIC REGION: ICD-10-CM

## 2019-10-08 DIAGNOSIS — M99.01 SEGMENTAL AND SOMATIC DYSFUNCTION OF CERVICAL REGION: ICD-10-CM

## 2019-10-08 DIAGNOSIS — M99.03 SEGMENTAL AND SOMATIC DYSFUNCTION OF LUMBAR REGION: Primary | ICD-10-CM

## 2019-10-08 DIAGNOSIS — M54.50 ACUTE BILATERAL LOW BACK PAIN WITHOUT SCIATICA: ICD-10-CM

## 2019-10-08 PROCEDURE — 98941 CHIROPRACT MANJ 3-4 REGIONS: CPT | Mod: AT | Performed by: CHIROPRACTOR

## 2019-10-08 RX ORDER — ESCITALOPRAM OXALATE 20 MG/1
20 TABLET ORAL DAILY
Qty: 90 TABLET | Refills: 3 | Status: SHIPPED | OUTPATIENT
Start: 2019-10-08 | End: 2020-10-01

## 2019-10-08 NOTE — TELEPHONE ENCOUNTER
Escitalopram       Last Written Prescription Date:  10/11/2018  Last Fill Quantity: 90,   # refills: 3  Last Office Visit: 10/11/2018  Future Office visit:       Routing refill request to provider for review/approval because:  Patient due for office visit. Please advise

## 2019-10-21 ENCOUNTER — TELEPHONE (OUTPATIENT)
Dept: FAMILY MEDICINE | Facility: OTHER | Age: 64
End: 2019-10-21
Payer: COMMERCIAL

## 2019-10-21 DIAGNOSIS — Z23 NEED FOR PROPHYLACTIC VACCINATION AND INOCULATION AGAINST INFLUENZA: Primary | ICD-10-CM

## 2019-10-21 PROCEDURE — 90662 IIV NO PRSV INCREASED AG IM: CPT

## 2019-10-21 PROCEDURE — G0008 ADMIN INFLUENZA VIRUS VAC: HCPCS | Performed by: PHYSICIAN ASSISTANT

## 2019-10-21 NOTE — TELEPHONE ENCOUNTER
FLU SHOT GIVEN.     Prior to immunization administration, verified patients identity using patient s name and date of birth. Please see Immunization Activity for additional information.     Screening Questionnaire for Adult Immunization    Are you sick today?   No   Do you have allergies to medications, food, a vaccine component or latex?   No   Have you ever had a serious reaction after receiving a vaccination?   No   Do you have a long-term health problem with heart disease, lung disease, asthma, kidney disease, metabolic disease (e.g. diabetes), anemia, or other blood disorder?   No   Do you have cancer, leukemia, HIV/AIDS, or any other immune system problem?   No   In the past 3 months, have you taken medications that affect  your immune system, such as prednisone, other steroids, or anticancer drugs; drugs for the treatment of rheumatoid arthritis, Crohn s disease, or psoriasis; or have you had radiation treatments?   No   Have you had a seizure, or a brain or other nervous system problem?   No   During the past year, have you received a transfusion of blood or blood     products, or been given immune (gamma) globulin or antiviral drug?   No   For women: Are you pregnant or is there a chance you could become        pregnant during the next month?   No   Have you received any vaccinations in the past 4 weeks?   No     Immunization questionnaire answers were all negative.        Per orders of Dr. ALICIA, injection of INFLUENZA VACCINE given by Carito Carty LPN. Patient instructed to remain in clinic for 15 minutes afterwards, and to report any adverse reaction to me immediately.       Screening performed by Carito Carty LPN on 10/21/2019 at 12:11 PM.

## 2019-10-23 ENCOUNTER — OFFICE VISIT (OUTPATIENT)
Dept: CHIROPRACTIC MEDICINE | Facility: OTHER | Age: 64
End: 2019-10-23
Attending: CHIROPRACTOR
Payer: COMMERCIAL

## 2019-10-23 DIAGNOSIS — M54.50 ACUTE BILATERAL LOW BACK PAIN WITHOUT SCIATICA: ICD-10-CM

## 2019-10-23 DIAGNOSIS — M99.02 SEGMENTAL AND SOMATIC DYSFUNCTION OF THORACIC REGION: ICD-10-CM

## 2019-10-23 DIAGNOSIS — M99.01 SEGMENTAL AND SOMATIC DYSFUNCTION OF CERVICAL REGION: ICD-10-CM

## 2019-10-23 DIAGNOSIS — M99.03 SEGMENTAL AND SOMATIC DYSFUNCTION OF LUMBAR REGION: Primary | ICD-10-CM

## 2019-10-23 PROCEDURE — 98941 CHIROPRACT MANJ 3-4 REGIONS: CPT | Mod: AT | Performed by: CHIROPRACTOR

## 2019-10-24 NOTE — PROGRESS NOTES
Subjective Finding:    Chief compalint: Patient presents with:  Back Pain  , Pain Scale: 5/10, Intensity: sharp and ache, Duration: 1 month, Change since last visit: worse, Radiating: no.    Date of injury:     Activities that the pain restricts:   Home/household activities: no.  Work duties: no.  Hobbies/social: no.  Sleep: no.  Makes symptoms better: activity and rest.  Makes symptoms worse: lumbar extension and lumbar flexion.  Have you seen anyone else for the symptoms? No.  Work related: no.  Automobile related injury: no.    Objective and Assessment:    Posture Analysis:   High shoulder: .  Head tilt: .  High iliac crest: .  Head carriage: neutral.  Thoracic Kyphosis: neutral.  Lumbar Lordosis: neutral.    Lumbar Range of Motion: flexion decreased and extension decreased.  Cervical Range of Motion: flexion decreased.  Thoracic Range of Motion: .  Extremity Range of Motion: .    Palpation:   Quad lumb: right, referred pain: no    Segmental dysfunction pre-treatment: C2, T6, T7, L4 and L5.    Assessment post-treatment:  Cervical: ROM increased.  Thoracic: pain and tenderness decreased.  Lumbar: pain and tenderness decreased.    Comments: .        Complicating Factors: .    Plan / Procedure:    Expected release date: .  Treatment plan: prn.  Instructed patient: ice 20 minutes every other hour as needed.  Short term goals: reduce pain and increase ROM.  Long term goals: increase patient functional independence.  Prognosis: excellent.

## 2019-11-13 DIAGNOSIS — M25.562 LEFT KNEE PAIN: Primary | ICD-10-CM

## 2019-11-21 ENCOUNTER — HOSPITAL ENCOUNTER (OUTPATIENT)
Dept: INTERVENTIONAL RADIOLOGY/VASCULAR | Facility: HOSPITAL | Age: 64
Discharge: HOME | End: 2019-11-21
Attending: ORTHOPAEDIC SURGERY | Admitting: ORTHOPAEDIC SURGERY
Payer: MEDICARE

## 2019-11-21 DIAGNOSIS — M25.562 LEFT KNEE PAIN, UNSPECIFIED CHRONICITY: ICD-10-CM

## 2019-11-21 DIAGNOSIS — M25.562 LEFT KNEE PAIN: ICD-10-CM

## 2019-11-21 LAB
APPEARANCE FLD: NORMAL
COLOR FLD: YELLOW
CRYSTALS SNV MICRO: NORMAL
MONOS+MACROS NFR FLD MANUAL: 95 %
NEUTS BAND NFR FLD MANUAL: 5 %
RBC # FLD: 1461 /UL
SPECIMEN SOURCE FLD: NORMAL
SPECIMEN SOURCE SNV: NORMAL
WBC # FLD AUTO: 24 /UL

## 2019-11-21 PROCEDURE — 83516 IMMUNOASSAY NONANTIBODY: CPT

## 2019-11-21 PROCEDURE — 87077 CULTURE AEROBIC IDENTIFY: CPT

## 2019-11-21 PROCEDURE — 84311 SPECTROPHOTOMETRY: CPT

## 2019-11-21 PROCEDURE — 87070 CULTURE OTHR SPECIMN AEROBIC: CPT

## 2019-11-21 PROCEDURE — 25000125 ZZHC RX 250: Performed by: RADIOLOGY

## 2019-11-21 PROCEDURE — 89060 EXAM SYNOVIAL FLUID CRYSTALS: CPT | Performed by: RADIOLOGY

## 2019-11-21 PROCEDURE — 87186 SC STD MICRODIL/AGAR DIL: CPT

## 2019-11-21 PROCEDURE — 89051 BODY FLUID CELL COUNT: CPT

## 2019-11-21 PROCEDURE — 20610 DRAIN/INJ JOINT/BURSA W/O US: CPT | Mod: TC,LT

## 2019-11-21 PROCEDURE — 86140 C-REACTIVE PROTEIN: CPT

## 2019-11-21 PROCEDURE — 87075 CULTR BACTERIA EXCEPT BLOOD: CPT

## 2019-11-21 RX ORDER — LIDOCAINE HYDROCHLORIDE 10 MG/ML
10 INJECTION, SOLUTION EPIDURAL; INFILTRATION; INTRACAUDAL; PERINEURAL ONCE
Status: COMPLETED | OUTPATIENT
Start: 2019-11-21 | End: 2019-11-21

## 2019-11-21 RX ORDER — LIDOCAINE HYDROCHLORIDE 10 MG/ML
INJECTION, SOLUTION EPIDURAL; INFILTRATION; INTRACAUDAL; PERINEURAL
Status: DISCONTINUED
Start: 2019-11-21 | End: 2019-11-22 | Stop reason: HOSPADM

## 2019-11-21 RX ADMIN — LIDOCAINE HYDROCHLORIDE 4 ML: 10 INJECTION, SOLUTION EPIDURAL; INFILTRATION; INTRACAUDAL; PERINEURAL at 12:35

## 2019-11-26 LAB
BACTERIA SPEC CULT: ABNORMAL
BACTERIA SPEC CULT: ABNORMAL
SPECIMEN SOURCE: ABNORMAL

## 2019-11-28 LAB
BACTERIA SPEC CULT: NORMAL
SPECIMEN SOURCE: NORMAL

## 2019-12-03 LAB — SYNOVASURE PERIPROSTHETIC JOINT INFECTION DECTION: NORMAL

## 2019-12-11 ENCOUNTER — HOSPITAL ENCOUNTER (OUTPATIENT)
Dept: CT IMAGING | Facility: HOSPITAL | Age: 64
Discharge: HOME | End: 2019-12-11
Attending: ORTHOPAEDIC SURGERY | Admitting: ORTHOPAEDIC SURGERY
Payer: MEDICARE

## 2019-12-11 DIAGNOSIS — M25.362 INSTABILITY OF LEFT KNEE JOINT: ICD-10-CM

## 2019-12-11 DIAGNOSIS — T84.023A: ICD-10-CM

## 2019-12-11 PROCEDURE — 73700 CT LOWER EXTREMITY W/O DYE: CPT | Mod: TC,LT

## 2019-12-28 ENCOUNTER — HOSPITAL ENCOUNTER (EMERGENCY)
Facility: HOSPITAL | Age: 64
Discharge: HOME | End: 2019-12-28
Attending: NURSE PRACTITIONER | Admitting: NURSE PRACTITIONER
Payer: MEDICARE

## 2019-12-28 ENCOUNTER — APPOINTMENT (OUTPATIENT)
Dept: GENERAL RADIOLOGY | Facility: HOSPITAL | Age: 64
End: 2019-12-28
Attending: NURSE PRACTITIONER
Payer: MEDICARE

## 2019-12-28 VITALS
DIASTOLIC BLOOD PRESSURE: 85 MMHG | OXYGEN SATURATION: 96 % | RESPIRATION RATE: 16 BRPM | SYSTOLIC BLOOD PRESSURE: 155 MMHG | TEMPERATURE: 98.1 F

## 2019-12-28 DIAGNOSIS — J01.90 ACUTE SINUSITIS WITH SYMPTOMS > 10 DAYS: ICD-10-CM

## 2019-12-28 DIAGNOSIS — J84.10 LUNG GRANULOMA (H): ICD-10-CM

## 2019-12-28 DIAGNOSIS — F51.01 PRIMARY INSOMNIA: ICD-10-CM

## 2019-12-28 LAB
BASOPHILS # BLD AUTO: 0 10E9/L (ref 0–0.2)
BASOPHILS NFR BLD AUTO: 0.5 %
CRP SERPL-MCNC: <2.9 MG/L (ref 0–8)
DIFFERENTIAL METHOD BLD: NORMAL
EOSINOPHIL # BLD AUTO: 0.1 10E9/L (ref 0–0.7)
EOSINOPHIL NFR BLD AUTO: 1.7 %
ERYTHROCYTE [DISTWIDTH] IN BLOOD BY AUTOMATED COUNT: 12.1 % (ref 10–15)
HCT VFR BLD AUTO: 41.1 % (ref 35–47)
HGB BLD-MCNC: 13.3 G/DL (ref 11.7–15.7)
IMM GRANULOCYTES # BLD: 0 10E9/L (ref 0–0.4)
IMM GRANULOCYTES NFR BLD: 0.2 %
LYMPHOCYTES # BLD AUTO: 1.6 10E9/L (ref 0.8–5.3)
LYMPHOCYTES NFR BLD AUTO: 28.3 %
MCH RBC QN AUTO: 28.9 PG (ref 26.5–33)
MCHC RBC AUTO-ENTMCNC: 32.4 G/DL (ref 31.5–36.5)
MCV RBC AUTO: 89 FL (ref 78–100)
MONOCYTES # BLD AUTO: 0.4 10E9/L (ref 0–1.3)
MONOCYTES NFR BLD AUTO: 7.1 %
NEUTROPHILS # BLD AUTO: 3.6 10E9/L (ref 1.6–8.3)
NEUTROPHILS NFR BLD AUTO: 62.2 %
NRBC # BLD AUTO: 0 10*3/UL
NRBC BLD AUTO-RTO: 0 /100
PLATELET # BLD AUTO: 214 10E9/L (ref 150–450)
RBC # BLD AUTO: 4.61 10E12/L (ref 3.8–5.2)
WBC # BLD AUTO: 5.8 10E9/L (ref 4–11)

## 2019-12-28 PROCEDURE — 86140 C-REACTIVE PROTEIN: CPT | Performed by: NURSE PRACTITIONER

## 2019-12-28 PROCEDURE — 85025 COMPLETE CBC W/AUTO DIFF WBC: CPT | Performed by: NURSE PRACTITIONER

## 2019-12-28 PROCEDURE — 36415 COLL VENOUS BLD VENIPUNCTURE: CPT | Performed by: NURSE PRACTITIONER

## 2019-12-28 PROCEDURE — G0463 HOSPITAL OUTPT CLINIC VISIT: HCPCS | Mod: 25

## 2019-12-28 PROCEDURE — 99213 OFFICE O/P EST LOW 20 MIN: CPT | Mod: Z6 | Performed by: NURSE PRACTITIONER

## 2019-12-28 PROCEDURE — 71046 X-RAY EXAM CHEST 2 VIEWS: CPT | Mod: TC

## 2019-12-28 ASSESSMENT — ENCOUNTER SYMPTOMS
WHEEZING: 0
SINUS PAIN: 1
GASTROINTESTINAL NEGATIVE: 1
RHINORRHEA: 0
COUGH: 1
BACK PAIN: 1
HEADACHES: 1
EYES NEGATIVE: 1
SINUS PRESSURE: 1
DIZZINESS: 0
FEVER: 0
SHORTNESS OF BREATH: 0
CHILLS: 0
SORE THROAT: 1
FATIGUE: 0
ACTIVITY CHANGE: 1
LIGHT-HEADEDNESS: 0

## 2019-12-28 NOTE — ED TRIAGE NOTES
Pt presents with posterior lung  base pain to both lungs, nonproductive cough, sinus headache. Advil cold and sinus taken at 1100 today.

## 2019-12-28 NOTE — DISCHARGE INSTRUCTIONS
Increase fluids. Complete all antibiotics even if feeling better.  Taking antibiotics with food may decrease the symptoms, of an upset stomach, that can occur when taking antibiotics. Antibiotics frequently cause diarrhea. Probiotics or yogurt may help prevent or decrease these symptoms. Return to be reevaluated if symptoms do not improve, or worsen.    Call tomorrow for final radiology read, [phone]

## 2019-12-28 NOTE — ED AVS SNAPSHOT
[facility]  [address]  [place] [address]  Phone:  [phone]                                    Aileen Szymanski   MRN: [id number]    Department:  [facility]   Date of Visit:  12/28/2019           After Visit Summary Signature Page    I have received my discharge instructions, and my questions have been answered. I have discussed any challenges I see with this plan with the nurse or doctor.    ..........................................................................................................................................  Patient/Patient Representative Signature      ..........................................................................................................................................  Patient Representative Print Name and Relationship to Patient    ..................................................               ................................................  Date                                   Time    ..........................................................................................................................................  Reviewed by Signature/Title    ...................................................              ..............................................  Date                                               Time          22EPIC Rev 08/18

## 2019-12-30 NOTE — TELEPHONE ENCOUNTER
Klonopin       Last Written Prescription Date:  05/09/19  Last Fill Quantity: 60,   # refills: 3  Last Office Visit: 10/11/18  Future Office visit:    Next 5 appointments (look out 90 days)    Jan 22, 2020  2:20 PM CST  (Arrive by 2:05 PM)  Pre-Op physical with AMERICA Gonsales  [facility] ([facility] [place] ) [address]  [place] [address]  [phone]

## 2019-12-31 RX ORDER — CLONAZEPAM 1 MG/1
TABLET ORAL
Qty: 60 TABLET | Refills: 0 | Status: SHIPPED | OUTPATIENT
Start: 2019-12-31 | End: 2020-03-03

## 2020-01-07 ENCOUNTER — OFFICE VISIT (OUTPATIENT)
Dept: CHIROPRACTIC MEDICINE | Facility: OTHER | Age: 65
End: 2020-01-07
Attending: CHIROPRACTOR
Payer: COMMERCIAL

## 2020-01-07 DIAGNOSIS — M99.03 SEGMENTAL AND SOMATIC DYSFUNCTION OF LUMBAR REGION: Primary | ICD-10-CM

## 2020-01-07 DIAGNOSIS — M54.50 ACUTE RIGHT-SIDED LOW BACK PAIN WITHOUT SCIATICA: ICD-10-CM

## 2020-01-07 DIAGNOSIS — M99.01 SEGMENTAL AND SOMATIC DYSFUNCTION OF CERVICAL REGION: ICD-10-CM

## 2020-01-07 DIAGNOSIS — M99.02 SEGMENTAL AND SOMATIC DYSFUNCTION OF THORACIC REGION: ICD-10-CM

## 2020-01-07 PROCEDURE — 98941 CHIROPRACT MANJ 3-4 REGIONS: CPT | Mod: AT | Performed by: CHIROPRACTOR

## 2020-01-16 PROBLEM — Z96.659 INFECTION OF TOTAL KNEE REPLACEMENT (H): Status: ACTIVE | Noted: 2019-12-27

## 2020-01-16 PROBLEM — T84.093A FAILED TOTAL LEFT KNEE REPLACEMENT (H): Status: ACTIVE | Noted: 2019-12-27

## 2020-01-16 PROBLEM — M24.662 ARTHROFIBROSIS OF KNEE JOINT, LEFT: Status: ACTIVE | Noted: 2019-12-27

## 2020-01-16 PROBLEM — T84.59XA INFECTION OF TOTAL KNEE REPLACEMENT (H): Status: ACTIVE | Noted: 2019-12-27

## 2020-01-16 NOTE — PROGRESS NOTES
[facility] [place]  [address]  [place] [address]  [phone]  Dept: [phone]    PRE-OP EVALUATION:  Today's date: 2020    Aileen Szymanski (: 1955) presents for pre-operative evaluation assessment as requested by Dr. Troy Nick .  She requires evaluation and anesthesia risk assessment prior to undergoing surgery/procedure for treatment of  Left Knee  .    Proposed Surgery/ Procedure: Left Knee   Date of Surgery/ Procedure: 2020  Time of Surgery/ Procedure: unknown   Hospital/Surgical Facility: [facility]  Fax number for surgical facility: [phone]  Primary Physician: Poppy Haynes  Type of Anesthesia Anticipated: to be determined    Patient has a Health Care Directive or Living Will:  YES     1. NO - Do you have a history of heart attack, stroke, stent, bypass or surgery on an artery in the head, neck, heart or legs?  2. NO - Do you ever have any pain or discomfort in your chest?  3. NO - Do you have a history of  Heart Failure?  4. NO - Are you troubled by shortness of breath when: walking on the level, up a slight hill or at night?  5. NO - Do you currently have a cold, bronchitis or other respiratory infection?  6. NO - Do you have a cough, shortness of breath or wheezing?  7. NO - Do you sometimes get pains in the calves of your legs when you walk?  8. NO - Do you or anyone in your family have previous history of blood clots?  9. NO - Do you or does anyone in your family have a serious bleeding problem such as prolonged bleeding following surgeries or cuts?  10.YES - Have you ever had problems with anemia or been told to take iron pills? Years and years ago  11. NO - Have you had any abnormal blood loss such as black, tarry or bloody stools, or abnormal vaginal bleeding.   12. YES - Have you ever had a blood transfusion?  13. NO - Have you or any of your relatives ever had problems with anesthesia?  14. NO - Do you have sleep apnea, excessive snoring  or daytime drowsiness?  15. NO - Do you have any prosthetic heart valves?  16. YES - Do you have prosthetic joints? LTKA  17. NO - Is there any chance that you may be pregnant?      HPI:     HPI related to upcoming procedure: she will be having revision of his left knee.       See problem list for active medical problems.  Problems all longstanding and stable, except as noted/documented.  See ROS for pertinent symptoms related to these conditions.      MEDICAL HISTORY:     Patient Active Problem List    Diagnosis Date Noted     Arthrofibrosis of knee joint, left 12/27/2019     Priority: Medium     Added automatically from request for surgery [id number]       Failed total left knee replacement (H) 12/27/2019     Priority: Medium     Added automatically from request for surgery [id number]       Infection of total knee replacement (H) 12/27/2019     Priority: Medium     Added automatically from request for surgery [id number]       Major depressive disorder without psychotic features 08/31/2017     Priority: Medium     Mood disorder (H) 09/19/2016     Priority: Medium     Mixed hyperlipidemia 09/19/2016     Priority: Medium     Lung granuloma (H) 03/27/2013     Priority: Medium     Overview:   Noted on chest xray 3/2013  Noted on chest xray 3/2013       Osteoarthrosis, pelvic region and thigh 12/05/2006     Priority: Medium     Overview:   IMO Update 10/11  IMO Update 10/11       Major depressive disorder, recurrent episode (H) 06/22/2006     Priority: Medium     Overview:   IMO Update 10/11  IMO Update 10/11        Past Medical History:   Diagnosis Date     Depressive disorder     Many years     Dizziness and giddiness 07/31/2007     Enthesopathy of knee, unspecified 06/13/2002     History of blood transfusion 2014?    Allergy to Lovenox. Due to knee replacement surgery.     Major depressive disorder without psychotic features 8/31/2017     Nonallopathic lesion of cervical region, not elsewhere classified 07/25/2001      Nonallopathic lesion of thoracic region, not elsewhere classified 06/13/2002     Pain in joint, lower leg 06/24/2002     Past Surgical History:   Procedure Laterality Date     ABDOMEN SURGERY  2008     lap band     ABDOMEN SURGERY  2007    hysteroscopy     ARTHROPLASTY REVISION KNEE Left 10/2016     CHOLECYSTECTOMY      removal     CLOSED REDUCTION NOSE N/A 11/7/2017    Procedure: CLOSED REDUCTION NOSE;  CLOSED REDUCTION NASAL SEPTAL FRACTURE;  Surgeon: Destiny Vanegas MD;  Location: [facility] OR     COLONOSCOPY  04/13/2010    Dr Quevedo; negative      left knee replacement  7/21/2014    Dr Quinones/ [facility]     Current Outpatient Medications   Medication Sig Dispense Refill     clonazePAM (KLONOPIN) 1 MG tablet TAKE 1 TABLET BY MOUTH AT BEDTIME 60 tablet 0     escitalopram (LEXAPRO) 20 MG tablet TAKE 1 TABLET (20 MG) BY MOUTH DAILY 90 tablet 3     MELATONIN PO Take 10 mg by mouth nightly as needed        multivitamin, therapeutic (THERA-VIT) TABS tablet Take 1 tablet by mouth daily       VITAMIN D, CHOLECALCIFEROL, PO Take 3,000 Units by mouth daily        OTC products: None, except as noted above    Allergies   Allergen Reactions     Lovenox [Enoxaparin] Other (See Comments)     Bleeding      Latex Allergy: NO    Social History     Tobacco Use     Smoking status: Never Smoker     Smokeless tobacco: Never Used   Substance Use Topics     Alcohol use: Yes     Comment: rare     History   Drug Use No       REVIEW OF SYSTEMS:   CONSTITUTIONAL: NEGATIVE for fever, chills, change in weight  INTEGUMENTARY/SKIN: NEGATIVE for worrisome rashes, moles or lesions  EYES: NEGATIVE for vision changes or irritation  ENT/MOUTH: NEGATIVE for ear, mouth and throat problems  RESP: NEGATIVE for significant cough or SOB  BREAST: NEGATIVE for masses, tenderness or discharge  CV: NEGATIVE for chest pain, palpitations or peripheral edema  GI: NEGATIVE for nausea, abdominal pain, heartburn, or change in bowel habits  : NEGATIVE  for frequency, dysuria, or hematuria  MUSCULOSKELETAL: NEGATIVE for significant arthralgias or myalgia  NEURO: NEGATIVE for weakness, dizziness or paresthesias  ENDOCRINE: NEGATIVE for temperature intolerance, skin/hair changes  HEME: NEGATIVE for bleeding problems  PSYCHIATRIC: NEGATIVE for changes in mood or affect    EXAM:   /64 (BP Location: Left arm, Patient Position: Chair, Cuff Size: Adult Regular)   Pulse 78   Temp 98.5  F (36.9  C) (Tympanic)   Resp 20   Wt 73.1 kg (161 lb 3.2 oz)   SpO2 98%   BMI 27.24 kg/m      GENERAL APPEARANCE: healthy, alert and no distress     EYES: EOMI, PERRL     HENT: ear canals and TM's normal and nose and mouth without ulcers or lesions     NECK: no adenopathy, no asymmetry, masses, or scars and thyroid normal to palpation     RESP: lungs clear to auscultation - no rales, rhonchi or wheezes     CV: regular rates and rhythm, normal S1 S2, no S3 or S4 and no murmur, click or rub     ABDOMEN:  soft, nontender, no HSM or masses and bowel sounds normal     MS: extremities normal- no gross deformities noted, no evidence of inflammation in joints, FROM in all extremities.     SKIN: no suspicious lesions or rashes     NEURO: Normal strength and tone, sensory exam grossly normal, mentation intact and speech normal     PSYCH: mentation appears normal. and affect normal/bright     LYMPHATICS: No cervical adenopathy    DIAGNOSTICS:     Labs Drawn and in Process:   Unresulted Labs Ordered in the Past 30 Days of this Admission     Date and Time Order Name Status Description    1/22/2020 1521 COMPREHENSIVE METABOLIC PANEL In process           Recent Labs   Lab Test 12/28/19  1449 10/24/18  1036 05/08/18  1111 11/06/17  1622   HGB 13.3 13.3 13.0 13.0    191 190  --    INR  --   --   --  1.00   NA  --  138 143  --    POTASSIUM  --  4.3 4.1  --    CR  --  1.10* 0.79  --         IMPRESSION:   Reason for surgery/procedure: left knee revision of replacement   Diagnosis/reason for  consult: medical clearance.     The proposed surgical procedure is considered INTERMEDIATE risk.    REVISED CARDIAC RISK INDEX  The patient has the following serious cardiovascular risks for perioperative complications such as (MI, PE, VFib and 3  AV Block):  No serious cardiac risks  INTERPRETATION: 1 risks: Class II (low risk - 0.9% complication rate)    The patient has the following additional risks for perioperative complications:  No identified additional risks      ICD-10-CM    1. Preop general physical exam Z01.818        RECOMMENDATIONS:         --Patient is to take all scheduled medications on the day of surgery EXCEPT for modifications listed below.    APPROVAL GIVEN to proceed with proposed procedure, without further diagnostic evaluation       Signed Electronically by: AMERICA Lepe    Copy of this evaluation report is provided to requesting physician.    [place] Preop Guidelines    Revised Cardiac Risk Index

## 2020-01-22 ENCOUNTER — OFFICE VISIT (OUTPATIENT)
Dept: FAMILY MEDICINE | Facility: OTHER | Age: 65
End: 2020-01-22
Attending: PHYSICIAN ASSISTANT
Payer: COMMERCIAL

## 2020-01-22 VITALS
OXYGEN SATURATION: 98 % | BODY MASS INDEX: 27.24 KG/M2 | SYSTOLIC BLOOD PRESSURE: 100 MMHG | RESPIRATION RATE: 20 BRPM | TEMPERATURE: 98.5 F | WEIGHT: 161.2 LBS | DIASTOLIC BLOOD PRESSURE: 64 MMHG | HEART RATE: 78 BPM

## 2020-01-22 DIAGNOSIS — Z01.818 PREOP GENERAL PHYSICAL EXAM: Primary | ICD-10-CM

## 2020-01-22 LAB
ALBUMIN SERPL-MCNC: 3.7 G/DL (ref 3.4–5)
ALP SERPL-CCNC: 79 U/L (ref 40–150)
ALT SERPL W P-5'-P-CCNC: 21 U/L (ref 0–50)
ANION GAP SERPL CALCULATED.3IONS-SCNC: 5 MMOL/L (ref 3–14)
AST SERPL W P-5'-P-CCNC: 15 U/L (ref 0–45)
BASOPHILS # BLD AUTO: 0 10E9/L (ref 0–0.2)
BASOPHILS NFR BLD AUTO: 0.5 %
BILIRUB SERPL-MCNC: 0.4 MG/DL (ref 0.2–1.3)
BUN SERPL-MCNC: 25 MG/DL (ref 7–30)
CALCIUM SERPL-MCNC: 9.2 MG/DL (ref 8.5–10.1)
CHLORIDE SERPL-SCNC: 106 MMOL/L (ref 94–109)
CO2 SERPL-SCNC: 29 MMOL/L (ref 20–32)
CREAT SERPL-MCNC: 0.8 MG/DL (ref 0.52–1.04)
DIFFERENTIAL METHOD BLD: NORMAL
EOSINOPHIL # BLD AUTO: 0.2 10E9/L (ref 0–0.7)
EOSINOPHIL NFR BLD AUTO: 1.8 %
ERYTHROCYTE [DISTWIDTH] IN BLOOD BY AUTOMATED COUNT: 12.2 % (ref 10–15)
GFR SERPL CREATININE-BSD FRML MDRD: 76 ML/MIN/{1.73_M2}
GLUCOSE SERPL-MCNC: 95 MG/DL (ref 70–99)
HCT VFR BLD AUTO: 41.4 % (ref 35–47)
HGB BLD-MCNC: 13.4 G/DL (ref 11.7–15.7)
IMM GRANULOCYTES # BLD: 0 10E9/L (ref 0–0.4)
IMM GRANULOCYTES NFR BLD: 0.4 %
LYMPHOCYTES # BLD AUTO: 2.1 10E9/L (ref 0.8–5.3)
LYMPHOCYTES NFR BLD AUTO: 25.2 %
MCH RBC QN AUTO: 28.8 PG (ref 26.5–33)
MCHC RBC AUTO-ENTMCNC: 32.4 G/DL (ref 31.5–36.5)
MCV RBC AUTO: 89 FL (ref 78–100)
MONOCYTES # BLD AUTO: 0.8 10E9/L (ref 0–1.3)
MONOCYTES NFR BLD AUTO: 9.4 %
NEUTROPHILS # BLD AUTO: 5.3 10E9/L (ref 1.6–8.3)
NEUTROPHILS NFR BLD AUTO: 62.7 %
NRBC # BLD AUTO: 0 10*3/UL
NRBC BLD AUTO-RTO: 0 /100
PLATELET # BLD AUTO: 230 10E9/L (ref 150–450)
POTASSIUM SERPL-SCNC: 4.7 MMOL/L (ref 3.4–5.3)
PROT SERPL-MCNC: 7.7 G/DL (ref 6.8–8.8)
RBC # BLD AUTO: 4.66 10E12/L (ref 3.8–5.2)
SODIUM SERPL-SCNC: 140 MMOL/L (ref 133–144)
WBC # BLD AUTO: 8.4 10E9/L (ref 4–11)

## 2020-01-22 PROCEDURE — G0463 HOSPITAL OUTPT CLINIC VISIT: HCPCS | Mod: 25

## 2020-01-22 PROCEDURE — 93010 ELECTROCARDIOGRAM REPORT: CPT | Performed by: INTERNAL MEDICINE

## 2020-01-22 PROCEDURE — 99214 OFFICE O/P EST MOD 30 MIN: CPT | Performed by: PHYSICIAN ASSISTANT

## 2020-01-22 PROCEDURE — 85025 COMPLETE CBC W/AUTO DIFF WBC: CPT | Mod: ZL | Performed by: PHYSICIAN ASSISTANT

## 2020-01-22 PROCEDURE — 80053 COMPREHEN METABOLIC PANEL: CPT | Mod: ZL | Performed by: PHYSICIAN ASSISTANT

## 2020-01-22 PROCEDURE — 93005 ELECTROCARDIOGRAM TRACING: CPT

## 2020-01-22 PROCEDURE — G0463 HOSPITAL OUTPT CLINIC VISIT: HCPCS

## 2020-01-22 PROCEDURE — 36415 COLL VENOUS BLD VENIPUNCTURE: CPT | Mod: ZL | Performed by: PHYSICIAN ASSISTANT

## 2020-01-22 ASSESSMENT — PAIN SCALES - GENERAL: PAINLEVEL: SEVERE PAIN (6)

## 2020-01-22 NOTE — NURSING NOTE
"Chief Complaint   Patient presents with     Pre-Op Exam       Initial /64 (BP Location: Left arm, Patient Position: Chair, Cuff Size: Adult Regular)   Pulse 78   Temp 98.5  F (36.9  C) (Tympanic)   Resp 20   Wt 73.1 kg (161 lb 3.2 oz)   SpO2 98%   BMI 27.24 kg/m   Estimated body mass index is 27.24 kg/m  as calculated from the following:    Height as of 10/11/18: 1.638 m (5' 4.5\").    Weight as of this encounter: 73.1 kg (161 lb 3.2 oz).  Medication Reconciliation: complete  Ilana Burnett LPN    "

## 2020-01-23 ENCOUNTER — TELEPHONE (OUTPATIENT)
Dept: FAMILY MEDICINE | Facility: OTHER | Age: 65
End: 2020-01-23

## 2020-02-10 ENCOUNTER — OFFICE VISIT (OUTPATIENT)
Dept: CHIROPRACTIC MEDICINE | Facility: OTHER | Age: 65
End: 2020-02-10
Attending: CHIROPRACTOR
Payer: COMMERCIAL

## 2020-02-10 DIAGNOSIS — M99.03 SEGMENTAL AND SOMATIC DYSFUNCTION OF LUMBAR REGION: Primary | ICD-10-CM

## 2020-02-10 DIAGNOSIS — M54.50 ACUTE BILATERAL LOW BACK PAIN WITHOUT SCIATICA: ICD-10-CM

## 2020-02-10 DIAGNOSIS — M99.01 SEGMENTAL AND SOMATIC DYSFUNCTION OF CERVICAL REGION: ICD-10-CM

## 2020-02-10 DIAGNOSIS — M99.02 SEGMENTAL AND SOMATIC DYSFUNCTION OF THORACIC REGION: ICD-10-CM

## 2020-02-10 PROCEDURE — 98941 CHIROPRACT MANJ 3-4 REGIONS: CPT | Mod: AT | Performed by: CHIROPRACTOR

## 2020-02-20 ENCOUNTER — TELEPHONE (OUTPATIENT)
Dept: FAMILY MEDICINE | Facility: OTHER | Age: 65
End: 2020-02-20

## 2020-02-20 NOTE — TELEPHONE ENCOUNTER
Nubia from  Home Care calling for verbal orders  Orders for home care  2 visits this  Week   Then 1 visit weekly thru certification for  IV teaching, Pic-line dressing change and labs    Please call Nubia  [phone]    Thanks    Vane Manuel LPN

## 2020-02-24 ENCOUNTER — RESULTS ONLY (OUTPATIENT)
Dept: LAB | Age: 65
End: 2020-02-24

## 2020-02-24 LAB
ALP SERPL-CCNC: 157 U/L (ref 40–150)
ALT SERPL W P-5'-P-CCNC: 13 U/L (ref 0–50)
AST SERPL W P-5'-P-CCNC: 23 U/L (ref 0–45)
BASOPHILS # BLD AUTO: 0.1 10E9/L (ref 0–0.2)
BASOPHILS NFR BLD AUTO: 0.7 %
BUN SERPL-MCNC: 21 MG/DL (ref 7–30)
CHOLEST SERPL-MCNC: 166 MG/DL
CREAT SERPL-MCNC: 0.74 MG/DL (ref 0.52–1.04)
CRP SERPL-MCNC: 4.4 MG/L (ref 0–8)
DIFFERENTIAL METHOD BLD: ABNORMAL
EOSINOPHIL # BLD AUTO: 0.3 10E9/L (ref 0–0.7)
EOSINOPHIL NFR BLD AUTO: 3.7 %
ERYTHROCYTE [DISTWIDTH] IN BLOOD BY AUTOMATED COUNT: 13.2 % (ref 10–15)
GFR SERPL CREATININE-BSD FRML MDRD: 85 ML/MIN/{1.73_M2}
HCT VFR BLD AUTO: 25.6 % (ref 35–47)
HDLC SERPL-MCNC: 44 MG/DL
HGB BLD-MCNC: 8.1 G/DL (ref 11.7–15.7)
IMM GRANULOCYTES # BLD: 0.1 10E9/L (ref 0–0.4)
IMM GRANULOCYTES NFR BLD: 0.7 %
LDLC SERPL CALC-MCNC: 93 MG/DL
LYMPHOCYTES # BLD AUTO: 1.3 10E9/L (ref 0.8–5.3)
LYMPHOCYTES NFR BLD AUTO: 18 %
MCH RBC QN AUTO: 28.9 PG (ref 26.5–33)
MCHC RBC AUTO-ENTMCNC: 31.6 G/DL (ref 31.5–36.5)
MCV RBC AUTO: 91 FL (ref 78–100)
MONOCYTES # BLD AUTO: 0.6 10E9/L (ref 0–1.3)
MONOCYTES NFR BLD AUTO: 7.7 %
NEUTROPHILS # BLD AUTO: 5.1 10E9/L (ref 1.6–8.3)
NEUTROPHILS NFR BLD AUTO: 69.2 %
NONHDLC SERPL-MCNC: 122 MG/DL
NRBC # BLD AUTO: 0 10*3/UL
NRBC BLD AUTO-RTO: 0 /100
PLATELET # BLD AUTO: 379 10E9/L (ref 150–450)
POTASSIUM SERPL-SCNC: 4 MMOL/L (ref 3.4–5.3)
RBC # BLD AUTO: 2.8 10E12/L (ref 3.8–5.2)
TRIGL SERPL-MCNC: 145 MG/DL
WBC # BLD AUTO: 7.4 10E9/L (ref 4–11)

## 2020-02-27 DIAGNOSIS — Z53.9 PERSONS ENCOUNTERING HEALTH SERVICES FOR SPECIFIC PROCEDURES, NOT CARRIED OUT: Primary | ICD-10-CM

## 2020-02-27 PROCEDURE — G0180 MD CERTIFICATION HHA PATIENT: HCPCS | Performed by: FAMILY MEDICINE

## 2020-02-28 ENCOUNTER — MEDICAL CORRESPONDENCE (OUTPATIENT)
Dept: HEALTH INFORMATION MANAGEMENT | Facility: CLINIC | Age: 65
End: 2020-02-28

## 2020-03-02 ENCOUNTER — RESULTS ONLY (OUTPATIENT)
Dept: LAB | Age: 65
End: 2020-03-02

## 2020-03-02 ENCOUNTER — DOCUMENTATION ONLY (OUTPATIENT)
Dept: OTHER | Facility: CLINIC | Age: 65
End: 2020-03-02

## 2020-03-02 ENCOUNTER — HEALTH MAINTENANCE LETTER (OUTPATIENT)
Age: 65
End: 2020-03-02

## 2020-03-02 DIAGNOSIS — F51.01 PRIMARY INSOMNIA: ICD-10-CM

## 2020-03-02 LAB
ALP SERPL-CCNC: 162 U/L (ref 40–150)
ALT SERPL W P-5'-P-CCNC: 8 U/L (ref 0–50)
AST SERPL W P-5'-P-CCNC: 16 U/L (ref 0–45)
BASOPHILS # BLD AUTO: 0 10E9/L (ref 0–0.2)
BASOPHILS NFR BLD AUTO: 0.8 %
BUN SERPL-MCNC: 19 MG/DL (ref 7–30)
CHOLEST SERPL-MCNC: 193 MG/DL
CREAT SERPL-MCNC: 0.68 MG/DL (ref 0.52–1.04)
CRP SERPL-MCNC: <2.9 MG/L (ref 0–8)
DIFFERENTIAL METHOD BLD: ABNORMAL
EOSINOPHIL # BLD AUTO: 0.2 10E9/L (ref 0–0.7)
EOSINOPHIL NFR BLD AUTO: 3.7 %
ERYTHROCYTE [DISTWIDTH] IN BLOOD BY AUTOMATED COUNT: 12.9 % (ref 10–15)
GFR SERPL CREATININE-BSD FRML MDRD: >90 ML/MIN/{1.73_M2}
HCT VFR BLD AUTO: 27.5 % (ref 35–47)
HDLC SERPL-MCNC: 49 MG/DL
HGB BLD-MCNC: 8.5 G/DL (ref 11.7–15.7)
IMM GRANULOCYTES # BLD: 0 10E9/L (ref 0–0.4)
IMM GRANULOCYTES NFR BLD: 0.4 %
LDLC SERPL CALC-MCNC: 121 MG/DL
LYMPHOCYTES # BLD AUTO: 1.1 10E9/L (ref 0.8–5.3)
LYMPHOCYTES NFR BLD AUTO: 22.2 %
MCH RBC QN AUTO: 28 PG (ref 26.5–33)
MCHC RBC AUTO-ENTMCNC: 30.9 G/DL (ref 31.5–36.5)
MCV RBC AUTO: 91 FL (ref 78–100)
MONOCYTES # BLD AUTO: 0.4 10E9/L (ref 0–1.3)
MONOCYTES NFR BLD AUTO: 8.6 %
NEUTROPHILS # BLD AUTO: 3.3 10E9/L (ref 1.6–8.3)
NEUTROPHILS NFR BLD AUTO: 64.3 %
NONHDLC SERPL-MCNC: 144 MG/DL
NRBC # BLD AUTO: 0 10*3/UL
NRBC BLD AUTO-RTO: 0 /100
PLATELET # BLD AUTO: 356 10E9/L (ref 150–450)
POTASSIUM SERPL-SCNC: 3.9 MMOL/L (ref 3.4–5.3)
RBC # BLD AUTO: 3.04 10E12/L (ref 3.8–5.2)
TRIGL SERPL-MCNC: 114 MG/DL
WBC # BLD AUTO: 5.1 10E9/L (ref 4–11)

## 2020-03-02 NOTE — TELEPHONE ENCOUNTER
klonopin      Last Written Prescription Date:  12/31/20  Last Fill Quantity: 60,   # refills: 0  Last Office Visit: 1/22/20  Future Office visit:       Routing refill request to provider for review/approval because:  Drug not on the [facility], P or [facility] refill protocol or controlled substance

## 2020-03-03 ENCOUNTER — HOSPITAL ENCOUNTER (EMERGENCY)
Facility: HOSPITAL | Age: 65
Discharge: HOME | End: 2020-03-03
Attending: EMERGENCY MEDICINE | Admitting: EMERGENCY MEDICINE
Payer: MEDICARE

## 2020-03-03 VITALS
DIASTOLIC BLOOD PRESSURE: 58 MMHG | TEMPERATURE: 98.3 F | RESPIRATION RATE: 16 BRPM | OXYGEN SATURATION: 100 % | SYSTOLIC BLOOD PRESSURE: 115 MMHG

## 2020-03-03 DIAGNOSIS — T82.9XXA COMPLICATION ASSOCIATED WITH PERIPHERALLY INSERTED CENTRAL CATHETER, INITIAL ENCOUNTER: Primary | ICD-10-CM

## 2020-03-03 PROCEDURE — 99281 EMR DPT VST MAYX REQ PHY/QHP: CPT

## 2020-03-03 PROCEDURE — 99282 EMERGENCY DEPT VISIT SF MDM: CPT | Mod: Z6 | Performed by: EMERGENCY MEDICINE

## 2020-03-03 RX ORDER — MULTIVIT WITH MINERALS/LUTEIN
1 TABLET ORAL DAILY
COMMUNITY
End: 2021-11-29

## 2020-03-03 RX ORDER — OXYCODONE HYDROCHLORIDE 5 MG/1
5 TABLET ORAL EVERY 6 HOURS PRN
Status: ON HOLD | COMMUNITY
End: 2020-04-08

## 2020-03-03 RX ORDER — AMOXICILLIN 250 MG
1 CAPSULE ORAL DAILY PRN
Status: ON HOLD | COMMUNITY
End: 2020-04-08

## 2020-03-03 RX ORDER — CEFAZOLIN SODIUM 1 G/50ML
2 SOLUTION INTRAVENOUS 3 TIMES DAILY
Status: ON HOLD | COMMUNITY
End: 2020-04-08

## 2020-03-03 RX ORDER — CLONAZEPAM 1 MG/1
TABLET ORAL
Qty: 60 TABLET | Refills: 1 | Status: SHIPPED | OUTPATIENT
Start: 2020-03-03 | End: 2020-07-02

## 2020-03-03 RX ORDER — GABAPENTIN 300 MG/1
300 CAPSULE ORAL 3 TIMES DAILY
Status: ON HOLD | COMMUNITY
End: 2020-04-08

## 2020-03-03 RX ORDER — ACETAMINOPHEN 325 MG/1
500 TABLET ORAL 3 TIMES DAILY
COMMUNITY
End: 2022-03-14

## 2020-03-03 NOTE — ED AVS SNAPSHOT
[facility]  [address]  [place] [address]  Phone:  [phone]                                    Aileen Szymanski   MRN: [id number]    Department:  [facility]   Date of Visit:  3/3/2020           After Visit Summary Signature Page    I have received my discharge instructions, and my questions have been answered. I have discussed any challenges I see with this plan with the nurse or doctor.    ..........................................................................................................................................  Patient/Patient Representative Signature      ..........................................................................................................................................  Patient Representative Print Name and Relationship to Patient    ..................................................               ................................................  Date                                   Time    ..........................................................................................................................................  Reviewed by Signature/Title    ...................................................              ..............................................  Date                                               Time          22EPIC Rev 08/18

## 2020-03-03 NOTE — ED NOTES
PICC line dressing removed with caution, cleaned with sterile technique and redressed with sterile technique. No complications noted and patient tolerated well. No active bleeding noted.

## 2020-03-03 NOTE — ED PROVIDER NOTES
History     Chief Complaint   Patient presents with     Vascular Access Problem     bleeding at picc line site. no pain or fever.      HPI  Aileen Szymanski is a 65 year old female who presented to the ED with complaints of bleeding at PICC line site.  Patient denies fever, chills Xarelto after her recent left knee surgery.  She is has a PICC line for IV antibiotics for left knee prosthesis infection.    Allergies:  Allergies   Allergen Reactions     Lovenox [Enoxaparin] Other (See Comments)     Bleeding       Problem List:    Patient Active Problem List    Diagnosis Date Noted     Arthrofibrosis of knee joint, left 12/27/2019     Priority: Medium     Added automatically from request for surgery [id number]       Failed total left knee replacement (H) 12/27/2019     Priority: Medium     Added automatically from request for surgery [id number]       Infection of total knee replacement (H) 12/27/2019     Priority: Medium     Added automatically from request for surgery [id number]       Major depressive disorder without psychotic features 08/31/2017     Priority: Medium     Mood disorder (H) 09/19/2016     Priority: Medium     Mixed hyperlipidemia 09/19/2016     Priority: Medium     Lung granuloma (H) 03/27/2013     Priority: Medium     Overview:   Noted on chest xray 3/2013  Noted on chest xray 3/2013       Osteoarthrosis, pelvic region and thigh 12/05/2006     Priority: Medium     Overview:   IMO Update 10/11  IMO Update 10/11       Major depressive disorder, recurrent episode (H) 06/22/2006     Priority: Medium     Overview:   IMO Update 10/11  IMO Update 10/11          Past Medical History:    Past Medical History:   Diagnosis Date     Depressive disorder      Dizziness and giddiness 07/31/2007     Enthesopathy of knee, unspecified 06/13/2002     History of blood transfusion 2014?     Major depressive disorder without psychotic features 8/31/2017     Nonallopathic lesion of cervical region, not elsewhere classified 07/25/2001      Nonallopathic lesion of thoracic region, not elsewhere classified 06/13/2002     Pain in joint, lower leg 06/24/2002       Past Surgical History:    Past Surgical History:   Procedure Laterality Date     ABDOMEN SURGERY  2008     lap band     ABDOMEN SURGERY  2007    hysteroscopy     ARTHROPLASTY REVISION KNEE Left 10/2016     CHOLECYSTECTOMY      removal     CLOSED REDUCTION NOSE N/A 11/7/2017    Procedure: CLOSED REDUCTION NOSE;  CLOSED REDUCTION NASAL SEPTAL FRACTURE;  Surgeon: Destiny Vanegas MD;  Location: [facility] OR     COLONOSCOPY  04/13/2010    Dr Quevedo; negative      left knee replacement  7/21/2014    [facility]       Family History:    Family History   Problem Relation Age of Onset     Lung Cancer Mother      Heart Disease Mother      Chronic Obstructive Pulmonary Disease Father        Social History:  Marital Status:   [2]  Social History     Tobacco Use     Smoking status: Never Smoker     Smokeless tobacco: Never Used   Substance Use Topics     Alcohol use: Yes     Comment: rare     Drug use: No        Medications:    acetaminophen (TYLENOL) 325 MG tablet  ceFAZolin (ANCEF) intermittent infusion 2 g in 100mL NS (pre-mix)  clonazePAM (KLONOPIN) 1 MG tablet  escitalopram (LEXAPRO) 20 MG tablet  gabapentin (NEURONTIN) 300 MG capsule  MELATONIN PO  multivitamin (CENTRUM SILVER) tablet  multivitamin, therapeutic (THERA-VIT) TABS tablet  oxyCODONE (ROXICODONE) 5 MG tablet  rivaroxaban ANTICOAGULANT (XARELTO) 10 MG TABS tablet  senna-docusate (SENOKOT-S/PERICOLACE) 8.6-50 MG tablet  VITAMIN D, CHOLECALCIFEROL, PO  vitamin D3 (CHOLECALCIFEROL) 13951 units (250 mcg) capsule          Review of Systems   All other systems reviewed and are negative.      Physical Exam   BP: 115/58  Heart Rate: 79  Temp: 98.3  F (36.8  C)  Resp: 16  SpO2: 100 %      Physical Exam  Vitals signs and nursing note reviewed.   Constitutional:       General: She is not in acute distress.     Appearance:  Normal appearance. She is not diaphoretic.   HENT:      Head: Normocephalic and atraumatic.      Mouth/Throat:      Pharynx: No oropharyngeal exudate.   Eyes:      General: No scleral icterus.     Pupils: Pupils are equal, round, and reactive to light.   Cardiovascular:      Rate and Rhythm: Normal rate and regular rhythm.      Heart sounds: Normal heart sounds.   Pulmonary:      Effort: No respiratory distress.      Breath sounds: Normal breath sounds.   Chest:      Chest wall: No tenderness.   Abdominal:      General: Bowel sounds are normal.      Palpations: Abdomen is soft.      Tenderness: There is no abdominal tenderness.   Musculoskeletal:        Arms:    Neurological:      Mental Status: She is alert and oriented to person, place, and time.         ED Course       Procedures      No results found for this or any previous visit (from the past 24 hour(s)).    Medications - No data to display    Assessments & Plan (with Medical Decision Making)   PICC line site bleeding: PICC line site bleeding secondary to Xarelto if patient still needs to use still end of this month.  She also still needs to use the PICC line for IV antibiotics for left knee prosthesis infection.  No active bleeding noted from the PICC line site on the right.  The area was cleaned and a new dressing applied.  Discharged home in stable condition.    I have reviewed the nursing notes.    I have reviewed the findings, diagnosis, plan and need for follow up with the patient.    Discharge Medication List as of 3/3/2020  1:20 PM          Final diagnoses:   Complication associated with peripherally inserted central catheter, initial encounter       3/3/2020   [facility]     Axel Pope MD  03/03/20 1810

## 2020-03-03 NOTE — ED TRIAGE NOTES
Patient presents today with PICC line that was placed for three times a day Ancef due to an infected leg. Patient's joint that was replaced 5 years ago got infected and had to be removed and a lisa placed at [facility].

## 2020-03-09 ENCOUNTER — RESULTS ONLY (OUTPATIENT)
Dept: LAB | Age: 65
End: 2020-03-09

## 2020-03-09 LAB
ALP SERPL-CCNC: 150 U/L (ref 40–150)
ALT SERPL W P-5'-P-CCNC: 10 U/L (ref 0–50)
AST SERPL W P-5'-P-CCNC: 14 U/L (ref 0–45)
BASOPHILS # BLD AUTO: 0 10E9/L (ref 0–0.2)
BASOPHILS NFR BLD AUTO: 0.4 %
BUN SERPL-MCNC: 26 MG/DL (ref 7–30)
CHOLEST SERPL-MCNC: 171 MG/DL
CREAT SERPL-MCNC: 0.71 MG/DL (ref 0.52–1.04)
CRP SERPL-MCNC: <2.9 MG/L (ref 0–8)
DIFFERENTIAL METHOD BLD: ABNORMAL
EOSINOPHIL # BLD AUTO: 0.2 10E9/L (ref 0–0.7)
EOSINOPHIL NFR BLD AUTO: 2.7 %
ERYTHROCYTE [DISTWIDTH] IN BLOOD BY AUTOMATED COUNT: 12.8 % (ref 10–15)
GFR SERPL CREATININE-BSD FRML MDRD: 89 ML/MIN/{1.73_M2}
HCT VFR BLD AUTO: 28.6 % (ref 35–47)
HDLC SERPL-MCNC: 43 MG/DL
HGB BLD-MCNC: 8.7 G/DL (ref 11.7–15.7)
IMM GRANULOCYTES # BLD: 0 10E9/L (ref 0–0.4)
IMM GRANULOCYTES NFR BLD: 0.4 %
LDLC SERPL CALC-MCNC: 90 MG/DL
LYMPHOCYTES # BLD AUTO: 1.1 10E9/L (ref 0.8–5.3)
LYMPHOCYTES NFR BLD AUTO: 19.2 %
MCH RBC QN AUTO: 27.3 PG (ref 26.5–33)
MCHC RBC AUTO-ENTMCNC: 30.4 G/DL (ref 31.5–36.5)
MCV RBC AUTO: 90 FL (ref 78–100)
MONOCYTES # BLD AUTO: 0.5 10E9/L (ref 0–1.3)
MONOCYTES NFR BLD AUTO: 9.9 %
NEUTROPHILS # BLD AUTO: 3.7 10E9/L (ref 1.6–8.3)
NEUTROPHILS NFR BLD AUTO: 67.4 %
NONHDLC SERPL-MCNC: 128 MG/DL
NRBC # BLD AUTO: 0 10*3/UL
NRBC BLD AUTO-RTO: 0 /100
PLATELET # BLD AUTO: 256 10E9/L (ref 150–450)
POTASSIUM SERPL-SCNC: 3.9 MMOL/L (ref 3.4–5.3)
RBC # BLD AUTO: 3.19 10E12/L (ref 3.8–5.2)
TRIGL SERPL-MCNC: 189 MG/DL
WBC # BLD AUTO: 5.5 10E9/L (ref 4–11)

## 2020-03-16 ENCOUNTER — RESULTS ONLY (OUTPATIENT)
Dept: LAB | Age: 65
End: 2020-03-16

## 2020-03-16 ENCOUNTER — APPOINTMENT (OUTPATIENT)
Dept: ULTRASOUND IMAGING | Facility: HOSPITAL | Age: 65
End: 2020-03-16
Attending: FAMILY MEDICINE
Payer: MEDICARE

## 2020-03-16 ENCOUNTER — HOSPITAL ENCOUNTER (EMERGENCY)
Facility: HOSPITAL | Age: 65
Discharge: HOME | End: 2020-03-16
Attending: FAMILY MEDICINE | Admitting: FAMILY MEDICINE
Payer: MEDICARE

## 2020-03-16 VITALS
BODY MASS INDEX: 27.04 KG/M2 | RESPIRATION RATE: 16 BRPM | DIASTOLIC BLOOD PRESSURE: 72 MMHG | TEMPERATURE: 98.4 F | WEIGHT: 160 LBS | SYSTOLIC BLOOD PRESSURE: 118 MMHG | HEART RATE: 89 BPM | OXYGEN SATURATION: 100 %

## 2020-03-16 DIAGNOSIS — M25.562 ACUTE PAIN OF LEFT KNEE: Primary | ICD-10-CM

## 2020-03-16 DIAGNOSIS — G89.18 ACUTE POST-OPERATIVE PAIN: ICD-10-CM

## 2020-03-16 LAB
ALP SERPL-CCNC: 137 U/L (ref 40–150)
ALT SERPL W P-5'-P-CCNC: 7 U/L (ref 0–50)
AST SERPL W P-5'-P-CCNC: 15 U/L (ref 0–45)
BASOPHILS # BLD AUTO: 0 10E9/L (ref 0–0.2)
BASOPHILS NFR BLD AUTO: 0.4 %
BUN SERPL-MCNC: 20 MG/DL (ref 7–30)
CHOLEST SERPL-MCNC: 199 MG/DL
CREAT SERPL-MCNC: 0.67 MG/DL (ref 0.52–1.04)
CRP SERPL-MCNC: <2.9 MG/L (ref 0–8)
DIFFERENTIAL METHOD BLD: ABNORMAL
EOSINOPHIL # BLD AUTO: 0.2 10E9/L (ref 0–0.7)
EOSINOPHIL NFR BLD AUTO: 4.1 %
ERYTHROCYTE [DISTWIDTH] IN BLOOD BY AUTOMATED COUNT: 13.1 % (ref 10–15)
GFR SERPL CREATININE-BSD FRML MDRD: >90 ML/MIN/{1.73_M2}
HCT VFR BLD AUTO: 29.2 % (ref 35–47)
HDLC SERPL-MCNC: 57 MG/DL
HGB BLD-MCNC: 8.9 G/DL (ref 11.7–15.7)
IMM GRANULOCYTES # BLD: 0 10E9/L (ref 0–0.4)
IMM GRANULOCYTES NFR BLD: 0.2 %
LACTATE BLD-SCNC: 0.9 MMOL/L (ref 0.7–2)
LDLC SERPL CALC-MCNC: 119 MG/DL
LYMPHOCYTES # BLD AUTO: 1.1 10E9/L (ref 0.8–5.3)
LYMPHOCYTES NFR BLD AUTO: 23.5 %
MCH RBC QN AUTO: 27 PG (ref 26.5–33)
MCHC RBC AUTO-ENTMCNC: 30.5 G/DL (ref 31.5–36.5)
MCV RBC AUTO: 89 FL (ref 78–100)
MONOCYTES # BLD AUTO: 0.6 10E9/L (ref 0–1.3)
MONOCYTES NFR BLD AUTO: 11.3 %
NEUTROPHILS # BLD AUTO: 2.9 10E9/L (ref 1.6–8.3)
NEUTROPHILS NFR BLD AUTO: 60.5 %
NONHDLC SERPL-MCNC: 142 MG/DL
NRBC # BLD AUTO: 0 10*3/UL
NRBC BLD AUTO-RTO: 0 /100
PLATELET # BLD AUTO: 223 10E9/L (ref 150–450)
POTASSIUM SERPL-SCNC: 3.8 MMOL/L (ref 3.4–5.3)
RBC # BLD AUTO: 3.3 10E12/L (ref 3.8–5.2)
TRIGL SERPL-MCNC: 116 MG/DL
WBC # BLD AUTO: 4.9 10E9/L (ref 4–11)

## 2020-03-16 PROCEDURE — 93971 EXTREMITY STUDY: CPT | Mod: TC,LT

## 2020-03-16 PROCEDURE — 36415 COLL VENOUS BLD VENIPUNCTURE: CPT | Performed by: FAMILY MEDICINE

## 2020-03-16 PROCEDURE — 99284 EMERGENCY DEPT VISIT MOD MDM: CPT | Mod: Z6 | Performed by: FAMILY MEDICINE

## 2020-03-16 PROCEDURE — 83605 ASSAY OF LACTIC ACID: CPT | Performed by: FAMILY MEDICINE

## 2020-03-16 PROCEDURE — 99284 EMERGENCY DEPT VISIT MOD MDM: CPT | Mod: 25

## 2020-03-16 ASSESSMENT — ENCOUNTER SYMPTOMS
ARTHRALGIAS: 0
ABDOMINAL PAIN: 0
FEVER: 0
HEADACHES: 0
SHORTNESS OF BREATH: 0
NECK STIFFNESS: 0
DIFFICULTY URINATING: 0
CONFUSION: 0
COLOR CHANGE: 0
EYE REDNESS: 0

## 2020-03-16 NOTE — ED AVS SNAPSHOT
[facility]  [address]  [place] [address]  Phone:  [phone]                                    Aileen Szymanski   MRN: [id number]    Department:  [facility]   Date of Visit:  3/16/2020           After Visit Summary Signature Page    I have received my discharge instructions, and my questions have been answered. I have discussed any challenges I see with this plan with the nurse or doctor.    ..........................................................................................................................................  Patient/Patient Representative Signature      ..........................................................................................................................................  Patient Representative Print Name and Relationship to Patient    ..................................................               ................................................  Date                                   Time    ..........................................................................................................................................  Reviewed by Signature/Title    ...................................................              ..............................................  Date                                               Time          22EPIC Rev 08/18

## 2020-03-16 NOTE — ED PROVIDER NOTES
History     Chief Complaint   Patient presents with     Leg Swelling     left knee swelling. pt had a spacer lisa placed on 2/12/2020.  Has been on vanco and ancef since surgery. Pt home health nurse told pt to come in for the increase swelling for one week or more.      HPI  Aileen Szymanski is a 65 year old woman with history of osteoarthritis s/p left TKA 5 years ago who had a spacer lisa placed on 2/12/20. She had a PICC line placed following the surgery and has been receiving cefazolin and vancomycin (now completed). The patient reports about 5 - 7 days of left leg pain that seems to radiate down her shin. She notes no redness or acute leg swelling, but is concerned about the possibility of a blood clot. No fevers/chills, cough, congestion/rhinorrhea or recent travel outside of [place].    Allergies:  Allergies   Allergen Reactions     Lovenox [Enoxaparin] Other (See Comments)     Bleeding       Problem List:    Patient Active Problem List    Diagnosis Date Noted     Arthrofibrosis of knee joint, left 12/27/2019     Priority: Medium     Added automatically from request for surgery [id number]       Failed total left knee replacement (H) 12/27/2019     Priority: Medium     Added automatically from request for surgery [id number]       Infection of total knee replacement (H) 12/27/2019     Priority: Medium     Added automatically from request for surgery [id number]       Major depressive disorder without psychotic features 08/31/2017     Priority: Medium     Mood disorder (H) 09/19/2016     Priority: Medium     Mixed hyperlipidemia 09/19/2016     Priority: Medium     Lung granuloma (H) 03/27/2013     Priority: Medium     Overview:   Noted on chest xray 3/2013  Noted on chest xray 3/2013       Osteoarthrosis, pelvic region and thigh 12/05/2006     Priority: Medium     Overview:   IMO Update 10/11  IMO Update 10/11       Major depressive disorder, recurrent episode (H) 06/22/2006     Priority: Medium     Overview:   IMO Update  10/11  IMO Update 10/11          Past Medical History:    Past Medical History:   Diagnosis Date     Depressive disorder      Dizziness and giddiness 07/31/2007     Enthesopathy of knee, unspecified 06/13/2002     History of blood transfusion 2014?     Major depressive disorder without psychotic features 8/31/2017     Nonallopathic lesion of cervical region, not elsewhere classified 07/25/2001     Nonallopathic lesion of thoracic region, not elsewhere classified 06/13/2002     Pain in joint, lower leg 06/24/2002       Past Surgical History:    Past Surgical History:   Procedure Laterality Date     ABDOMEN SURGERY  2008     lap band     ABDOMEN SURGERY  2007    hysteroscopy     ARTHROPLASTY REVISION KNEE Left 10/2016     CHOLECYSTECTOMY      removal     CLOSED REDUCTION NOSE N/A 11/7/2017    Procedure: CLOSED REDUCTION NOSE;  CLOSED REDUCTION NASAL SEPTAL FRACTURE;  Surgeon: Destiny Vanegas MD;  Location: [facility]     COLONOSCOPY  04/13/2010    Dr Quevedo; negative      left knee replacement  7/21/2014    Dr Quinones/ [facility]       Family History:    Family History   Problem Relation Age of Onset     Lung Cancer Mother      Heart Disease Mother      Chronic Obstructive Pulmonary Disease Father        Social History:  Marital Status:   [2]  Social History     Tobacco Use     Smoking status: Never Smoker     Smokeless tobacco: Never Used   Substance Use Topics     Alcohol use: Yes     Comment: rare     Drug use: No        Medications:    acetaminophen (TYLENOL) 325 MG tablet  ceFAZolin (ANCEF) intermittent infusion 2 g in 100mL NS (pre-mix)  clonazePAM (KLONOPIN) 1 MG tablet  escitalopram (LEXAPRO) 20 MG tablet  gabapentin (NEURONTIN) 300 MG capsule  MELATONIN PO  multivitamin (CENTRUM SILVER) tablet  multivitamin, therapeutic (THERA-VIT) TABS tablet  oxyCODONE (ROXICODONE) 5 MG tablet  rivaroxaban ANTICOAGULANT (XARELTO) 10 MG TABS tablet  senna-docusate (SENOKOT-S/PERICOLACE) 8.6-50 MG  tablet  VITAMIN D, CHOLECALCIFEROL, PO  vitamin D3 (CHOLECALCIFEROL) 89012 units (250 mcg) capsule          Review of Systems   Constitutional: Negative for fever.   HENT: Negative for congestion.    Eyes: Negative for redness.   Respiratory: Negative for shortness of breath.    Cardiovascular: Negative for chest pain.   Gastrointestinal: Negative for abdominal pain.   Genitourinary: Negative for difficulty urinating.   Musculoskeletal: Negative for arthralgias and neck stiffness.   Skin: Negative for color change.   Neurological: Negative for headaches.   Psychiatric/Behavioral: Negative for confusion.       Physical Exam   BP: 111/72  Pulse: 89  Temp: 98.1  F (36.7  C)  Resp: 18  Weight: 72.6 kg (160 lb)  SpO2: 99 %      Physical Exam    General Appearance: well-developed, well-nourished, alert & oriented, no apparent distress.    HEENT: atraumatic. Pupils equal, round & reactive to light, extraocular movements intact. Bilateral ear canals clear. Nose without rhinorrhea or epistaxis. Clear oropharynx. Moist mucous membranes.    Neck: normal inspection, non-tender, full & painless ROM, supple, no lymphadenopathy or nuchal rigidity. No jugular venous distension.    Cardiovascular: regular rate, rhythm, normal S1 & S2, no murmurs, rubs or gallops.    Respiratory: clear lung sounds with good air entry, no wheezes rales or rhonchi, no acute respiratory distress.    Gastrointestinal: normal inspection, normal bowel sounds, non-tender, no masses or organomegaly.    Extremities: normal right knee inspection, well-healed surgical scar over the left knee without spreading erythema, open wound/purulent drainage or underlying fluctuance, 2+/4+ pulses bilaterally, normal & painless ROM, non-tender, joints normal.    Neurologic: CN II - XII intact, no motor/sensory deficit.    Skin: normal color, no skin rash.    ED Course        Procedures           Results for orders placed or performed during the hospital encounter of  03/16/20 (from the past 24 hour(s))   Lactic acid whole blood   Result Value Ref Range    Lactic Acid 0.9 0.7 - 2.0 mmol/L   US Lower Extremity Venous Duplex Left    Narrative    PROCEDURE: US LOWER EXTREMITY VENOUS DUPLEX LEFT 3/16/2020 2:51 PM    HISTORY: Knee Swelling    COMPARISONS: None.    TECHNIQUE: Grayscale and color Doppler evaluation.    FINDINGS: There is no deep venous thrombosis. Veins are normally  compressible with normal phasicity and augmentation.         Impression    IMPRESSION: No DVT.    PATRICK BRAR MD       Medications - No data to display    Assessments & Plan (with Medical Decision Making)   The patient is a 65 year old with left knee pain.    1) Left knee pain - stable anemia. No leukocytosis or acute renal/hepatic abnormality. Normal lactic acid. Normal CRP. No evidence of DVT on ultrasound. Patient will be treated with rest, oral fluids and her previously prescribed pain management regimen.    Patient will call her primary orthopedic surgery team regarding this ER visit to seek guidance regarding when she should be re-evaluated by her primary surgeon. The patient verbalizes agreement with this plan as well as to immediately return to the ER with any new/worsening S/Sx.      I have reviewed the nursing notes.    I have reviewed the findings, diagnosis, plan and need for follow up with the patient.    New Prescriptions    No medications on file       Final diagnoses:   Acute pain of left knee   Acute post-operative pain       3/16/2020   [facility]     Taran Espino MD  03/16/20 [id number]

## 2020-03-16 NOTE — DISCHARGE INSTRUCTIONS
Thank you for allowing [facility] to care for you today. The cause of your left knee pain remains unclear at this time. No evidence an an acute infection was revealed during your evaluation. There was no evidence of blood clot revealed during your ultrasound evaluation.    Please call your primary orthopedic surgery team regarding this emergency department visit to seek guidance regarding recommended orthopedic follow-up. However, immediately return to the emergency department with any new or worsening signs, symptoms or questions.    Pranay Espino MD

## 2020-03-19 ENCOUNTER — TELEPHONE (OUTPATIENT)
Dept: FAMILY MEDICINE | Facility: OTHER | Age: 65
End: 2020-03-19

## 2020-03-19 NOTE — TELEPHONE ENCOUNTER
Antibiotic spacer in left leg.  Dr in [place] would like an xray to see if it is damaged or broken. Please order an xray for her.    [phone]

## 2020-03-19 NOTE — TELEPHONE ENCOUNTER
9:10 AM    Reason for Call: Phone Call    Description: patient calls and states she needs a xray of her left leg, Dr. Bhavesh sequeira from [facility] in [facility] is requesting it.     Was an appointment offered for this call? No  If yes : Appointment type              Date    Preferred method for responding to this message: Telephone Call  What is your phone number ?    If we cannot reach you directly, may we leave a detailed response at the number you provided? Yes    Can this message wait until your PCP/provider returns, if available today? YES, No    Deng Glover

## 2020-03-20 NOTE — TELEPHONE ENCOUNTER
Called and notified patient that Dr. Mohan will have to send the request and Poppy will then be able to cosign. Kim Curry LPN

## 2020-03-20 NOTE — TELEPHONE ENCOUNTER
Called and spoke to patient notifying her that her Dr. Mohan  Will have to order the xrays and Poppy will then be able to cosign for it. Kim Curry LPN

## 2020-03-23 ENCOUNTER — RESULTS ONLY (OUTPATIENT)
Dept: LAB | Age: 65
End: 2020-03-23

## 2020-03-23 ENCOUNTER — TELEPHONE (OUTPATIENT)
Dept: FAMILY MEDICINE | Facility: OTHER | Age: 65
End: 2020-03-23

## 2020-03-23 LAB
ALP SERPL-CCNC: 126 U/L (ref 40–150)
ALT SERPL W P-5'-P-CCNC: 7 U/L (ref 0–50)
AST SERPL W P-5'-P-CCNC: 18 U/L (ref 0–45)
BASOPHILS # BLD AUTO: 0 10E9/L (ref 0–0.2)
BASOPHILS NFR BLD AUTO: 0.3 %
BUN SERPL-MCNC: 21 MG/DL (ref 7–30)
CHOLEST SERPL-MCNC: 204 MG/DL
CREAT SERPL-MCNC: 0.72 MG/DL (ref 0.52–1.04)
CRP SERPL-MCNC: <2.9 MG/L (ref 0–8)
DIFFERENTIAL METHOD BLD: ABNORMAL
EOSINOPHIL # BLD AUTO: 0.1 10E9/L (ref 0–0.7)
EOSINOPHIL NFR BLD AUTO: 1.2 %
ERYTHROCYTE [DISTWIDTH] IN BLOOD BY AUTOMATED COUNT: 13.1 % (ref 10–15)
GFR SERPL CREATININE-BSD FRML MDRD: 88 ML/MIN/{1.73_M2}
HCT VFR BLD AUTO: 30.2 % (ref 35–47)
HDLC SERPL-MCNC: 60 MG/DL
HGB BLD-MCNC: 9.3 G/DL (ref 11.7–15.7)
IMM GRANULOCYTES # BLD: 0 10E9/L (ref 0–0.4)
IMM GRANULOCYTES NFR BLD: 0.3 %
LDLC SERPL CALC-MCNC: 127 MG/DL
LYMPHOCYTES # BLD AUTO: 1.1 10E9/L (ref 0.8–5.3)
LYMPHOCYTES NFR BLD AUTO: 18.4 %
MCH RBC QN AUTO: 26.7 PG (ref 26.5–33)
MCHC RBC AUTO-ENTMCNC: 30.8 G/DL (ref 31.5–36.5)
MCV RBC AUTO: 87 FL (ref 78–100)
MONOCYTES # BLD AUTO: 0.5 10E9/L (ref 0–1.3)
MONOCYTES NFR BLD AUTO: 7.7 %
NEUTROPHILS # BLD AUTO: 4.2 10E9/L (ref 1.6–8.3)
NEUTROPHILS NFR BLD AUTO: 72.1 %
NONHDLC SERPL-MCNC: 144 MG/DL
NRBC # BLD AUTO: 0 10*3/UL
NRBC BLD AUTO-RTO: 0 /100
PLATELET # BLD AUTO: 263 10E9/L (ref 150–450)
POTASSIUM SERPL-SCNC: 4.3 MMOL/L (ref 3.4–5.3)
RBC # BLD AUTO: 3.48 10E12/L (ref 3.8–5.2)
TRIGL SERPL-MCNC: 86 MG/DL
WBC # BLD AUTO: 5.9 10E9/L (ref 4–11)

## 2020-04-08 ENCOUNTER — HOSPITAL ENCOUNTER (OUTPATIENT)
Dept: INTERVENTIONAL RADIOLOGY/VASCULAR | Facility: HOSPITAL | Age: 65
End: 2020-04-08
Attending: ORTHOPAEDIC SURGERY | Admitting: RADIOLOGY
Payer: MEDICARE

## 2020-04-08 ENCOUNTER — HOSPITAL ENCOUNTER (OUTPATIENT)
Facility: HOSPITAL | Age: 65
Discharge: HOME | End: 2020-04-08
Attending: RADIOLOGY | Admitting: RADIOLOGY
Payer: MEDICARE

## 2020-04-08 DIAGNOSIS — Z96.659 INFECTION OF PROSTHETIC KNEE JOINT, SEQUELA: Primary | ICD-10-CM

## 2020-04-08 DIAGNOSIS — T84.59XS INFECTION OF TOTAL KNEE REPLACEMENT, SEQUELA: ICD-10-CM

## 2020-04-08 DIAGNOSIS — T84.59XS INFECTION OF PROSTHETIC KNEE JOINT, SEQUELA: Primary | ICD-10-CM

## 2020-04-08 DIAGNOSIS — Z96.659 INFECTION OF TOTAL KNEE REPLACEMENT, SEQUELA: ICD-10-CM

## 2020-04-08 PROCEDURE — 84311 SPECTROPHOTOMETRY: CPT

## 2020-04-08 PROCEDURE — 87070 CULTURE OTHR SPECIMN AEROBIC: CPT

## 2020-04-08 PROCEDURE — 83516 IMMUNOASSAY NONANTIBODY: CPT

## 2020-04-08 PROCEDURE — 77002 NEEDLE LOCALIZATION BY XRAY: CPT | Mod: TC

## 2020-04-08 PROCEDURE — 25000125 ZZHC RX 250: Performed by: RADIOLOGY

## 2020-04-08 PROCEDURE — 86140 C-REACTIVE PROTEIN: CPT

## 2020-04-08 RX ORDER — LIDOCAINE HYDROCHLORIDE 10 MG/ML
10 INJECTION, SOLUTION INFILTRATION; PERINEURAL ONCE
Status: COMPLETED | OUTPATIENT
Start: 2020-04-08 | End: 2020-04-08

## 2020-04-08 RX ORDER — LIDOCAINE HYDROCHLORIDE 10 MG/ML
INJECTION, SOLUTION EPIDURAL; INFILTRATION; INTRACAUDAL; PERINEURAL
Status: DISPENSED
Start: 2020-04-08 | End: 2020-04-08

## 2020-04-08 RX ORDER — IOPAMIDOL 612 MG/ML
50 INJECTION, SOLUTION INTRAVASCULAR ONCE
Status: DISCONTINUED | OUTPATIENT
Start: 2020-04-08 | End: 2020-04-08 | Stop reason: CLARIF

## 2020-04-08 RX ADMIN — LIDOCAINE HYDROCHLORIDE 6 ML: 10 INJECTION, SOLUTION EPIDURAL; INFILTRATION; INTRACAUDAL; PERINEURAL at 11:00

## 2020-04-09 ENCOUNTER — TELEPHONE (OUTPATIENT)
Dept: FAMILY MEDICINE | Facility: OTHER | Age: 65
End: 2020-04-09

## 2020-04-09 ENCOUNTER — OFFICE VISIT (OUTPATIENT)
Dept: FAMILY MEDICINE | Facility: OTHER | Age: 65
End: 2020-04-09
Attending: PHYSICIAN ASSISTANT
Payer: MEDICARE

## 2020-04-09 VITALS
TEMPERATURE: 98.6 F | BODY MASS INDEX: 27.45 KG/M2 | WEIGHT: 162.4 LBS | OXYGEN SATURATION: 99 % | SYSTOLIC BLOOD PRESSURE: 114 MMHG | DIASTOLIC BLOOD PRESSURE: 69 MMHG | HEART RATE: 85 BPM

## 2020-04-09 DIAGNOSIS — Z96.659 INFECTION OF TOTAL KNEE REPLACEMENT, SEQUELA: ICD-10-CM

## 2020-04-09 DIAGNOSIS — T84.59XS INFECTION OF TOTAL KNEE REPLACEMENT, SEQUELA: ICD-10-CM

## 2020-04-09 DIAGNOSIS — Z01.818 PREOP GENERAL PHYSICAL EXAM: Primary | ICD-10-CM

## 2020-04-09 DIAGNOSIS — R33.9 URINARY RETENTION: ICD-10-CM

## 2020-04-09 DIAGNOSIS — G25.81 RESTLESS LEGS SYNDROME (RLS): ICD-10-CM

## 2020-04-09 DIAGNOSIS — K59.02 CONSTIPATION DUE TO OUTLET DYSFUNCTION: ICD-10-CM

## 2020-04-09 PROBLEM — T84.50XA PROSTHETIC JOINT INFECTION (H): Status: ACTIVE | Noted: 2020-02-18

## 2020-04-09 LAB
ALBUMIN UR-MCNC: NEGATIVE MG/DL
APPEARANCE UR: CLEAR
BACTERIA #/AREA URNS HPF: ABNORMAL /HPF
BASOPHILS # BLD AUTO: 0 10E9/L (ref 0–0.2)
BASOPHILS NFR BLD AUTO: 0.4 %
BILIRUB UR QL STRIP: NEGATIVE
COLOR UR AUTO: YELLOW
CRP SERPL-MCNC: <2.9 MG/L (ref 0–8)
DIFFERENTIAL METHOD BLD: ABNORMAL
EOSINOPHIL # BLD AUTO: 0.1 10E9/L (ref 0–0.7)
EOSINOPHIL NFR BLD AUTO: 1.3 %
ERYTHROCYTE [DISTWIDTH] IN BLOOD BY AUTOMATED COUNT: 13.2 % (ref 10–15)
ERYTHROCYTE [SEDIMENTATION RATE] IN BLOOD BY WESTERGREN METHOD: 54 MM/H (ref 0–30)
GLUCOSE UR STRIP-MCNC: NEGATIVE MG/DL
HCT VFR BLD AUTO: 35.4 % (ref 35–47)
HGB BLD-MCNC: 10.7 G/DL (ref 11.7–15.7)
HGB UR QL STRIP: NEGATIVE
IMM GRANULOCYTES # BLD: 0 10E9/L (ref 0–0.4)
IMM GRANULOCYTES NFR BLD: 0.1 %
KETONES UR STRIP-MCNC: NEGATIVE MG/DL
LEUKOCYTE ESTERASE UR QL STRIP: ABNORMAL
LYMPHOCYTES # BLD AUTO: 1.2 10E9/L (ref 0.8–5.3)
LYMPHOCYTES NFR BLD AUTO: 17.9 %
MCH RBC QN AUTO: 25.5 PG (ref 26.5–33)
MCHC RBC AUTO-ENTMCNC: 30.2 G/DL (ref 31.5–36.5)
MCV RBC AUTO: 85 FL (ref 78–100)
MONOCYTES # BLD AUTO: 0.5 10E9/L (ref 0–1.3)
MONOCYTES NFR BLD AUTO: 7.8 %
MUCOUS THREADS #/AREA URNS LPF: PRESENT /LPF
NEUTROPHILS # BLD AUTO: 5 10E9/L (ref 1.6–8.3)
NEUTROPHILS NFR BLD AUTO: 72.5 %
NITRATE UR QL: NEGATIVE
NRBC # BLD AUTO: 0 10*3/UL
NRBC BLD AUTO-RTO: 0 /100
PH UR STRIP: 5.5 PH (ref 4.7–8)
PLATELET # BLD AUTO: 279 10E9/L (ref 150–450)
RBC # BLD AUTO: 4.19 10E12/L (ref 3.8–5.2)
RBC #/AREA URNS AUTO: <1 /HPF (ref 0–2)
SOURCE: ABNORMAL
SP GR UR STRIP: 1.02 (ref 1–1.03)
SQUAMOUS #/AREA URNS AUTO: 1 /HPF (ref 0–1)
UROBILINOGEN UR STRIP-MCNC: NORMAL MG/DL (ref 0–2)
WBC # BLD AUTO: 6.9 10E9/L (ref 4–11)
WBC #/AREA URNS AUTO: 2 /HPF (ref 0–5)

## 2020-04-09 PROCEDURE — 99214 OFFICE O/P EST MOD 30 MIN: CPT | Performed by: PHYSICIAN ASSISTANT

## 2020-04-09 PROCEDURE — G0463 HOSPITAL OUTPT CLINIC VISIT: HCPCS

## 2020-04-09 PROCEDURE — 85652 RBC SED RATE AUTOMATED: CPT | Mod: ZL | Performed by: PHYSICIAN ASSISTANT

## 2020-04-09 PROCEDURE — 86140 C-REACTIVE PROTEIN: CPT | Mod: ZL | Performed by: PHYSICIAN ASSISTANT

## 2020-04-09 PROCEDURE — 85025 COMPLETE CBC W/AUTO DIFF WBC: CPT | Mod: ZL | Performed by: PHYSICIAN ASSISTANT

## 2020-04-09 PROCEDURE — 87081 CULTURE SCREEN ONLY: CPT | Mod: ZL | Performed by: PHYSICIAN ASSISTANT

## 2020-04-09 PROCEDURE — 81001 URINALYSIS AUTO W/SCOPE: CPT | Mod: ZL | Performed by: PHYSICIAN ASSISTANT

## 2020-04-09 PROCEDURE — 36415 COLL VENOUS BLD VENIPUNCTURE: CPT | Mod: ZL | Performed by: PHYSICIAN ASSISTANT

## 2020-04-09 PROCEDURE — 87186 SC STD MICRODIL/AGAR DIL: CPT | Mod: ZL | Performed by: PHYSICIAN ASSISTANT

## 2020-04-09 RX ORDER — POLYETHYLENE GLYCOL 3350 17 G/17G
1 POWDER, FOR SOLUTION ORAL DAILY
Qty: 30 PACKET | Refills: 3 | Status: SHIPPED | OUTPATIENT
Start: 2020-04-09 | End: 2021-05-03

## 2020-04-09 RX ORDER — CYCLOBENZAPRINE HCL 10 MG
10 TABLET ORAL
Qty: 90 TABLET | Refills: 3 | Status: SHIPPED | OUTPATIENT
Start: 2020-04-09 | End: 2021-05-03

## 2020-04-09 ASSESSMENT — PAIN SCALES - GENERAL: PAINLEVEL: MILD PAIN (2)

## 2020-04-09 NOTE — PROGRESS NOTES
[facility] [place]  [address]  [place] [address]  [phone]  Dept: [phone]    PRE-OP EVALUATION:  Today's date: 2020    Aileen Szymanski (: 1955) presents for pre-operative evaluation assessment as requested by Dr. Nick.  She requires evaluation and anesthesia risk assessment prior to undergoing surgery/procedure for treatment of Left Knee Revision .    Proposed Surgery/ Procedure: Left Knee Revision  Date of Surgery/ Procedure: 2020  Time of Surgery/ Procedure: tbd  Hospital/Surgical Facility: [facility]  Fax number for surgical facility: [facility]. No number present.   Primary Physician: Poppy Haynes  Type of Anesthesia Anticipated: to be determined    Patient has a Health Care Directive or Living Will:  YES     1. NO - Do you have a history of heart attack, stroke, stent, bypass or surgery on an artery in the head, neck, heart or legs?  2. NO - Do you ever have any pain or discomfort in your chest?  3. NO - Do you have a history of  Heart Failure?  4. NO - Are you troubled by shortness of breath when: walking on the level, up a slight hill or at night?  5. NO - Do you currently have a cold, bronchitis or other respiratory infection?  6. NO - Do you have a cough, shortness of breath or wheezing?  7. NO - Do you sometimes get pains in the calves of your legs when you walk?  8. NO - Do you or anyone in your family have previous history of blood clots?  9. NO - Do you or does anyone in your family have a serious bleeding problem such as prolonged bleeding following surgeries or cuts?  10. NO - Have you ever had problems with anemia or been told to take iron pills?  11. NO - Have you had any abnormal blood loss such as black, tarry or bloody stools, or abnormal vaginal bleeding?  12. YES - HAVE YOU EVER HAD A BLOOD TRANSFUSION? On 2020   13. NO - Have you or any of your relatives ever had problems with anesthesia?  14. NO - Do you have sleep  apnea, excessive snoring or daytime drowsiness?  15. NO - Do you have any prosthetic heart valves?  16. YES - DO YOU HAVE PROSTHETIC JOINTS? Yes Left knee  17. NO - Is there any chance that you may be pregnant?      HPI:     HPI related to upcoming procedure: has an infected hardware of her left knee.  Had an antibiotic lisa in her knee and IV medications for 6 weeks. Now completed.       See problem list for active medical problems.  Problems all longstanding and stable, except as noted/documented.  See ROS for pertinent symptoms related to these conditions.      MEDICAL HISTORY:     Patient Active Problem List    Diagnosis Date Noted     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     Priority: Medium     Arthrofibrosis of knee joint, left 12/27/2019     Priority: Medium     Added automatically from request for surgery [id number]       Failed total left knee replacement (H) 12/27/2019     Priority: Medium     Added automatically from request for surgery [id number]       Infection of total knee replacement (H) 12/27/2019     Priority: Medium     Added automatically from request for surgery [id number]       Major depressive disorder without psychotic features 08/31/2017     Priority: Medium     Mood disorder (H) 09/19/2016     Priority: Medium     Mixed hyperlipidemia 09/19/2016     Priority: Medium     Lung granuloma (H) 03/27/2013     Priority: Medium     Overview:   Noted on chest xray 3/2013  Noted on chest xray 3/2013       Osteoarthrosis, pelvic region and thigh 12/05/2006     Priority: Medium     Overview:   IMO Update 10/11  IMO Update 10/11       Major depressive disorder, recurrent episode (H) 06/22/2006     Priority: Medium     Overview:   IMO Update 10/11  IMO Update 10/11        Past Medical History:   Diagnosis Date     Depressive disorder     Many years     Dizziness and giddiness 07/31/2007     Enthesopathy of knee, unspecified 06/13/2002     History of blood transfusion 2014?    Allergy to Lovenox. Due to knee  replacement surgery.     Major depressive disorder without psychotic features 8/31/2017     Nonallopathic lesion of cervical region, not elsewhere classified 07/25/2001     Nonallopathic lesion of thoracic region, not elsewhere classified 06/13/2002     Pain in joint, lower leg 06/24/2002     Past Surgical History:   Procedure Laterality Date     ABDOMEN SURGERY  2008     lap band     ABDOMEN SURGERY  2007    hysteroscopy     ARTHROPLASTY REVISION KNEE Left 10/2016     CHOLECYSTECTOMY      removal     CLOSED REDUCTION NOSE N/A 11/7/2017    Procedure: CLOSED REDUCTION NOSE;  CLOSED REDUCTION NASAL SEPTAL FRACTURE;  Surgeon: Destiny Vanegas MD;  Location: [facility] OR     COLONOSCOPY  04/13/2010    Dr Quevedo; negative      left knee replacement  7/21/2014    Dr Quinones/ [facility]     Current Outpatient Medications   Medication Sig Dispense Refill     clonazePAM (KLONOPIN) 1 MG tablet TAKE 1 TABLET BY MOUTH AT BEDTIME 60 tablet 1     cyclobenzaprine (FLEXERIL) 10 MG tablet Take 1 tablet (10 mg) by mouth nightly as needed for muscle spasms 90 tablet 3     escitalopram (LEXAPRO) 20 MG tablet TAKE 1 TABLET (20 MG) BY MOUTH DAILY 90 tablet 3     MELATONIN PO Take 10 mg by mouth nightly as needed        multivitamin (CENTRUM SILVER) tablet Take 1 tablet by mouth daily       multivitamin, therapeutic (THERA-VIT) TABS tablet Take 1 tablet by mouth daily       polyethylene glycol (MIRALAX) 17 g packet Take 17 g by mouth daily 30 packet 3     VITAMIN D, CHOLECALCIFEROL, PO Take 3,000 Units by mouth daily        vitamin D3 (CHOLECALCIFEROL) 68642 units (250 mcg) capsule Take 1 capsule by mouth daily       acetaminophen (TYLENOL) 325 MG tablet Take 500 mg by mouth 3 times daily       OTC products: none    Allergies   Allergen Reactions     Lovenox [Enoxaparin] Other (See Comments)     Bleeding      Latex Allergy: NO    Social History     Tobacco Use     Smoking status: Never Smoker     Smokeless tobacco: Never Used    Substance Use Topics     Alcohol use: Yes     Comment: rare     History   Drug Use No       REVIEW OF SYSTEMS:   CONSTITUTIONAL: NEGATIVE for fever, chills, change in weight  INTEGUMENTARY/SKIN: NEGATIVE for worrisome rashes, moles or lesions  EYES: NEGATIVE for vision changes or irritation  ENT/MOUTH: NEGATIVE for ear, mouth and throat problems  RESP: NEGATIVE for significant cough or SOB  BREAST: NEGATIVE for masses, tenderness or discharge  CV: NEGATIVE for chest pain, palpitations or peripheral edema  GI: NEGATIVE for nausea, abdominal pain, heartburn, or change in bowel habits  : NEGATIVE for frequency, dysuria, or hematuria  MUSCULOSKELETAL:see hpi  NEURO: NEGATIVE for weakness, dizziness or paresthesias  ENDOCRINE: NEGATIVE for temperature intolerance, skin/hair changes  HEME: NEGATIVE for bleeding problems  PSYCHIATRIC: NEGATIVE for changes in mood or affect    EXAM:   /69 (BP Location: Right arm, Patient Position: Sitting, Cuff Size: Adult Regular)   Pulse 85   Temp 98.6  F (37  C) (Tympanic)   Wt 73.7 kg (162 lb 6.4 oz)   SpO2 99%   BMI 27.45 kg/m      GENERAL APPEARANCE: healthy, alert and no distress     EYES: EOMI, PERRL     HENT: ear canals and TM's normal and nose and mouth without ulcers or lesions     NECK: no adenopathy, no asymmetry, masses, or scars and thyroid normal to palpation     RESP: lungs clear to auscultation - no rales, rhonchi or wheezes     CV: regular rates and rhythm, normal S1 S2, no S3 or S4 and no murmur, click or rub     ABDOMEN:  soft, nontender, no HSM or masses and bowel sounds normal     MS: extremities normal- no gross deformities noted, no evidence of inflammation in joints, FROM in all extremities.     SKIN: no suspicious lesions or rashes     NEURO: Normal strength and tone, sensory exam grossly normal, mentation intact and speech normal     PSYCH: mentation appears normal. and affect normal/bright     LYMPHATICS: No cervical adenopathy    DIAGNOSTICS:      EKG: appears normal, NSR, normal axis, normal intervals, no acute ST/T changes c/w ischemia, no LVH by voltage criteria, unchanged from previous tracings  Labs Drawn and in Process:   Unresulted Labs Ordered in the Past 30 Days of this Admission     Date and Time Order Name Status Description    4/8/2020 1142 Fluid Culture Aerobic Bacterial Preliminary     4/8/2020 1136 SYNOVASURE PERIPROSTHETIC JOINT INFECTION DETECTION In process           Recent Labs   Lab Test 03/23/20  1200 03/16/20  0830  01/22/20  1525  10/24/18  1036  11/06/17  1622   HGB 9.3* 8.9*   < > 13.4   < > 13.3   < > 13.0    223   < > 230   < > 191   < >  --    INR  --   --   --   --   --   --   --  1.00   NA  --   --   --  140  --  138   < >  --    POTASSIUM 4.3 3.8   < > 4.7  --  4.3   < >  --    CR 0.72 0.67   < > 0.80  --  1.10*   < >  --     < > = values in this interval not displayed.        IMPRESSION:   Reason for surgery/procedure: she is having lisa removal and knee replacement if infection is clear.   Diagnosis/reason for consult: medical clearance.     The proposed surgical procedure is considered INTERMEDIATE risk.    REVISED CARDIAC RISK INDEX  The patient has the following serious cardiovascular risks for perioperative complications such as (MI, PE, VFib and 3  AV Block):  No serious cardiac risks  INTERPRETATION: 1 risks: Class II (low risk - 0.9% complication rate)    The patient has the following additional risks for perioperative complications:  No identified additional risks      ICD-10-CM    1. Preop general physical exam  Z01.818 ESR: Erythrocyte sedimentation rate     CRP, inflammation     Methicillin resistant staph aureus cult     CBC with platelets differential   2. Infection of total knee replacement, sequela  T84.59XS ESR: Erythrocyte sedimentation rate    Z96.659 CRP, inflammation     Methicillin resistant staph aureus cult   3. Urinary retention  R33.9 UA reflex to Microscopic and Culture - [place]      polyethylene glycol (MIRALAX) 17 g packet   4. Constipation due to outlet dysfunction  K59.02    5. Restless legs syndrome (RLS)  G25.81 cyclobenzaprine (FLEXERIL) 10 MG tablet       RECOMMENDATIONS:         --Patient is to take all scheduled medications on the day of surgery EXCEPT for modifications listed below.    APPROVAL GIVEN to proceed with proposed procedure, without further diagnostic evaluation       Signed Electronically by: HC Provider 2    Copy of this evaluation report is provided to requesting physician.    [facility] Preop Guidelines    Revised Cardiac Risk Index

## 2020-04-09 NOTE — NURSING NOTE
"Chief Complaint   Patient presents with     Pre-Op Exam       Initial /69 (BP Location: Right arm, Patient Position: Sitting, Cuff Size: Adult Regular)   Pulse 85   Temp 98.6  F (37  C) (Tympanic)   Wt 73.7 kg (162 lb 6.4 oz)   SpO2 99%   BMI 27.45 kg/m   Estimated body mass index is 27.45 kg/m  as calculated from the following:    Height as of 10/11/18: 1.638 m (5' 4.5\").    Weight as of this encounter: 73.7 kg (162 lb 6.4 oz).  Medication Reconciliation: complete  Kim Curry LPN  "

## 2020-04-12 LAB
BACTERIA SPEC CULT: NORMAL
SPECIMEN SOURCE: NORMAL

## 2020-04-14 DIAGNOSIS — M25.469 KNEE SWELLING: Primary | ICD-10-CM

## 2020-04-14 RX ORDER — CELECOXIB 100 MG/1
100 CAPSULE ORAL 2 TIMES DAILY
Qty: 60 CAPSULE | Refills: 1 | Status: SHIPPED | OUTPATIENT
Start: 2020-04-14 | End: 2021-05-03

## 2020-04-20 DIAGNOSIS — Z71.89 COUNSELING AND COORDINATION OF CARE: Primary | ICD-10-CM

## 2020-04-21 DIAGNOSIS — Z71.89 COUNSELING AND COORDINATION OF CARE: ICD-10-CM

## 2020-04-21 LAB
ANION GAP SERPL CALCULATED.3IONS-SCNC: 2 MMOL/L (ref 3–14)
BUN SERPL-MCNC: 21 MG/DL (ref 7–30)
CALCIUM SERPL-MCNC: 8.8 MG/DL (ref 8.5–10.1)
CHLORIDE SERPL-SCNC: 108 MMOL/L (ref 94–109)
CO2 SERPL-SCNC: 29 MMOL/L (ref 20–32)
CREAT SERPL-MCNC: 0.92 MG/DL (ref 0.52–1.04)
GFR SERPL CREATININE-BSD FRML MDRD: 65 ML/MIN/{1.73_M2}
GLUCOSE SERPL-MCNC: 96 MG/DL (ref 70–99)
POTASSIUM SERPL-SCNC: 4.1 MMOL/L (ref 3.4–5.3)
SODIUM SERPL-SCNC: 139 MMOL/L (ref 133–144)

## 2020-04-21 PROCEDURE — 80048 BASIC METABOLIC PNL TOTAL CA: CPT | Mod: ZL | Performed by: ORTHOPAEDIC SURGERY

## 2020-04-21 PROCEDURE — 36415 COLL VENOUS BLD VENIPUNCTURE: CPT | Mod: ZL | Performed by: ORTHOPAEDIC SURGERY

## 2020-04-22 LAB
BACTERIA SPEC CULT: NO GROWTH
SPECIMEN SOURCE: NORMAL

## 2020-05-13 ENCOUNTER — MEDICAL CORRESPONDENCE (OUTPATIENT)
Dept: HEALTH INFORMATION MANAGEMENT | Facility: CLINIC | Age: 65
End: 2020-05-13

## 2020-05-13 ENCOUNTER — TELEPHONE (OUTPATIENT)
Dept: FAMILY MEDICINE | Facility: OTHER | Age: 65
End: 2020-05-13

## 2020-05-13 NOTE — TELEPHONE ENCOUNTER
Nubia from [facility] calling to request verbal for skilled nursing wound assessment 2 times a week for 2 weeks, than 1 time a week for 2 weeks.    Ph: [phone]    Please advise    Clarice Montiel LPN      .

## 2020-05-21 ENCOUNTER — DOCUMENTATION ONLY (OUTPATIENT)
Dept: OTHER | Facility: CLINIC | Age: 65
End: 2020-05-21

## 2020-05-26 DIAGNOSIS — Z53.9 PERSONS ENCOUNTERING HEALTH SERVICES FOR SPECIFIC PROCEDURES, NOT CARRIED OUT: Primary | ICD-10-CM

## 2020-05-26 PROCEDURE — G0180 MD CERTIFICATION HHA PATIENT: HCPCS | Performed by: FAMILY MEDICINE

## 2020-05-27 NOTE — PROGRESS NOTES
Subjective     Aileen Szymanski is a 65 year old female who presents to clinic today for the following health issues:    HPI   Suture removal      Duration: may 6th    Description (location/character/radiation): remove sutures from left leg    Intensity:  mild    Accompanying signs and symptoms: none    History (similar episodes/previous evaluation): None    Precipitating or alleviating factors: None    Therapies tried and outcome: None    Left TKA 6 years ago, 4 years ago revision w/ smaller knee cap, Feb 2020 spacer bar for 2 months, May 6th knee put back in but had a hairline fx which progressed during the surgery. Her surgeon wanted the stitch in for longer than normal to 4th surgery in the same area and risk of bending her knee resulting in loosing of stitchesl  - taking bactrim for 6 months  - virtual visit w/ ortho provider yesterday             Patient Active Problem List   Diagnosis     Mood disorder (H)     Mixed hyperlipidemia     Major depressive disorder without psychotic features     Arthrofibrosis of knee joint, left     Failed total left knee replacement (H)     Infection of total knee replacement (H)     Lung granuloma (H)     Major depressive disorder, recurrent episode (H)     Osteoarthrosis, pelvic region and thigh     Prosthetic joint infection (H)     Past Surgical History:   Procedure Laterality Date     ABDOMEN SURGERY  2008     lap band     ABDOMEN SURGERY  2007    hysteroscopy     ARTHROPLASTY REVISION KNEE Left 10/2016     CHOLECYSTECTOMY      removal     CLOSED REDUCTION NOSE N/A 11/7/2017    Procedure: CLOSED REDUCTION NOSE;  CLOSED REDUCTION NASAL SEPTAL FRACTURE;  Surgeon: Destiny Vanegas MD;  Location: [facility] OR     COLONOSCOPY  04/13/2010    Dr Quevedo; negative      JOINT REPLACEMENT Left 05/06/2020    replacement after infection     left knee replacement  7/21/2014    Dr Quinones/ [facility]       Social History     Tobacco Use     Smoking status: Never Smoker      Smokeless tobacco: Never Used   Substance Use Topics     Alcohol use: Yes     Comment: rare     Family History   Problem Relation Age of Onset     Lung Cancer Mother      Heart Disease Mother      Chronic Obstructive Pulmonary Disease Father          Current Outpatient Medications   Medication Sig Dispense Refill     celecoxib (CELEBREX) 100 MG capsule Take 1 capsule (100 mg) by mouth 2 times daily 60 capsule 1     clonazePAM (KLONOPIN) 1 MG tablet TAKE 1 TABLET BY MOUTH AT BEDTIME 60 tablet 1     cyclobenzaprine (FLEXERIL) 10 MG tablet Take 1 tablet (10 mg) by mouth nightly as needed for muscle spasms 90 tablet 3     escitalopram (LEXAPRO) 20 MG tablet TAKE 1 TABLET (20 MG) BY MOUTH DAILY 90 tablet 3     MELATONIN PO Take 10 mg by mouth nightly as needed        multivitamin (CENTRUM SILVER) tablet Take 1 tablet by mouth daily       multivitamin, therapeutic (THERA-VIT) TABS tablet Take 1 tablet by mouth daily       polyethylene glycol (MIRALAX) 17 g packet Take 17 g by mouth daily 30 packet 3     sulfamethoxazole-trimethoprim (BACTRIM) 400-80 MG tablet Take 1 tablet by mouth 2 times daily       VITAMIN D, CHOLECALCIFEROL, PO Take 3,000 Units by mouth daily        vitamin D3 (CHOLECALCIFEROL) 05707 units (250 mcg) capsule Take 1 capsule by mouth daily       acetaminophen (TYLENOL) 325 MG tablet Take 500 mg by mouth 3 times daily       Allergies   Allergen Reactions     Lovenox [Enoxaparin] Other (See Comments)     Bleeding     BP Readings from Last 3 Encounters:   05/29/20 110/58   04/09/20 114/69   03/16/20 118/72    Wt Readings from Last 3 Encounters:   05/29/20 72.6 kg (160 lb)   04/09/20 73.7 kg (162 lb 6.4 oz)   03/16/20 72.6 kg (160 lb)                    Reviewed and updated as needed this visit by Provider  Tobacco  Allergies  Meds  Problems  Med Hx  Surg Hx  Fam Hx         Review of Systems   Constitutional: Negative for chills and fever.   HENT: Negative for congestion.    Respiratory: Negative for  shortness of breath.    Cardiovascular: Negative for chest pain and palpitations.   Gastrointestinal: Negative for abdominal pain.   Musculoskeletal: Positive for arthralgias (mild left knee / leg).            Objective    /58   Pulse 72   Temp 98.6  F (37  C) (Tympanic)   Wt 72.6 kg (160 lb)   SpO2 97%   BMI 27.04 kg/m    Body mass index is 27.04 kg/m .  Physical Exam  Constitutional:       General: She is not in acute distress.     Appearance: She is not ill-appearing.   Cardiovascular:      Rate and Rhythm: Normal rate and regular rhythm.      Heart sounds: No murmur.   Pulmonary:      Effort: Pulmonary effort is normal. No respiratory distress.      Breath sounds: No wheezing or rales.   Abdominal:      General: Bowel sounds are normal.      Tenderness: There is no abdominal tenderness.   Musculoskeletal:      Comments: Left knee: Able to flex knee to ~ 30 degrees. Sensation intact. PT pulse 2+. Incision line well healed. Most stitches in place. Stitches removed without issues, steri strips placed. No s/s of infection   Neurological:      Mental Status: She is alert.        Diagnostic Test Results:  none         Assessment & Plan     1. History of total knee arthroplasty, left  Stitches removed today w/o issues. Steri strips placed. No s/s of infection      See Patient Instructions    Return if symptoms worsen or fail to improve.    Vane Gil MD  [facility] - [place]

## 2020-05-29 ENCOUNTER — OFFICE VISIT (OUTPATIENT)
Dept: FAMILY MEDICINE | Facility: OTHER | Age: 65
End: 2020-05-29
Attending: FAMILY MEDICINE
Payer: COMMERCIAL

## 2020-05-29 VITALS
BODY MASS INDEX: 27.04 KG/M2 | TEMPERATURE: 98.6 F | WEIGHT: 160 LBS | DIASTOLIC BLOOD PRESSURE: 58 MMHG | SYSTOLIC BLOOD PRESSURE: 110 MMHG | HEART RATE: 72 BPM | OXYGEN SATURATION: 97 %

## 2020-05-29 DIAGNOSIS — Z96.652 HISTORY OF TOTAL KNEE ARTHROPLASTY, LEFT: Primary | ICD-10-CM

## 2020-05-29 PROCEDURE — G0463 HOSPITAL OUTPT CLINIC VISIT: HCPCS

## 2020-05-29 PROCEDURE — 99213 OFFICE O/P EST LOW 20 MIN: CPT | Performed by: FAMILY MEDICINE

## 2020-05-29 RX ORDER — SULFAMETHOXAZOLE AND TRIMETHOPRIM 400; 80 MG/1; MG/1
1 TABLET ORAL 2 TIMES DAILY
COMMUNITY
End: 2021-03-25

## 2020-05-29 ASSESSMENT — ANXIETY QUESTIONNAIRES
1. FEELING NERVOUS, ANXIOUS, OR ON EDGE: NOT AT ALL
6. BECOMING EASILY ANNOYED OR IRRITABLE: NOT AT ALL
7. FEELING AFRAID AS IF SOMETHING AWFUL MIGHT HAPPEN: NOT AT ALL
3. WORRYING TOO MUCH ABOUT DIFFERENT THINGS: NOT AT ALL
5. BEING SO RESTLESS THAT IT IS HARD TO SIT STILL: NOT AT ALL
GAD7 TOTAL SCORE: 0
2. NOT BEING ABLE TO STOP OR CONTROL WORRYING: NOT AT ALL
4. TROUBLE RELAXING: NOT AT ALL

## 2020-05-29 ASSESSMENT — ENCOUNTER SYMPTOMS
SHORTNESS OF BREATH: 0
CHILLS: 0
ARTHRALGIAS: 1
PALPITATIONS: 0
ABDOMINAL PAIN: 0
FEVER: 0

## 2020-05-29 ASSESSMENT — PATIENT HEALTH QUESTIONNAIRE - PHQ9: SUM OF ALL RESPONSES TO PHQ QUESTIONS 1-9: 0

## 2020-05-29 ASSESSMENT — PAIN SCALES - GENERAL: PAINLEVEL: NO PAIN (0)

## 2020-05-29 NOTE — PATIENT INSTRUCTIONS
"It was a pleasure to meet you today.      Patient Education     Stitches or Staple Removal  You were seen today for removal of your stitches or staples. Your wound is healing as expected. The wound has healed well enough that the stitches or staples can be removed. The wound will continue to heal for the next few months.  At this time there is no sign of infection.   Home care    If you have pain, take pain medicine as advised by your healthcare provider.     Keep your wound clean and protected by covering it with a bandage for the next week or so.     Wash your hands with soap and warm water before and after caring for your wound. This helps prevent infection.    Clean the wound gently with soap and warm water daily or as directed by your healthcare provider. Don't use iodine, alcohol, or other cleansers on the wound.  Gently pat it dry. Put on a new bandage, if needed. Don't reuse bandages.    If the area gets wet, gently pat it dry with a clean cloth. Replace the wet bandage with a dry one.    Check the wound daily for signs of infection. (These are listed under \"When to seek medical advice\" below.)    You may shower and bathe as usual. Swimming may be allowed, but check with your healthcare provider first.    Keep the wound out of prolonged direct sunlight. The new skin is very sensitive and can easily sunburn causing worse scarring.    Ask your provider about using over-the-counter scar removing creams if your wound is highly visible.    Follow-up care  Follow up with your healthcare provider as advised.  When to seek medical advice   Call your healthcare provider if any of the following occur:    Wound reopens or bleeds    Signs of an infection, such as:  ? Increasing redness or swelling around the wound  ? Increased warmth from the wound  ? Worsening pain  ? Red streaking lines away from the wound  ? Fluid draining from the wound    Fever of 100.4 F (38 C) or higher, or as directed by your healthcare " provider  Date Last Reviewed: 4/1/2018 2000-2019 [employer]. [address] [place] [address], [place] [address]. All rights reserved. This information is not intended as a substitute for professional medical care. Always follow your healthcare professional's instructions.

## 2020-05-29 NOTE — NURSING NOTE
"Chief Complaint   Patient presents with     Suture Removal       Initial /58   Pulse 72   Temp 98.6  F (37  C) (Tympanic)   Wt 72.6 kg (160 lb)   SpO2 97%   BMI 27.04 kg/m   Estimated body mass index is 27.04 kg/m  as calculated from the following:    Height as of 10/11/18: 1.638 m (5' 4.5\").    Weight as of this encounter: 72.6 kg (160 lb).  Medication Reconciliation: complete     Alyce Mayes LPN  "

## 2020-05-30 ASSESSMENT — ANXIETY QUESTIONNAIRES: GAD7 TOTAL SCORE: 0

## 2020-06-12 ENCOUNTER — HOSPITAL ENCOUNTER (OUTPATIENT)
Dept: PHYSICAL THERAPY | Facility: HOSPITAL | Age: 65
Setting detail: THERAPIES SERIES
End: 2020-06-12
Attending: NURSE PRACTITIONER
Payer: MEDICARE

## 2020-06-12 PROCEDURE — 97110 THERAPEUTIC EXERCISES: CPT | Mod: GP | Performed by: PHYSICAL THERAPIST

## 2020-06-12 PROCEDURE — 97161 PT EVAL LOW COMPLEX 20 MIN: CPT | Mod: GP | Performed by: PHYSICAL THERAPIST

## 2020-06-17 ASSESSMENT — ACTIVITIES OF DAILY LIVING (ADL)
GO UP STAIRS: ACTIVITY IS MINIMALLY DIFFICULT
KNEE_ACTIVITY_OF_DAILY_LIVING_SCORE: 75.71
WALK: ACTIVITY IS FAIRLY DIFFICULT
RAW_SCORE: 53
RISE FROM A CHAIR: ACTIVITY IS NOT DIFFICULT
HOW_WOULD_YOU_RATE_THE_OVERALL_FUNCTION_OF_YOUR_KNEE_DURING_YOUR_USUAL_DAILY_ACTIVITIES?: SEVERELY ABNORMAL
SQUAT: I AM UNABLE TO DO THE ACTIVITY
HOW_WOULD_YOU_RATE_THE_CURRENT_FUNCTION_OF_YOUR_KNEE_DURING_YOUR_USUAL_DAILY_ACTIVITIES_ON_A_SCALE_FROM_0_TO_100_WITH_100_BEING_YOUR_LEVEL_OF_KNEE_FUNCTION_PRIOR_TO_YOUR_INJURY_AND_0_BEING_THE_INABILITY_TO_PERFORM_ANY_OF_YOUR_USUAL_DAILY_ACTIVITIES?: 30
WEAKNESS: I DO NOT HAVE THE SYMPTOM
LIMPING: I DO NOT HAVE THE SYMPTOM
KNEEL ON THE FRONT OF YOUR KNEE: I AM UNABLE TO DO THE ACTIVITY
KNEE_ACTIVITY_OF_DAILY_LIVING_SUM: 53
SWELLING: I HAVE THE SYMPTOM BUT IT DOES NOT AFFECT MY ACTIVITY
STIFFNESS: I DO NOT HAVE THE SYMPTOM
AS_A_RESULT_OF_YOUR_KNEE_INJURY,_HOW_WOULD_YOU_RATE_YOUR_CURRENT_LEVEL_OF_DAILY_ACTIVITY?: ABNORMAL
SIT WITH YOUR KNEE BENT: ACTIVITY IS MINIMALLY DIFFICULT
PAIN: I DO NOT HAVE THE SYMPTOM
GIVING WAY, BUCKLING OR SHIFTING OF KNEE: I DO NOT HAVE THE SYMPTOM
GO DOWN STAIRS: ACTIVITY IS MINIMALLY DIFFICULT
STAND: ACTIVITY IS NOT DIFFICULT

## 2020-06-17 NOTE — PROGRESS NOTES
06/12/20 2000   General Information   Type of Visit Initial OP Ortho PT Evaluation   Start of Care Date 06/12/20   Referring Physician ULYSSES Fragoso, CNP   Patient/Family Goals Statement regain normal function of L knee   Orders Evaluate and Treat   Orders Comment TTWB x 12 weeks, HKB at all times   Date of Order 05/07/20   Certification Required? Yes   Medical Diagnosis s/p L knee revision   Surgical/Medical history reviewed Yes   Precautions/Limitations other (see comments)   Weight-Bearing Status - LLE toe touch weight-bearing   Body Part(s)   Body Part(s) Knee   Presentation and Etiology   Pertinent history of current problem (include personal factors and/or comorbidities that impact the POC) Patient has an extensive history dealing with her L knee.  She had a knee replacement roughly 3 years ago and struggled with her progress. She had been to PT 4 different episodes and still struggled.  She got another opinion and was diagnosed with a staph infection. She was treated with IV antibiotics and then her knee was replaced once again, however her tibia was fractuerd during the surgery. She has been TTWB for the past 12 weeks with ROM limited to 0-30 and gradually progressed in her hinged brace now to 0-70 degrees. She sees his surgeon next week to determine if healing has taken place and to determine if she can move forward in her rehab process   Impairments D. Decreased ROM;F. Decreased strength and endurance   Functional Limitations perform activities of daily living   Symptom Location L knee    Onset date of current episode/exacerbation 05/06/20   Chronicity Chronic   Pain rating (0-10 point scale) Best (/10);Worst (/10)   Best (/10) 0   Worst (/10) 3   Pain quality B. Dull   Frequency of pain/symptoms B. Intermittent   Pain/symptoms are: Worse during the day   Pain/symptoms exacerbated by K. Home tasks;F. Nothing   Pain/symptoms eased by A. Sitting;D. Nothing;J. Braces/supports   Progression of  symptoms since onset: Improved   Current / Previous Interventions   Diagnostic Tests:   (imaging prior to surgery)   Current Level of Function   Current Community Support Family/friend caregiver   Patient role/employment history F. Retired   [facility]   Current equipment-Gait/Locomotion Walker   Fall Risk Screen   Fall screen completed by PT   Have you fallen 2 or more times in the past year? No   Have you fallen and had an injury in the past year? No   Is patient a fall risk? No   Knee Objective Findings   Side (if bilateral, select both right and left) Left   Observation no acute distress   Integumentary  appears well healed, supra 18.5cm, infra 15cm   Posture L knee genu valgum appreciated   Gait/Locomotion with FWW, toe-touch,encouraged foot flat   Balance/Proprioception (Single Leg Stance) Impaired   Knee Special Test Comments Special tests not perforned due to post-op status   Palpation Grossly non-tender to palpation   Accessory Motion/Joint Mobility NT   Left Knee Extension AROM 0   Left Knee Flexion AROM 74   Left Knee Flexion PROM NT   Left Knee Flexion Strength 4/5   Left Knee Extension Strength 4/5   Left Hip Abduction Strength 4-/5   Left Quad Set Strength 4-/5   L VMO Strength 4-/5   Planned Therapy Interventions   Planned Therapy Interventions balance training;gait training;joint mobilization;manual therapy;neuromuscular re-education;ROM;strengthening;stretching   Planned Modality Interventions   Planned Modality Interventions Comments as needed   Clinical Impression   Criteria for Skilled Therapeutic Interventions Met yes, treatment indicated   PT Diagnosis s/p L TKA revision, complex   Influenced by the following impairments weakness and ROM limitations   Functional limitations due to impairments difficulty with ambulation, all functional tasks and daily living   Clinical Presentation Stable/Uncomplicated   Clinical Presentation Rationale due to lack of additional  comorbidities influencing anticipated PT progress   Clinical Decision Making (Complexity) Low complexity   Therapy Frequency 2 times/Week   Predicted Duration of Therapy Intervention (days/wks) up to 12 weeks   Risk & Benefits of therapy have been explained Yes   Patient, Family & other staff in agreement with plan of care Yes   Clinical Impression Comments Presentation is consistent with s/p L TKA revision with a conplex history leading up to her surgery. She is expected to progress well with skilled PT intervention with the goals of restoring her ability to performing her ADLs and function well with no pain   Education Assessment   Preferred Learning Style Demonstration   Barriers to Learning No barriers   ORTHO GOALS   PT Ortho Eval Goals 1;2;3;4   Ortho Goal 1   Goal Identifier STG 1   Goal Description Patient will be compliant with HEP in order to make progress at home   Target Date 07/08/20   Ortho Goal 2   Goal Identifier LTG1   Goal Description Patient will demonstrate increased L knee ROM from 0-100 degrees in order to perform stairs safely at home and at [facility]   Target Date 07/29/20   Ortho Goal 3   Goal Identifier LTG 2   Goal Description Patient will report overall 50% or more improvement in general function as it relates to her L knee in order to perform all home tasks safely   Target Date 07/29/20   Ortho Goal 4   Goal Identifier LTG 3   Goal Description Patient will report overall 80% or more improvement in daily function as it relates to her L knee in order to perform all home and [facility] tasks as she desires   Target Date 09/09/20   Total Evaluation Time   PT Eval, Low Complexity Minutes (77136) 35   Therapy Certification   Certification date from 06/12/20   Certification date to 09/09/20   Medical Diagnosis s/p L TKA revision, complex   I certify the need for these services furnished under this plan of treatment and while under my care. (Physician co-signature of this document indicates review and  certification of the therapy plan).      _____________________________     __________________________    ____________  Physician's Signature                 Date               Time

## 2020-06-19 ENCOUNTER — HOSPITAL ENCOUNTER (OUTPATIENT)
Dept: PHYSICAL THERAPY | Facility: HOSPITAL | Age: 65
Setting detail: THERAPIES SERIES
End: 2020-06-19
Attending: NURSE PRACTITIONER
Payer: MEDICARE

## 2020-06-19 PROCEDURE — 97530 THERAPEUTIC ACTIVITIES: CPT | Mod: GP | Performed by: PHYSICAL THERAPIST

## 2020-06-19 PROCEDURE — 97110 THERAPEUTIC EXERCISES: CPT | Mod: GP | Performed by: PHYSICAL THERAPIST

## 2020-06-25 ENCOUNTER — HOSPITAL ENCOUNTER (OUTPATIENT)
Dept: PHYSICAL THERAPY | Facility: HOSPITAL | Age: 65
Setting detail: THERAPIES SERIES
End: 2020-06-25
Attending: NURSE PRACTITIONER
Payer: MEDICARE

## 2020-06-25 PROCEDURE — 97110 THERAPEUTIC EXERCISES: CPT | Mod: GP | Performed by: PHYSICAL THERAPIST

## 2020-06-25 PROCEDURE — 97140 MANUAL THERAPY 1/> REGIONS: CPT | Mod: GP | Performed by: PHYSICAL THERAPIST

## 2020-06-30 DIAGNOSIS — F51.01 PRIMARY INSOMNIA: ICD-10-CM

## 2020-07-01 NOTE — TELEPHONE ENCOUNTER
Clonazepam 1 MG      Last Written Prescription Date:  3-3-20  Last Fill Quantity: 60,   # refills: 1  Last Office Visit: 5-29-20

## 2020-07-02 RX ORDER — CLONAZEPAM 1 MG/1
TABLET ORAL
Qty: 60 TABLET | Refills: 1 | Status: SHIPPED | OUTPATIENT
Start: 2020-07-02 | End: 2020-11-04

## 2020-07-06 ENCOUNTER — HOSPITAL ENCOUNTER (OUTPATIENT)
Dept: PHYSICAL THERAPY | Facility: HOSPITAL | Age: 65
Setting detail: THERAPIES SERIES
End: 2020-07-06
Attending: NURSE PRACTITIONER
Payer: MEDICARE

## 2020-07-06 PROCEDURE — 97110 THERAPEUTIC EXERCISES: CPT | Mod: GP | Performed by: PHYSICAL THERAPIST

## 2020-07-09 ENCOUNTER — HOSPITAL ENCOUNTER (OUTPATIENT)
Dept: PHYSICAL THERAPY | Facility: HOSPITAL | Age: 65
Setting detail: THERAPIES SERIES
End: 2020-07-09
Attending: NURSE PRACTITIONER
Payer: MEDICARE

## 2020-07-09 PROCEDURE — 97110 THERAPEUTIC EXERCISES: CPT | Mod: GP | Performed by: PHYSICAL THERAPIST

## 2020-07-13 ENCOUNTER — HOSPITAL ENCOUNTER (OUTPATIENT)
Dept: PHYSICAL THERAPY | Facility: HOSPITAL | Age: 65
Setting detail: THERAPIES SERIES
End: 2020-07-13
Attending: NURSE PRACTITIONER
Payer: MEDICARE

## 2020-07-13 PROCEDURE — 97140 MANUAL THERAPY 1/> REGIONS: CPT | Mod: GP | Performed by: PHYSICAL THERAPIST

## 2020-07-13 PROCEDURE — 97110 THERAPEUTIC EXERCISES: CPT | Mod: GP | Performed by: PHYSICAL THERAPIST

## 2020-07-16 ENCOUNTER — HOSPITAL ENCOUNTER (OUTPATIENT)
Dept: PHYSICAL THERAPY | Facility: HOSPITAL | Age: 65
Setting detail: THERAPIES SERIES
End: 2020-07-16
Attending: NURSE PRACTITIONER
Payer: MEDICARE

## 2020-07-16 PROCEDURE — 97110 THERAPEUTIC EXERCISES: CPT | Mod: GP | Performed by: PHYSICAL THERAPIST

## 2020-07-16 PROCEDURE — 97140 MANUAL THERAPY 1/> REGIONS: CPT | Mod: GP | Performed by: PHYSICAL THERAPIST

## 2020-07-20 ENCOUNTER — HOSPITAL ENCOUNTER (OUTPATIENT)
Dept: PHYSICAL THERAPY | Facility: HOSPITAL | Age: 65
Setting detail: THERAPIES SERIES
End: 2020-07-20
Attending: NURSE PRACTITIONER
Payer: MEDICARE

## 2020-07-20 PROCEDURE — 97110 THERAPEUTIC EXERCISES: CPT | Mod: GP | Performed by: PHYSICAL THERAPIST

## 2020-07-23 ENCOUNTER — HOSPITAL ENCOUNTER (OUTPATIENT)
Dept: PHYSICAL THERAPY | Facility: HOSPITAL | Age: 65
Setting detail: THERAPIES SERIES
End: 2020-07-23
Attending: NURSE PRACTITIONER
Payer: MEDICARE

## 2020-07-23 PROCEDURE — 97110 THERAPEUTIC EXERCISES: CPT | Mod: GP | Performed by: PHYSICAL THERAPIST

## 2020-07-27 ENCOUNTER — TELEPHONE (OUTPATIENT)
Dept: FAMILY MEDICINE | Facility: OTHER | Age: 65
End: 2020-07-27

## 2020-07-27 ENCOUNTER — HOSPITAL ENCOUNTER (OUTPATIENT)
Dept: PHYSICAL THERAPY | Facility: HOSPITAL | Age: 65
Setting detail: THERAPIES SERIES
End: 2020-07-27
Attending: NURSE PRACTITIONER
Payer: MEDICARE

## 2020-07-27 DIAGNOSIS — Z78.0 ASYMPTOMATIC POSTMENOPAUSAL ESTROGEN DEFICIENCY: Primary | ICD-10-CM

## 2020-07-27 PROCEDURE — 97110 THERAPEUTIC EXERCISES: CPT | Mod: GP | Performed by: PHYSICAL THERAPIST

## 2020-07-27 PROCEDURE — 97530 THERAPEUTIC ACTIVITIES: CPT | Mod: GP | Performed by: PHYSICAL THERAPIST

## 2020-07-27 NOTE — TELEPHONE ENCOUNTER
Pt called, requesting that nidhi order dexa scan for her. Last was in 2018. Pt reports lots of knee issues, surgery, PT, etc. Pt states that her surgeon is concerned about her bone health and would like her to have a dexa. Pended, need associated dx. Please advise. Thank you!

## 2020-07-27 NOTE — PROGRESS NOTES
"Outpatient Physical Therapy Progress Note     Patient: Aileen Szymanski  : 1955    Beginning/End Dates of Reporting Period:  2020 to 2020    Referring Provider: Keily Cunningham NP    Therapy Diagnosis: s/p L TKA revision     Client Self Report: States she is overall about 85% improved, still has weakness issues, but no pain and feels she is making great strides. Stairs are getting easier to control and she notes improvement in mechanics overall. She is feeling good about her progress and is getting back to normal activities    Objective Measurements:  Objective Measure: Knee ROM, pain, strength  Details: 0-96 degrees, 0/10 pain, strength improving to grossly 4-/5 with msot noted weakness at eccentric quad control with stepping down - improving, but still not enough control for 8\" step down      Goals:  Goal Identifier STG 1   Goal Description Patient will be compliant with HEP in order to make progress at home   Target Date 20   Date Met  20   Progress:     Goal Identifier LTG1   Goal Description Patient will demonstrate increased L knee ROM from 0-100 degrees in order to perform stairs safely at home and at [facility]   Target Date 20   Date Met  (improving)   Progress:     Goal Identifier LTG 2   Goal Description Patient will report overall 50% or more improvement in general function as it relates to her L knee in order to perform all home tasks safely   Target Date 20   Date Met  20   Progress:     Goal Identifier LTG 3   Goal Description Patient will report overall 80% or more improvement in daily function as it relates to her L knee in order to perform all home and [facility] tasks as she desires   Target Date 20   Date Met  (Improving)   Progress:       Progress Toward Goals:   Progress this reporting period: Patient is making good gains      Plan:  Continue therapy per current plan of care.    Discharge:  No  "

## 2020-07-30 ENCOUNTER — HOSPITAL ENCOUNTER (OUTPATIENT)
Dept: PHYSICAL THERAPY | Facility: HOSPITAL | Age: 65
Setting detail: THERAPIES SERIES
End: 2020-07-30
Attending: NURSE PRACTITIONER
Payer: MEDICARE

## 2020-07-30 PROCEDURE — 97110 THERAPEUTIC EXERCISES: CPT | Mod: GP | Performed by: PHYSICAL THERAPIST

## 2020-07-31 DIAGNOSIS — Z96.652 TOTAL KNEE REPLACEMENT STATUS, LEFT: Primary | ICD-10-CM

## 2020-08-06 ENCOUNTER — HOSPITAL ENCOUNTER (OUTPATIENT)
Dept: PHYSICAL THERAPY | Facility: HOSPITAL | Age: 65
Setting detail: THERAPIES SERIES
End: 2020-08-06
Attending: NURSE PRACTITIONER
Payer: MEDICARE

## 2020-08-06 PROCEDURE — 97110 THERAPEUTIC EXERCISES: CPT | Mod: GP | Performed by: PHYSICAL THERAPIST

## 2020-08-12 ENCOUNTER — OFFICE VISIT (OUTPATIENT)
Dept: CHIROPRACTIC MEDICINE | Facility: OTHER | Age: 65
End: 2020-08-12
Attending: CHIROPRACTOR
Payer: COMMERCIAL

## 2020-08-12 DIAGNOSIS — M99.02 SEGMENTAL AND SOMATIC DYSFUNCTION OF THORACIC REGION: ICD-10-CM

## 2020-08-12 DIAGNOSIS — M99.03 SEGMENTAL AND SOMATIC DYSFUNCTION OF LUMBAR REGION: Primary | ICD-10-CM

## 2020-08-12 DIAGNOSIS — M99.01 SEGMENTAL AND SOMATIC DYSFUNCTION OF CERVICAL REGION: ICD-10-CM

## 2020-08-12 DIAGNOSIS — M54.50 ACUTE BILATERAL LOW BACK PAIN WITHOUT SCIATICA: ICD-10-CM

## 2020-08-12 PROCEDURE — 98941 CHIROPRACT MANJ 3-4 REGIONS: CPT | Mod: AT | Performed by: CHIROPRACTOR

## 2020-08-13 ENCOUNTER — HOSPITAL ENCOUNTER (OUTPATIENT)
Dept: PHYSICAL THERAPY | Facility: HOSPITAL | Age: 65
Setting detail: THERAPIES SERIES
End: 2020-08-13
Attending: NURSE PRACTITIONER
Payer: MEDICARE

## 2020-08-13 PROCEDURE — 97110 THERAPEUTIC EXERCISES: CPT | Mod: GP | Performed by: PHYSICAL THERAPIST

## 2020-08-21 ENCOUNTER — HOSPITAL ENCOUNTER (OUTPATIENT)
Dept: BONE DENSITY | Facility: HOSPITAL | Age: 65
End: 2020-08-21
Attending: PHYSICIAN ASSISTANT
Payer: MEDICARE

## 2020-08-21 DIAGNOSIS — Z78.0 ASYMPTOMATIC POSTMENOPAUSAL ESTROGEN DEFICIENCY: ICD-10-CM

## 2020-08-21 DIAGNOSIS — Z96.652 TOTAL KNEE REPLACEMENT STATUS, LEFT: ICD-10-CM

## 2020-08-21 LAB
ANION GAP SERPL CALCULATED.3IONS-SCNC: 6 MMOL/L (ref 3–14)
BUN SERPL-MCNC: 21 MG/DL (ref 7–30)
CALCIUM SERPL-MCNC: 9 MG/DL (ref 8.5–10.1)
CHLORIDE SERPL-SCNC: 104 MMOL/L (ref 94–109)
CO2 SERPL-SCNC: 29 MMOL/L (ref 20–32)
CREAT SERPL-MCNC: 0.98 MG/DL (ref 0.52–1.04)
GFR SERPL CREATININE-BSD FRML MDRD: 60 ML/MIN/{1.73_M2}
GLUCOSE SERPL-MCNC: 96 MG/DL (ref 70–99)
POTASSIUM SERPL-SCNC: 4.2 MMOL/L (ref 3.4–5.3)
SODIUM SERPL-SCNC: 139 MMOL/L (ref 133–144)

## 2020-08-21 PROCEDURE — 80048 BASIC METABOLIC PNL TOTAL CA: CPT | Mod: ZL | Performed by: ORTHOPAEDIC SURGERY

## 2020-08-21 PROCEDURE — 77080 DXA BONE DENSITY AXIAL: CPT | Mod: TC

## 2020-08-21 PROCEDURE — 36415 COLL VENOUS BLD VENIPUNCTURE: CPT | Mod: ZL | Performed by: ORTHOPAEDIC SURGERY

## 2020-08-24 ENCOUNTER — TELEPHONE (OUTPATIENT)
Dept: FAMILY MEDICINE | Facility: OTHER | Age: 65
End: 2020-08-24

## 2020-08-24 ENCOUNTER — HOSPITAL ENCOUNTER (OUTPATIENT)
Dept: PHYSICAL THERAPY | Facility: HOSPITAL | Age: 65
Setting detail: THERAPIES SERIES
End: 2020-08-24
Attending: NURSE PRACTITIONER
Payer: MEDICARE

## 2020-08-24 PROCEDURE — 97110 THERAPEUTIC EXERCISES: CPT | Mod: GP | Performed by: PHYSICAL THERAPIST

## 2020-08-24 PROCEDURE — 97140 MANUAL THERAPY 1/> REGIONS: CPT | Mod: GP | Performed by: PHYSICAL THERAPIST

## 2020-08-24 NOTE — TELEPHONE ENCOUNTER
"Request     Stephon Aileen MESSI \"Aileen Barbara\"  Patient Referral Request Pool 3 days ago          Aileen Barbara MESSI Szymanski would like to request a referral.  Reason: Metabolic Panel 8/20/20 and DEXA Scan.  Requested provider: Dr Troy Nick  Comment:  Please send metabolic results and DEXA SCAN results to   Dr Troy Nick  [facility].     Please see message above.These where completed on 8.21.2020.      Khushi Hanson RN    "

## 2020-08-27 ENCOUNTER — HOSPITAL ENCOUNTER (OUTPATIENT)
Dept: PHYSICAL THERAPY | Facility: HOSPITAL | Age: 65
Setting detail: THERAPIES SERIES
End: 2020-08-27
Attending: NURSE PRACTITIONER
Payer: MEDICARE

## 2020-08-27 PROCEDURE — 97110 THERAPEUTIC EXERCISES: CPT | Mod: GP | Performed by: PHYSICAL THERAPIST

## 2020-08-27 PROCEDURE — 97140 MANUAL THERAPY 1/> REGIONS: CPT | Mod: GP | Performed by: PHYSICAL THERAPIST

## 2020-08-31 ENCOUNTER — HOSPITAL ENCOUNTER (OUTPATIENT)
Dept: PHYSICAL THERAPY | Facility: HOSPITAL | Age: 65
Setting detail: THERAPIES SERIES
End: 2020-08-31
Attending: NURSE PRACTITIONER
Payer: MEDICARE

## 2020-08-31 PROCEDURE — 97110 THERAPEUTIC EXERCISES: CPT | Mod: GP | Performed by: PHYSICAL THERAPIST

## 2020-08-31 PROCEDURE — 97530 THERAPEUTIC ACTIVITIES: CPT | Mod: GP | Performed by: PHYSICAL THERAPIST

## 2020-09-04 ENCOUNTER — HOSPITAL ENCOUNTER (OUTPATIENT)
Dept: PHYSICAL THERAPY | Facility: HOSPITAL | Age: 65
Setting detail: THERAPIES SERIES
End: 2020-09-04
Attending: NURSE PRACTITIONER
Payer: MEDICARE

## 2020-09-04 PROCEDURE — 97110 THERAPEUTIC EXERCISES: CPT | Mod: GP,CQ

## 2020-09-10 ENCOUNTER — HOSPITAL ENCOUNTER (OUTPATIENT)
Dept: PHYSICAL THERAPY | Facility: HOSPITAL | Age: 65
Setting detail: THERAPIES SERIES
End: 2020-09-10
Attending: NURSE PRACTITIONER
Payer: MEDICARE

## 2020-09-10 PROCEDURE — 97110 THERAPEUTIC EXERCISES: CPT | Mod: GP | Performed by: PHYSICAL THERAPIST

## 2020-09-15 ENCOUNTER — HOSPITAL ENCOUNTER (OUTPATIENT)
Dept: PHYSICAL THERAPY | Facility: HOSPITAL | Age: 65
Setting detail: THERAPIES SERIES
End: 2020-09-15
Attending: NURSE PRACTITIONER
Payer: MEDICARE

## 2020-09-15 PROCEDURE — 97110 THERAPEUTIC EXERCISES: CPT | Mod: GP | Performed by: PHYSICAL THERAPIST

## 2020-09-21 ENCOUNTER — HOSPITAL ENCOUNTER (OUTPATIENT)
Dept: PHYSICAL THERAPY | Facility: HOSPITAL | Age: 65
Setting detail: THERAPIES SERIES
End: 2020-09-21
Attending: NURSE PRACTITIONER
Payer: MEDICARE

## 2020-09-21 PROCEDURE — 97110 THERAPEUTIC EXERCISES: CPT | Mod: GP | Performed by: PHYSICAL THERAPIST

## 2020-09-21 PROCEDURE — 97140 MANUAL THERAPY 1/> REGIONS: CPT | Mod: GP | Performed by: PHYSICAL THERAPIST

## 2020-09-21 NOTE — PROGRESS NOTES
Outpatient Physical Therapy Progress Note     Patient: Aileen Szymanski  : 1955    Beginning/End Dates of Reporting Period:  2020 to 2020    Referring Provider: Keily Cunningham NP    Therapy Diagnosis: s/p L TKA revision with complications     Client Self Report: Aileen Jimenez has been seen for 20 skilled PT sessions focusing on regaining functional use of her L knee and strengthening her muscles in her hip, knee, calf and core.  She has been doing well with all activities with minimal to no pain, however she gets easily frustrated if she has a day when her knee is a little bothersome. She has some days when she can walk and go up and down stairs without any problem and other days when she gets occasional discomfort in her anterior knee with stairs.  Overall, she feels she is about half-way through her recovery and notes increasing strength and stability in her left leg. She is due for a recertification and this progress note was sent for signature to her referring provider    Objective Measurements:  Objective Measure: ROM, quad strength, stairs and gait  Details: AROM 0-92, PROM 0-94. Patient is able to make full revolutions on the bike after some warming up and continues to ride her bike at home.  Her quad is getting stronger to the point where she can perform LAQs with resistance sufficiently for multiple sets of 10 reps. She still has some weakness in single leg stance with mini squats, but this is improving.  Patient has been performing isolated quad work, hip abd strengthening, foot and ankle mobility and strength.  Patient ambulates well with adequate knee flex/ext, however still weakness noted with quad with terminal stance. Stairs are improving but she still has slight eccentric quad control issues, but this is improving    Goals:  Goal Identifier STG 1   Goal Description Patient will be compliant with HEP in order to make progress at home   Target Date 20   Date Met  20    Progress:     Goal Identifier LTG1   Goal Description Patient will demonstrate increased L knee ROM from 0-100 degrees in order to perform stairs safely at home and at [facility]   Target Date 07/29/20   Date Met  (DC - patient remains around  94 degrees)   Progress:     Goal Identifier LTG 2   Goal Description Patient will report overall 50% or more improvement in general function as it relates to her L knee in order to perform all home tasks safely   Target Date 07/29/20   Date Met  07/27/20   Progress:     Goal Identifier LTG 3   Goal Description Patient will report overall 80% or more improvement in daily function as it relates to her L knee in order to perform all home and [facility] tasks as she desires   Target Date 10/19/20   Date Met  (goal extended)   Progress:       Progress Toward Goals:   Progress this reporting period: Patient is making progress      Plan:  Continue therapy per current plan of care.    Discharge:  No    I certify the need for these services furnished under this plan of treatment and while under my care. (Physician co-signature of this document indicates review and certification of the therapy plan).      _____________________________     __________________________    ____________  Physician's Signature                 Date               Time

## 2020-09-24 ENCOUNTER — HOSPITAL ENCOUNTER (OUTPATIENT)
Dept: PHYSICAL THERAPY | Facility: HOSPITAL | Age: 65
Setting detail: THERAPIES SERIES
End: 2020-09-24
Attending: NURSE PRACTITIONER
Payer: MEDICARE

## 2020-09-24 PROCEDURE — 97110 THERAPEUTIC EXERCISES: CPT | Mod: GP | Performed by: PHYSICAL THERAPIST

## 2020-09-28 ENCOUNTER — HOSPITAL ENCOUNTER (OUTPATIENT)
Dept: PHYSICAL THERAPY | Facility: HOSPITAL | Age: 65
Setting detail: THERAPIES SERIES
End: 2020-09-28
Attending: NURSE PRACTITIONER
Payer: MEDICARE

## 2020-09-28 PROCEDURE — 97110 THERAPEUTIC EXERCISES: CPT | Mod: GP | Performed by: PHYSICAL THERAPIST

## 2020-09-30 DIAGNOSIS — F33.2 SEVERE EPISODE OF RECURRENT MAJOR DEPRESSIVE DISORDER, WITHOUT PSYCHOTIC FEATURES (H): ICD-10-CM

## 2020-10-01 ENCOUNTER — HOSPITAL ENCOUNTER (OUTPATIENT)
Dept: PHYSICAL THERAPY | Facility: HOSPITAL | Age: 65
Setting detail: THERAPIES SERIES
End: 2020-10-01
Attending: NURSE PRACTITIONER
Payer: MEDICARE

## 2020-10-01 PROCEDURE — 97110 THERAPEUTIC EXERCISES: CPT | Mod: GP | Performed by: PHYSICAL THERAPIST

## 2020-10-01 RX ORDER — ESCITALOPRAM OXALATE 20 MG/1
TABLET ORAL
Qty: 90 TABLET | Refills: 3 | Status: SHIPPED | OUTPATIENT
Start: 2020-10-01 | End: 2021-09-21

## 2020-10-05 ENCOUNTER — HOSPITAL ENCOUNTER (OUTPATIENT)
Dept: PHYSICAL THERAPY | Facility: HOSPITAL | Age: 65
Setting detail: THERAPIES SERIES
End: 2020-10-05
Attending: NURSE PRACTITIONER
Payer: MEDICARE

## 2020-10-05 PROCEDURE — 97110 THERAPEUTIC EXERCISES: CPT | Mod: GP | Performed by: PHYSICAL THERAPIST

## 2020-10-05 PROCEDURE — 97140 MANUAL THERAPY 1/> REGIONS: CPT | Mod: GP | Performed by: PHYSICAL THERAPIST

## 2020-10-08 ENCOUNTER — HOSPITAL ENCOUNTER (OUTPATIENT)
Dept: PHYSICAL THERAPY | Facility: HOSPITAL | Age: 65
Setting detail: THERAPIES SERIES
End: 2020-10-08
Attending: NURSE PRACTITIONER
Payer: MEDICARE

## 2020-10-08 ENCOUNTER — OFFICE VISIT (OUTPATIENT)
Dept: CHIROPRACTIC MEDICINE | Facility: OTHER | Age: 65
End: 2020-10-08
Attending: CHIROPRACTOR
Payer: COMMERCIAL

## 2020-10-08 DIAGNOSIS — M99.01 SEGMENTAL AND SOMATIC DYSFUNCTION OF CERVICAL REGION: ICD-10-CM

## 2020-10-08 DIAGNOSIS — M99.02 SEGMENTAL AND SOMATIC DYSFUNCTION OF THORACIC REGION: ICD-10-CM

## 2020-10-08 DIAGNOSIS — M99.03 SEGMENTAL AND SOMATIC DYSFUNCTION OF LUMBAR REGION: Primary | ICD-10-CM

## 2020-10-08 DIAGNOSIS — M54.50 ACUTE BILATERAL LOW BACK PAIN WITHOUT SCIATICA: ICD-10-CM

## 2020-10-08 PROCEDURE — 97140 MANUAL THERAPY 1/> REGIONS: CPT | Mod: GP | Performed by: PHYSICAL THERAPIST

## 2020-10-08 PROCEDURE — 98941 CHIROPRACT MANJ 3-4 REGIONS: CPT | Mod: AT | Performed by: CHIROPRACTOR

## 2020-10-08 NOTE — PROGRESS NOTES
Subjective Finding:    Chief compalint: Patient presents with:  Back Pain: recovering from knee surgery  Neck Pain  , Pain Scale: 5/10, Intensity: sharp and ache, Duration: 1 month, Change since last visit: worse, Radiating: no.    Date of injury:     Activities that the pain restricts:   Home/household activities: no.  Work duties: no.  Hobbies/social: no.  Sleep: no.  Makes symptoms better: activity and rest.  Makes symptoms worse: lumbar extension and lumbar flexion.  Have you seen anyone else for the symptoms? No.  Work related: no.  Automobile related injury: no.    Objective and Assessment:    Posture Analysis:   High shoulder: .  Head tilt: .  High iliac crest: .  Head carriage: neutral.  Thoracic Kyphosis: neutral.  Lumbar Lordosis: neutral.    Lumbar Range of Motion: flexion decreased and extension decreased.  Cervical Range of Motion: flexion decreased.  Thoracic Range of Motion: .  Extremity Range of Motion: .    Palpation:   Quad lumb: right, referred pain: no    Segmental dysfunction pre-treatment: C2, T6, T7, L4 and L5.    Assessment post-treatment:  Cervical: ROM increased.  Thoracic: pain and tenderness decreased.  Lumbar: pain and tenderness decreased.    Comments: .        Complicating Factors: .    Plan / Procedure:    Expected release date: .  Treatment plan: prn.  Instructed patient: ice 20 minutes every other hour as needed.  Short term goals: reduce pain and increase ROM.  Long term goals: increase patient functional independence.  Prognosis: excellent.

## 2020-10-12 ENCOUNTER — HOSPITAL ENCOUNTER (OUTPATIENT)
Dept: PHYSICAL THERAPY | Facility: HOSPITAL | Age: 65
Setting detail: THERAPIES SERIES
End: 2020-10-12
Attending: NURSE PRACTITIONER
Payer: MEDICARE

## 2020-10-12 PROCEDURE — 97110 THERAPEUTIC EXERCISES: CPT | Mod: GP | Performed by: PHYSICAL THERAPIST

## 2020-10-12 NOTE — PROGRESS NOTES
Outpatient Physical Therapy Progress Note     Patient: Aileen Szymanski  : 1955    Beginning/End Dates of Reporting Period:  2020 to 10/12/2020    Referring Provider: Dr Montoya/ Keily Cunningham    Therapy Diagnosis: s/p L TKA revision with complications     Client Self Report: Julia Jimenez has been attending PT for the past few months working on increasing her strength, motin and function as it pertains to her complex s/p L TKA revision.  She had been doing well for a number of weeks - gaining strength in her quad, glutes, calf and her range of motion had been between .  She had been working on riding her stationary bike at home as well as step exercises, hip ABD, calf and isolated quad strengthening.  She was very painful last week without a noted change in activity - stairs were more painful, she had tenderness along the joint line as well as into her tibial plateau. She had lost range of motion down to about 85 degrees as well.  She was unable to pedal her bike and had pain with weight-bearing. Today, she reports improved pain and better able to do stairs.  Today, we will try BFR added into her treatment plan to further strengthen her LE muscles and decrease pain    Objective Measurements:  Objective Measure: Knee ROM  Details: PROM: 0-92, hard end feel.  Quad strengthening improving and functional stair and gait performance improving.  Still question pain along tibial plateau and joint line - have addressed patellar mobility and tib-femoral mobility with mobilizations, however ROM still seems to be remaining around 90 degrees.  If patient's imaging indicates proper healing and no underlying factors contribute to patient's unexpected pain, will plan to continue progressing BFR strengthening and function until strength and stair and gait are improved closer to 80%.      Goals:  Goal Identifier STG 1   Goal Description Patient will be compliant with HEP in order to make progress at home    Target Date 07/08/20   Date Met  07/27/20   Progress:     Goal Identifier LTG1   Goal Description Patient will demonstrate increased L knee ROM from 0-100 degrees in order to perform stairs safely at home and at [facility]   Target Date 07/29/20   Date Met  (Patient remains around 92 degrees)   Progress:     Goal Identifier LTG 2   Goal Description Patient will report overall 50% or more improvement in general function as it relates to her L knee in order to perform all home tasks safely   Target Date 11/09/20   Date Met  (goal extended)   Progress:     Goal Identifier LTG 3   Goal Description Patient will report overall 80% or more improvement in daily function as it relates to her L knee in order to perform all home and [facility] tasks as she desires   Target Date 11/23/20   Date Met  (Goal extended)   Progress:       Progress Toward Goals:   Progress this reporting period: Patient was making progress, but had increased pain last week that hindered progress    Plan:  Continue therapy per current plan of care.    Discharge:  No

## 2020-10-15 ENCOUNTER — HOSPITAL ENCOUNTER (OUTPATIENT)
Dept: PHYSICAL THERAPY | Facility: HOSPITAL | Age: 65
Setting detail: THERAPIES SERIES
End: 2020-10-15
Attending: NURSE PRACTITIONER
Payer: MEDICARE

## 2020-10-15 PROCEDURE — 97110 THERAPEUTIC EXERCISES: CPT | Mod: GP,CQ

## 2020-10-19 ENCOUNTER — HOSPITAL ENCOUNTER (OUTPATIENT)
Dept: PHYSICAL THERAPY | Facility: HOSPITAL | Age: 65
Setting detail: THERAPIES SERIES
End: 2020-10-19
Attending: NURSE PRACTITIONER
Payer: MEDICARE

## 2020-10-19 PROCEDURE — 97110 THERAPEUTIC EXERCISES: CPT | Mod: GP | Performed by: PHYSICAL THERAPIST

## 2020-10-22 ENCOUNTER — HOSPITAL ENCOUNTER (OUTPATIENT)
Dept: PHYSICAL THERAPY | Facility: HOSPITAL | Age: 65
Setting detail: THERAPIES SERIES
End: 2020-10-22
Attending: NURSE PRACTITIONER
Payer: MEDICARE

## 2020-10-22 PROCEDURE — 97110 THERAPEUTIC EXERCISES: CPT | Mod: GP | Performed by: PHYSICAL THERAPIST

## 2020-10-26 ENCOUNTER — HOSPITAL ENCOUNTER (OUTPATIENT)
Dept: PHYSICAL THERAPY | Facility: HOSPITAL | Age: 65
Setting detail: THERAPIES SERIES
End: 2020-10-26
Attending: NURSE PRACTITIONER
Payer: MEDICARE

## 2020-10-26 PROCEDURE — 97140 MANUAL THERAPY 1/> REGIONS: CPT | Mod: GP | Performed by: PHYSICAL THERAPIST

## 2020-10-26 PROCEDURE — 97110 THERAPEUTIC EXERCISES: CPT | Mod: GP | Performed by: PHYSICAL THERAPIST

## 2020-10-26 NOTE — PROGRESS NOTES
Outpatient Physical Therapy Progress Note     Patient: Aileen Szymanski  : 1955    Beginning/End Dates of Reporting Period:  10/12/2020 to 10/26/2020    Referring Provider: Dr Montoya/ Keily Cunningham NP    Therapy Diagnosis: s/p L TKA revision     Client Self Report: Patient report she has been doing better this week and has less pain although she still has pain with stairs. She reports she wants to get stronger and work on her bone health for potentially having surgery again. She feels she is making good progress and wants to keep working with PT    Objective Measurements:  Objective Measure: Hip strength, quad strength, LORETTA  Details: Further addressed hip weakness today with hip hike exercise - noted difficulty performing this. Will work on at home along with ongoing quad strengthening.  ROM is 0-89 today, Gait improved with increased strengthening and increased quad activation.        Goals:  Goal Identifier STG 1   Goal Description Patient will be compliant with HEP in order to make progress at home   Target Date 20   Date Met  20   Progress:     Goal Identifier LTG1   Goal Description Patient will demonstrate increased L knee ROM from 0-100 degrees in order to perform stairs safely at home and at [facility]   Target Date 20   Date Met      Progress:     Goal Identifier LTG 2   Goal Description Patient will report overall 50% or more improvement in general function as it relates to her L knee in order to perform all home tasks safely   Target Date 20   Date Met      Progress:     Goal Identifier LTG 3   Goal Description Patient will report overall 80% or more improvement in daily function as it relates to her L knee in order to perform all home and [facility] tasks as she desires   Target Date 20   Date Met      Progress:       Progress Toward Goals:   Progress this reporting period: Patient is making progress towards goals      Plan:  Continue therapy per current plan of  care.    Discharge:  No

## 2020-10-29 ENCOUNTER — HOSPITAL ENCOUNTER (OUTPATIENT)
Dept: PHYSICAL THERAPY | Facility: HOSPITAL | Age: 65
Setting detail: THERAPIES SERIES
End: 2020-10-29
Attending: NURSE PRACTITIONER
Payer: MEDICARE

## 2020-10-29 ENCOUNTER — OFFICE VISIT (OUTPATIENT)
Dept: CHIROPRACTIC MEDICINE | Facility: OTHER | Age: 65
End: 2020-10-29
Attending: CHIROPRACTOR
Payer: COMMERCIAL

## 2020-10-29 DIAGNOSIS — M99.03 SEGMENTAL AND SOMATIC DYSFUNCTION OF LUMBAR REGION: Primary | ICD-10-CM

## 2020-10-29 DIAGNOSIS — M99.02 SEGMENTAL AND SOMATIC DYSFUNCTION OF THORACIC REGION: ICD-10-CM

## 2020-10-29 DIAGNOSIS — M99.01 SEGMENTAL AND SOMATIC DYSFUNCTION OF CERVICAL REGION: ICD-10-CM

## 2020-10-29 DIAGNOSIS — M54.50 ACUTE BILATERAL LOW BACK PAIN WITHOUT SCIATICA: ICD-10-CM

## 2020-10-29 PROCEDURE — 98941 CHIROPRACT MANJ 3-4 REGIONS: CPT | Mod: AT | Performed by: CHIROPRACTOR

## 2020-10-29 PROCEDURE — 97140 MANUAL THERAPY 1/> REGIONS: CPT | Mod: GP | Performed by: PHYSICAL THERAPIST

## 2020-10-29 PROCEDURE — 97110 THERAPEUTIC EXERCISES: CPT | Mod: GP | Performed by: PHYSICAL THERAPIST

## 2020-11-02 ENCOUNTER — HOSPITAL ENCOUNTER (OUTPATIENT)
Dept: PHYSICAL THERAPY | Facility: HOSPITAL | Age: 65
Setting detail: THERAPIES SERIES
End: 2020-11-02
Attending: NURSE PRACTITIONER
Payer: MEDICARE

## 2020-11-02 DIAGNOSIS — F51.01 PRIMARY INSOMNIA: ICD-10-CM

## 2020-11-02 PROCEDURE — 97110 THERAPEUTIC EXERCISES: CPT | Mod: GP,CQ

## 2020-11-02 PROCEDURE — 97140 MANUAL THERAPY 1/> REGIONS: CPT | Mod: GP,CQ

## 2020-11-03 NOTE — TELEPHONE ENCOUNTER
klonopin      Last Written Prescription Date:  7/2/20  Last Fill Quantity: 60,   # refills: 1  Last Office Visit: 5/29/20  Future Office visit:

## 2020-11-04 RX ORDER — CLONAZEPAM 1 MG/1
TABLET ORAL
Qty: 60 TABLET | Refills: 1 | Status: SHIPPED | OUTPATIENT
Start: 2020-11-04 | End: 2021-03-02

## 2020-11-05 ENCOUNTER — HOSPITAL ENCOUNTER (OUTPATIENT)
Dept: PHYSICAL THERAPY | Facility: HOSPITAL | Age: 65
Setting detail: THERAPIES SERIES
End: 2020-11-05
Attending: NURSE PRACTITIONER
Payer: MEDICARE

## 2020-11-05 PROCEDURE — 97110 THERAPEUTIC EXERCISES: CPT | Mod: GP

## 2020-11-05 PROCEDURE — 97140 MANUAL THERAPY 1/> REGIONS: CPT | Mod: GP,CQ

## 2020-11-09 ENCOUNTER — HOSPITAL ENCOUNTER (OUTPATIENT)
Dept: PHYSICAL THERAPY | Facility: HOSPITAL | Age: 65
Setting detail: THERAPIES SERIES
End: 2020-11-09
Attending: NURSE PRACTITIONER
Payer: MEDICARE

## 2020-11-09 PROCEDURE — 97110 THERAPEUTIC EXERCISES: CPT | Mod: GP | Performed by: PHYSICAL THERAPIST

## 2020-11-09 PROCEDURE — 97140 MANUAL THERAPY 1/> REGIONS: CPT | Mod: GP | Performed by: PHYSICAL THERAPIST

## 2020-11-12 ENCOUNTER — HOSPITAL ENCOUNTER (OUTPATIENT)
Dept: PHYSICAL THERAPY | Facility: HOSPITAL | Age: 65
Setting detail: THERAPIES SERIES
End: 2020-11-12
Attending: NURSE PRACTITIONER
Payer: MEDICARE

## 2020-11-12 PROCEDURE — 97140 MANUAL THERAPY 1/> REGIONS: CPT | Mod: GP,CQ

## 2020-11-12 PROCEDURE — 97110 THERAPEUTIC EXERCISES: CPT | Mod: GP,CQ

## 2020-11-16 ENCOUNTER — HOSPITAL ENCOUNTER (OUTPATIENT)
Dept: PHYSICAL THERAPY | Facility: HOSPITAL | Age: 65
Setting detail: THERAPIES SERIES
End: 2020-11-16
Attending: NURSE PRACTITIONER
Payer: MEDICARE

## 2020-11-16 PROCEDURE — 97110 THERAPEUTIC EXERCISES: CPT | Mod: GP,CQ

## 2020-11-16 PROCEDURE — 97140 MANUAL THERAPY 1/> REGIONS: CPT | Mod: GP,KX

## 2020-11-19 ENCOUNTER — HOSPITAL ENCOUNTER (OUTPATIENT)
Dept: PHYSICAL THERAPY | Facility: HOSPITAL | Age: 65
Setting detail: THERAPIES SERIES
End: 2020-11-19
Attending: NURSE PRACTITIONER
Payer: MEDICARE

## 2020-11-19 PROCEDURE — 999N000214 HC STATISTIC PT OUTPT VISIT: Performed by: PHYSICAL THERAPIST

## 2020-11-23 ENCOUNTER — HOSPITAL ENCOUNTER (OUTPATIENT)
Dept: PHYSICAL THERAPY | Facility: HOSPITAL | Age: 65
Setting detail: THERAPIES SERIES
End: 2020-11-23
Attending: NURSE PRACTITIONER
Payer: MEDICARE

## 2020-11-23 PROCEDURE — 97110 THERAPEUTIC EXERCISES: CPT | Mod: GP | Performed by: PHYSICAL THERAPIST

## 2020-11-23 PROCEDURE — 97140 MANUAL THERAPY 1/> REGIONS: CPT | Mod: GP | Performed by: PHYSICAL THERAPIST

## 2020-12-03 ENCOUNTER — HOSPITAL ENCOUNTER (OUTPATIENT)
Dept: PHYSICAL THERAPY | Facility: HOSPITAL | Age: 65
Setting detail: THERAPIES SERIES
End: 2020-12-03
Attending: NURSE PRACTITIONER
Payer: MEDICARE

## 2020-12-03 PROCEDURE — 97140 MANUAL THERAPY 1/> REGIONS: CPT | Mod: GP | Performed by: PHYSICAL THERAPIST

## 2020-12-03 PROCEDURE — 97110 THERAPEUTIC EXERCISES: CPT | Mod: GP,KX | Performed by: PHYSICAL THERAPIST

## 2020-12-10 ENCOUNTER — HOSPITAL ENCOUNTER (OUTPATIENT)
Dept: PHYSICAL THERAPY | Facility: HOSPITAL | Age: 65
Setting detail: THERAPIES SERIES
End: 2020-12-10
Attending: NURSE PRACTITIONER
Payer: MEDICARE

## 2020-12-10 ENCOUNTER — OFFICE VISIT (OUTPATIENT)
Dept: CHIROPRACTIC MEDICINE | Facility: OTHER | Age: 65
End: 2020-12-10
Attending: CHIROPRACTOR
Payer: COMMERCIAL

## 2020-12-10 DIAGNOSIS — M99.01 SEGMENTAL AND SOMATIC DYSFUNCTION OF CERVICAL REGION: ICD-10-CM

## 2020-12-10 DIAGNOSIS — M99.03 SEGMENTAL AND SOMATIC DYSFUNCTION OF LUMBAR REGION: Primary | ICD-10-CM

## 2020-12-10 DIAGNOSIS — M99.02 SEGMENTAL AND SOMATIC DYSFUNCTION OF THORACIC REGION: ICD-10-CM

## 2020-12-10 DIAGNOSIS — M54.50 ACUTE BILATERAL LOW BACK PAIN WITHOUT SCIATICA: ICD-10-CM

## 2020-12-10 PROCEDURE — 98941 CHIROPRACT MANJ 3-4 REGIONS: CPT | Mod: AT | Performed by: CHIROPRACTOR

## 2020-12-10 PROCEDURE — 97110 THERAPEUTIC EXERCISES: CPT | Mod: GP,KX | Performed by: PHYSICAL THERAPIST

## 2020-12-17 ENCOUNTER — HOSPITAL ENCOUNTER (OUTPATIENT)
Dept: PHYSICAL THERAPY | Facility: HOSPITAL | Age: 65
Setting detail: THERAPIES SERIES
End: 2020-12-17
Attending: NURSE PRACTITIONER
Payer: MEDICARE

## 2020-12-17 PROCEDURE — 97110 THERAPEUTIC EXERCISES: CPT | Mod: GP

## 2020-12-29 ENCOUNTER — HOSPITAL ENCOUNTER (OUTPATIENT)
Dept: PHYSICAL THERAPY | Facility: HOSPITAL | Age: 65
Setting detail: THERAPIES SERIES
End: 2020-12-29
Attending: NURSE PRACTITIONER
Payer: MEDICARE

## 2020-12-29 PROCEDURE — 97110 THERAPEUTIC EXERCISES: CPT | Mod: GP | Performed by: PHYSICAL THERAPIST

## 2020-12-29 NOTE — PROGRESS NOTES
12/17/20 1117   Signing Clinician's Name / Credentials   Signing clinician's name / credentials Mary Rubio PTA   Session Number   Session Number 40   Session Tracking, Supervision and Quick Adds   PT Assistant Visit Number [id number]   Progress Note/Recertification   Progress Note Due Date 10/13/20   Progress Note Completed Date 10/26/20   Recertification Due Date 09/21/20   Recertification Completed Date  10/12/20   Adult Goals   PT Ortho Eval Goals 1;2;3;4   Ortho Goal 1   Goal Identifier STG 1   Goal Description Patient will be compliant with HEP in order to make progress at home   Target Date 07/08/20   Date Met 07/27/20   Ortho Goal 2   Goal Identifier LTG1   Goal Description Patient will demonstrate increased L knee ROM from 0-100 degrees in order to perform stairs safely at home and at [facility]   Target Date 11/16/20   Date Met   (plateaued at 85-90)   Ortho Goal 3   Goal Identifier LTG 2   Goal Description Patient will report overall 50% or more improvement in general function as it relates to her L knee in order to perform all home tasks safely   Target Date 11/23/20   Date Met 12/17/20   Ortho Goal 4   Goal Identifier LTG 3   Goal Description Patient will report overall 80% or more improvement in daily function as it relates to her L knee in order to perform all home and [facility] tasks as she desires   Target Date 02/23/21   Subjective Report   Subjective Report Patient has been doing well overall and states she continues to do her HEP   Objective Measures   Objective Measures Objective Measure 1   Objective Measure 1   Objective Measure stairs   Details No pain with negotiating a full flight of stairs today in hospital - had difficulty with the small set of stairs in the PT gym which seem to be slightly higher than standard   Treatment Interventions   Interventions Therapeutic Procedure/Exercise   Therapeutic Procedure/exercise   Therapeutic Procedures: strength, endurance, ROM, flexibillity minutes (07034) 30  "  Skilled Intervention ther ex, strengthening   Patient Response good   Treatment Detail Gastroc/soleus stretch onslantboard, seatced claf machine with 15lbs x 2 sets of 15 reps, knee EXT 2 plates x 2 sets of 15 rep1 plates x 2 sets of 10 reps, lateral step down 4\" x 2 sets of 15 reps, and step downs with 3 sec eccentric control n L x 2 sets of 10 reps    Assessments Completed   Assessments Completed Patient continues to gain strength and become more independent in her home program   Plan   Home program eccentric heel drop on level ground for gastroc strengthening   Plan for next session Progress HEP   Total Session Time   Timed Code Treatment Minutes 30   Total Treatment Time (sum of timed and untimed services) 30     "

## 2020-12-29 NOTE — PROGRESS NOTES
Outpatient Physical Therapy Discharge Note     Patient: Aileen Szymanski  : 1955    Beginning/End Dates of Reporting Period:  2020 to 2020    Referring Provider: Dr Montoya/ Keily Cunningham    Therapy Diagnosis: s/p L TKA revision, complex     Client Self Report: Patient reports she is about 80% improved overall and while she still has difficulty with getting up from a chair and occasionally stairs, she feels much better than before she had her revision. She is not sure if she can live with her knee like this the rest of her life, but she notes she has gained strength, she can straighten her knee, she has less pain and she can move better overall. She will work on her own for 3 months and report back on her progress. She plans to meet with her surgeon again in May to discuss her options and make a plan.    Objective Measurements:  Objective Measure: Knee motion and strength  Details: Knee ROM is 0-86 today with pain at EROM FLEX.  Quad strength has improved, but is not as strong as her R side - grossly 4-/5 compared to 5-/5 on R.  She still has a slightly antalgic gait pattern when she first initiates gait, but this improves as it loosens up.     Goals:  Goal Identifier STG 1   Goal Description Patient will be compliant with HEP in order to make progress at home   Target Date 20   Date Met  20   Progress:     Goal Identifier LTG1   Goal Description Patient will demonstrate increased L knee ROM from 0-100 degrees in order to perform stairs safely at home and at [employer]   Target Date 20   Date Met  (Plateaued at 85-90 degrees)   Progress:     Goal Identifier LTG 2   Goal Description Patient will report overall 50% or more improvement in general function as it relates to her L knee in order to perform all home tasks safely   Target Date 20   Date Met  20   Progress:     Goal Identifier LTG 3   Goal Description Patient will report overall 80% or more improvement in daily  function as it relates to her L knee in order to perform all home and [employer] tasks as she desires   Target Date 02/23/21   Date Met  (Will hold for 3 months)   Progress:       Progress Toward Goals:   Progress this reporting period: Patient met some goals      Plan:  Discharge from therapy.    Discharge:    Reason for Discharge: Patient will take a break from therapy and touch base in 3 months. Discharge for now    Equipment Issued: None    Discharge Plan: Patient to continue home program.

## 2021-03-01 DIAGNOSIS — F51.01 PRIMARY INSOMNIA: ICD-10-CM

## 2021-03-02 ENCOUNTER — OFFICE VISIT (OUTPATIENT)
Dept: CHIROPRACTIC MEDICINE | Facility: OTHER | Age: 66
End: 2021-03-02
Attending: CHIROPRACTOR
Payer: COMMERCIAL

## 2021-03-02 DIAGNOSIS — M54.2 CERVICALGIA: ICD-10-CM

## 2021-03-02 DIAGNOSIS — M99.03 SEGMENTAL AND SOMATIC DYSFUNCTION OF LUMBAR REGION: ICD-10-CM

## 2021-03-02 DIAGNOSIS — M99.01 SEGMENTAL AND SOMATIC DYSFUNCTION OF CERVICAL REGION: ICD-10-CM

## 2021-03-02 DIAGNOSIS — M99.02 SEGMENTAL AND SOMATIC DYSFUNCTION OF THORACIC REGION: Primary | ICD-10-CM

## 2021-03-02 PROCEDURE — 98941 CHIROPRACT MANJ 3-4 REGIONS: CPT | Mod: AT | Performed by: CHIROPRACTOR

## 2021-03-02 RX ORDER — CLONAZEPAM 1 MG/1
TABLET ORAL
Qty: 30 TABLET | Refills: 1 | Status: SHIPPED | OUTPATIENT
Start: 2021-03-02 | End: 2021-05-03

## 2021-03-02 NOTE — TELEPHONE ENCOUNTER
clonazePAM (KLONOPIN) 1 MG tablet      Last Written Prescription Date:  11/4/20  Last Fill Quantity: 60,   # refills: 1  Last Office Visit: 5/29/20 Louise  Future Office visit:    Next 5 appointments (look out 90 days)    Mar 02, 2021 11:30 AM  Return Visit with Sulaiman Hollingsworth DC  [facility] [place] ([facility]  [place] ) [address] [place] [address]  [place] [address]  [phone]           Routing refill request to provider for review/approval because:  Drug not on the [facility], [facility] or [facility] refill protocol or controlled substance

## 2021-03-22 ENCOUNTER — TELEPHONE (OUTPATIENT)
Dept: FAMILY MEDICINE | Facility: OTHER | Age: 66
End: 2021-03-22

## 2021-03-22 NOTE — TELEPHONE ENCOUNTER
Pt is requesting antibiotic to take before upcoming dental appointment on Friday 3/26/21. She has had a knee replacement in the past. Please call and let her know when med is sent [phone]. She uses [facility] in [place].

## 2021-03-24 RX ORDER — SULFAMETHOXAZOLE/TRIMETHOPRIM 800-160 MG
1 TABLET ORAL 2 TIMES DAILY
Qty: 14 TABLET | Refills: 0 | Status: CANCELLED | OUTPATIENT
Start: 2021-03-24 | End: 2021-03-31

## 2021-03-24 NOTE — TELEPHONE ENCOUNTER
Call back from patient inquiring on antibiotic prior to dental procedure. Awaiting medication to be sent to the pharmacy. Pt states taking Bactrim prior to last dental visit. Upcoming visit on 3/26/21.    Pharmacy: [facility] [place]

## 2021-03-25 DIAGNOSIS — N39.0 URINARY TRACT INFECTION WITHOUT HEMATURIA, SITE UNSPECIFIED: Primary | ICD-10-CM

## 2021-03-25 RX ORDER — SULFAMETHOXAZOLE AND TRIMETHOPRIM 400; 80 MG/1; MG/1
1 TABLET ORAL 2 TIMES DAILY
Qty: 14 TABLET | Refills: 0 | Status: SHIPPED | OUTPATIENT
Start: 2021-03-25 | End: 2021-04-01

## 2021-03-25 RX ORDER — SULFAMETHOXAZOLE/TRIMETHOPRIM 800-160 MG
1 TABLET ORAL 2 TIMES DAILY
Qty: 14 TABLET | Refills: 0 | Status: CANCELLED | OUTPATIENT
Start: 2021-03-25 | End: 2021-04-01

## 2021-04-14 ENCOUNTER — OFFICE VISIT (OUTPATIENT)
Dept: CHIROPRACTIC MEDICINE | Facility: OTHER | Age: 66
End: 2021-04-14
Attending: CHIROPRACTOR
Payer: COMMERCIAL

## 2021-04-14 DIAGNOSIS — M99.02 SEGMENTAL AND SOMATIC DYSFUNCTION OF THORACIC REGION: ICD-10-CM

## 2021-04-14 DIAGNOSIS — M99.01 SEGMENTAL AND SOMATIC DYSFUNCTION OF CERVICAL REGION: ICD-10-CM

## 2021-04-14 DIAGNOSIS — M54.50 ACUTE BILATERAL LOW BACK PAIN WITHOUT SCIATICA: ICD-10-CM

## 2021-04-14 DIAGNOSIS — M99.03 SEGMENTAL AND SOMATIC DYSFUNCTION OF LUMBAR REGION: Primary | ICD-10-CM

## 2021-04-14 PROCEDURE — 98941 CHIROPRACT MANJ 3-4 REGIONS: CPT | Mod: AT | Performed by: CHIROPRACTOR

## 2021-04-18 ENCOUNTER — HEALTH MAINTENANCE LETTER (OUTPATIENT)
Age: 66
End: 2021-04-18

## 2021-04-21 ENCOUNTER — OFFICE VISIT (OUTPATIENT)
Dept: CHIROPRACTIC MEDICINE | Facility: OTHER | Age: 66
End: 2021-04-21
Attending: CHIROPRACTOR
Payer: COMMERCIAL

## 2021-04-21 DIAGNOSIS — M99.03 SEGMENTAL AND SOMATIC DYSFUNCTION OF LUMBAR REGION: Primary | ICD-10-CM

## 2021-04-21 DIAGNOSIS — M99.02 SEGMENTAL AND SOMATIC DYSFUNCTION OF THORACIC REGION: ICD-10-CM

## 2021-04-21 DIAGNOSIS — M99.01 SEGMENTAL AND SOMATIC DYSFUNCTION OF CERVICAL REGION: ICD-10-CM

## 2021-04-21 DIAGNOSIS — M54.50 ACUTE BILATERAL LOW BACK PAIN WITHOUT SCIATICA: ICD-10-CM

## 2021-04-21 PROCEDURE — 98941 CHIROPRACT MANJ 3-4 REGIONS: CPT | Mod: AT | Performed by: CHIROPRACTOR

## 2021-04-22 ENCOUNTER — TRANSFERRED RECORDS (OUTPATIENT)
Dept: HEALTH INFORMATION MANAGEMENT | Facility: CLINIC | Age: 66
End: 2021-04-22

## 2021-04-29 NOTE — PROGRESS NOTES
"SUBJECTIVE:   Aileen Szymanski is a 66 year old female who presents for Preventive Visit.    {Split Bill scripting  The purpose of this visit is to discuss your medical history and prevent health problems before you are sick. You may be responsible for a co-pay, coinsurance, or deductible if your visit today includes services such as checking on a sore throat, having an x-ray or lab test, or treating and evaluating a new or existing condition :956377}  Patient has been advised of split billing requirements and indicates understanding: {Yes and No:411706}  Are you in the first 12 months of your Medicare Part B coverage?  { :078118::\"No\"}    Physical Health:    In general, how would you rate your overall physical health? { :800005}    Outside of work, how many days during the week do you exercise? { :364450}    Outside of work, approximately how many minutes a day do you exercise?{ :365551}    If you drink alcohol do you typically have >3 drinks per day or >7 drinks per week? { :045700}    Do you usually eat at least 4 servings of fruit and vegetables a day, include whole grains & fiber and avoid regularly eating high fat or \"junk\" foods? { :239136::\"Yes\"}    Do you have any problems taking medications regularly?  { :830345::\"No\"}    Do you have any side effects from medications? { :118086}    Needs assistance for the following daily activities: { :024742}    Which of the following safety concerns are present in your home?  { :870308::\"none identified\"}     Hearing impairment: { :590316}    In the past 6 months, have you been bothered by leaking of urine? { :362270}    Mental Health:    In general, how would you rate your overall mental or emotional health? { :673076}  PHQ-2 Score:      Do you feel safe in your environment? { :502501}    Have you ever done Advance Care Planning? (For example, a Health Directive, POLST, or a discussion with a medical provider or your loved ones about your wishes): { " ":952493}    Additional concerns to address?  {If YES, notify patient they may be responsible for a copay, coinsurance or deductible if the visit today includes services such as checking on a sore throat, having an x-ray or lab test, or treating and evaluating a new or existing condition :319195::\"No\"}    Fall risk:  { :066384}  {If any of the above assessments are answered yes, consider ordering appropriate referrals (Optional):269591::\"click delete button to remove this line now\"}  Cognitive Screening: { :824137}    {Do you have sleep apnea, excessive snoring or daytime drowsiness? (Optional):070570}    {Outside tests to abstract? :859882}    {additional problems to add (Optional):182097}    Reviewed and updated as needed this visit by clinical staff                 Reviewed and updated as needed this visit by Provider                Social History     Tobacco Use     Smoking status: Never Smoker     Smokeless tobacco: Never Used   Substance Use Topics     Alcohol use: Yes     Comment: rare                           Current providers sharing in care for this patient include: {Rooming staff:  Please update Care Team in Rooming Activity, refresh this note and then delete this statement}  Patient Care Team:  Poppy Haynes PA as PCP - General (Family Practice)  Vane Gil MD as Assigned PCP    The following health maintenance items are reviewed in Epic and correct as of today:  Health Maintenance   Topic Date Due     DEPRESSION ACTION PLAN  Never done     Pneumococcal Vaccine: Pediatrics (0 to 5 Years) and At-Risk Patients (6 to 64 Years) (1 of 2 - PPSV23) Never done     Pneumococcal Vaccine: 65+ Years (1 of 2 - PPSV23) Never done     ZOSTER IMMUNIZATION (1 of 2) Never done     MEDICARE ANNUAL WELLNESS VISIT  Never done     MAMMO SCREENING  05/26/2019     COLORECTAL CANCER SCREENING  04/13/2020     AURELIANO ASSESSMENT  11/29/2020     PHQ-9  11/29/2020     FALL RISK ASSESSMENT  05/29/2021     DEXA  " "2022     DTAP/TDAP/TD IMMUNIZATION (2 - Td) 2024     LIPID  2025     ADVANCE CARE PLANNING  2025     HEPATITIS C SCREENING  Completed     INFLUENZA VACCINE  Completed     COVID-19 Vaccine  Completed     IPV IMMUNIZATION  Aged Out     MENINGITIS IMMUNIZATION  Aged Out     HEPATITIS B IMMUNIZATION  Aged Out     {Chronicprobdata (Optional):808898}  {Decision Support (Optional):663849}    ROS:  {ROS COMP:588428}    OBJECTIVE:   There were no vitals taken for this visit. Estimated body mass index is 27.04 kg/m  as calculated from the following:    Height as of 10/11/18: 1.638 m (5' 4.5\").    Weight as of 20: 72.6 kg (160 lb).  EXAM:   {Exam :061190}    {Diagnostic Test Results (Optional):508381::\"Diagnostic Test Results:\",\"Labs reviewed in Epic\"}    ASSESSMENT / PLAN:   {Diag Picklist:268612}    Patient has been advised of split billing requirements and indicates understanding: {YES / NO:108228::\"Yes\"}    COUNSELING:  {Medicare Counselin}    Estimated body mass index is 27.04 kg/m  as calculated from the following:    Height as of 10/11/18: 1.638 m (5' 4.5\").    Weight as of 20: 72.6 kg (160 lb).    {Weight Management Plan (ACO) Complete if BMI is abnormal-  Ages 18-64  BMI >24.9.  Age 65+ with BMI <23 or >30 (Optional):175202}    She reports that she has never smoked. She has never used smokeless tobacco.    Appropriate preventive services were discussed with this patient, including applicable screening as appropriate for cardiovascular disease, diabetes, osteopenia/osteoporosis, and glaucoma.  As appropriate for age/gender, discussed screening for colorectal cancer, prostate cancer, breast cancer, and cervical cancer. Checklist reviewing preventive services available has been given to the patient.    Reviewed patients plan of care and provided an AVS. The {CarePlan:355669} for Aileen meets the Care Plan requirement. This Care Plan has been established and reviewed with the " {PATIENT, FAMILY MEMBER, CAREGIVER:714984}.    Counseling Resources:  ATP IV Guidelines  Pooled Cohorts Equation Calculator  Breast Cancer Risk Calculator  BRCA-Related Cancer Risk Assessment: FHS-7 Tool  FRAX Risk Assessment  ICSI Preventive Guidelines  Dietary Guidelines for Americans, 2010  USDA's MyPlate  ASA Prophylaxis  Lung CA Screening    AMERICA Lepe  [facility]

## 2021-04-29 NOTE — PATIENT INSTRUCTIONS
Preventive Health Recommendations    See your health care provider every year to    Review health changes.     Discuss preventive care.      Review your medicines if your doctor has prescribed any.      You no longer need a yearly Pap test unless you've had an abnormal Pap test in the past 10 years. If you have vaginal symptoms, such as bleeding or discharge, be sure to talk with your provider about a Pap test.      Every 1 to 2 years, have a mammogram.  If you are over 69, talk with your health care provider about whether or not you want to continue having screening mammograms.      Every 10 years, have a colonoscopy. Or, have a yearly FIT test (stool test). These exams will check for colon cancer.       Have a cholesterol test every 5 years, or more often if your doctor advises it.       Have a diabetes test (fasting glucose) every three years. If you are at risk for diabetes, you should have this test more often.       At age 65, have a bone density scan (DEXA) to check for osteoporosis (brittle bone disease).    Shots:    Get a flu shot each year.    Get a tetanus shot every 10 years.    Talk to your doctor about your pneumonia vaccines. There are now two you should receive - Pneumovax (PPSV 23) and Prevnar (PCV 13).    Talk to your pharmacist about the shingles vaccine.    Talk to your doctor about the hepatitis B vaccine.    Nutrition:     Eat at least 5 servings of fruits and vegetables each day.      Eat whole-grain bread, whole-wheat pasta and brown rice instead of white grains and rice.      Get adequate about Calcium and Vitamin D.     Lifestyle    Exercise at least 150 minutes a week (30 minutes a day, 5 days a week). This will help you control your weight and prevent disease.      Limit alcohol to one drink per day.      No smoking.       Wear sunscreen to prevent skin cancer.       See your dentist twice a year for an exam and cleaning.      See your eye doctor every 1 to 2 years to screen for  conditions such as glaucoma, macular degeneration, cataracts, etc.    Personalized Prevention Plan  You are due for the preventive services outlined below.  Your care team is available to assist you in scheduling these services.  If you have already completed any of these items, please share that information with your care team to update in your medical record.    Health Maintenance Due   Topic Date Due     Depression Action Plan  Never done     Pneumococcal Vaccine (1 of 2 - PPSV23) Never done     Pneumococcal Vaccine (1 of 2 - PPSV23) Never done     Zoster (Shingles) Vaccine (1 of 2) Never done     Mammogram  05/26/2019     Colorectal Cancer Screening  04/13/2020     Anxiety Assessment  11/29/2020     Depression Assessment  11/29/2020     FALL RISK ASSESSMENT  05/29/2021

## 2021-04-30 ENCOUNTER — TELEPHONE (OUTPATIENT)
Dept: FAMILY MEDICINE | Facility: OTHER | Age: 66
End: 2021-04-30

## 2021-04-30 DIAGNOSIS — F51.01 PRIMARY INSOMNIA: ICD-10-CM

## 2021-04-30 ASSESSMENT — ACTIVITIES OF DAILY LIVING (ADL): CURRENT_FUNCTION: HOUSEWORK REQUIRES ASSISTANCE

## 2021-04-30 NOTE — TELEPHONE ENCOUNTER
Next 5 appointments (look out 90 days)    May 03, 2021  3:30 PM  (Arrive by 3:15 PM)  PHYSICAL with AMERICA Gonsales  [facility] [place] ([facility] [place] ) [address]  [place] [address]  [phone]        Pt calling and has the appointment. She states that this will need to be a pre-op too. DOS: May 14,2021 at [facility] in [place] with Dr.M. Nick. For Left leg knee revision.    She states that she will need a MRSA swab that day too. She has her covid test scheduled down there.      Please advise on the above.          Khushi Hanson RN

## 2021-05-03 ENCOUNTER — OFFICE VISIT (OUTPATIENT)
Dept: FAMILY MEDICINE | Facility: OTHER | Age: 66
End: 2021-05-03
Attending: PHYSICIAN ASSISTANT
Payer: COMMERCIAL

## 2021-05-03 ENCOUNTER — MYC MEDICAL ADVICE (OUTPATIENT)
Dept: FAMILY MEDICINE | Facility: OTHER | Age: 66
End: 2021-05-03

## 2021-05-03 VITALS
WEIGHT: 161 LBS | SYSTOLIC BLOOD PRESSURE: 105 MMHG | HEART RATE: 62 BPM | HEIGHT: 65 IN | BODY MASS INDEX: 26.82 KG/M2 | DIASTOLIC BLOOD PRESSURE: 70 MMHG | TEMPERATURE: 98.1 F | OXYGEN SATURATION: 98 %

## 2021-05-03 DIAGNOSIS — K21.9 GASTROESOPHAGEAL REFLUX DISEASE WITHOUT ESOPHAGITIS: Primary | ICD-10-CM

## 2021-05-03 DIAGNOSIS — Z01.818 PREOP GENERAL PHYSICAL EXAM: Primary | ICD-10-CM

## 2021-05-03 DIAGNOSIS — Z96.659 INFECTION OF TOTAL KNEE REPLACEMENT, SEQUELA: ICD-10-CM

## 2021-05-03 DIAGNOSIS — R33.9 URINARY RETENTION: ICD-10-CM

## 2021-05-03 DIAGNOSIS — T84.59XS INFECTION OF TOTAL KNEE REPLACEMENT, SEQUELA: ICD-10-CM

## 2021-05-03 PROBLEM — T84.018S FAILURE OF TOTAL KNEE ARTHROPLASTY, SEQUELA: Status: ACTIVE | Noted: 2021-04-22

## 2021-05-03 LAB
ALBUMIN SERPL-MCNC: 3.6 G/DL (ref 3.4–5)
ALBUMIN UR-MCNC: NEGATIVE MG/DL
ALP SERPL-CCNC: 83 U/L (ref 40–150)
ALT SERPL W P-5'-P-CCNC: 13 U/L (ref 0–50)
ANION GAP SERPL CALCULATED.3IONS-SCNC: 3 MMOL/L (ref 3–14)
APPEARANCE UR: CLEAR
AST SERPL W P-5'-P-CCNC: 13 U/L (ref 0–45)
BACTERIA #/AREA URNS HPF: ABNORMAL /HPF
BASOPHILS # BLD AUTO: 0 10E9/L (ref 0–0.2)
BASOPHILS NFR BLD AUTO: 0.7 %
BILIRUB SERPL-MCNC: 0.3 MG/DL (ref 0.2–1.3)
BILIRUB UR QL STRIP: NEGATIVE
BUN SERPL-MCNC: 18 MG/DL (ref 7–30)
CALCIUM SERPL-MCNC: 8.9 MG/DL (ref 8.5–10.1)
CHLORIDE SERPL-SCNC: 107 MMOL/L (ref 94–109)
CO2 SERPL-SCNC: 29 MMOL/L (ref 20–32)
COLOR UR AUTO: ABNORMAL
CREAT SERPL-MCNC: 0.92 MG/DL (ref 0.52–1.04)
DIFFERENTIAL METHOD BLD: ABNORMAL
EOSINOPHIL # BLD AUTO: 0.1 10E9/L (ref 0–0.7)
EOSINOPHIL NFR BLD AUTO: 2.2 %
ERYTHROCYTE [DISTWIDTH] IN BLOOD BY AUTOMATED COUNT: 15.1 % (ref 10–15)
GFR SERPL CREATININE-BSD FRML MDRD: 65 ML/MIN/{1.73_M2}
GLUCOSE SERPL-MCNC: 95 MG/DL (ref 70–99)
GLUCOSE UR STRIP-MCNC: NEGATIVE MG/DL
HCT VFR BLD AUTO: 37.2 % (ref 35–47)
HGB BLD-MCNC: 11.6 G/DL (ref 11.7–15.7)
HGB UR QL STRIP: NEGATIVE
IMM GRANULOCYTES # BLD: 0 10E9/L (ref 0–0.4)
IMM GRANULOCYTES NFR BLD: 0.2 %
KETONES UR STRIP-MCNC: NEGATIVE MG/DL
LEUKOCYTE ESTERASE UR QL STRIP: NEGATIVE
LYMPHOCYTES # BLD AUTO: 1.8 10E9/L (ref 0.8–5.3)
LYMPHOCYTES NFR BLD AUTO: 30.1 %
MCH RBC QN AUTO: 25 PG (ref 26.5–33)
MCHC RBC AUTO-ENTMCNC: 31.2 G/DL (ref 31.5–36.5)
MCV RBC AUTO: 80 FL (ref 78–100)
MONOCYTES # BLD AUTO: 0.5 10E9/L (ref 0–1.3)
MONOCYTES NFR BLD AUTO: 7.9 %
MUCOUS THREADS #/AREA URNS LPF: PRESENT /LPF
NEUTROPHILS # BLD AUTO: 3.5 10E9/L (ref 1.6–8.3)
NEUTROPHILS NFR BLD AUTO: 58.9 %
NITRATE UR QL: NEGATIVE
NRBC # BLD AUTO: 0 10*3/UL
NRBC BLD AUTO-RTO: 0 /100
PH UR STRIP: 5.5 PH (ref 4.7–8)
PLATELET # BLD AUTO: 240 10E9/L (ref 150–450)
POTASSIUM SERPL-SCNC: 4 MMOL/L (ref 3.4–5.3)
PROT SERPL-MCNC: 7.8 G/DL (ref 6.8–8.8)
RBC # BLD AUTO: 4.64 10E12/L (ref 3.8–5.2)
RBC #/AREA URNS AUTO: <1 /HPF (ref 0–2)
SODIUM SERPL-SCNC: 139 MMOL/L (ref 133–144)
SOURCE: ABNORMAL
SP GR UR STRIP: 1.01 (ref 1–1.03)
SQUAMOUS #/AREA URNS AUTO: 0 /HPF (ref 0–1)
UROBILINOGEN UR STRIP-MCNC: NORMAL MG/DL (ref 0–2)
WBC # BLD AUTO: 5.9 10E9/L (ref 4–11)
WBC #/AREA URNS AUTO: <1 /HPF (ref 0–5)

## 2021-05-03 PROCEDURE — 93010 ELECTROCARDIOGRAM REPORT: CPT | Performed by: INTERNAL MEDICINE

## 2021-05-03 PROCEDURE — 36415 COLL VENOUS BLD VENIPUNCTURE: CPT | Mod: ZL | Performed by: PHYSICIAN ASSISTANT

## 2021-05-03 PROCEDURE — G0463 HOSPITAL OUTPT CLINIC VISIT: HCPCS | Mod: 25

## 2021-05-03 PROCEDURE — 85025 COMPLETE CBC W/AUTO DIFF WBC: CPT | Mod: ZL | Performed by: PHYSICIAN ASSISTANT

## 2021-05-03 PROCEDURE — 81001 URINALYSIS AUTO W/SCOPE: CPT | Mod: ZL | Performed by: PHYSICIAN ASSISTANT

## 2021-05-03 PROCEDURE — 93005 ELECTROCARDIOGRAM TRACING: CPT

## 2021-05-03 PROCEDURE — G0463 HOSPITAL OUTPT CLINIC VISIT: HCPCS

## 2021-05-03 PROCEDURE — 80053 COMPREHEN METABOLIC PANEL: CPT | Mod: ZL | Performed by: PHYSICIAN ASSISTANT

## 2021-05-03 PROCEDURE — 99214 OFFICE O/P EST MOD 30 MIN: CPT | Performed by: PHYSICIAN ASSISTANT

## 2021-05-03 RX ORDER — CLONAZEPAM 1 MG/1
TABLET ORAL
Qty: 30 TABLET | Refills: 1 | Status: SHIPPED | OUTPATIENT
Start: 2021-05-03 | End: 2021-06-30

## 2021-05-03 ASSESSMENT — ANXIETY QUESTIONNAIRES
1. FEELING NERVOUS, ANXIOUS, OR ON EDGE: NOT AT ALL
6. BECOMING EASILY ANNOYED OR IRRITABLE: NOT AT ALL
5. BEING SO RESTLESS THAT IT IS HARD TO SIT STILL: NOT AT ALL
2. NOT BEING ABLE TO STOP OR CONTROL WORRYING: NOT AT ALL
7. FEELING AFRAID AS IF SOMETHING AWFUL MIGHT HAPPEN: NOT AT ALL
4. TROUBLE RELAXING: NOT AT ALL
GAD7 TOTAL SCORE: 0
3. WORRYING TOO MUCH ABOUT DIFFERENT THINGS: NOT AT ALL

## 2021-05-03 ASSESSMENT — MIFFLIN-ST. JEOR: SCORE: 1266.17

## 2021-05-03 ASSESSMENT — PATIENT HEALTH QUESTIONNAIRE - PHQ9: SUM OF ALL RESPONSES TO PHQ QUESTIONS 1-9: 9

## 2021-05-03 ASSESSMENT — PAIN SCALES - GENERAL: PAINLEVEL: MODERATE PAIN (5)

## 2021-05-03 NOTE — TELEPHONE ENCOUNTER
clonazePAM (KLONOPIN) 1 MG tablet     Last Written Prescription Date:  3/2/2021  Last Fill Quantity: 30   # refills: 1  Last Office Visit: 5/29/2020  Future Office visit:    Next 5 appointments (look out 90 days)    May 03, 2021  3:30 PM  (Arrive by 3:15 PM)  Pre-Op physical with AMERICA Gonsales  [facility] [place] ([facility] [place] ) [address]  [place] [address]  [phone]

## 2021-05-03 NOTE — PROGRESS NOTES
"Answers for HPI/ROS submitted by the patient on 4/30/2021   Annual Exam:  In general, how would you rate your overall physical health?: fair  Frequency of exercise:: None  Do you usually eat at least 4 servings of fruit and vegetables a day, include whole grains & fiber, and avoid regularly eating high fat or \"junk\" foods? : No  Taking medications regularly:: Yes  Activities of Daily Living: housework requires assistance  Home safety: no safety concerns identified  Hearing Impairment:: no hearing concerns  In the past 6 months, have you been bothered by leaking of urine?: Yes  In general, how would you rate your overall mental or emotional health?: good  Additional concerns today:: Yes  [facility] [place]  [address]  [place] [address]  Phone: [phone]  Primary Provider: Poppy Alicia  Pre-op Performing Provider: POPPY ALICIA      PREOPERATIVE EVALUATION:  Today's date: 5/3/2021    Aileen Szymanski is a 66 year old female who presents for a preoperative evaluation.    Surgical Information:  Surgery/Procedure: Left Total Knee revision  Surgery Location: [facility]   Surgeon: Dr. Nick  Surgery Date: 5/14/2021  Time of Surgery: tbd  Where patient plans to recover: Other: at least 5 days in the hospital  Fax number for surgical facility: [facility] fax number [phone]  Alyce(nurse) [phone]    Type of Anesthesia Anticipated: to be determined    Assessment & Plan     The proposed surgical procedure is considered INTERMEDIATE risk.    Routine general medical examination at a health care facility  Pre op for knee revision.     Risks and Recommendations:  The patient has the following additional risks and recommendations for perioperative complications:   - No identified additional risk factors other than previously addressed    Medication Instructions:  Patient is on no chronic medications    RECOMMENDATION:  APPROVAL GIVEN to proceed with proposed procedure, without further " diagnostic evaluation.    Review of external notes as documented above     Discussion of management or test interpretation with external physician/other qualified healthcare professional/appropriate source - [facility], [place].   Diagnosis or treatment significantly limited by social determinants of health - none     10  minutes spent on the date of the encounter doing chart review, review of outside records, patient visit and documentation         Subjective     HPI related to upcoming procedure: total revision of knee       Preop Questions 5/3/2021   1. Have you ever had a heart attack or stroke? No   2. Have you ever had surgery on your heart or blood vessels, such as a stent placement, a coronary artery bypass, or surgery on an artery in your head, neck, heart, or legs? No   3. Do you have chest pain with activity? No   4. Do you have a history of  heart failure? No   5. Do you currently have a cold, bronchitis or symptoms of other infection? No   6. Do you have a cough, shortness of breath, or wheezing? No   7. Do you or anyone in your family have previous history of blood clots? No   8. Do you or does anyone in your family have a serious bleeding problem such as prolonged bleeding following surgeries or cuts? No   9. Have you ever had problems with anemia or been told to take iron pills? YES - last surgery 2 transfusions    10. Have you had any abnormal blood loss such as black, tarry or bloody stools, or abnormal vaginal bleeding? No   11. Have you ever had a blood transfusion? YES - yes last surgery.    11a. Have you ever had a transfusion reaction? No   12. Are you willing to have a blood transfusion if it is medically needed before, during, or after your surgery? Yes   13. Have you or any of your relatives ever had problems with anesthesia? No   14. Do you have sleep apnea, excessive snoring or daytime drowsiness? No   15. Do you have any artifical heart valves or other implanted medical devices like a  pacemaker, defibrillator, or continuous glucose monitor? No   16. Do you have artificial joints? No   17. Are you allergic to latex? No     Health Care Directive:  Patient has a Health Care Directive on file      Preoperative Review of :   reviewed - no record of controlled substances prescribed.      Status of Chronic Conditions:  DEPRESSION - Patient has a long history of Depression of moderate severity requiring medication for control with recent symptoms being stable..Current symptoms of depression include none.       Review of Systems  CONSTITUTIONAL: NEGATIVE for fever, chills, change in weight  INTEGUMENTARY/SKIN: NEGATIVE for worrisome rashes, moles or lesions  EYES: NEGATIVE for vision changes or irritation  ENT/MOUTH: NEGATIVE for ear, mouth and throat problems  RESP: NEGATIVE for significant cough or SOB  CV: NEGATIVE for chest pain, palpitations or peripheral edema  GI: NEGATIVE for nausea, abdominal pain, heartburn, or change in bowel habits  : NEGATIVE for frequency, dysuria, or hematuria  MUSCULOSKELETAL:left knee is swollen large scar and angulated by 20 degrees.   NEURO:has numbness on incisional line.   ENDOCRINE: NEGATIVE for temperature intolerance, skin/hair changes  HEME: NEGATIVE for bleeding problems  PSYCHIATRIC: NEGATIVE for changes in mood or affect mood is up beat.     Patient Active Problem List    Diagnosis Date Noted     Failure of total knee arthroplasty, sequela 04/22/2021     Priority: Medium     Formatting of this note might be different from the original.  Added automatically from request for surgery [id number]       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     Priority: Medium     Arthrofibrosis of knee joint, left 12/27/2019     Priority: Medium     Added automatically from request for surgery [id number]       Failed total left knee replacement (H) 12/27/2019     Priority: Medium     Added automatically from request for surgery [id number]       Infection of total knee replacement  (H) 12/27/2019     Priority: Medium     Added automatically from request for surgery [id number]       Major depressive disorder without psychotic features 08/31/2017     Priority: Medium     Mood disorder (H) 09/19/2016     Priority: Medium     Mixed hyperlipidemia 09/19/2016     Priority: Medium     Lung granuloma (H) 03/27/2013     Priority: Medium     Overview:   Noted on chest xray 3/2013  Noted on chest xray 3/2013       Osteoarthrosis, pelvic region and thigh 12/05/2006     Priority: Medium     Overview:   IMO Update 10/11  IMO Update 10/11       Major depressive disorder, recurrent episode (H) 06/22/2006     Priority: Medium     Overview:   IMO Update 10/11  IMO Update 10/11        Past Medical History:   Diagnosis Date     Depressive disorder     Many years     Dizziness and giddiness 07/31/2007     Enthesopathy of knee, unspecified 06/13/2002     History of blood transfusion 2014?    Allergy to Lovenox. Due to knee replacement surgery.     Major depressive disorder without psychotic features 8/31/2017     Nonallopathic lesion of cervical region, not elsewhere classified 07/25/2001     Nonallopathic lesion of thoracic region, not elsewhere classified 06/13/2002     Pain in joint, lower leg 06/24/2002     Past Surgical History:   Procedure Laterality Date     ABDOMEN SURGERY  2008     lap band     ABDOMEN SURGERY  2007    hysteroscopy     ARTHROPLASTY REVISION KNEE Left 10/2016     CHOLECYSTECTOMY      removal     CLOSED REDUCTION NOSE N/A 11/7/2017    Procedure: CLOSED REDUCTION NOSE;  CLOSED REDUCTION NASAL SEPTAL FRACTURE;  Surgeon: Destiny Vanegas MD;  Location: [facility] OR     COLONOSCOPY  04/13/2010    Dr Quevedo; negative      JOINT REPLACEMENT Left 05/06/2020    replacement after infection     left knee replacement  7/21/2014    Dr Quinones/ [facility]     Current Outpatient Medications   Medication Sig Dispense Refill     acetaminophen (TYLENOL) 325 MG tablet Take 500 mg by mouth 3 times daily    "    clonazePAM (KLONOPIN) 1 MG tablet TAKE 1 TABLET BY MOUTH AT BEDTIME 30 tablet 1     escitalopram (LEXAPRO) 20 MG tablet TAKE 1 TABLET BY MOUTH EVERY DAY 90 tablet 3     MELATONIN PO Take 10 mg by mouth nightly as needed        multivitamin (CENTRUM SILVER) tablet Take 1 tablet by mouth daily       multivitamin, therapeutic (THERA-VIT) TABS tablet Take 1 tablet by mouth daily       vitamin D3 (CHOLECALCIFEROL) 51359 units (250 mcg) capsule Take 1 capsule by mouth daily         Allergies   Allergen Reactions     Lovenox [Enoxaparin] Other (See Comments)     Bleeding        Social History     Tobacco Use     Smoking status: Never Smoker     Smokeless tobacco: Never Used   Substance Use Topics     Alcohol use: Yes     Comment: rare     Family History   Problem Relation Age of Onset     Lung Cancer Mother      Heart Disease Mother      Chronic Obstructive Pulmonary Disease Father      History   Drug Use No         Objective     Blood Pressure 105/70 (BP Location: Left arm, Patient Position: Sitting, Cuff Size: Adult Regular)   Pulse 62   Temperature 98.1  F (36.7  C) (Tympanic)   Height 1.651 m (5' 5\")   Weight 73 kg (161 lb)   Oxygen Saturation 98%   Body Mass Index 26.79 kg/m      Physical Exam    GENERAL APPEARANCE: healthy, alert and no distress     EYES: EOMI, PERRL     HENT: ear canals and TM's normal and nose and mouth without ulcers or lesions     NECK: no adenopathy, no asymmetry, masses, or scars and thyroid normal to palpation     RESP: lungs clear to auscultation - no rales, rhonchi or wheezes     CV: regular rates and rhythm, normal S1 S2, no S3 or S4 and no murmur, click or rub     ABDOMEN:  soft, nontender, no HSM or masses and bowel sounds normal     MS: extremities normal- no gross deformities noted, no evidence of inflammation in joints, FROM in all extremities.     SKIN: no suspicious lesions or rashes     NEURO: Normal strength and tone, sensory exam grossly normal, mentation intact and " speech normal     PSYCH: mentation appears normal. and affect normal/bright     LYMPHATICS: No cervical adenopathy    Recent Labs   Lab Test 08/21/20  1257 04/21/20  1409 04/09/20  1508 03/23/20  1200   HGB  --   --  10.7* 9.3*   PLT  --   --  279 263    139  --   --    POTASSIUM 4.2 4.1  --  4.3   CR 0.98 0.92  --  0.72        Diagnostics:  Recent Results (from the past 24 hour(s))   Comprehensive metabolic panel    Collection Time: 05/03/21  4:39 PM   Result Value Ref Range    Sodium 139 133 - 144 mmol/L    Potassium 4.0 3.4 - 5.3 mmol/L    Chloride 107 94 - 109 mmol/L    Carbon Dioxide PENDING 20 - 32 mmol/L    Anion Gap PENDING 3 - 14 mmol/L    Glucose PENDING 70 - 99 mg/dL    Urea Nitrogen PENDING 7 - 30 mg/dL    Creatinine PENDING 0.52 - 1.04 mg/dL    GFR Estimate PENDING >60 mL/min/[1.73_m2]    GFR Estimate If Black PENDING >60 mL/min/[1.73_m2]    Calcium PENDING 8.5 - 10.1 mg/dL    Bilirubin Total PENDING 0.2 - 1.3 mg/dL    Albumin PENDING 3.4 - 5.0 g/dL    Protein Total PENDING 6.8 - 8.8 g/dL    Alkaline Phosphatase PENDING 40 - 150 U/L    ALT PENDING 0 - 50 U/L    AST PENDING 0 - 45 U/L   CBC with platelets differential    Collection Time: 05/03/21  4:39 PM   Result Value Ref Range    WBC 5.9 4.0 - 11.0 10e9/L    RBC Count 4.64 3.8 - 5.2 10e12/L    Hemoglobin 11.6 (L) 11.7 - 15.7 g/dL    Hematocrit 37.2 35.0 - 47.0 %    MCV 80 78 - 100 fl    MCH 25.0 (L) 26.5 - 33.0 pg    MCHC 31.2 (L) 31.5 - 36.5 g/dL    RDW 15.1 (H) 10.0 - 15.0 %    Platelet Count 240 150 - 450 10e9/L    Diff Method Automated Method     % Neutrophils 58.9 %    % Lymphocytes 30.1 %    % Monocytes 7.9 %    % Eosinophils 2.2 %    % Basophils 0.7 %    % Immature Granulocytes 0.2 %    Nucleated RBCs 0 0 /100    Absolute Neutrophil 3.5 1.6 - 8.3 10e9/L    Absolute Lymphocytes 1.8 0.8 - 5.3 10e9/L    Absolute Monocytes 0.5 0.0 - 1.3 10e9/L    Absolute Eosinophils 0.1 0.0 - 0.7 10e9/L    Absolute Basophils 0.0 0.0 - 0.2 10e9/L    Abs  Immature Granulocytes 0.0 0 - 0.4 10e9/L    Absolute Nucleated RBC 0.0    UA with Microscopic reflex to Culture    Collection Time: 05/03/21  4:42 PM    Specimen: Midstream Urine   Result Value Ref Range    Color Urine Light Yellow     Appearance Urine Clear     Glucose Urine Negative NEG^Negative mg/dL    Bilirubin Urine Negative NEG^Negative    Ketones Urine Negative NEG^Negative mg/dL    Specific Gravity Urine 1.015 1.003 - 1.035    Blood Urine Negative NEG^Negative    pH Urine 5.5 4.7 - 8.0 pH    Protein Albumin Urine Negative NEG^Negative mg/dL    Urobilinogen mg/dL Normal 0.0 - 2.0 mg/dL    Nitrite Urine Negative NEG^Negative    Leukocyte Esterase Urine Negative NEG^Negative    Source Midstream Urine     WBC Urine <1 0 - 5 /HPF    RBC Urine <1 0 - 2 /HPF    Bacteria Urine Few (A) NEG^Negative /HPF    Squamous Epithelial /HPF Urine 0 0 - 1 /HPF    Mucous Urine Present (A) NEG^Negative /LPF      EKG: appears normal, NSR, sinus bradycardia, normal axis, normal intervals, no acute ST/T changes c/w ischemia, no LVH by voltage criteria, unchanged from previous tracings    Revised Cardiac Risk Index (RCRI):  The patient has the following serious cardiovascular risks for perioperative complications:   - No serious cardiac risks = 0 points     RCRI Interpretation: 1 point: Class II (low risk - 0.9% complication rate)           Signed Electronically by: AMERICA Lepe  Copy of this evaluation report is provided to requesting physician.

## 2021-05-03 NOTE — NURSING NOTE
"Chief Complaint   Patient presents with     Pre-Op Exam       Initial Blood Pressure 105/70 (BP Location: Left arm, Patient Position: Sitting, Cuff Size: Adult Regular)   Pulse 62   Temperature 98.1  F (36.7  C) (Tympanic)   Height 1.651 m (5' 5\")   Weight 73 kg (161 lb)   Oxygen Saturation 98%   Body Mass Index 26.79 kg/m   Estimated body mass index is 26.79 kg/m  as calculated from the following:    Height as of this encounter: 1.651 m (5' 5\").    Weight as of this encounter: 73 kg (161 lb).  Medication Reconciliation: complete  Kim Curry LPN  "

## 2021-05-04 ENCOUNTER — HOSPITAL ENCOUNTER (OUTPATIENT)
Dept: PHYSICAL THERAPY | Facility: HOSPITAL | Age: 66
Setting detail: THERAPIES SERIES
End: 2021-05-04
Attending: ORTHOPAEDIC SURGERY
Payer: MEDICARE

## 2021-05-04 ENCOUNTER — TELEPHONE (OUTPATIENT)
Dept: FAMILY MEDICINE | Facility: OTHER | Age: 66
End: 2021-05-04

## 2021-05-04 PROCEDURE — 97161 PT EVAL LOW COMPLEX 20 MIN: CPT | Mod: GP | Performed by: PHYSICAL THERAPIST

## 2021-05-04 PROCEDURE — 97110 THERAPEUTIC EXERCISES: CPT | Mod: GP | Performed by: PHYSICAL THERAPIST

## 2021-05-04 RX ORDER — OMEPRAZOLE 20 MG/1
20 TABLET, DELAYED RELEASE ORAL DAILY
Qty: 90 TABLET | Refills: 0 | Status: SHIPPED | OUTPATIENT
Start: 2021-05-04 | End: 2021-09-01

## 2021-05-04 ASSESSMENT — ANXIETY QUESTIONNAIRES: GAD7 TOTAL SCORE: 0

## 2021-05-05 ASSESSMENT — ACTIVITIES OF DAILY LIVING (ADL)
RAW_SCORE: 29
GO UP STAIRS: ACTIVITY IS FAIRLY DIFFICULT
SQUAT: ACTIVITY IS SOMEWHAT DIFFICULT
GIVING WAY, BUCKLING OR SHIFTING OF KNEE: I DO NOT HAVE THE SYMPTOM
KNEEL ON THE FRONT OF YOUR KNEE: I AM UNABLE TO DO THE ACTIVITY
PAIN: THE SYMPTOM AFFECTS MY ACTIVITY MODERATELY
WALK: ACTIVITY IS FAIRLY DIFFICULT
STAND: ACTIVITY IS SOMEWHAT DIFFICULT
KNEE_ACTIVITY_OF_DAILY_LIVING_SUM: 29
STIFFNESS: THE SYMPTOM AFFECTS MY ACTIVITY MODERATELY
HOW_WOULD_YOU_RATE_THE_CURRENT_FUNCTION_OF_YOUR_KNEE_DURING_YOUR_USUAL_DAILY_ACTIVITIES_ON_A_SCALE_FROM_0_TO_100_WITH_100_BEING_YOUR_LEVEL_OF_KNEE_FUNCTION_PRIOR_TO_YOUR_INJURY_AND_0_BEING_THE_INABILITY_TO_PERFORM_ANY_OF_YOUR_USUAL_DAILY_ACTIVITIES?: 50
AS_A_RESULT_OF_YOUR_KNEE_INJURY,_HOW_WOULD_YOU_RATE_YOUR_CURRENT_LEVEL_OF_DAILY_ACTIVITY?: ABNORMAL
KNEE_ACTIVITY_OF_DAILY_LIVING_SCORE: 41.43
SIT WITH YOUR KNEE BENT: ACTIVITY IS FAIRLY DIFFICULT
RISE FROM A CHAIR: ACTIVITY IS FAIRLY DIFFICULT
WEAKNESS: THE SYMPTOM AFFECTS MY ACTIVITY SEVERELY
HOW_WOULD_YOU_RATE_THE_OVERALL_FUNCTION_OF_YOUR_KNEE_DURING_YOUR_USUAL_DAILY_ACTIVITIES?: SEVERELY ABNORMAL
GO DOWN STAIRS: ACTIVITY IS VERY DIFFICULT
LIMPING: THE SYMPTOM AFFECTS MY ACTIVITY MODERATELY
SWELLING: THE SYMPTOM AFFECTS MY ACTIVITY MODERATELY

## 2021-05-06 ENCOUNTER — HOSPITAL ENCOUNTER (OUTPATIENT)
Dept: PHYSICAL THERAPY | Facility: HOSPITAL | Age: 66
Setting detail: THERAPIES SERIES
End: 2021-05-06
Attending: ORTHOPAEDIC SURGERY
Payer: MEDICARE

## 2021-05-06 PROCEDURE — 97110 THERAPEUTIC EXERCISES: CPT | Mod: GP | Performed by: PHYSICAL THERAPIST

## 2021-05-06 NOTE — PROGRESS NOTES
05/04/21 1300   General Information   Type of Visit Initial OP Ortho PT Evaluation   Start of Care Date 05/04/21   Referring Physician Dr Nick   Patient/Family Goals Statement strengthen knee prior to surgery   Orders Evaluate and Treat   Orders Comment pre-op - getting post-op orders to do PT following surgery   Date of Order 05/03/21   Certification Required? Yes   Medical Diagnosis s/p L failed TKA, pre-op strengthening   Surgical/Medical history reviewed Yes   Precautions/Limitations no known precautions/limitations   Body Part(s)   Body Part(s) Knee   Presentation and Etiology   Pertinent history of current problem (include personal factors and/or comorbidities that impact the POC) Patient returns to PT after working on her own for the past 4 months after reaching a plateau in her progress with PT. She has a complex history of TKAs with her most recent surgery nearly 1 year ago - a TKA revision complicated by a tibial fracture she sustained intraoperatively. She has a noted valgus deformity  of roughly 15 degrees and after exhausting her progress in PT and continuing to have pain and dysfunction, she has elected to undergo another TKA procedure with hope to successfully change out the tibial component to a smaller size.  I had met with her and her surgeon last fall to collaborate on her case. She will be undergoing this revision procedure onf 5/14/2021. We will initiate strengthening prior to surgery to facilitate a better post-op outcome and will plan to continue PT post-op per surgeon orders   Impairments A. Pain;D. Decreased ROM;E. Decreased flexibility;F. Decreased strength and endurance;H. Impaired gait   Functional Limitations perform activities of daily living;perform required work activities;perform desired leisure / sports activities   Symptom Location L knee   How/Where did it occur   (Multiple surgeries)   Onset date of current episode/exacerbation 05/03/21   Chronicity Chronic   Pain rating  (0-10 point scale) Best (/10);Worst (/10)   Best (/10) 0   Worst (/10) 7   Pain quality A. Sharp   Frequency of pain/symptoms B. Intermittent   Pain/symptoms exacerbated by G. Certain positions  (stairs)   Pain/symptoms eased by C. Rest   Progression of symptoms since onset: Worsened   Current / Previous Interventions   Diagnostic Tests:   (upated imaging obtained by surgeon)   Prior Level of Function   Prior Level of Function-Mobility IND   Prior Level of Function-ADLs IND   Current Level of Function   Current Community Support Family/friend caregiver   Patient role/employment history F. Retired   [facility]   Current equipment-Gait/Locomotion None   Fall Risk Screen   Fall screen completed by PT   Have you fallen 2 or more times in the past year? No   Have you fallen and had an injury in the past year? No   Is patient a fall risk? No   Abuse Screen (yes response referral indicated)   Feels Unsafe at Home or Work/School no   Feels Threatened by Someone no   Does Anyone Try to Keep You From Having Contact with Others or Doing Things Outside Your Home? no   Physical Signs of Abuse Present no   Knee Objective Findings   Side (if bilateral, select both right and left) Left   Observation no acute distress, well-healed prior surgical incision   Gait/Locomotion antalgic - decreased functional knee flexion, slight trendelenburg gait   Balance/Proprioception (Single Leg Stance) Impaired   Knee Special Test Comments NT   Palpation Grossly non-tender to palpation   Accessory Motion/Joint Mobility Hard end feel with flexion, impaired tibial-femoral mobility, patellar mobility appropriate with inf/sup glides   Left Knee Extension AROM 0   Left Knee Extension PROM -2   Left Knee Flexion AROM 76   Left Knee Flexion PROM 78   Left Knee Flexion Strength 5-/5   Left Knee Extension Strength 4/5   Left Hip Abduction Strength 4/5   Left Quad Set Strength 4-/5   L VMO Strength 4-/5   Left Gastrocnemius Flexibility  hypo   Left ITB Flexibility hypo   Planned Therapy Interventions   Planned Therapy Interventions joint mobilization;manual therapy;neuromuscular re-education;ROM;strengthening;stretching   Planned Modality Interventions   Planned Modality Interventions Comments as needed   Clinical Impression   Criteria for Skilled Therapeutic Interventions Met yes, treatment indicated   PT Diagnosis Failed L TKA, weakness and deconditioning, pre-op conditioning   Influenced by the following impairments pain, weakness   Functional limitations due to impairments difficulty performing home and functional activities   Clinical Presentation Evolving/Changing   Clinical Presentation Rationale due to complexity of patient's case with history of multiple revisions   Clinical Decision Making (Complexity) Moderate complexity   Therapy Frequency 2 times/Week   Predicted Duration of Therapy Intervention (days/wks) up to 2 weeks until surgery, then will adjust based on surgeon's updated orders   Risk & Benefits of therapy have been explained Yes   Patient, Family & other staff in agreement with plan of care Yes   Clinical Impression Comments Presentation is consistent with L failed TKA revision complicated by tibial fracture.  She has failed to reach a state of functional mobility that is satisfactory to her and she has ongoing pain and dysfunction of her L knee.  She is now 1 year post-op from her most recent revision and she has elected to undergo another procedure to straighten her leg and hopefully change out to smaller tibila components to allow for less pain and better function. She has had difficulty with regaining strength and motion post-operatively and had a significant infection in her knee that limite her in previous episodes of care.  I am hopeful this episode of care and associated revision surgery will improve the patient's quality of life and safety with functions of daily living   Education Assessment   Preferred Learning Style  Demonstration   Barriers to Learning No barriers   ORTHO GOALS   PT Ortho Eval Goals 1;2;3   Ortho Goal 1   Goal Identifier STG 1   Goal Description Patient will be compliant with HEP in order to make progress at home   Target Date 05/19/21   Ortho Goal 2   Goal Identifier LTG 1   Goal Description Patient will demonstrate 4/5 strength in L quad in order to perform all functional activities safely   Target Date 07/28/21   Ortho Goal 3   Goal Identifier LTG 2   Goal Description Patient will demonstrate 0-90 degrees of L knee motion in order to improve safety and eficiency with daily mobility tasks   Target Date 07/28/21   Total Evaluation Time   PT Eval, Moderate Complexity Minutes (45036) 35   Therapy Certification   Certification date from 05/04/21   Certification date to 08/02/21   Medical Diagnosis L failed TKA, pre-op strengthening transition to post-op rehab     I certify the need for these services furnished under this plan of treatment and while under my care. (Physician co-signature of this document indicates review and certification of the therapy plan).      _____________________________     __________________________    ____________  Physician's Signature                 Date               Time

## 2021-05-07 ENCOUNTER — TELEPHONE (OUTPATIENT)
Dept: FAMILY MEDICINE | Facility: OTHER | Age: 66
End: 2021-05-07

## 2021-05-07 NOTE — TELEPHONE ENCOUNTER
Alyce from [facility] in [place] calling asking for 5.3.2021 MRSA swab results.Updated her that do no see this lab from that date in pt blanca.      Khushi Hanson RN

## 2021-05-10 ENCOUNTER — HOSPITAL ENCOUNTER (OUTPATIENT)
Dept: PHYSICAL THERAPY | Facility: HOSPITAL | Age: 66
Setting detail: THERAPIES SERIES
End: 2021-05-10
Attending: ORTHOPAEDIC SURGERY
Payer: MEDICARE

## 2021-05-10 PROCEDURE — 97110 THERAPEUTIC EXERCISES: CPT | Mod: GP

## 2021-05-12 ENCOUNTER — HOSPITAL ENCOUNTER (OUTPATIENT)
Dept: PHYSICAL THERAPY | Facility: HOSPITAL | Age: 66
Setting detail: THERAPIES SERIES
End: 2021-05-12
Attending: ORTHOPAEDIC SURGERY
Payer: MEDICARE

## 2021-05-12 ENCOUNTER — OFFICE VISIT (OUTPATIENT)
Dept: CHIROPRACTIC MEDICINE | Facility: OTHER | Age: 66
End: 2021-05-12
Attending: CHIROPRACTOR
Payer: COMMERCIAL

## 2021-05-12 DIAGNOSIS — M99.03 SEGMENTAL AND SOMATIC DYSFUNCTION OF LUMBAR REGION: Primary | ICD-10-CM

## 2021-05-12 DIAGNOSIS — M99.01 SEGMENTAL AND SOMATIC DYSFUNCTION OF CERVICAL REGION: ICD-10-CM

## 2021-05-12 DIAGNOSIS — M54.50 ACUTE LEFT-SIDED LOW BACK PAIN WITHOUT SCIATICA: ICD-10-CM

## 2021-05-12 DIAGNOSIS — M99.02 SEGMENTAL AND SOMATIC DYSFUNCTION OF THORACIC REGION: ICD-10-CM

## 2021-05-12 PROCEDURE — 97110 THERAPEUTIC EXERCISES: CPT | Mod: GP

## 2021-05-12 PROCEDURE — 98941 CHIROPRACT MANJ 3-4 REGIONS: CPT | Mod: AT | Performed by: CHIROPRACTOR

## 2021-06-01 ENCOUNTER — HOSPITAL ENCOUNTER (OUTPATIENT)
Dept: PHYSICAL THERAPY | Facility: HOSPITAL | Age: 66
Setting detail: THERAPIES SERIES
End: 2021-06-01
Attending: PHYSICIAN ASSISTANT
Payer: MEDICARE

## 2021-06-01 PROCEDURE — 999N000214 HC STATISTIC PT OUTPT VISIT: Performed by: PHYSICAL THERAPIST

## 2021-06-01 PROCEDURE — 97112 NEUROMUSCULAR REEDUCATION: CPT | Mod: GP | Performed by: PHYSICAL THERAPIST

## 2021-06-01 NOTE — PROGRESS NOTES
Outpatient Physical Therapy Progress Note     Patient: Aileen Szymanski  : 1955    Beginning/End Dates of Reporting Period:  2021 to 2021    Referring Provider: Dr Sandoval MD    Therapy Diagnosis: s/p L TKA revision, complex     Client Self Report: Patient returns to PT following L knee complex revision. Her DOS was 2021. She had a physician follow-up on  and ws instructed to avoid knee flexion beyond 80 degrees and to avoid aggressive quad work.  She presents to PT today to resume her rehab with new measurements and an updated POC    Objective Measurements:  Objective Measure: AROM, quad function, screening  Details: AROM L knee: -2-0-78.  L knee has an area of redness and swelling and is warm to the touch on the lateral aspect.  Patient states she had a hematoma there following surgery. Instructed her to continue to monitor this and use ice, compression and again monitor for any signs of feeling ill, fever, chills, etc and notify surgeon immediately if she has a progression of these.  We will plan to work on strengthening her quad, regaining gentle motion, swelling and pain control and regain functional mobility with respect to her healing surgical site.       Goals:  Goal Identifier STG 1   Goal Description Patient will be compliant with HEP in order to make progress at home   Target Date 21   Date Met      Progress:     Goal Identifier LTG 1   Goal Description Patient will demonstrate 4/5 strength in L quad in order to perform all functional activities safely   Target Date 21   Date Met      Progress:     Goal Identifier LTG 2   Goal Description Patient will demonstrate 0-90 degrees of L knee motion in order to improve safety and eficiency with daily mobility tasks   Target Date 21   Date Met      Progress:       Progress Toward Goals:   Progress this reporting period: Patient's goals have been updated and POC revised      Plan:  Changes to therapy plan of care: 2x/week  for up to 12 weeks    Discharge:  No    I certify the need for these services furnished under this plan of treatment and while under my care. (Physician co-signature of this document indicates review and certification of the therapy plan).      _____________________________     __________________________    ____________  Physician's Signature                 Date               Time

## 2021-06-04 ENCOUNTER — HOSPITAL ENCOUNTER (OUTPATIENT)
Dept: PHYSICAL THERAPY | Facility: HOSPITAL | Age: 66
Setting detail: THERAPIES SERIES
End: 2021-06-04
Attending: PHYSICIAN ASSISTANT
Payer: MEDICARE

## 2021-06-04 ENCOUNTER — HOSPITAL ENCOUNTER (EMERGENCY)
Facility: HOSPITAL | Age: 66
Discharge: HOME | End: 2021-06-04
Attending: EMERGENCY MEDICINE | Admitting: EMERGENCY MEDICINE
Payer: MEDICARE

## 2021-06-04 VITALS
WEIGHT: 160 LBS | DIASTOLIC BLOOD PRESSURE: 67 MMHG | HEART RATE: 67 BPM | RESPIRATION RATE: 16 BRPM | HEIGHT: 65 IN | BODY MASS INDEX: 26.66 KG/M2 | OXYGEN SATURATION: 99 % | SYSTOLIC BLOOD PRESSURE: 114 MMHG | TEMPERATURE: 98 F

## 2021-06-04 DIAGNOSIS — L08.9 SKIN INFLAMMATION: ICD-10-CM

## 2021-06-04 LAB
BASOPHILS # BLD AUTO: 0 10E9/L (ref 0–0.2)
BASOPHILS NFR BLD AUTO: 0.5 %
CRP SERPL-MCNC: 9.8 MG/L (ref 0–8)
DIFFERENTIAL METHOD BLD: ABNORMAL
EOSINOPHIL # BLD AUTO: 0.2 10E9/L (ref 0–0.7)
EOSINOPHIL NFR BLD AUTO: 1.9 %
ERYTHROCYTE [DISTWIDTH] IN BLOOD BY AUTOMATED COUNT: 15.2 % (ref 10–15)
ERYTHROCYTE [SEDIMENTATION RATE] IN BLOOD BY WESTERGREN METHOD: 100 MM/H (ref 0–30)
HCT VFR BLD AUTO: 33.2 % (ref 35–47)
HGB BLD-MCNC: 10.1 G/DL (ref 11.7–15.7)
IMM GRANULOCYTES # BLD: 0 10E9/L (ref 0–0.4)
IMM GRANULOCYTES NFR BLD: 0.4 %
LYMPHOCYTES # BLD AUTO: 1.1 10E9/L (ref 0.8–5.3)
LYMPHOCYTES NFR BLD AUTO: 13.4 %
MCH RBC QN AUTO: 24.5 PG (ref 26.5–33)
MCHC RBC AUTO-ENTMCNC: 30.4 G/DL (ref 31.5–36.5)
MCV RBC AUTO: 81 FL (ref 78–100)
MONOCYTES # BLD AUTO: 0.6 10E9/L (ref 0–1.3)
MONOCYTES NFR BLD AUTO: 7 %
NEUTROPHILS # BLD AUTO: 6.1 10E9/L (ref 1.6–8.3)
NEUTROPHILS NFR BLD AUTO: 76.8 %
NRBC # BLD AUTO: 0 10*3/UL
NRBC BLD AUTO-RTO: 0 /100
PLATELET # BLD AUTO: 420 10E9/L (ref 150–450)
RBC # BLD AUTO: 4.12 10E12/L (ref 3.8–5.2)
WBC # BLD AUTO: 7.9 10E9/L (ref 4–11)

## 2021-06-04 PROCEDURE — 87040 BLOOD CULTURE FOR BACTERIA: CPT | Performed by: EMERGENCY MEDICINE

## 2021-06-04 PROCEDURE — 86140 C-REACTIVE PROTEIN: CPT | Performed by: EMERGENCY MEDICINE

## 2021-06-04 PROCEDURE — 36415 COLL VENOUS BLD VENIPUNCTURE: CPT

## 2021-06-04 PROCEDURE — 85025 COMPLETE CBC W/AUTO DIFF WBC: CPT | Performed by: EMERGENCY MEDICINE

## 2021-06-04 PROCEDURE — 97112 NEUROMUSCULAR REEDUCATION: CPT | Mod: GP | Performed by: PHYSICAL THERAPIST

## 2021-06-04 PROCEDURE — 85652 RBC SED RATE AUTOMATED: CPT | Performed by: EMERGENCY MEDICINE

## 2021-06-04 PROCEDURE — 99284 EMERGENCY DEPT VISIT MOD MDM: CPT | Performed by: EMERGENCY MEDICINE

## 2021-06-04 PROCEDURE — 99283 EMERGENCY DEPT VISIT LOW MDM: CPT

## 2021-06-04 PROCEDURE — 97110 THERAPEUTIC EXERCISES: CPT | Mod: GP | Performed by: PHYSICAL THERAPIST

## 2021-06-04 RX ORDER — CEPHALEXIN 500 MG/1
500 CAPSULE ORAL 2 TIMES DAILY
Qty: 20 CAPSULE | Refills: 0 | Status: SHIPPED | OUTPATIENT
Start: 2021-06-04 | End: 2021-06-04

## 2021-06-04 RX ORDER — SULFAMETHOXAZOLE/TRIMETHOPRIM 800-160 MG
1 TABLET ORAL 2 TIMES DAILY
Qty: 28 TABLET | Refills: 0 | Status: SHIPPED | OUTPATIENT
Start: 2021-06-04 | End: 2021-06-18

## 2021-06-04 ASSESSMENT — MIFFLIN-ST. JEOR: SCORE: 1261.64

## 2021-06-04 NOTE — ED NOTES
Pt arrives as walk in with walker with  c/o left knee swelling and redness. Pt with 5th knee surgery May 14, was seen by PT this morning and they recommended she come here. Pt denies any pain or tenderness to knee. Redness is blanchable, warm to touch. Erythema outlined with indelible ink.

## 2021-06-04 NOTE — ED NOTES
Patient is having a virtual visit with her orthopedic surgeon at this time.  Will call lab back when she is done.

## 2021-06-04 NOTE — ED TRIAGE NOTES
"Patient presents with ongoing problems with left knee.  Patient states she has had 5 total knee surgeries.  Patient has noticed that she has been having left knee swelling/redness and worsening pain.  States she recently finished her keflex.  Was at PT and they recommend she have it evaluated for worsening hematoma vs infection.      Dr. Mohan at [facility] in [place] did all of her previous surgeries.    Last surgery was May 14th; she had the \"lower component replaced\"  "

## 2021-06-04 NOTE — ED PROVIDER NOTES
History     Chief Complaint   Patient presents with     Joint Swelling     L knee     HPI  Aileen Szymanski is a 66 year old female who presents to the ED via private vehicle with concerns of a possible left knee infection.  She recently had fifth surgery on her left knee for prosthetic joint repair.  She has had several complications in the past including a prosthetic joint infection.  Most recently this was replaced on May 14.  She completed a 2-week course of Keflex, and follow-up with orthopedics on May 27.  There was some concern she might have developed a slight hematoma the knee at that visit but there was no concerns about infectious process at this time.  The patient now comes to the ED with complaints of some slight increased redness and warmth in the area over the left knee, but no drainage or discharge from the actual incision.  She has been able to complete her physical therapy appointments without significant pain and has been accomplishing good range of motion of the knee without pain.  She has had no significant systemic illness such as fevers or general malaise but did have a little bit of nausea earlier today.  Given her prior history of recurrent infections and complications the patient is very diligent about her healing process and has concerns that she might be developing a possible infection in the skin over the joint.  She otherwise states she is been doing well recently and has no other specific complaints, denies any other recent injuries or illnesses    Allergies:  Allergies   Allergen Reactions     Lovenox [Enoxaparin] Other (See Comments)     Bleeding       Problem List:    Patient Active Problem List    Diagnosis Date Noted     Failure of total knee arthroplasty, sequela 04/22/2021     Priority: Medium     Formatting of this note might be different from the original.  Added automatically from request for surgery [id number]       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     Priority:  Medium     Arthrofibrosis of knee joint, left 12/27/2019     Priority: Medium     Added automatically from request for surgery [id number]       Failed total left knee replacement (H) 12/27/2019     Priority: Medium     Added automatically from request for surgery [id number]       Infection of total knee replacement (H) 12/27/2019     Priority: Medium     Added automatically from request for surgery [id number]       Major depressive disorder without psychotic features 08/31/2017     Priority: Medium     Mood disorder (H) 09/19/2016     Priority: Medium     Mixed hyperlipidemia 09/19/2016     Priority: Medium     Lung granuloma (H) 03/27/2013     Priority: Medium     Overview:   Noted on chest xray 3/2013  Noted on chest xray 3/2013       Osteoarthrosis, pelvic region and thigh 12/05/2006     Priority: Medium     Overview:   IMO Update 10/11  IMO Update 10/11       Major depressive disorder, recurrent episode (H) 06/22/2006     Priority: Medium     Overview:   IMO Update 10/11  IMO Update 10/11          Past Medical History:    Past Medical History:   Diagnosis Date     Depressive disorder      Dizziness and giddiness 07/31/2007     Enthesopathy of knee, unspecified 06/13/2002     History of blood transfusion 2014?     Major depressive disorder without psychotic features 8/31/2017     Nonallopathic lesion of cervical region, not elsewhere classified 07/25/2001     Nonallopathic lesion of thoracic region, not elsewhere classified 06/13/2002     Pain in joint, lower leg 06/24/2002       Past Surgical History:    Past Surgical History:   Procedure Laterality Date     ABDOMEN SURGERY  2008     lap band     ABDOMEN SURGERY  2007    hysteroscopy     ARTHROPLASTY REVISION KNEE Left 10/2016     CHOLECYSTECTOMY      removal     CLOSED REDUCTION NOSE N/A 11/7/2017    Procedure: CLOSED REDUCTION NOSE;  CLOSED REDUCTION NASAL SEPTAL FRACTURE;  Surgeon: Destiny Vanegas MD;  Location: [facility] OR     COLONOSCOPY  04/13/2010      "Emy; negative      JOINT REPLACEMENT Left 05/06/2020    replacement after infection     left knee replacement  7/21/2014    Dr Quinones/ [facility]       Family History:    Family History   Problem Relation Age of Onset     Lung Cancer Mother      Heart Disease Mother      Chronic Obstructive Pulmonary Disease Father        Social History:  Marital Status:   [2]  Social History     Tobacco Use     Smoking status: Never Smoker     Smokeless tobacco: Never Used   Substance Use Topics     Alcohol use: Yes     Comment: rare     Drug use: No        Medications:    acetaminophen (TYLENOL) 325 MG tablet  clonazePAM (KLONOPIN) 1 MG tablet  escitalopram (LEXAPRO) 20 MG tablet  MELATONIN PO  multivitamin (CENTRUM SILVER) tablet  omeprazole (PRILOSEC OTC) 20 MG EC tablet  sulfamethoxazole-trimethoprim (BACTRIM DS) 800-160 MG tablet  vitamin D3 (CHOLECALCIFEROL) 29712 units (250 mcg) capsule  multivitamin, therapeutic (THERA-VIT) TABS tablet          Review of Systems  Pertinent positives and negatives as noted in the HPI, remainder of 10 point review systems otherwise negative    Physical Exam   BP: 114/67  Pulse: 67  Temp: 98  F (36.7  C)  Resp: 16  Height: 165.1 cm (5' 5\")  Weight: 72.6 kg (160 lb)  SpO2: 99 %      Physical Exam  Vitals signs and nursing note reviewed.   Constitutional:       General: She is not in acute distress.     Appearance: Normal appearance. She is normal weight. She is not ill-appearing or toxic-appearing.   HENT:      Head: Normocephalic and atraumatic.      Right Ear: External ear normal.      Left Ear: External ear normal.      Nose: Nose normal.      Mouth/Throat:      Mouth: Mucous membranes are moist.      Pharynx: Oropharynx is clear.   Eyes:      Extraocular Movements: Extraocular movements intact.      Conjunctiva/sclera: Conjunctivae normal.      Pupils: Pupils are equal, round, and reactive to light.   Cardiovascular:      Rate and Rhythm: Normal rate and regular rhythm.      " Pulses: Normal pulses.   Pulmonary:      Effort: Pulmonary effort is normal.   Musculoskeletal: Normal range of motion.      Comments: She has some slight swelling/possible mild hematoma of the left knee, but no specific joint effusion appreciated.  Her knee range of motion is good without significant pain.   Skin:     General: Skin is warm and dry.      Capillary Refill: Capillary refill takes less than 2 seconds.      Comments: Her left knee surgical scar is clean dry and intact with no dehiscence.  The physical therapist in this morning's visit did draw margin around the edge of the erythema overlying the left knee, and the erythema does not extend beyond this margin at present.  There is some slight warmth but no significant tenderness associated with this erythema.  There is no associated lymphangitis, no palpable cords, and no fullness or pain in the popliteal fossa.  There is no lymphadenopathy in the left groin.  No evidence of any other skin infection in the left lower extremity.   Neurological:      General: No focal deficit present.      Mental Status: She is alert and oriented to person, place, and time. Mental status is at baseline.   Psychiatric:         Mood and Affect: Mood normal.         Behavior: Behavior normal.         Thought Content: Thought content normal.         Judgment: Judgment normal.           ED Course        Procedures              Critical Care time:  none             Results for orders placed or performed during the hospital encounter of 06/04/21 (from the past 24 hour(s))   CBC with platelets differential   Result Value Ref Range    WBC 7.9 4.0 - 11.0 10e9/L    RBC Count 4.12 3.8 - 5.2 10e12/L    Hemoglobin 10.1 (L) 11.7 - 15.7 g/dL    Hematocrit 33.2 (L) 35.0 - 47.0 %    MCV 81 78 - 100 fl    MCH 24.5 (L) 26.5 - 33.0 pg    MCHC 30.4 (L) 31.5 - 36.5 g/dL    RDW 15.2 (H) 10.0 - 15.0 %    Platelet Count 420 150 - 450 10e9/L    Diff Method Automated Method     % Neutrophils 76.8 %     % Lymphocytes 13.4 %    % Monocytes 7.0 %    % Eosinophils 1.9 %    % Basophils 0.5 %    % Immature Granulocytes 0.4 %    Nucleated RBCs 0 0 /100    Absolute Neutrophil 6.1 1.6 - 8.3 10e9/L    Absolute Lymphocytes 1.1 0.8 - 5.3 10e9/L    Absolute Monocytes 0.6 0.0 - 1.3 10e9/L    Absolute Eosinophils 0.2 0.0 - 0.7 10e9/L    Absolute Basophils 0.0 0.0 - 0.2 10e9/L    Abs Immature Granulocytes 0.0 0 - 0.4 10e9/L    Absolute Nucleated RBC 0.0    CRP inflammation   Result Value Ref Range    CRP Inflammation 9.8 (H) 0.0 - 8.0 mg/L   Erythrocyte sedimentation rate auto   Result Value Ref Range    Sed Rate 100 (H) 0 - 30 mm/h       Medications - No data to display    Assessments & Plan (with Medical Decision Making)     I have reviewed the nursing notes.    I have reviewed the findings, diagnosis, plan and need for follow up with the patient.  History and exam are suggestive of possible mild skin inflammation related to a healing subcutaneous hematoma versus possible mild early cellulitis, but I do not have concerns about a septic non-native joint at this time.  I was able to consult with the patient's orthopedic surgeon, Dr. Nick videoconference with the patient, and we felt that mutually agreeable that there would be some clinical benefit in obtaining ESR, CRP, blood cultures and blood counts today to establish some baseline for longitudinal comparison.  We also felt that it was reasonable to start the patient on a course of Bactrim in case this is an early cellulitis but neither of us were specifically convinced that this was an absolute cellulitis at this point.  We did not feel that a joint aspiration or imaging studies needed to be performed today.    Patient indicates she is comfortable with this plan of care and will be following up with her orthopedic surgeon within the next week.  Return precautions are discussed with the patient and she is provided a prescription for Bactrim.  Results of diagnostic studies from  today's blood tests will be forwarded to orthopedic surgeons office for follow-up.  Patient indicates agreement and understanding and she is subsequently discharged in stable condition with good prognosis    Discharge Medication List as of 6/4/2021  1:19 PM      START taking these medications    Details   sulfamethoxazole-trimethoprim (BACTRIM DS) 800-160 MG tablet Take 1 tablet by mouth 2 times daily for 14 days, Disp-28 tablet, R-0, Local Print             Final diagnoses:   Skin inflammation       6/4/2021   [facility]     Jaime Holland MD  06/04/21 [id number]

## 2021-06-04 NOTE — ED NOTES
Pt verbalized understanding of DCIs, including f/u and s/sx of when to return to ED. Ambulatory with  to exit.

## 2021-06-09 ENCOUNTER — HOSPITAL ENCOUNTER (OUTPATIENT)
Dept: PHYSICAL THERAPY | Facility: HOSPITAL | Age: 66
Setting detail: THERAPIES SERIES
End: 2021-06-09
Attending: PHYSICIAN ASSISTANT
Payer: MEDICARE

## 2021-06-09 PROCEDURE — 97112 NEUROMUSCULAR REEDUCATION: CPT | Mod: GP

## 2021-06-09 PROCEDURE — 97110 THERAPEUTIC EXERCISES: CPT | Mod: GP,CQ

## 2021-06-10 LAB
BACTERIA SPEC CULT: NORMAL
SPECIMEN SOURCE: NORMAL

## 2021-06-16 ENCOUNTER — HOSPITAL ENCOUNTER (OUTPATIENT)
Dept: PHYSICAL THERAPY | Facility: HOSPITAL | Age: 66
Setting detail: THERAPIES SERIES
End: 2021-06-16
Attending: PHYSICIAN ASSISTANT
Payer: MEDICARE

## 2021-06-16 PROCEDURE — 97110 THERAPEUTIC EXERCISES: CPT | Mod: GP

## 2021-06-18 ENCOUNTER — HOSPITAL ENCOUNTER (OUTPATIENT)
Dept: PHYSICAL THERAPY | Facility: HOSPITAL | Age: 66
Setting detail: THERAPIES SERIES
End: 2021-06-18
Attending: PHYSICIAN ASSISTANT
Payer: MEDICARE

## 2021-06-18 PROCEDURE — 97140 MANUAL THERAPY 1/> REGIONS: CPT | Mod: GP | Performed by: PHYSICAL THERAPIST

## 2021-06-18 PROCEDURE — 97110 THERAPEUTIC EXERCISES: CPT | Mod: GP | Performed by: PHYSICAL THERAPIST

## 2021-06-22 ENCOUNTER — HOSPITAL ENCOUNTER (OUTPATIENT)
Dept: PHYSICAL THERAPY | Facility: HOSPITAL | Age: 66
Setting detail: THERAPIES SERIES
End: 2021-06-22
Attending: PHYSICIAN ASSISTANT
Payer: MEDICARE

## 2021-06-22 PROCEDURE — 97140 MANUAL THERAPY 1/> REGIONS: CPT | Mod: GP,CQ

## 2021-06-22 PROCEDURE — 97110 THERAPEUTIC EXERCISES: CPT | Mod: GP,CQ

## 2021-06-22 NOTE — PROGRESS NOTES
Progress Report  Maida Shah DPT, OCS, Cert DN     06/22/21 1029   Signing Clinician's Name / Credentials   Signing clinician's name / credentials Mary Torbrianraymond PTA   Session Number   Session Number 10   Session Tracking, Supervision and Quick Adds   PT Assistant Visit Number 5   Quick Adds Assistant Supervision   Assistant Supervision PTA visit observed, intervention appropriate, plan of care reviewed and remains appropriate   Progress Note/Recertification   Progress Note Completed Date 06/01/21   Recertification Due Date 08/02/21   Adult Goals   PT Ortho Eval Goals 1;2;3   Ortho Goal 1   Goal Identifier STG 1   Goal Description Patient will be compliant with HEP in order to make progress at home   Target Date 06/29/21   Date Met   (6/22/2021)   Ortho Goal 2   Goal Identifier LTG 1   Goal Description Patient will demonstrate 4/5 strength in L quad in order to perform all functional activities safely   Target Date 08/24/21   Date Met   (improving)   Ortho Goal 3   Goal Identifier LTG 2   Goal Description Patient will demonstrate 0-90 degrees of L knee motion in order to improve safety and eficiency with daily mobility tasks   Target Date 08/24/21   Date Met 06/22/21   Subjective Report   Subjective Report Pt reports her knee ached on Sunday all day due to the weather. Pt walked in carrying her quad cane with a slight limp.    Objective Measure 1   Objective Measure AROM knee   Details 0/99 ind, PROM 0/101   Treatment Interventions   Interventions Therapeutic Procedure/Exercise;Manual Therapy   Therapeutic Procedure/exercise   Therapeutic Procedures: strength, endurance, ROM, flexibillity minutes (52024) 15   Skilled Intervention motion and strength   Patient Response good   Treatment Detail Supine bridging 2x10 reps, standing hip abd YTB 2x10 reps, TKE 2x10 reps GTB,    Manual Therapy   Manual Therapy: Mobilization, MFR, MLD, friction massage minutes (69796) 15   Skilled Intervention man ther   Patient  Response good   Treatment Detail tib-femoral mobs ant/post for promotion of flex/ext motion and STM and MFR to L quad and hamstring with progressive stretch into increased flexion motin   Assessments Completed   Assessments Completed Patient is improving   Plan   Home program Added hip abd standing YTB 2x10 reps daily, supine bridging 2x10 reps daily   Plan for next session Monitor response to tx session. Ask about doctor Zoom visit.    Total Session Time   Timed Code Treatment Minutes 30   Total Treatment Time (sum of timed and untimed services) 30

## 2021-06-24 ENCOUNTER — HOSPITAL ENCOUNTER (OUTPATIENT)
Dept: PHYSICAL THERAPY | Facility: HOSPITAL | Age: 66
Setting detail: THERAPIES SERIES
End: 2021-06-24
Attending: PHYSICIAN ASSISTANT
Payer: MEDICARE

## 2021-06-24 PROCEDURE — 97110 THERAPEUTIC EXERCISES: CPT | Mod: GP | Performed by: PHYSICAL THERAPIST

## 2021-06-28 DIAGNOSIS — F51.01 PRIMARY INSOMNIA: ICD-10-CM

## 2021-06-29 ENCOUNTER — HOSPITAL ENCOUNTER (OUTPATIENT)
Dept: PHYSICAL THERAPY | Facility: HOSPITAL | Age: 66
Setting detail: THERAPIES SERIES
End: 2021-06-29
Attending: PHYSICIAN ASSISTANT
Payer: MEDICARE

## 2021-06-29 PROCEDURE — 97110 THERAPEUTIC EXERCISES: CPT | Mod: GP,CQ

## 2021-06-30 RX ORDER — CLONAZEPAM 1 MG/1
TABLET ORAL
Qty: 30 TABLET | Refills: 0 | Status: SHIPPED | OUTPATIENT
Start: 2021-06-30 | End: 2021-07-30

## 2021-06-30 NOTE — TELEPHONE ENCOUNTER
KLONOPIN      Last Written Prescription Date:  5-3-2021  Last Fill Quantity: 30,   # refills: 1  Last Office Visit: 5-3-2021  Future Office visit:       Routing refill request to provider for review/approval because:  Drug not on the [facility], [facility] or [facility] refill protocol or controlled substance

## 2021-07-01 ENCOUNTER — HOSPITAL ENCOUNTER (OUTPATIENT)
Dept: PHYSICAL THERAPY | Facility: HOSPITAL | Age: 66
Setting detail: THERAPIES SERIES
End: 2021-07-01
Attending: PHYSICIAN ASSISTANT
Payer: MEDICARE

## 2021-07-01 PROCEDURE — 97110 THERAPEUTIC EXERCISES: CPT | Mod: GP | Performed by: PHYSICAL THERAPIST

## 2021-07-06 ENCOUNTER — HOSPITAL ENCOUNTER (OUTPATIENT)
Dept: PHYSICAL THERAPY | Facility: HOSPITAL | Age: 66
Setting detail: THERAPIES SERIES
End: 2021-07-06
Attending: PHYSICIAN ASSISTANT
Payer: MEDICARE

## 2021-07-06 PROCEDURE — 97110 THERAPEUTIC EXERCISES: CPT | Mod: GP

## 2021-07-08 ENCOUNTER — HOSPITAL ENCOUNTER (OUTPATIENT)
Dept: PHYSICAL THERAPY | Facility: HOSPITAL | Age: 66
Setting detail: THERAPIES SERIES
End: 2021-07-08
Attending: PHYSICIAN ASSISTANT
Payer: MEDICARE

## 2021-07-08 PROCEDURE — 97110 THERAPEUTIC EXERCISES: CPT | Mod: GP

## 2021-07-13 ENCOUNTER — HOSPITAL ENCOUNTER (OUTPATIENT)
Dept: PHYSICAL THERAPY | Facility: HOSPITAL | Age: 66
Setting detail: THERAPIES SERIES
End: 2021-07-13
Attending: PHYSICIAN ASSISTANT
Payer: MEDICARE

## 2021-07-13 PROCEDURE — 97110 THERAPEUTIC EXERCISES: CPT | Mod: GP,CQ

## 2021-07-15 ENCOUNTER — HOSPITAL ENCOUNTER (OUTPATIENT)
Dept: PHYSICAL THERAPY | Facility: HOSPITAL | Age: 66
Setting detail: THERAPIES SERIES
End: 2021-07-15
Attending: PHYSICIAN ASSISTANT
Payer: MEDICARE

## 2021-07-15 PROCEDURE — 97110 THERAPEUTIC EXERCISES: CPT | Mod: GP

## 2021-07-20 ENCOUNTER — OFFICE VISIT (OUTPATIENT)
Dept: CHIROPRACTIC MEDICINE | Facility: OTHER | Age: 66
End: 2021-07-20
Attending: CHIROPRACTOR
Payer: MEDICARE

## 2021-07-20 ENCOUNTER — HOSPITAL ENCOUNTER (OUTPATIENT)
Dept: PHYSICAL THERAPY | Facility: HOSPITAL | Age: 66
Setting detail: THERAPIES SERIES
End: 2021-07-20
Attending: PHYSICIAN ASSISTANT
Payer: MEDICARE

## 2021-07-20 DIAGNOSIS — M99.01 SEGMENTAL AND SOMATIC DYSFUNCTION OF CERVICAL REGION: ICD-10-CM

## 2021-07-20 DIAGNOSIS — M99.02 SEGMENTAL AND SOMATIC DYSFUNCTION OF THORACIC REGION: ICD-10-CM

## 2021-07-20 DIAGNOSIS — M99.03 SEGMENTAL AND SOMATIC DYSFUNCTION OF LUMBAR REGION: Primary | ICD-10-CM

## 2021-07-20 DIAGNOSIS — M54.50 ACUTE BILATERAL LOW BACK PAIN WITHOUT SCIATICA: ICD-10-CM

## 2021-07-20 PROCEDURE — 97110 THERAPEUTIC EXERCISES: CPT | Mod: GP

## 2021-07-20 PROCEDURE — 98941 CHIROPRACT MANJ 3-4 REGIONS: CPT | Mod: AT | Performed by: CHIROPRACTOR

## 2021-07-22 ENCOUNTER — HOSPITAL ENCOUNTER (OUTPATIENT)
Dept: PHYSICAL THERAPY | Facility: HOSPITAL | Age: 66
Setting detail: THERAPIES SERIES
End: 2021-07-22
Attending: PHYSICIAN ASSISTANT
Payer: MEDICARE

## 2021-07-22 PROCEDURE — 97110 THERAPEUTIC EXERCISES: CPT | Mod: GP | Performed by: PHYSICAL THERAPIST

## 2021-07-27 ENCOUNTER — HOSPITAL ENCOUNTER (OUTPATIENT)
Dept: PHYSICAL THERAPY | Facility: HOSPITAL | Age: 66
Setting detail: THERAPIES SERIES
End: 2021-07-27
Attending: PHYSICIAN ASSISTANT
Payer: MEDICARE

## 2021-07-27 PROCEDURE — 97110 THERAPEUTIC EXERCISES: CPT | Mod: GP

## 2021-07-29 ENCOUNTER — HOSPITAL ENCOUNTER (OUTPATIENT)
Dept: PHYSICAL THERAPY | Facility: HOSPITAL | Age: 66
Setting detail: THERAPIES SERIES
End: 2021-07-29
Attending: PHYSICIAN ASSISTANT
Payer: MEDICARE

## 2021-07-29 PROCEDURE — 97110 THERAPEUTIC EXERCISES: CPT | Mod: GP

## 2021-08-05 ENCOUNTER — HOSPITAL ENCOUNTER (OUTPATIENT)
Dept: PHYSICAL THERAPY | Facility: HOSPITAL | Age: 66
Setting detail: THERAPIES SERIES
End: 2021-08-05
Attending: PHYSICIAN ASSISTANT
Payer: MEDICARE

## 2021-08-05 PROCEDURE — 97140 MANUAL THERAPY 1/> REGIONS: CPT | Mod: GP | Performed by: PHYSICAL THERAPIST

## 2021-08-05 PROCEDURE — 97110 THERAPEUTIC EXERCISES: CPT | Mod: GP | Performed by: PHYSICAL THERAPIST

## 2021-08-05 NOTE — PROGRESS NOTES
08/05/21 1100   Signing Clinician's Name / Credentials   Signing clinician's name / credentials Maida Shah DPT, OCS, Cert DN   Session Number   Session Number 22   Progress Note/Recertification   Progress Note Completed Date 08/05/21   Adult Goals   PT Ortho Eval Goals 1;2;3   Ortho Goal 1   Goal Identifier STG 1   Goal Description Patient will be compliant with HEP in order to make progress at home   Target Date 06/29/21   Ortho Goal 2   Goal Identifier LTG 1   Goal Description Patient will demonstrate 4/5 strength in L quad in order to perform all functional activities safely   Goal Progress Improving   Target Date 08/24/21   Ortho Goal 3   Goal Identifier LTG 2   Goal Description Patient will demonstrate 0-90 degrees of L knee motion in order to improve safety and eficiency with daily mobility tasks   Target Date 08/24/21   Date Met 06/22/21   Subjective Report   Subjective Report Patient reports she had a few breakthroughs where she was able to stand for 5 hours, she went on her bike without pain and made full revolutions. She walked without pain all weekend on uneven surfaces. She has been feeling very good and making good gains. She is getting stronger and is happy with her progress   Objective Measure 1   Objective Measure Quad strength   Details Strength is improving overall with knee extension and was able to do it with no pain. She ambulates with improved gait with less hip drop. ,however there still is mild weakness and decreased motion with terminal knee extension   Treatment Interventions   Interventions Therapeutic Procedure/Exercise;Manual Therapy   Therapeutic Procedure/exercise   Therapeutic Procedures: strength, endurance, ROM, flexibillity minutes (68128) 16   Skilled Intervention strengthening   Patient Response good   Treatment Detail knee extension 1 plates x 2 sets of 15 reps with help from other leg, leg press SL 3 plates x 2 sets of 15 reps, step ups and downs on stairs case with  less HH assistance, Dl squats in II bars x 2 sets of 15 reps   Manual Therapy   Manual Therapy: Mobilization, MFR, MLD, friction massage minutes (81421) 15   Skilled Intervention man ther   Patient Response good   Treatment Detail STM and MFR with muscle gun to scar for scar tissue mobiliation with improved texture following   Assessments Completed   Assessments Completed Patient is improving nicely   Plan   Home program added DL squats    Plan for next session Continue with tissue work and finalize strengthening program the next couple of sessions   Total Session Time   Timed Code Treatment Minutes 31   Total Treatment Time (sum of timed and untimed services) 34

## 2021-08-12 ENCOUNTER — HOSPITAL ENCOUNTER (OUTPATIENT)
Dept: PHYSICAL THERAPY | Facility: HOSPITAL | Age: 66
Setting detail: THERAPIES SERIES
End: 2021-08-12
Attending: PHYSICIAN ASSISTANT
Payer: MEDICARE

## 2021-08-12 PROCEDURE — 97140 MANUAL THERAPY 1/> REGIONS: CPT | Mod: GP,CQ

## 2021-08-12 PROCEDURE — 97110 THERAPEUTIC EXERCISES: CPT | Mod: GP

## 2021-08-19 ENCOUNTER — HOSPITAL ENCOUNTER (OUTPATIENT)
Dept: PHYSICAL THERAPY | Facility: HOSPITAL | Age: 66
Setting detail: THERAPIES SERIES
End: 2021-08-19
Attending: PHYSICIAN ASSISTANT
Payer: MEDICARE

## 2021-08-19 PROCEDURE — 97110 THERAPEUTIC EXERCISES: CPT | Mod: GP | Performed by: PHYSICAL THERAPIST

## 2021-08-19 PROCEDURE — 97140 MANUAL THERAPY 1/> REGIONS: CPT | Mod: GP | Performed by: PHYSICAL THERAPIST

## 2021-08-27 ENCOUNTER — HOSPITAL ENCOUNTER (OUTPATIENT)
Dept: PHYSICAL THERAPY | Facility: HOSPITAL | Age: 66
Setting detail: THERAPIES SERIES
End: 2021-08-27
Attending: PHYSICIAN ASSISTANT
Payer: MEDICARE

## 2021-08-27 PROCEDURE — 97140 MANUAL THERAPY 1/> REGIONS: CPT | Mod: GP | Performed by: PHYSICAL THERAPIST

## 2021-08-27 PROCEDURE — 97110 THERAPEUTIC EXERCISES: CPT | Mod: GP | Performed by: PHYSICAL THERAPIST

## 2021-08-29 DIAGNOSIS — K21.9 GASTROESOPHAGEAL REFLUX DISEASE WITHOUT ESOPHAGITIS: ICD-10-CM

## 2021-08-31 NOTE — TELEPHONE ENCOUNTER
Prilosec      Last Written Prescription Date:  5.4.21  Last Fill Quantity: #90,   # refills: 0  Last Office Visit: 5.3.21  Future Office visit:       Routing refill request to provider for review/approval because:

## 2021-09-03 ENCOUNTER — HOSPITAL ENCOUNTER (OUTPATIENT)
Dept: PHYSICAL THERAPY | Facility: HOSPITAL | Age: 66
Setting detail: THERAPIES SERIES
End: 2021-09-03
Attending: PHYSICIAN ASSISTANT
Payer: MEDICARE

## 2021-09-03 PROCEDURE — 97110 THERAPEUTIC EXERCISES: CPT | Mod: GP | Performed by: PHYSICAL THERAPIST

## 2021-09-09 ENCOUNTER — HOSPITAL ENCOUNTER (OUTPATIENT)
Dept: PHYSICAL THERAPY | Facility: HOSPITAL | Age: 66
Setting detail: THERAPIES SERIES
End: 2021-09-09
Attending: PHYSICIAN ASSISTANT
Payer: MEDICARE

## 2021-09-09 PROCEDURE — 97112 NEUROMUSCULAR REEDUCATION: CPT | Mod: GP,KX | Performed by: PHYSICAL THERAPIST

## 2021-09-09 PROCEDURE — 97110 THERAPEUTIC EXERCISES: CPT | Mod: GP,KX | Performed by: PHYSICAL THERAPIST

## 2021-09-13 DIAGNOSIS — Z01.818 PRE-PROCEDURAL EXAMINATION: Primary | ICD-10-CM

## 2021-09-13 NOTE — TELEPHONE ENCOUNTER
Patient called requesting amoxicillin 500 MG to take prior to dental procedure patient states she has had multiple knee replacements and needs medication prior to dental work.

## 2021-09-15 RX ORDER — AMOXICILLIN 500 MG/1
CAPSULE ORAL
Qty: 4 CAPSULE | Refills: 0 | Status: SHIPPED | OUTPATIENT
Start: 2021-09-15 | End: 2022-03-16

## 2021-09-16 ENCOUNTER — HOSPITAL ENCOUNTER (OUTPATIENT)
Dept: PHYSICAL THERAPY | Facility: HOSPITAL | Age: 66
Setting detail: THERAPIES SERIES
End: 2021-09-16
Attending: PHYSICIAN ASSISTANT
Payer: MEDICARE

## 2021-09-16 PROCEDURE — 97112 NEUROMUSCULAR REEDUCATION: CPT | Mod: GP,KX | Performed by: PHYSICAL THERAPIST

## 2021-09-16 PROCEDURE — 97110 THERAPEUTIC EXERCISES: CPT | Mod: GP,KX | Performed by: PHYSICAL THERAPIST

## 2021-09-20 DIAGNOSIS — F33.2 SEVERE EPISODE OF RECURRENT MAJOR DEPRESSIVE DISORDER, WITHOUT PSYCHOTIC FEATURES (H): ICD-10-CM

## 2021-09-21 RX ORDER — ESCITALOPRAM OXALATE 20 MG/1
TABLET ORAL
Qty: 90 TABLET | Refills: 0 | Status: SHIPPED | OUTPATIENT
Start: 2021-09-21 | End: 2021-12-20

## 2021-09-21 NOTE — TELEPHONE ENCOUNTER
Lexapro      Last Written Prescription Date:  6.29.21  Last Fill Quantity: #90,   # refills: 0  Last Office Visit: 5.3.21  Future Office visit:       Routing refill request to provider for review/approval because:

## 2021-09-23 ENCOUNTER — HOSPITAL ENCOUNTER (OUTPATIENT)
Dept: PHYSICAL THERAPY | Facility: HOSPITAL | Age: 66
Setting detail: THERAPIES SERIES
End: 2021-09-23
Attending: PHYSICIAN ASSISTANT
Payer: MEDICARE

## 2021-09-23 PROCEDURE — 97112 NEUROMUSCULAR REEDUCATION: CPT | Mod: GP,CQ,KX

## 2021-09-23 PROCEDURE — 97110 THERAPEUTIC EXERCISES: CPT | Mod: GP,CQ

## 2021-10-03 ENCOUNTER — HEALTH MAINTENANCE LETTER (OUTPATIENT)
Age: 66
End: 2021-10-03

## 2021-10-07 ENCOUNTER — HOSPITAL ENCOUNTER (OUTPATIENT)
Dept: PHYSICAL THERAPY | Facility: HOSPITAL | Age: 66
Setting detail: THERAPIES SERIES
End: 2021-10-07
Attending: PHYSICIAN ASSISTANT
Payer: MEDICARE

## 2021-10-07 PROCEDURE — 97112 NEUROMUSCULAR REEDUCATION: CPT | Mod: GP,CQ

## 2021-10-07 PROCEDURE — 97110 THERAPEUTIC EXERCISES: CPT | Mod: GP

## 2021-10-14 ENCOUNTER — HOSPITAL ENCOUNTER (OUTPATIENT)
Dept: PHYSICAL THERAPY | Facility: HOSPITAL | Age: 66
Setting detail: THERAPIES SERIES
End: 2021-10-14
Attending: PHYSICIAN ASSISTANT
Payer: MEDICARE

## 2021-10-14 PROCEDURE — 97110 THERAPEUTIC EXERCISES: CPT | Mod: GP,KX | Performed by: PHYSICAL THERAPIST

## 2021-10-14 NOTE — PROGRESS NOTES
10/07/21 1049   Signing Clinician's Name / Credentials   Signing clinician's name / credentials Mary Rubio PTA   Session Number   Session Number 30   Session Tracking, Supervision and Quick Adds   PT Assistant Visit Number [id number]   Progress Note/Recertification   Progress Note Completed Date 08/05/21   Recertification Due Date 08/02/21   Subjective Report   Subjective Report Pt was feeling light headed before    Vitals Signs   Heart Rate 73   SpO2 93   Blood Pressure 122/65   Treatment Interventions   Interventions Therapeutic Procedure/Exercise;Neuromuscular Re-education   Therapeutic Procedure/exercise   Therapeutic Procedures: strength, endurance, ROM, flexibillity minutes (59398) 20   Skilled Intervention strengthening   Patient Response good   Treatment Detail HS stretch on step x 10 sec holds x 5 reps, slantboard gastroc stretch x 10 sec holds x 5 reps, leg press 3 plates x 2 sets of 15 reps with emphasis on L x 2 sets of 15 reps with heavy cueing for quad vs hip movement, hip ABD 2 plates x 2 sets of 15 reps, DL squats x 2 sets of 10 reps   Neuromuscular Re-education   Neuromuscular re-ed of mvmt, balance, coord, kinesthetic sense, posture, proprioception minutes (83386) 10   Skilled Intervention balance and coordination training   Patient Response good   Treatment Detail Biodex balance machine for maze control DL with emphasis on appropriate weight shifting - 3 trials with improving from 82% to 95% accuracy with decreased time but heavy cueing especially with weight shifting to left   Assessments Completed   Assessments Completed Pt was dizzy before tx and after the leg press.   Plan   Home program COntinue current   Plan for next session Keep progressing strength and balance   Total Session Time   Timed Code Treatment Minutes 30   Total Treatment Time (sum of timed and untimed services) 30        10/07/21 1049   Signing Clinician's Name / Credentials   Signing clinician's name / credentials Mary Rubio  PTA   Session Number   Session Number 30   Session Tracking, Supervision and Quick Adds   PT Assistant Visit Number 16   Progress Note/Recertification   Progress Note Completed Date 08/05/21   Recertification Due Date 08/02/21   Subjective Report   Subjective Report Pt was feeling light headed before    Vitals Signs   Heart Rate 73   SpO2 93   Blood Pressure 122/65   Treatment Interventions   Interventions Therapeutic Procedure/Exercise;Neuromuscular Re-education   Therapeutic Procedure/exercise   Therapeutic Procedures: strength, endurance, ROM, flexibillity minutes (92008) 20   Skilled Intervention strengthening   Patient Response good   Treatment Detail HS stretch on step x 10 sec holds x 5 reps, slantboard gastroc stretch x 10 sec holds x 5 reps, leg press 3 plates x 2 sets of 15 reps with emphasis on L x 2 sets of 15 reps with heavy cueing for quad vs hip movement, hip ABD 2 plates x 2 sets of 15 reps, DL squats x 2 sets of 10 reps   Neuromuscular Re-education   Neuromuscular re-ed of mvmt, balance, coord, kinesthetic sense, posture, proprioception minutes (54736) 10   Skilled Intervention balance and coordination training   Patient Response good   Treatment Detail Voxxter balance machine for maze control DL with emphasis on appropriate weight shifting - 3 trials with improving from 82% to 95% accuracy with decreased time but heavy cueing especially with weight shifting to left   Assessments Completed   Assessments Completed Pt was dizzy before tx and after the leg press.   Plan   Home program COntinue current   Plan for next session Keep progressing strength and balance   Total Session Time   Timed Code Treatment Minutes 30   Total Treatment Time (sum of timed and untimed services) 30   Patient is continuing to make progress in PT, but will be nearing the end of her rehab episode with ongoing work on her HEP. POC reviewed with patient. Maida Shah DPT, OCS, Cert DN

## 2021-10-20 ENCOUNTER — HOSPITAL ENCOUNTER (OUTPATIENT)
Dept: PHYSICAL THERAPY | Facility: HOSPITAL | Age: 66
Setting detail: THERAPIES SERIES
End: 2021-10-20
Attending: PHYSICIAN ASSISTANT
Payer: MEDICARE

## 2021-10-20 ENCOUNTER — NURSE TRIAGE (OUTPATIENT)
Dept: FAMILY MEDICINE | Facility: OTHER | Age: 66
End: 2021-10-20

## 2021-10-20 DIAGNOSIS — Z20.822 SUSPECTED 2019 NOVEL CORONAVIRUS INFECTION: Primary | ICD-10-CM

## 2021-10-20 PROCEDURE — 97110 THERAPEUTIC EXERCISES: CPT | Mod: GP,KX | Performed by: PHYSICAL THERAPIST

## 2021-10-20 NOTE — TELEPHONE ENCOUNTER
"Discharge Instructions for COVID-19 Patients  You have--or may have--COVID-19. Please follow the instructions listed below.   If you have a weakened immune system, discuss with your doctor any other actions you need to take.  How can I protect others?  If you have symptoms (fever, cough, body aches or trouble breathing):    Stay home and away from others (self-isolate) until:  ? Your other symptoms have resolved (gotten better). And   ? You've had no fever--and no medicine that reduces fever--for 1 full day (24 hours). And   ? At least 10 days have passed since your symptoms started. (You may need to wait 20 days. Follow the advice of your care team.)  If you don't show symptoms, but testing showed that you have COVID-19:    Stay home and away from others (self-isolate) until at least 10 days have passed since the date of your first positive COVID-19 test.  During this time    Stay in your own room, even for meals. Use your own bathroom if you can.    Stay away from others in your home. No hugging, kissing or shaking hands. No visitors.    Don't go to work, school or anywhere else.    Clean \"high touch\" surfaces often (doorknobs, counters, handles). Use household cleaning spray or wipes.    You'll find a full list of  on the EPA website: www.epa.gov/pesticide-registration/list-n-disinfectants-use-against-sars-cov-2.    Cover your mouth and nose with a mask or other face covering to avoid spreading germs.    Wash your hands and face often. Use soap and water.    Caregivers in these groups are at risk for severe illness due to COVID-19:  ? People 65 years and older  ? People who live in a nursing home or long-term care facility  ? People with chronic disease (lung, heart, cancer, diabetes, kidney, liver, immunologic)  ? People who have a weakened immune system, including those who:    Are in cancer treatment    Take medicine that weakens the immune system, such as corticosteroids    Had a bone marrow or organ " transplant    Have an immune deficiency    Have poorly controlled HIV or AIDS    Are obese (body mass index of 40 or higher)    Smoke regularly    Caregivers should wear gloves while washing dishes, handling laundry and cleaning bedrooms and bathrooms.    Use caution when washing and drying laundry: Don't shake dirty laundry and use the warmest water setting that you can.    For more tips on managing your health at home, go to www.cdc.gov/coronavirus/2019-ncov/downloads/10Things.pdf.  How can I take care of myself at home?  1. Get lots of rest. Drink extra fluids (unless a doctor has told you not to).  2. Take Tylenol (acetaminophen) for fever or pain. If you have liver or kidney problems, ask your family doctor if it's okay to take Tylenol.   Adults can take either:   ? 650 mg (two 325 mg pills) every 4 to 6 hours, or   ? 1,000 mg (two 500 mg pills) every 8 hours as needed.  ? Note: Don't take more than 3,000 mg in one day. Acetaminophen is found in many medicines (both prescribed and over-the-counter medicines). Read all labels to be sure you don't take too much.   For children, check the Tylenol bottle for the right dose. The dose is based on the child's age or weight.  3. If you have other health problems (like cancer, heart failure, an organ transplant or severe kidney disease): Call your specialty clinic if you don't feel better in the next 2 days.  4. Know when to call 911. Emergency warning signs include:  ? Trouble breathing or shortness of breath  ? Pain or pressure in the chest that doesn't go away  ? Feeling confused like you haven't felt before, or not being able to wake up  ? Bluish-colored lips or face  5. Your doctor may have prescribed a blood thinner medicine. Follow their instructions.  Where can I get more information?     [facility] - About COVID-19:   [email or web address]    CDC - What to Do If You're Sick:  www.cdc.gov/coronavirus/2019-ncov/about/steps-when-sick.html    CDC - Ending Home Isolation: www.cdc.gov/coronavirus/2019-ncov/hcp/disposition-in-home-patients.html    CDC - Caring for Someone: www.cdc.gov/coronavirus/2019-ncov/if-you-are-sick/care-for-someone.html    [facility] - Interim Guidance for Hospital Discharge to Home: www.health.[facility].[place]./diseases/coronavirus/hcp/hospdischarge.pdf    Below are the COVID-19 hotlines at the [place] Department of Health ([facility]). Interpreters are available.  ? For health questions: Call 707-800-1984 or 1-160.346.1386 (7 a.m. to 7 p.m.)  ? For questions about schools and childcare: Call 183-507-0437 or 1-166.552.3503 (7 a.m. to 7 p.m.)    For informational purposes only. Not to replace the advice of your health care provider. Clinically reviewed by Dr. Spenser Stark.   Copyright   2020 [facility]. All rights reserved. [employer] 969898 - REV 01/05/21.        Additional Information    Negative: SEVERE difficulty breathing (e.g., struggling for each breath, speaks in single words)    Negative: Difficult to awaken or acting confused (e.g., disoriented, slurred speech)    Negative: Bluish (or gray) lips or face now    Negative: Shock suspected (e.g., cold/pale/clammy skin, too weak to stand, low BP, rapid pulse)    Negative: Sounds like a life-threatening emergency to the triager    Negative: [1] COVID-19 exposure AND [2] no symptoms    Negative: COVID-19 vaccine reaction suspected (e.g., fever, headache, muscle aches) occurring 1 to 3 days after getting vaccine    Negative: COVID-19 vaccine, questions about    Negative: [1] Lives with someone known to have influenza (flu test positive) AND [2] flu-like symptoms (e.g., cough, runny nose, sore throat, SOB; with or without fever)    Negative: [1] Adult with possible COVID-19 symptoms AND [2] triager concerned about severity of symptoms or other causes    Negative: COVID-19 and breastfeeding, questions about    Negative:  "SEVERE or constant chest pain or pressure (Exception: mild central chest pain, present only when coughing)    Negative: MODERATE difficulty breathing (e.g., speaks in phrases, SOB even at rest, pulse 100-120)    Negative: [1] Headache AND [2] stiff neck (can't touch chin to chest)    Negative: MILD difficulty breathing (e.g., minimal/no SOB at rest, SOB with walking, pulse <100)    Negative: Chest pain or pressure    Negative: Patient sounds very sick or weak to the triager    Negative: Fever > 103 F (39.4 C)    Negative: [1] Fever > 101 F (38.3 C) AND [2] age > 60 years    Negative: [1] Fever > 100.0 F (37.8 C) AND [2] bedridden (e.g., nursing home patient, CVA, chronic illness, recovering from surgery)    Negative: HIGH RISK for severe COVID complications (e.g., age > 64 years, obesity with BMI > 25, pregnant, chronic lung disease or other chronic medical condition)  (Exception: Already seen by PCP and no new or worsening symptoms.)    Negative: [1] HIGH RISK patient AND [2] influenza is widespread in the community AND [3] ONE OR MORE respiratory symptoms: cough, sore throat, runny or stuffy nose    Negative: [1] HIGH RISK patient AND [2] influenza exposure within the last 7 days AND [3] ONE OR MORE respiratory symptoms: cough, sore throat, runny or stuffy nose    Negative: [1] COVID-19 infection suspected by caller or triager AND [2] mild symptoms (cough, fever, or others) AND [3] negative COVID-19 rapid test    Negative: Fever present > 3 days (72 hours)    Negative: [1] Fever returns after gone for over 24 hours AND [2] symptoms worse or not improved    Negative: [1] Continuous (nonstop) coughing interferes with work or school AND [2] no improvement using cough treatment per protocol    Answer Assessment - Initial Assessment Questions  1. COVID-19 DIAGNOSIS: \"Who made your Coronavirus (COVID-19) diagnosis?\" \"Was it confirmed by a positive lab test?\" If not diagnosed by a HCP, ask \"Are there lots of cases " "(community spread) where you live?\" (See public health department website, if unsure)      Not confirmed  2. COVID-19 EXPOSURE: \"Was there any known exposure to COVID before the symptoms began?\" CDC Definition of close contact: within 6 feet (2 meters) for a total of 15 minutes or more over a 24-hour period.       no  3. ONSET: \"When did the COVID-19 symptoms start?\"       sunday  4. WORST SYMPTOM: \"What is your worst symptom?\" (e.g., cough, fever, shortness of breath, muscle aches)      headache  5. COUGH: \"Do you have a cough?\" If Yes, ask: \"How bad is the cough?\"        no  6. FEVER: \"Do you have a fever?\" If Yes, ask: \"What is your temperature, how was it measured, and when did it start?\"      no  7. RESPIRATORY STATUS: \"Describe your breathing?\" (e.g., shortness of breath, wheezing, unable to speak)       No issues  8. BETTER-SAME-WORSE: \"Are you getting better, staying the same or getting worse compared to yesterday?\"  If getting worse, ask, \"In what way?\"      Better, no headache today  9. HIGH RISK DISEASE: \"Do you have any chronic medical problems?\" (e.g., asthma, heart or lung disease, weak immune system, obesity, etc.)      no  10. PREGNANCY: \"Is there any chance you are pregnant?\" \"When was your last menstrual period?\"        na  11. OTHER SYMPTOMS: \"Do you have any other symptoms?\"  (e.g., chills, fatigue, headache, loss of smell or taste, muscle pain, sore throat; new loss of smell or taste especially support the diagnosis of COVID-19)        Headache, nausea- dry heaves    Protocols used: CORONAVIRUS (COVID-19) DIAGNOSED OR [id number] 8.25.2021      "

## 2021-10-21 ENCOUNTER — APPOINTMENT (OUTPATIENT)
Dept: GENERAL RADIOLOGY | Facility: HOSPITAL | Age: 66
End: 2021-10-21
Attending: NURSE PRACTITIONER
Payer: MEDICARE

## 2021-10-21 ENCOUNTER — OFFICE VISIT (OUTPATIENT)
Dept: FAMILY MEDICINE | Facility: OTHER | Age: 66
End: 2021-10-21
Attending: PHYSICIAN ASSISTANT
Payer: MEDICARE

## 2021-10-21 ENCOUNTER — HOSPITAL ENCOUNTER (EMERGENCY)
Facility: HOSPITAL | Age: 66
Discharge: HOME | End: 2021-10-21
Attending: NURSE PRACTITIONER | Admitting: NURSE PRACTITIONER
Payer: MEDICARE

## 2021-10-21 VITALS
RESPIRATION RATE: 18 BRPM | TEMPERATURE: 96.5 F | HEART RATE: 60 BPM | DIASTOLIC BLOOD PRESSURE: 72 MMHG | SYSTOLIC BLOOD PRESSURE: 126 MMHG | OXYGEN SATURATION: 99 %

## 2021-10-21 DIAGNOSIS — Z20.822 SUSPECTED 2019 NOVEL CORONAVIRUS INFECTION: ICD-10-CM

## 2021-10-21 DIAGNOSIS — R07.81 RIB PAIN ON RIGHT SIDE: ICD-10-CM
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
## 2021-10-21 PROCEDURE — 99283 EMERGENCY DEPT VISIT LOW MDM: CPT | Performed by: NURSE PRACTITIONER

## 2021-10-21 PROCEDURE — 99283 EMERGENCY DEPT VISIT LOW MDM: CPT

## 2021-10-21 PROCEDURE — 71101 X-RAY EXAM UNILAT RIBS/CHEST: CPT | Mod: RT

## 2021-10-21 PROCEDURE — U0005 INFEC AGEN DETEC AMPLI PROBE: HCPCS | Mod: ZL

## 2021-10-21 NOTE — ED PROVIDER NOTES
History     Chief Complaint   Patient presents with     Rib Pain     HPI  Aileen Szymanski is a 66 year old female who presents ambulatory for evaluation of right sided rib pain after having a fall yesterday evening. Denies hitting head, loss of consciousness. States she fell because it was dark and she tripped while getting up from the couch. She has required several surgeries to her leg and fell in a way to guard her leg. She denies any symptoms of chest pain, lightheaded, dizziness prior to falling. She denies chest pain or dyspnea currently.       Allergies:  Allergies   Allergen Reactions     Lovenox [Enoxaparin] Other (See Comments)     Bleeding       Problem List:    Patient Active Problem List    Diagnosis Date Noted     Failure of total knee arthroplasty, sequela 04/22/2021     Priority: Medium     Formatting of this note might be different from the original.  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     Priority: Medium     Arthrofibrosis of knee joint, left 12/27/2019     Priority: Medium     Added automatically from request for surgery [id number]       Failed total left knee replacement (H) 12/27/2019     Priority: Medium     Added automatically from request for surgery [id number]       Infection of total knee replacement (H) 12/27/2019     Priority: Medium     Added automatically from request for surgery [id number]       Major depressive disorder without psychotic features 08/31/2017     Priority: Medium     Mood disorder (H) 09/19/2016     Priority: Medium     Mixed hyperlipidemia 09/19/2016     Priority: Medium     Lung granuloma (H) 03/27/2013     Priority: Medium     Overview:   Noted on chest xray 3/2013  Noted on chest xray 3/2013       Osteoarthrosis, pelvic region and thigh 12/05/2006     Priority: Medium     Overview:   IMO Update 10/11  IMO Update 10/11       Major depressive disorder, recurrent episode (H) 06/22/2006     Priority: Medium      Overview:   IMO Update 10/11  IMO Update 10/11          Past Medical History:    Past Medical History:   Diagnosis Date     Depressive disorder      Dizziness and giddiness 07/31/2007     Enthesopathy of knee, unspecified 06/13/2002     History of blood transfusion 2014?     Major depressive disorder without psychotic features 8/31/2017     Nonallopathic lesion of cervical region, not elsewhere classified 07/25/2001     Nonallopathic lesion of thoracic region, not elsewhere classified 06/13/2002     Pain in joint, lower leg 06/24/2002       Past Surgical History:    Past Surgical History:   Procedure Laterality Date     ABDOMEN SURGERY  2008     lap band     ABDOMEN SURGERY  2007    hysteroscopy     ARTHROPLASTY REVISION KNEE Left 10/2016     CHOLECYSTECTOMY      removal     CLOSED REDUCTION NOSE N/A 11/7/2017    Procedure: CLOSED REDUCTION NOSE;  CLOSED REDUCTION NASAL SEPTAL FRACTURE;  Surgeon: Destiny Vanegas MD;  Location: [facility] OR     COLONOSCOPY  04/13/2010    Dr Quevedo; negative      JOINT REPLACEMENT Left 05/06/2020    replacement after infection     left knee replacement  7/21/2014    Dr Quinones/ [facility]       Family History:    Family History   Problem Relation Age of Onset     Lung Cancer Mother      Heart Disease Mother      Chronic Obstructive Pulmonary Disease Father        Social History:  Marital Status:   [2]  Social History     Tobacco Use     Smoking status: Never Smoker     Smokeless tobacco: Never Used   Substance Use Topics     Alcohol use: Yes     Comment: rare     Drug use: No        Medications:    acetaminophen (TYLENOL) 325 MG tablet  amoxicillin (AMOXIL) 500 MG capsule  clonazePAM (KLONOPIN) 1 MG tablet  escitalopram (LEXAPRO) 20 MG tablet  MELATONIN PO  multivitamin (CENTRUM SILVER) tablet  multivitamin, therapeutic (THERA-VIT) TABS tablet  omeprazole (PRILOSEC) 20 MG DR capsule  vitamin D3 (CHOLECALCIFEROL) 82597 units (250 mcg) capsule          Review of Systems    Constitutional: Negative for chills and fever.   Respiratory: Negative for cough and shortness of breath.    Cardiovascular: Positive for chest pain (right sided rib pain). Negative for palpitations.   Gastrointestinal: Negative for abdominal pain, nausea and vomiting.   Skin: Negative for color change, rash and wound.   Neurological: Negative for dizziness, syncope, weakness, light-headedness and headaches.       Physical Exam   BP: 126/72  Pulse: 60  Temp: (!) 96.5  F (35.8  C)  Resp: 18  SpO2: 99 %      Physical Exam  Constitutional:       General: She is not in acute distress.     Appearance: Normal appearance. She is not ill-appearing or toxic-appearing.   HENT:      Head: Normocephalic and atraumatic.      Right Ear: Tympanic membrane, ear canal and external ear normal.      Left Ear: Tympanic membrane, ear canal and external ear normal.      Nose: Nose normal.      Mouth/Throat:      Lips: Pink.      Mouth: Mucous membranes are moist.      Pharynx: Oropharynx is clear. Uvula midline.   Eyes:      General: Lids are normal.      Extraocular Movements: Extraocular movements intact.      Conjunctiva/sclera: Conjunctivae normal.      Pupils: Pupils are equal, round, and reactive to light.   Cardiovascular:      Rate and Rhythm: Normal rate and regular rhythm.      Heart sounds: S1 normal and S2 normal. No murmur heard.   No friction rub. No gallop.    Pulmonary:      Effort: Pulmonary effort is normal.      Breath sounds: Normal breath sounds.   Chest:      Chest wall: Tenderness present. No deformity, swelling or crepitus.   Abdominal:      Palpations: Abdomen is soft.      Tenderness: There is no abdominal tenderness.   Musculoskeletal:      Cervical back: Full passive range of motion without pain.      Right lower leg: No edema.      Left lower leg: No edema.      Comments: FROM of upper and lower extremities   Skin:     General: Skin is warm and dry.   Neurological:      Mental Status: She is alert and  oriented to person, place, and time.   Psychiatric:         Behavior: Behavior is cooperative.         ED Course        Procedures      Results for orders placed or performed during the hospital encounter of 10/21/21 (from the past 24 hour(s))   Ribs XR, unilat 3 views + PA chest, right    Narrative    PROCEDURE: XR RIBS & CHEST RT 3VW 10/21/2021 1:45 PM    HISTORY: fall, pain    COMPARISONS: 2019    TECHNIQUE: Chest one view, right RIBS 2 views    FINDINGS: Chest: There is a calcified granuloma at the left lung base.  The lungs are clear of active pulmonary infiltrates. There is no  pneumothorax or pleural effusion. The heart is normal in size.    Right RIBS 2 views: There is no right rib fracture or destructive  lesion noted.         Impression    IMPRESSION: No rib fractures.    No active pulmonary infiltrates.    MUMTAZ QUEVEDO MD         SYSTEM ID:  [id number]       Medications - No data to display    Assessments & Plan (with Medical Decision Making)     I have reviewed the nursing notes.    I have reviewed the findings, diagnosis, plan and need for follow up with the patient.  (W19.XXXA) Fall, initial encounter  (R07.81) Rib pain on right side  Alert, pleasant 66-year-old female presents amatory from home in no acute distress for evaluation of right-sided rib pain.  Last night she had purely mechanical fall and she landed hitting the right side of her ribs onto the edge of the couch.  She denies hitting her head.  She denies any new/worsening neck pain.  She denies any other injuries.  She has some tenderness to her right anterior lower ribs without gross deformity.  There is no ecchymosis or erythema.  X-rays negative for any fractures.  Plan for discharge home and symptomatic treatments.  Encouraged coughing and deep breathing.        Mary Aponte CNP          New Prescriptions    No medications on file       Final diagnoses:   Fall, initial encounter   Rib pain on right side       10/21/2021   [facility]  EMERGENCY DEPARTMENT     Mary Aponte, ART  10/30/21 [id number]

## 2021-10-21 NOTE — ED TRIAGE NOTES
"Pt presents with c/o right leg muscle spasms. Pt denies sob, CP. Denies dizziness, n/v/d. Pt also reports right-sided rib pain after falling last night. Pt denies dizziness or other complaints at this time. Pt denies hitting head during fall, denies blood thinners. Does report previous \"dizzy spells.\"  "

## 2021-10-21 NOTE — ED NOTES
Patient took flexeril last night for leg spasms, was reading her danielle in the dark, was getting tired. Stood up to throw the quilt back over the couch, tripped and fell into the front of the couch. Knocked the  Wind put of her for about 30 seconds. Used ice during the night for the pain.

## 2021-10-23 LAB — SARS-COV-2 RNA RESP QL NAA+PROBE: NEGATIVE

## 2021-10-26 ENCOUNTER — APPOINTMENT (OUTPATIENT)
Dept: CT IMAGING | Facility: HOSPITAL | Age: 66
End: 2021-10-26
Attending: EMERGENCY MEDICINE
Payer: MEDICARE

## 2021-10-26 ENCOUNTER — HOSPITAL ENCOUNTER (EMERGENCY)
Facility: HOSPITAL | Age: 66
Discharge: HOME | End: 2021-10-26
Attending: EMERGENCY MEDICINE | Admitting: EMERGENCY MEDICINE
Payer: MEDICARE

## 2021-10-26 VITALS
HEART RATE: 54 BPM | TEMPERATURE: 98.1 F | OXYGEN SATURATION: 93 % | WEIGHT: 162.9 LBS | DIASTOLIC BLOOD PRESSURE: 82 MMHG | SYSTOLIC BLOOD PRESSURE: 142 MMHG | BODY MASS INDEX: 27.11 KG/M2 | RESPIRATION RATE: 20 BRPM

## 2021-10-26 DIAGNOSIS — R07.9 CHEST PAIN, UNSPECIFIED TYPE: ICD-10-CM

## 2021-10-26 DIAGNOSIS — S22.41XA CLOSED FRACTURE OF MULTIPLE RIBS OF RIGHT SIDE, INITIAL ENCOUNTER: ICD-10-CM

## 2021-10-26 PROCEDURE — 96374 THER/PROPH/DIAG INJ IV PUSH: CPT

## 2021-10-26 PROCEDURE — 99285 EMERGENCY DEPT VISIT HI MDM: CPT | Mod: 25

## 2021-10-26 PROCEDURE — 250N000011 HC RX IP 250 OP 636: Performed by: EMERGENCY MEDICINE

## 2021-10-26 PROCEDURE — 99284 EMERGENCY DEPT VISIT MOD MDM: CPT | Performed by: EMERGENCY MEDICINE

## 2021-10-26 PROCEDURE — 71250 CT THORAX DX C-: CPT

## 2021-10-26 RX ORDER — HYDROMORPHONE HYDROCHLORIDE 1 MG/ML
0.5 INJECTION, SOLUTION INTRAMUSCULAR; INTRAVENOUS; SUBCUTANEOUS ONCE
Status: COMPLETED | OUTPATIENT
Start: 2021-10-26 | End: 2021-10-26

## 2021-10-26 RX ORDER — OXYCODONE AND ACETAMINOPHEN 10; 325 MG/1; MG/1
1 TABLET ORAL EVERY 6 HOURS PRN
Qty: 20 TABLET | Refills: 0 | Status: SHIPPED | OUTPATIENT
Start: 2021-10-26 | End: 2021-11-29

## 2021-10-26 RX ADMIN — HYDROMORPHONE HYDROCHLORIDE 0.5 MG: 1 INJECTION, SOLUTION INTRAMUSCULAR; INTRAVENOUS; SUBCUTANEOUS at 05:54

## 2021-10-26 ASSESSMENT — ENCOUNTER SYMPTOMS
FEVER: 0
CHILLS: 0
SHORTNESS OF BREATH: 0
COUGH: 0

## 2021-10-26 NOTE — ED TRIAGE NOTES
"Pt brought in by EMS for evaluation of right posterior rib pain. Reports she fell a few days ago in the area she fell on. Rolled in bed today and fewlt a po and had \"extreme pain\". Pt states she had oxycodone 10mg po at home prior to EMS arrival and EMS gave Fentanyl 50mcg IV x2 en-route to the ED. Provider at bedside.   "

## 2021-10-26 NOTE — DISCHARGE INSTRUCTIONS
Use the incentive spirometer. Also, you have an abnormal finding on your CT scan, discuss it with your doctor (your right kidney).

## 2021-10-26 NOTE — ED PROVIDER NOTES
History     Chief Complaint   Patient presents with     Rib Pain     HPI  Aileen Szymanski is a 66 year old female who is here with rib pain.  Fell 5 days ago.  Pain has been relatively controlled.  Twisted in bed and felt a pop and became concerned so he came here.  No resting chest pain or difficulty breathing.  No fever or cough.  No other concerns.  Pain controlled at this time.  Received 100 mics of fentanyl and took 2 Percocet prior to arrival.    Allergies:  Allergies   Allergen Reactions     Lovenox [Enoxaparin] Other (See Comments)     Bleeding       Problem List:    Patient Active Problem List    Diagnosis Date Noted     Failure of total knee arthroplasty, sequela 04/22/2021     Priority: Medium     Formatting of this note might be different from the original.  Added automatically from request for surgery [id number]       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     Priority: Medium     Arthrofibrosis of knee joint, left 12/27/2019     Priority: Medium     Added automatically from request for surgery [id number]       Failed total left knee replacement (H) 12/27/2019     Priority: Medium     Added automatically from request for surgery [id number]       Infection of total knee replacement (H) 12/27/2019     Priority: Medium     Added automatically from request for surgery [id number]       Major depressive disorder without psychotic features 08/31/2017     Priority: Medium     Mood disorder (H) 09/19/2016     Priority: Medium     Mixed hyperlipidemia 09/19/2016     Priority: Medium     Lung granuloma (H) 03/27/2013     Priority: Medium     Overview:   Noted on chest xray 3/2013  Noted on chest xray 3/2013       Osteoarthrosis, pelvic region and thigh 12/05/2006     Priority: Medium     Overview:   IMO Update 10/11  IMO Update 10/11       Major depressive disorder, recurrent episode (H) 06/22/2006     Priority: Medium     Overview:   IMO Update 10/11  IMO Update 10/11          Past Medical History:    Past Medical  History:   Diagnosis Date     Depressive disorder      Dizziness and giddiness 07/31/2007     Enthesopathy of knee, unspecified 06/13/2002     History of blood transfusion 2014?     Major depressive disorder without psychotic features 8/31/2017     Nonallopathic lesion of cervical region, not elsewhere classified 07/25/2001     Nonallopathic lesion of thoracic region, not elsewhere classified 06/13/2002     Pain in joint, lower leg 06/24/2002       Past Surgical History:    Past Surgical History:   Procedure Laterality Date     ABDOMEN SURGERY  2008     lap band     ABDOMEN SURGERY  2007    hysteroscopy     ARTHROPLASTY REVISION KNEE Left 10/2016     CHOLECYSTECTOMY      removal     CLOSED REDUCTION NOSE N/A 11/7/2017    Procedure: CLOSED REDUCTION NOSE;  CLOSED REDUCTION NASAL SEPTAL FRACTURE;  Surgeon: Destiny Vanegas MD;  Location: [facility]     COLONOSCOPY  04/13/2010    Dr Quevedo; negative      JOINT REPLACEMENT Left 05/06/2020    replacement after infection     left knee replacement  7/21/2014    Dr Quinones/ [facility]       Family History:    Family History   Problem Relation Age of Onset     Lung Cancer Mother      Heart Disease Mother      Chronic Obstructive Pulmonary Disease Father        Social History:  Marital Status:   [2]  Social History     Tobacco Use     Smoking status: Never Smoker     Smokeless tobacco: Never Used   Substance Use Topics     Alcohol use: Yes     Comment: rare     Drug use: No        Medications:    amoxicillin (AMOXIL) 500 MG capsule  clonazePAM (KLONOPIN) 1 MG tablet  escitalopram (LEXAPRO) 20 MG tablet  MELATONIN PO  multivitamin (CENTRUM SILVER) tablet  multivitamin, therapeutic (THERA-VIT) TABS tablet  omeprazole (PRILOSEC) 20 MG DR capsule  vitamin D3 (CHOLECALCIFEROL) 94096 units (250 mcg) capsule  acetaminophen (TYLENOL) 325 MG tablet          Review of Systems   Constitutional: Negative for chills and fever.   Respiratory: Negative for cough and  shortness of breath.    All other systems reviewed and are negative.      Physical Exam   BP: 143/80  Pulse: 61  Temp: 98.1  F (36.7  C)  Resp: 18  Weight: 73.9 kg (162 lb 14.4 oz)  SpO2: 96 %      Physical Exam  Constitutional:       General: She is not in acute distress.     Appearance: She is not diaphoretic.   HENT:      Head: Normocephalic and atraumatic.      Right Ear: External ear normal.      Left Ear: External ear normal.      Nose: No congestion or rhinorrhea.      Mouth/Throat:      Pharynx: Oropharynx is clear. No oropharyngeal exudate.   Eyes:      General: No scleral icterus.     Pupils: Pupils are equal, round, and reactive to light.   Cardiovascular:      Rate and Rhythm: Normal rate and regular rhythm.      Heart sounds: Normal heart sounds.   Pulmonary:      Effort: No respiratory distress.      Breath sounds: Normal breath sounds.   Abdominal:      General: Bowel sounds are normal.      Palpations: Abdomen is soft.      Tenderness: There is no abdominal tenderness.   Musculoskeletal:         General: No tenderness.      Cervical back: Normal range of motion and neck supple.      Right lower leg: No edema.      Left lower leg: No edema.      Comments: No rib tenderness no ecchymosis   Skin:     General: Skin is warm.      Capillary Refill: Capillary refill takes less than 2 seconds.      Findings: No rash.   Neurological:      Mental Status: Mental status is at baseline.      Cranial Nerves: No cranial nerve deficit.   Psychiatric:         Mood and Affect: Mood normal.         Behavior: Behavior normal.         ED Course        Procedures              Critical Care time:  none               No results found for this or any previous visit (from the past 24 hour(s)).    Medications - No data to display    Assessments & Plan (with Medical Decision Making)     I have reviewed the nursing notes.    I have reviewed the findings, diagnosis, plan and need for follow up with the patient.   65 yo f here w/  possible rib fx, had neg cxr last week but given osteoporosis, could have missed rib fx. Mostly for patients reassurance. abd ct given pt is going to get lap band checked out tomorrow so more of a convenience.    New Prescriptions    No medications on file       Final diagnoses:   Chest pain, unspecified type       10/26/2021   [facility]     Isma Sutton MD  10/26/21 [id number]

## 2021-10-27 ENCOUNTER — TELEPHONE (OUTPATIENT)
Dept: FAMILY MEDICINE | Facility: OTHER | Age: 66
End: 2021-10-27

## 2021-10-27 DIAGNOSIS — N28.89 RENAL MASS OF UNKNOWN NATURE: Primary | ICD-10-CM

## 2021-10-27 DIAGNOSIS — N85.2 ENLARGED UTERUS: ICD-10-CM

## 2021-10-27 NOTE — TELEPHONE ENCOUNTER
Saw ER results, spoke with Aileen Perez and will get MRI of kidney reassured small masses hx of breast cancer. Also has markedly enlarged uterus  Getting Ultrasound

## 2021-10-28 ENCOUNTER — HOSPITAL ENCOUNTER (OUTPATIENT)
Dept: PHYSICAL THERAPY | Facility: HOSPITAL | Age: 66
Setting detail: THERAPIES SERIES
End: 2021-10-28
Attending: PHYSICIAN ASSISTANT
Payer: MEDICARE

## 2021-10-28 ENCOUNTER — TELEPHONE (OUTPATIENT)
Dept: FAMILY MEDICINE | Facility: OTHER | Age: 66
End: 2021-10-28

## 2021-10-28 DIAGNOSIS — N85.2 ENLARGED UTERUS: Primary | ICD-10-CM

## 2021-10-28 PROCEDURE — 999N000214 HC STATISTIC PT OUTPT VISIT: Performed by: PHYSICAL THERAPIST

## 2021-10-28 NOTE — TELEPHONE ENCOUNTER
Received call from diagnostic imaging, they had to cancel out the order for the US hysterosongraphy, they do not do these here. Is there a different US you want?

## 2021-10-30 ASSESSMENT — ENCOUNTER SYMPTOMS
DIZZINESS: 0
CHILLS: 0
PALPITATIONS: 0
FEVER: 0
COUGH: 0
WOUND: 0
SHORTNESS OF BREATH: 0
WEAKNESS: 0
ABDOMINAL PAIN: 0
LIGHT-HEADEDNESS: 0
HEADACHES: 0
COLOR CHANGE: 0
NAUSEA: 0
VOMITING: 0

## 2021-11-12 ENCOUNTER — HOSPITAL ENCOUNTER (OUTPATIENT)
Dept: MRI IMAGING | Facility: HOSPITAL | Age: 66
Discharge: HOME | End: 2021-11-12
Attending: PHYSICIAN ASSISTANT | Admitting: PHYSICIAN ASSISTANT
Payer: MEDICARE

## 2021-11-12 DIAGNOSIS — N28.89 RENAL MASS OF UNKNOWN NATURE: ICD-10-CM

## 2021-11-12 PROCEDURE — A9585 GADOBUTROL INJECTION: HCPCS | Performed by: RADIOLOGY

## 2021-11-12 PROCEDURE — 255N000002 HC RX 255 OP 636: Performed by: RADIOLOGY

## 2021-11-12 PROCEDURE — 74183 MRI ABD W/O CNTR FLWD CNTR: CPT

## 2021-11-12 RX ORDER — GADOBUTROL 604.72 MG/ML
10 INJECTION INTRAVENOUS ONCE
Status: COMPLETED | OUTPATIENT
Start: 2021-11-12 | End: 2021-11-12

## 2021-11-12 RX ADMIN — GADOBUTROL 10 ML: 604.72 INJECTION INTRAVENOUS at 14:03

## 2021-11-15 ENCOUNTER — TELEPHONE (OUTPATIENT)
Dept: FAMILY MEDICINE | Facility: OTHER | Age: 66
End: 2021-11-15
Payer: COMMERCIAL

## 2021-11-15 DIAGNOSIS — E78.2 MIXED HYPERLIPIDEMIA: ICD-10-CM

## 2021-11-15 DIAGNOSIS — N85.8 UTERINE MASS: Primary | ICD-10-CM

## 2021-11-15 DIAGNOSIS — K21.00 GASTROESOPHAGEAL REFLUX DISEASE WITH ESOPHAGITIS WITHOUT HEMORRHAGE: Primary | ICD-10-CM

## 2021-11-15 RX ORDER — SIMVASTATIN 40 MG
40 TABLET ORAL AT BEDTIME
Qty: 90 TABLET | Refills: 3 | Status: SHIPPED | OUTPATIENT
Start: 2021-11-15 | End: 2022-11-08

## 2021-11-15 RX ORDER — SUCRALFATE ORAL 1 G/10ML
1 SUSPENSION ORAL 4 TIMES DAILY
Qty: 1200 ML | Refills: 0 | Status: SHIPPED | OUTPATIENT
Start: 2021-11-15 | End: 2022-03-14

## 2021-11-15 NOTE — TELEPHONE ENCOUNTER
We do not have ability to script Prolia.  Is injectable. And not in the MAR as a out patient script.  So need ot call the pharmacy and see if they will cover it.

## 2021-11-15 NOTE — TELEPHONE ENCOUNTER
To:Nurse of Poppy Haynes  Pt requesting consult visit. Please return her call to gather more information.  Thank you

## 2021-11-16 ENCOUNTER — TELEPHONE (OUTPATIENT)
Dept: FAMILY MEDICINE | Facility: OTHER | Age: 66
End: 2021-11-16
Payer: COMMERCIAL

## 2021-11-16 NOTE — TELEPHONE ENCOUNTER
Received an APPROVAL from [employer] for Sucralfate 1gm/10ml suspension. Effective 08/18/2021 to 11/16/2022. Forms scanned to Epic.

## 2021-11-16 NOTE — TELEPHONE ENCOUNTER
Received a PA from [employer] for sucralfate (CARAFATE) 1 GM/10ML suspension. Not EPA enabled. Completed insurance form and faxed to [employer]. Waiting for a response.

## 2021-11-24 ENCOUNTER — HOSPITAL ENCOUNTER (OUTPATIENT)
Dept: ULTRASOUND IMAGING | Facility: HOSPITAL | Age: 66
Discharge: HOME | End: 2021-11-24
Attending: PHYSICIAN ASSISTANT | Admitting: PHYSICIAN ASSISTANT
Payer: MEDICARE

## 2021-11-24 DIAGNOSIS — N85.2 ENLARGED UTERUS: ICD-10-CM

## 2021-11-24 PROCEDURE — 76830 TRANSVAGINAL US NON-OB: CPT

## 2021-11-29 ENCOUNTER — OFFICE VISIT (OUTPATIENT)
Dept: OBGYN | Facility: OTHER | Age: 66
End: 2021-11-29
Attending: OBSTETRICS & GYNECOLOGY
Payer: COMMERCIAL

## 2021-11-29 VITALS
HEIGHT: 65 IN | OXYGEN SATURATION: 97 % | HEART RATE: 75 BPM | SYSTOLIC BLOOD PRESSURE: 108 MMHG | DIASTOLIC BLOOD PRESSURE: 68 MMHG | BODY MASS INDEX: 26.66 KG/M2 | WEIGHT: 160 LBS

## 2021-11-29 DIAGNOSIS — D25.1 INTRAMURAL LEIOMYOMA OF UTERUS: ICD-10-CM

## 2021-11-29 PROBLEM — Z96.659 INFECTION OF TOTAL KNEE REPLACEMENT (H): Status: RESOLVED | Noted: 2019-12-27 | Resolved: 2021-11-29

## 2021-11-29 PROBLEM — T84.59XA INFECTION OF TOTAL KNEE REPLACEMENT (H): Status: RESOLVED | Noted: 2019-12-27 | Resolved: 2021-11-29

## 2021-11-29 PROBLEM — T84.093A FAILED TOTAL LEFT KNEE REPLACEMENT (H): Status: RESOLVED | Noted: 2019-12-27 | Resolved: 2021-11-29

## 2021-11-29 PROCEDURE — G0463 HOSPITAL OUTPT CLINIC VISIT: HCPCS

## 2021-11-29 PROCEDURE — 99204 OFFICE O/P NEW MOD 45 MIN: CPT | Performed by: OBSTETRICS & GYNECOLOGY

## 2021-11-29 ASSESSMENT — PAIN SCALES - GENERAL: PAINLEVEL: NO PAIN (0)

## 2021-11-29 ASSESSMENT — MIFFLIN-ST. JEOR: SCORE: 1261.64

## 2021-11-29 NOTE — PROGRESS NOTES
"GYN CONSULT NOTE     CHIEF COMPLAINT / REASON FOR VISIT  Patient presents for Gynecology consultation at the request of her primary provider, Dr. Poppy Haynes, for uterine mass.    HISTORY OF PRESENT ILLNESS  Aileen Szymanski is a 66 year old  with No LMP recorded. Patient is postmenopausal. postmenopausal female who presents for Gynecology consultation for uterine mass.  Menopause was at age 46. She is not on hormone replacement therapy. She had an incidental finding of a \"uterine mass\" on imaging in October when she was evaluated for rib fractures.  The patient has a known history of a fibroid uterus since at least .  The patient is postmenopausal and denies hormone replacement therapy.  She is not sexually active.  She denies any history of dyspareunia or postcoital spotting.  She denies pelvic pain, pressure, or discomfort.  No low back pain.  No difficulty urinating, dysuria, hematuria, or flank pain.  No diarrhea or constipation, no pain with defecation, no change in bowel habits.  The patient denies abnormal vaginal discharge, itching, irritation or bleeding.  She denies headaches, vision changes, shortness of breath, chest pain, fever or chills. Her depression and anxiety symptoms are stable on medication.    The patient underwent gastric lap band and has lost approximately 100 pounds.  The patient believes that she has abdominal adhesions from her prior surgery. The patient had episode of postmenopausal spotting in  and she underwent hysteroscopy with normal findings.  Her postmenopausal bleeding symptoms resolved.  At the time of her hysteroscopy in  she reports that her surgeon did not find a submucosal or fibroid.  The patient is not sure if the \"uterine mass\" that was seen on her recent imaging is the same finding as her fibroid uterus.  The patient has had multiple complicated surgical procedures and postoperative complications.    MENSTRUAL HISTORY  Postmenopausal: Yes.  Menopause " was at age 46.  Current menopause symptoms: None.  Hormone replacement therapy: No.  She is not sexually active and denies issues with intercourse.   Dyspareunia: Denies.  Postcoital spotting: Denies.  Pelvic pain: No.  Abnormal Discharge: No.  Vaginitis symptoms: No.  Past GYN history: Hysteroscopy normal  in 2011 for episode of postmenopausal bleeding.  Prior tubal ligation.  Previous work-up: No  STD History: No STD history.  Last Pap smear history: Normal.  Mammogram history: Normal.    ALLERGIES     Allergies   Allergen Reactions     Lovenox [Enoxaparin] Other (See Comments)     Bleeding     MEDICATIONS    Current Outpatient Medications:      acetaminophen (TYLENOL) 325 MG tablet, Take 500 mg by mouth 3 times daily, Disp: , Rfl:      amoxicillin (AMOXIL) 500 MG capsule, TAKE FOUR CAPSULES BY MOUTH ONCE 60 MINS PRIOR TO DENTAL PROCEDURE., Disp: 4 capsule, Rfl: 0     clonazePAM (KLONOPIN) 1 MG tablet, TAKE 1 TABLET BY MOUTH AT BEDTIME, Disp: 30 tablet, Rfl: 5     escitalopram (LEXAPRO) 20 MG tablet, TAKE 1 TABLET DAILY BY MOUTH, Disp: 90 tablet, Rfl: 0     MELATONIN PO, Take 10 mg by mouth nightly as needed , Disp: , Rfl:      multivitamin, therapeutic (THERA-VIT) TABS tablet, Take 1 tablet by mouth daily, Disp: , Rfl:      omeprazole (PRILOSEC) 20 MG DR capsule, TAKE 1 CAPSULE (20 MG) BY MOUTH DAILY, Disp: 90 capsule, Rfl: 0     simvastatin (ZOCOR) 40 MG tablet, Take 1 tablet (40 mg) by mouth At Bedtime, Disp: 90 tablet, Rfl: 3     sucralfate (CARAFATE) 1 GM/10ML suspension, Take 10 mLs (1 g) by mouth 4 times daily, Disp: 1200 mL, Rfl: 0     vitamin D3 (CHOLECALCIFEROL) 46357 units (250 mcg) capsule, Take 1 capsule by mouth daily, Disp: , Rfl:      multivitamin (CENTRUM SILVER) tablet, Take 1 tablet by mouth daily, Disp: , Rfl:     REVIEW OF SYSTEMS  As per the HPI, otherwise negative.    Past Medical History:   Diagnosis Date     Arthrofibrosis of knee joint, left 12/27/2019    Added automatically from request  for surgery [id number]     Dizziness and giddiness 07/31/2007     Enthesopathy of knee, unspecified 06/13/2002     Failed total left knee replacement (H) 12/27/2019    Added automatically from request for surgery [id number]     History of blood transfusion 2014?    Allergy to Lovenox. Due to knee replacement surgery.     Infection of total knee replacement (H) 12/27/2019    Added automatically from request for surgery [id number]     Intramural leiomyoma of uterus 03/07/2011     Lung granuloma (H) 03/27/2013    Overview:  Noted on chest xray 3/2013 Noted on chest xray 3/2013     Major depressive disorder without psychotic features 08/31/2017     Major depressive disorder, recurrent episode (H) 06/22/2006    Overview:  IMO Update 10/11 IMO Update 10/11     Mixed hyperlipidemia 09/19/2016     Nonallopathic lesion of cervical region, not elsewhere classified 07/25/2001     Nonallopathic lesion of thoracic region, not elsewhere classified 06/13/2002     Osteoarthrosis, pelvic region and thigh 12/05/2006    Overview:  IMO Update 10/11 IMO Update 10/11     Pain in joint, lower leg 06/24/2002     Postmenopausal bleeding 04/27/2011     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     Past Surgical History:   Procedure Laterality Date     ARTHROPLASTY REVISION KNEE Left 10/2016     CLOSED REDUCTION NOSE N/A 11/07/2017    Procedure: CLOSED REDUCTION NOSE;  CLOSED REDUCTION NASAL SEPTAL FRACTURE;  Surgeon: Destiny Vanegas MD;  Location: [facility]     COLONOSCOPY  04/13/2010    Dr Quevedo; negative      ERCP W/ PLASTIC STENT PLACEMENT N/A 01/24/2008     ERCP W/ SPHICTEROTOMY N/A 12/10/2007     HYSTEROSCOPY DIAGNOSTIC N/A 04/27/2011     LAPAROSCOPIC CHOLECYSTECTOMY N/A 12/03/2007     LAPAROSCOPIC GASTRIC BANDING N/A 04/23/2008     LAPAROSCOPIC TUBAL LIGATION Bilateral 1978     left knee replacement  07/21/2014    Dr Quinones/ [place] [facility]     REPLACEMENT TOTAL KNEE Left 05/14/2021    Complex revision left total knee     REPLACEMENT TOTAL KNEE  "Left 2020    left knee replacement after infection     OB History    Para Term  AB Living   1 1 1 0 0 1   SAB IAB Ectopic Multiple Live Births   0 0 0 0 1      # Outcome Date GA Lbr Gabriel/2nd Weight Sex Delivery Anes PTL Lv   1 Term 76 [id number]   F Vag-Spont  N FERMIN      Name: Berna     Social History     Tobacco Use     Smoking status: Never Smoker     Smokeless tobacco: Never Used   Substance Use Topics     Alcohol use: Yes     Comment: rare     History   Sexual Activity     Sexual activity: Not Currently     Partners: Male     Birth control/ protection: Post-menopausal     Family History   Problem Relation Age of Onset     Lung Cancer Mother      Heart Disease Mother      Chronic Obstructive Pulmonary Disease Father      OBJECTIVE  /68 (BP Location: Left arm, Patient Position: Sitting, Cuff Size: Adult Regular)   Pulse 75   Ht 1.651 m (5' 5\")   Wt 72.6 kg (160 lb)   SpO2 97%   BMI 26.63 kg/m      General:  Well-developed, well-nourished female in no apparent distress.  Neurological: Alert and oriented x3.  Lungs:  Clear to auscultation bilaterally with good inspiratory effort.  No wheezing rhonchi or rales noted. Breathing nonlabored.  Heart:  Regular rate and rhythm without murmur. No JVD.  No peripheral vascular disease.  Abdomen: Soft, nontender, nondistended, positive bowel sounds.  No organomegaly. No rebound, no guarding.  Multiple well-healed incisions.  Pelvic exam:  Normal external female genitalia without lesions or abnormalities. Normal pubic hair distribution. Urethral meatus normal in appearance and without masses. Normal Bartholin, Urethral and Wilkerson's glands. Vaginal mucosa is pink and moist with a small amount of physiologic discharge present in the posterior vaginal fornix. No vaginal lesions. Atrophic vaginal mucosa.  No cystocele or rectocele.  Normal multiparous cervix without lesions or abnormalities. No cervical motion tenderness to palpation. The bladder and " urethra are nontender to palpation. Uterus is enlarged, 12 week size, shape, mid position, mobile, and nontender. Uterine descent is minimal.  No adnexal masses or tenderness bilaterally.  Rectal exam:  No external hemorrhoids noted. No rectovaginal nodularity noted. Examination confirms the vaginal exam.  Extremities:  No clubbing cyanosis or edema. Nontender bilaterally.     Chaperone: Edie Reynoso LPN    DIAGNOSTICS  03/07/2011 Pelvic ultrasound: Transvaginal and transabdominal exams were performed. Uterine contours are deformed by what appears to be a large posterior fibroid. This fibroid measures approximately 7.4 cm. Endometrium is poorly seen because of distortion. Ovaries are not identified. Negative pelvis fluid.    10/26/2021 CT abdomen pelvis: Right nondisplaced 11th rib fracture.  Small high density mass in the right kidney possibly a high density cyst follow-up MR of the kidneys is recommended.  Markedly enlarged uterus ultrasound follow-up is recommended.  The esophagus is dilated and the patient has a lap band in place.    11/12/2021 MRI abdomen: No abnormal enhancement is associated with the 6 mm right renal cortical lesion, again a hemorrhagic cyst is favored. Consider follow-up ultrasound in one year.  Probable chronic uterine leiomyoma, partially visualized.    11/24/2021 Pelvic ultrasound: Uterus: Anteverted position.  Uterus measures 9.9 x 7.0 x 7.8 cm. Fibroids: There is a large slightly hypoechoic mass in the uterine fundus, which likely abuts the endometrium given its size, however the endometrium is not well visualized. Overall, this measures 6.9 x 6.6 x 5.4 cm. There are a couple of other shadowing hyperechoic foci along the periphery of the uterus which may represent degenerated fibroids. Endometrium: The endometrium is not well visualized. Right ovary: The right ovary is not visualized. No adnexal masses.  Left ovary: The left ovary is not visualized. No adnexal masses. Pelvic fluid:  None.  IMPRESSION:   There is a large slightly hypoechoic mass in the uterine fundus measuring up to 6.9 cm, most compatible with a fibroid. This likely abuts the endometrium given its large size, however the endometrium is not well visualized.  The ovaries are not visualized. No adnexal mass is demonstrated.    ASSESSMENT / PLAN  Aileen Szymanski is a 66 year old  female who presents in consultation for uterine mass.    1 Uterine fibroid, 6.9 cm intramural    - I discussed uterine fibroids with the patient.  The patient has a known history of uterine fibroids going back at least to .  I reviewed her recent imaging including CT scan, MRI and pelvic ultrasound.  This shows a 6.9 x 6.6 x 5.4 cm intramural hypoechoic mass consistent with uterine fibroid.  Endometrium is not well-visualized in the ovaries are not visualized.  No adnexal mass or free fluid.  There are also several small degenerating fibroids on the periphery of the uterus.  Her intramural uterine fibroid is essentially unchanged in size from pelvic ultrasound in 2011.  - I reviewed with the patient that the majority of uterine fibroids are benign.  Fibroids are stimulated by her hormones and most fibroids will regress in size when the patient becomes menopausal.  - I discussed management options with the patient including observation, uterine artery embolization, fibroid cryotherapy, and surgical removal.  I reviewed the expected outcome, risk, benefits, alternatives, indication of each option with the patient.  - If the patient desires surgical removal, I recommend a hysterectomy with bilateral salpingo-oophorectomy.  Her uterus is enlarged with minimal descent secondary to the suspected intramural fibroid.  A vaginal hysterectomy will be difficult but is not impossible.  I also discussed options of laparoscopic hysterectomy with morcellation, laparoscopic-assisted vaginal hysterectomy, and abdominal hysterectomy.  - The patient is  "asymptomatic and her fibroid is unchanged in size since 2011 I feel that it is reasonable to observe fibroid and repeat a pelvic ultrasound in 6 to 8 months.  - The patient is anxious that she has a \"uterine mass\".  I reassured her that I suspect this is her known uterine fibroid which has been present for over 10 years.  - The patient is considering surgical removal and she is considering having a surgical procedure performed at a tertiary care center because she has had multiple surgical complications and postoperative complications.  - I recommend the patient discuss her options with her family and then contact me with her management decision.  - If she desires a surgical procedure of [facility] center, I will be happy to place a consult for her.    - Problem list reviewed and updated.  - Follow up in 1-2 weeks by Rob with management decision as above.    - 50 minute visit with 40 minutes spent in counseling and coordinating care.    Jaime Villeda MD  Obstetrics and Gynecology      CC: PRIMARY CARE PROVIDER: Poppy Gerber  "

## 2021-11-29 NOTE — NURSING NOTE
"Chief Complaint   Patient presents with     Consult     Consult, MESSI Ivy uterine mass       Initial /68 (BP Location: Left arm, Patient Position: Sitting, Cuff Size: Adult Regular)   Pulse 75   Ht 1.651 m (5' 5\")   Wt 72.6 kg (160 lb)   SpO2 97%   BMI 26.63 kg/m   Estimated body mass index is 26.63 kg/m  as calculated from the following:    Height as of this encounter: 1.651 m (5' 5\").    Weight as of this encounter: 72.6 kg (160 lb).  Medication Reconciliation: complete  GELA HAAS LPN  "

## 2021-11-30 ENCOUNTER — TELEPHONE (OUTPATIENT)
Dept: OBGYN | Facility: OTHER | Age: 66
End: 2021-11-30
Payer: COMMERCIAL

## 2021-11-30 DIAGNOSIS — Z86.018 HISTORY OF UTERINE FIBROID: Primary | ICD-10-CM

## 2021-11-30 NOTE — TELEPHONE ENCOUNTER
Patient called and stated she had time to think and she would like to have a consult with a surgeon in [place] at [facility] to discuss removing her fibroid.  She would like us to send a referral.  Thanks!

## 2021-12-19 DIAGNOSIS — F33.2 SEVERE EPISODE OF RECURRENT MAJOR DEPRESSIVE DISORDER, WITHOUT PSYCHOTIC FEATURES (H): ICD-10-CM

## 2021-12-20 RX ORDER — ESCITALOPRAM OXALATE 20 MG/1
TABLET ORAL
Qty: 90 TABLET | Refills: 0 | Status: SHIPPED | OUTPATIENT
Start: 2021-12-20 | End: 2022-03-17

## 2021-12-20 NOTE — TELEPHONE ENCOUNTER
Lexapro      Last Written Prescription Date:  9/21/21  Last Fill Quantity: 90,   # refills: 0  Last Office Visit: 5/3/21  Future Office visit:       Routing refill request to provider for review/approval because:

## 2022-01-13 ENCOUNTER — TRANSFERRED RECORDS (OUTPATIENT)
Dept: HEALTH INFORMATION MANAGEMENT | Facility: CLINIC | Age: 67
End: 2022-01-13

## 2022-02-06 DIAGNOSIS — F51.01 PRIMARY INSOMNIA: ICD-10-CM

## 2022-02-09 RX ORDER — CLONAZEPAM 1 MG/1
TABLET ORAL
Qty: 30 TABLET | Refills: 4 | Status: SHIPPED | OUTPATIENT
Start: 2022-02-09 | End: 2022-06-17

## 2022-02-09 NOTE — TELEPHONE ENCOUNTER
Klonopin      Last Written Prescription Date:  07/30/21  Last Fill Quantity: 30,   # refills: 5  Last Office Visit: 05/03/21  Future Office visit:    Next 5 appointments (look out 90 days)    Feb 28, 2022  2:00 PM  (Arrive by 1:45 PM)  Pre-Op physical with AMERICA Gonsales  [facility] ([facility] [place] ) [address]  [place] [address]  [phone]

## 2022-02-24 NOTE — PROGRESS NOTES
[facility] [place]  [address]  \[facility] [place] [facility]\""[place] [address]  Phone: [phone]  Primary Provider: Poppy Haynes  Pre-op Performing Provider: JUNE MONET      PREOPERATIVE EVALUATION:  Today's date: 2/28/2022    Aileen Szymanski is a 67 year old female who presents for a preoperative evaluation.    Surgical Information:  Surgery/Procedure: Hysterectomy   Surgery Location: [facility]  Surgeon: Dr. Baeza  Surgery Date: 3/11/2022  Time of Surgery: tbd  Where patient plans to recover: At home with family  Fax number for surgical facility: Note does not need to be faxed, will be available electronically in Epic.    Type of Anesthesia Anticipated: to be determined    Assessment & Plan     The proposed surgical procedure is considered INTERMEDIATE risk.    Preop general physical exam  Uterine mass  Needs stress test to be cleared for surgery due to abnormal EKG with EKG changes   - CBC with platelets and differential; Future  - Basic metabolic panel; Future  - EKG 12-lead complete w/read - (Clinic Performed)    Abnormal electrocardiogram  Needs stress test before clearing for surgery   - NM Lexiscan stress test; Future    Mixed hyperlipidemia  Due for screening after restarting medication  Improved cholesterol level with statin use   - Lipid Profile (Chol, Trig, HDL, LDL calc); Future         Risks and Recommendations:  The patient has the following additional risks and recommendations for perioperative complications:  Cardiovascular:   - Pre-op stress test due to abnormal EKG, no current symptoms of SOB or chest pain     Medication Instructions:  Patient is to take all scheduled medications on the day of surgery EXCEPT for modifications listed below:   - Statins: Continue taking on the day of surgery.    - Benzodiazepines: Continue without modification.   - SSRIs, SNRIs, TCAs, Antipsychotics: Continue without modification.     RECOMMENDATION:  Surgery pending stress test results       I  spent a total of 34 minutes on the day of the visit.   Time spent doing chart review, history and exam, documentation and further activities per the note        Subjective     HPI related to upcoming procedure: uterine mass, prolapsed uterus, bladder and rectum       Preop Questions 3/1/2022   1. Have you ever had a heart attack or stroke? No   2. Have you ever had surgery on your heart or blood vessels, such as a stent placement, a coronary artery bypass, or surgery on an artery in your head, neck, heart, or legs? No   3. Do you have chest pain with activity? No   4. Do you have a history of  heart failure? No   5. Do you currently have a cold, bronchitis or symptoms of other infection? No   6. Do you have a cough, shortness of breath, or wheezing? No   7. Do you or anyone in your family have previous history of blood clots? No   8. Do you or does anyone in your family have a serious bleeding problem such as prolonged bleeding following surgeries or cuts? No   9. Have you ever had problems with anemia or been told to take iron pills? YES - years ago   10. Have you had any abnormal blood loss such as black, tarry or bloody stools, or abnormal vaginal bleeding? No   11. Have you ever had a blood transfusion? YES - during surgery left leg    11a. Have you ever had a transfusion reaction? No   12. Are you willing to have a blood transfusion if it is medically needed before, during, or after your surgery? Yes   13. Have you or any of your relatives ever had problems with anesthesia? No   14. Do you have sleep apnea, excessive snoring or daytime drowsiness? No   15. Do you have any artifical heart valves or other implanted medical devices like a pacemaker, defibrillator, or continuous glucose monitor? No   16. Do you have artificial joints? YES - LTK   17. Are you allergic to latex? No     Health Care Directive:  Patient has a Health Care Directive on file      Preoperative Review of :   reviewed - controlled  substances reflected in medication list.      Status of Chronic Conditions:  See problem list for active medical problems.  Problems all longstanding and stable, except as noted/documented.  See ROS for pertinent symptoms related to these conditions.    DEPRESSION - Patient has a long history of Depression of moderate severity requiring medication for control with recent symptoms being stable..Current symptoms of depression include none.     HYPERLIPIDEMIA - Patient has a long history of significant Hyperlipidemia requiring medication for treatment with recent good control. Patient reports no problems or side effects with the medication.       Review of Systems  CONSTITUTIONAL: NEGATIVE for fever, chills, change in weight  INTEGUMENTARY/SKIN: NEGATIVE for worrisome rashes, moles or lesions  EYES: NEGATIVE for vision changes or irritation  ENT/MOUTH: NEGATIVE for ear, mouth and throat problems  RESP: NEGATIVE for significant cough or SOB  CV: NEGATIVE for chest pain, palpitations or peripheral edema  GI: NEGATIVE for nausea, abdominal pain, heartburn, or change in bowel habits   female: difficulty with bowels  MUSCULOSKELETAL: NEGATIVE for significant arthralgias or myalgia  NEURO: NEGATIVE for weakness, dizziness or paresthesias  ENDOCRINE: NEGATIVE for temperature intolerance, skin/hair changes  HEME: NEGATIVE for bleeding problems  PSYCHIATRIC: NEGATIVE for changes in mood or affect    Patient Active Problem List    Diagnosis Date Noted     Cystocele and rectocele with incomplete uterovaginal prolapse 01/17/2022     Priority: Medium     Formatting of this note might be different from the original.  Added automatically from request for surgery [id number]       DEIRDRE (stress urinary incontinence, female) 01/17/2022     Priority: Medium     Formatting of this note might be different from the original.  Added automatically from request for surgery [id number]       Failure of total knee arthroplasty, sequela 04/22/2021      Priority: Medium     Formatting of this note might be different from the original.  Added automatically from request for surgery [id number]       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     Priority: Medium     Arthrofibrosis of knee joint, left 12/27/2019     Priority: Medium     Added automatically from request for surgery [id number]       Major depressive disorder without psychotic features 08/31/2017     Priority: Medium     Mixed hyperlipidemia 09/19/2016     Priority: Medium     Lung granuloma (H) 03/27/2013     Priority: Medium     Overview:   Noted on chest xray 3/2013  Noted on chest xray 3/2013       Intramural leiomyoma of uterus 03/07/2011     Priority: Medium     Osteoarthrosis, pelvic region and thigh 12/05/2006     Priority: Medium     Overview:   IMO Update 10/11  IMO Update 10/11        Past Medical History:   Diagnosis Date     Arthrofibrosis of knee joint, left 12/27/2019    Added automatically from request for surgery [id number]     Dizziness and giddiness 07/31/2007     Enthesopathy of knee, unspecified 06/13/2002     Failed total left knee replacement (H) 12/27/2019    Added automatically from request for surgery [id number]     History of blood transfusion 2014?    Allergy to Lovenox. Due to knee replacement surgery.     Infection of total knee replacement (H) 12/27/2019    Added automatically from request for surgery [id number]     Intramural leiomyoma of uterus 03/07/2011     Lung granuloma (H) 03/27/2013    Overview:  Noted on chest xray 3/2013 Noted on chest xray 3/2013     Major depressive disorder without psychotic features 08/31/2017     Major depressive disorder, recurrent episode (H) 06/22/2006    Overview:  IMO Update 10/11 IMO Update 10/11     Mixed hyperlipidemia 09/19/2016     Nonallopathic lesion of cervical region, not elsewhere classified 07/25/2001     Nonallopathic lesion of thoracic region, not elsewhere classified 06/13/2002     Osteoarthrosis, pelvic region and thigh 12/05/2006    Overview:   IMO Update 10/11 IMO Update 10/11     Pain in joint, lower leg 06/24/2002     Postmenopausal bleeding 04/27/2011     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     Past Surgical History:   Procedure Laterality Date     ARTHROPLASTY REVISION KNEE Left 10/2016     CLOSED REDUCTION NOSE N/A 11/07/2017    Procedure: CLOSED REDUCTION NOSE;  CLOSED REDUCTION NASAL SEPTAL FRACTURE;  Surgeon: Destiny Vanegas MD;  Location: [facility]     COLONOSCOPY  04/13/2010    Dr Quevedo; negative      ERCP W/ PLASTIC STENT PLACEMENT N/A 01/24/2008     ERCP W/ SPHICTEROTOMY N/A 12/10/2007     HYSTEROSCOPY DIAGNOSTIC N/A 04/27/2011     LAPAROSCOPIC CHOLECYSTECTOMY N/A 12/03/2007     LAPAROSCOPIC GASTRIC BANDING N/A 04/23/2008     LAPAROSCOPIC TUBAL LIGATION Bilateral 1978     left knee replacement  07/21/2014    Dr Quinones/ [place]     REPLACEMENT TOTAL KNEE Left 05/14/2021    Complex revision left total knee     REPLACEMENT TOTAL KNEE Left 05/06/2020    left knee replacement after infection     Current Outpatient Medications   Medication Sig Dispense Refill     acetaminophen (TYLENOL) 325 MG tablet Take 500 mg by mouth 3 times daily       clonazePAM (KLONOPIN) 1 MG tablet TAKE 1 TABLET BY MOUTH AT BEDTIME 30 tablet 4     escitalopram (LEXAPRO) 20 MG tablet TAKE 1 TABLET DAILY BY MOUTH 90 tablet 0     estradiol (ESTRACE) 0.1 MG/GM vaginal cream Insert one gram vaginally every other night.       MELATONIN PO Take 10 mg by mouth nightly as needed        multivitamin, therapeutic (THERA-VIT) TABS tablet Take 1 tablet by mouth daily       simvastatin (ZOCOR) 40 MG tablet Take 1 tablet (40 mg) by mouth At Bedtime 90 tablet 3     vitamin D3 (CHOLECALCIFEROL) 29360 units (250 mcg) capsule Take 1 capsule by mouth daily       amoxicillin (AMOXIL) 500 MG capsule TAKE FOUR CAPSULES BY MOUTH ONCE 60 MINS PRIOR TO DENTAL PROCEDURE. (Patient not taking: Reported on 3/1/2022) 4 capsule 0     omeprazole (PRILOSEC) 20 MG DR capsule TAKE 1 CAPSULE  "(20 MG) BY MOUTH DAILY (Patient not taking: Reported on 3/1/2022) 90 capsule 0     sucralfate (CARAFATE) 1 GM/10ML suspension Take 10 mLs (1 g) by mouth 4 times daily (Patient not taking: Reported on 3/1/2022) 1200 mL 0       Allergies   Allergen Reactions     Lovenox [Enoxaparin] Other (See Comments)     Bleeding        Social History     Tobacco Use     Smoking status: Never Smoker     Smokeless tobacco: Never Used   Substance Use Topics     Alcohol use: Yes     Comment: rare     Family History   Problem Relation Age of Onset     Lung Cancer Mother      Heart Disease Mother      Chronic Obstructive Pulmonary Disease Father      History   Drug Use No         Objective     /70   Pulse 78   Temp 97.8  F (36.6  C) (Tympanic)   Ht 1.651 m (5' 5\")   Wt 74.4 kg (164 lb)   SpO2 99%   BMI 27.29 kg/m      Physical Exam    GENERAL APPEARANCE: healthy, alert and no distress     EYES: EOMI, PERRL     HENT: ear canals and TM's normal and nose and mouth without ulcers or lesions     NECK: no adenopathy, no asymmetry, masses, or scars and thyroid normal to palpation     RESP: lungs clear to auscultation - no rales, rhonchi or wheezes     CV: peripheral pulses strong, irregular rhythm and PVC and PAC     ABDOMEN:  soft, nontender, no HSM or masses and bowel sounds normal     MS: extremities normal- no gross deformities noted, no evidence of inflammation in joints, FROM in all extremities.     SKIN: no suspicious lesions or rashes     NEURO: Normal strength and tone, sensory exam grossly normal, mentation intact and speech normal     PSYCH: mentation appears normal. and affect normal/bright     LYMPHATICS: No cervical adenopathy    Recent Labs   Lab Test 06/04/21  1312 05/03/21  1639 08/21/20  1257   HGB 10.1* 11.6*  --     240  --    NA  --  139 139   POTASSIUM  --  4.0 4.2   CR  --  0.92 0.98        Diagnostics:  Recent Results (from the past 24 hour(s))   Basic metabolic panel    Collection Time: 03/01/22  " 9:08 AM   Result Value Ref Range    Sodium 140 133 - 144 mmol/L    Potassium 3.5 3.4 - 5.3 mmol/L    Chloride 108 94 - 109 mmol/L    Carbon Dioxide (CO2) 25 20 - 32 mmol/L    Anion Gap 7 3 - 14 mmol/L    Urea Nitrogen 21 7 - 30 mg/dL    Creatinine 0.92 0.52 - 1.04 mg/dL    Calcium 8.8 8.5 - 10.1 mg/dL    Glucose 133 (H) 70 - 99 mg/dL    GFR Estimate 67 >60 mL/min/1.73m2   CBC with platelets and differential    Collection Time: 03/01/22  9:08 AM   Result Value Ref Range    WBC Count 5.2 4.0 - 11.0 10e3/uL    RBC Count 4.67 3.80 - 5.20 10e6/uL    Hemoglobin 11.0 (L) 11.7 - 15.7 g/dL    Hematocrit 36.3 35.0 - 47.0 %    MCV 78 78 - 100 fL    MCH 23.6 (L) 26.5 - 33.0 pg    MCHC 30.3 (L) 31.5 - 36.5 g/dL    RDW 16.9 (H) 10.0 - 15.0 %    Platelet Count 234 150 - 450 10e3/uL    % Neutrophils 50 %    % Lymphocytes 36 %    % Monocytes 11 %    % Eosinophils 3 %    % Basophils 0 %    % Immature Granulocytes 0 %    NRBCs per 100 WBC 0 <1 /100    Absolute Neutrophils 2.7 1.6 - 8.3 10e3/uL    Absolute Lymphocytes 1.9 0.8 - 5.3 10e3/uL    Absolute Monocytes 0.6 0.0 - 1.3 10e3/uL    Absolute Eosinophils 0.1 0.0 - 0.7 10e3/uL    Absolute Basophils 0.0 0.0 - 0.2 10e3/uL    Absolute Immature Granulocytes 0.0 <=0.4 10e3/uL    Absolute NRBCs 0.0 10e3/uL      EKG: EKG changes: SR with prmature supraventricular complexes with occasional PVCs possible inferior infarct and anterior infact.     Revised Cardiac Risk Index (RCRI):  The patient has the following serious cardiovascular risks for perioperative complications:   - No serious cardiac risks = 0 points     RCRI Interpretation: 1 point: Class II (low risk - 0.9% complication rate)           Signed Electronically by: AMERICA Lepe  Copy of this evaluation report is provided to requesting physician.

## 2022-03-01 ENCOUNTER — OFFICE VISIT (OUTPATIENT)
Dept: FAMILY MEDICINE | Facility: OTHER | Age: 67
End: 2022-03-01
Attending: PHYSICIAN ASSISTANT
Payer: COMMERCIAL

## 2022-03-01 ENCOUNTER — LAB (OUTPATIENT)
Dept: LAB | Facility: OTHER | Age: 67
End: 2022-03-01
Attending: NURSE PRACTITIONER
Payer: MEDICARE

## 2022-03-01 ENCOUNTER — APPOINTMENT (OUTPATIENT)
Dept: GENERAL RADIOLOGY | Facility: OTHER | Age: 67
End: 2022-03-01
Attending: NURSE PRACTITIONER
Payer: MEDICARE

## 2022-03-01 ENCOUNTER — TELEPHONE (OUTPATIENT)
Dept: FAMILY MEDICINE | Facility: OTHER | Age: 67
End: 2022-03-01

## 2022-03-01 VITALS
WEIGHT: 164 LBS | DIASTOLIC BLOOD PRESSURE: 70 MMHG | BODY MASS INDEX: 27.32 KG/M2 | SYSTOLIC BLOOD PRESSURE: 120 MMHG | TEMPERATURE: 97.8 F | HEIGHT: 65 IN | HEART RATE: 78 BPM | OXYGEN SATURATION: 99 %

## 2022-03-01 DIAGNOSIS — E78.2 MIXED HYPERLIPIDEMIA: ICD-10-CM

## 2022-03-01 DIAGNOSIS — N85.8 UTERINE MASS: ICD-10-CM

## 2022-03-01 DIAGNOSIS — Z01.818 PREOP GENERAL PHYSICAL EXAM: Primary | ICD-10-CM

## 2022-03-01 DIAGNOSIS — Z01.818 PREOP GENERAL PHYSICAL EXAM: ICD-10-CM

## 2022-03-01 DIAGNOSIS — R94.31 ABNORMAL ELECTROCARDIOGRAM: ICD-10-CM

## 2022-03-01 PROBLEM — N81.2 CYSTOCELE AND RECTOCELE WITH INCOMPLETE UTEROVAGINAL PROLAPSE: Status: ACTIVE | Noted: 2022-01-17

## 2022-03-01 PROBLEM — N39.3 SUI (STRESS URINARY INCONTINENCE, FEMALE): Status: ACTIVE | Noted: 2022-01-17

## 2022-03-01 LAB
ANION GAP SERPL CALCULATED.3IONS-SCNC: 7 MMOL/L (ref 3–14)
BASOPHILS # BLD AUTO: 0 10E3/UL (ref 0–0.2)
BASOPHILS NFR BLD AUTO: 0 %
BUN SERPL-MCNC: 21 MG/DL (ref 7–30)
CALCIUM SERPL-MCNC: 8.8 MG/DL (ref 8.5–10.1)
CHLORIDE BLD-SCNC: 108 MMOL/L (ref 94–109)
CHOLEST SERPL-MCNC: 175 MG/DL
CO2 SERPL-SCNC: 25 MMOL/L (ref 20–32)
CREAT SERPL-MCNC: 0.92 MG/DL (ref 0.52–1.04)
EOSINOPHIL # BLD AUTO: 0.1 10E3/UL (ref 0–0.7)
EOSINOPHIL NFR BLD AUTO: 3 %
ERYTHROCYTE [DISTWIDTH] IN BLOOD BY AUTOMATED COUNT: 16.9 % (ref 10–15)
FASTING STATUS PATIENT QL REPORTED: YES
GFR SERPL CREATININE-BSD FRML MDRD: 67 ML/MIN/1.73M2
GLUCOSE BLD-MCNC: 133 MG/DL (ref 70–99)
HCT VFR BLD AUTO: 36.3 % (ref 35–47)
HDLC SERPL-MCNC: 79 MG/DL
HGB BLD-MCNC: 11 G/DL (ref 11.7–15.7)
IMM GRANULOCYTES # BLD: 0 10E3/UL
IMM GRANULOCYTES NFR BLD: 0 %
LDLC SERPL CALC-MCNC: 78 MG/DL
LYMPHOCYTES # BLD AUTO: 1.9 10E3/UL (ref 0.8–5.3)
LYMPHOCYTES NFR BLD AUTO: 36 %
MCH RBC QN AUTO: 23.6 PG (ref 26.5–33)
MCHC RBC AUTO-ENTMCNC: 30.3 G/DL (ref 31.5–36.5)
MCV RBC AUTO: 78 FL (ref 78–100)
MONOCYTES # BLD AUTO: 0.6 10E3/UL (ref 0–1.3)
MONOCYTES NFR BLD AUTO: 11 %
NEUTROPHILS # BLD AUTO: 2.7 10E3/UL (ref 1.6–8.3)
NEUTROPHILS NFR BLD AUTO: 50 %
NONHDLC SERPL-MCNC: 96 MG/DL
NRBC # BLD AUTO: 0 10E3/UL
NRBC BLD AUTO-RTO: 0 /100
PLATELET # BLD AUTO: 234 10E3/UL (ref 150–450)
POTASSIUM BLD-SCNC: 3.5 MMOL/L (ref 3.4–5.3)
RBC # BLD AUTO: 4.67 10E6/UL (ref 3.8–5.2)
SODIUM SERPL-SCNC: 140 MMOL/L (ref 133–144)
TRIGL SERPL-MCNC: 90 MG/DL
WBC # BLD AUTO: 5.2 10E3/UL (ref 4–11)

## 2022-03-01 PROCEDURE — 36415 COLL VENOUS BLD VENIPUNCTURE: CPT | Mod: ZL

## 2022-03-01 PROCEDURE — 85025 COMPLETE CBC W/AUTO DIFF WBC: CPT | Mod: ZL

## 2022-03-01 PROCEDURE — 36415 COLL VENOUS BLD VENIPUNCTURE: CPT | Mod: ZL | Performed by: NURSE PRACTITIONER

## 2022-03-01 PROCEDURE — G0463 HOSPITAL OUTPT CLINIC VISIT: HCPCS | Mod: 25 | Performed by: NURSE PRACTITIONER

## 2022-03-01 PROCEDURE — 83718 ASSAY OF LIPOPROTEIN: CPT | Mod: ZL | Performed by: NURSE PRACTITIONER

## 2022-03-01 PROCEDURE — 93010 ELECTROCARDIOGRAM REPORT: CPT | Mod: 77 | Performed by: INTERNAL MEDICINE

## 2022-03-01 PROCEDURE — 80048 BASIC METABOLIC PNL TOTAL CA: CPT | Mod: ZL

## 2022-03-01 PROCEDURE — 93005 ELECTROCARDIOGRAM TRACING: CPT | Performed by: NURSE PRACTITIONER

## 2022-03-01 PROCEDURE — 99214 OFFICE O/P EST MOD 30 MIN: CPT | Performed by: NURSE PRACTITIONER

## 2022-03-01 RX ORDER — ESTRADIOL 0.1 MG/G
CREAM VAGINAL
COMMUNITY
Start: 2022-02-17 | End: 2022-04-11

## 2022-03-01 ASSESSMENT — ANXIETY QUESTIONNAIRES
GAD7 TOTAL SCORE: 0
4. TROUBLE RELAXING: NOT AT ALL
2. NOT BEING ABLE TO STOP OR CONTROL WORRYING: NOT AT ALL
1. FEELING NERVOUS, ANXIOUS, OR ON EDGE: NOT AT ALL
IF YOU CHECKED OFF ANY PROBLEMS ON THIS QUESTIONNAIRE, HOW DIFFICULT HAVE THESE PROBLEMS MADE IT FOR YOU TO DO YOUR WORK, TAKE CARE OF THINGS AT HOME, OR GET ALONG WITH OTHER PEOPLE: NOT DIFFICULT AT ALL
5. BEING SO RESTLESS THAT IT IS HARD TO SIT STILL: NOT AT ALL
6. BECOMING EASILY ANNOYED OR IRRITABLE: NOT AT ALL
3. WORRYING TOO MUCH ABOUT DIFFERENT THINGS: NOT AT ALL
7. FEELING AFRAID AS IF SOMETHING AWFUL MIGHT HAPPEN: NOT AT ALL

## 2022-03-01 ASSESSMENT — PAIN SCALES - GENERAL: PAINLEVEL: NO PAIN (0)

## 2022-03-01 ASSESSMENT — PATIENT HEALTH QUESTIONNAIRE - PHQ9: SUM OF ALL RESPONSES TO PHQ QUESTIONS 1-9: 1

## 2022-03-01 NOTE — PATIENT INSTRUCTIONS
Patient Education     Presurgery Checklist  You are scheduled to have surgery. The healthcare staff will try to make your stay comfortable. Use the guidelines below to remind yourself what to do before surgery. Be sure to follow any specific pre-op instructions from your surgeon or nurse.  Preparing for surgery    If you are having abdominal surgery, ask what you need to do to clear your bowel.    Tell your surgeon if you have allergies to any medicines, latex, or foods.    Ask your surgeon if you might need a blood transfusion during surgery and if so, how to prepare for it. In some cases, you can donate blood before surgery. If needed, this blood can be given back (transfused) to you during or after surgery.    Arrange for an adult family member or friend to drive you home after surgery. If possible, have someone ready to help you at home as you recover.    Call the surgeon if you get a cold, fever, sore throat, diarrhea, or other health problem just before surgery. Your surgeon can decide whether or not to postpone the surgery.  Medicines    Tell your surgeon about all medicines you take, including prescription and over-the-counter products such as herbal remedies and vitamins. Ask if you should continue taking them.    If you take ibuprofen, naproxen, or blood thinners (anticoagulants) such as aspirin, clopidogrel, or warfarin, ask your surgeon whether you should stop taking them and how long before surgery you should stop.    You may be told to take antibiotics just before surgery to prevent infection. If so, follow instructions carefully on how to take them.    If you are told to take blood thinners to help prevent blood clots after surgery, be sure to follow the instructions on how to take them.  Stop smoking  If you smoke, healing may take longer. So at least 2 week(s) before surgery, stop smoking.  Bathing or showering before surgery    If instructed, wash with antibacterial soap. Afterward, do not use  lotions, oils, or powders.    If you are having surgery on the head, you may be asked to shampoo with antibacterial soap. Follow instructions for doing so.  Do not remove hair from the surgery site  Do not shave hair from the incision site, unless you are given specific instructions to do so. Usually, if hair needs to be removed, it will be done at the hospital right before surgery.  Don t eat or drink    Follow any directions you are given for not eating or drinking before surgery. If you don't follow instructions about when to stop eating and drinking, your procedure may be postponed or rescheduled for another day. This is a safety issue.    You can brush your teeth and rinse your mouth, but don t swallow any water.  Day of surgery    Don't wear makeup. Don't use perfume, deodorant, or hairspray. Remove nail polish and artificial nails.    Leave jewelry (including rings), watches, and other valuables at home.    Be sure to bring health insurance cards or forms and a photo ID.    Bring a list of your medicines (include the name, dose, how often you take them, and the time last dose was taken).    Arrive on time at the hospital or surgery facility.  Date Last Reviewed: 12/1/2016 2000-2018 [employer]. [address] [place] [address], [place] [address]. All rights reserved. This information is not intended as a substitute for professional medical care. Always follow your healthcare professional's instructions.

## 2022-03-01 NOTE — Clinical Note
Needs stress test before clearing for surgery.  Please notify Petey ARORA FNP-BC  Family Nurse Practitioner

## 2022-03-01 NOTE — TELEPHONE ENCOUNTER
----- Message from ULYSSES Landaverde CNP sent at 3/1/2022 10:58 AM CST -----  Please notify improved cholesterol labs with restarting her statin    Zoe ARORA [facility]-BC  Family Nurse Practitioner

## 2022-03-02 ASSESSMENT — ANXIETY QUESTIONNAIRES: GAD7 TOTAL SCORE: 0

## 2022-03-07 ENCOUNTER — HOSPITAL ENCOUNTER (OUTPATIENT)
Dept: NUCLEAR MEDICINE | Facility: HOSPITAL | Age: 67
Setting detail: NUCLEAR MEDICINE
End: 2022-03-07
Attending: NURSE PRACTITIONER
Payer: MEDICARE

## 2022-03-07 ENCOUNTER — HOSPITAL ENCOUNTER (OUTPATIENT)
Dept: CARDIOLOGY | Facility: HOSPITAL | Age: 67
Setting detail: NUCLEAR MEDICINE
End: 2022-03-07
Attending: NURSE PRACTITIONER
Payer: MEDICARE

## 2022-03-07 DIAGNOSIS — R94.31 ABNORMAL ELECTROCARDIOGRAM: ICD-10-CM

## 2022-03-07 PROCEDURE — 93016 CV STRESS TEST SUPVJ ONLY: CPT | Performed by: INTERNAL MEDICINE

## 2022-03-07 PROCEDURE — 93017 CV STRESS TEST TRACING ONLY: CPT

## 2022-03-07 PROCEDURE — A9500 TC99M SESTAMIBI: HCPCS | Performed by: RADIOLOGY

## 2022-03-07 PROCEDURE — 93018 CV STRESS TEST I&R ONLY: CPT | Performed by: INTERNAL MEDICINE

## 2022-03-07 PROCEDURE — 343N000001 HC RX 343: Performed by: RADIOLOGY

## 2022-03-07 PROCEDURE — 250N000011 HC RX IP 250 OP 636: Performed by: RADIOLOGY

## 2022-03-07 PROCEDURE — 78452 HT MUSCLE IMAGE SPECT MULT: CPT

## 2022-03-07 RX ORDER — REGADENOSON 0.08 MG/ML
0.4 INJECTION, SOLUTION INTRAVENOUS ONCE
Status: DISCONTINUED | OUTPATIENT
Start: 2022-03-07 | End: 2022-03-07

## 2022-03-07 RX ORDER — REGADENOSON 0.08 MG/ML
0.4 INJECTION, SOLUTION INTRAVENOUS ONCE
Status: COMPLETED | OUTPATIENT
Start: 2022-03-07 | End: 2022-03-07

## 2022-03-07 RX ORDER — AMINOPHYLLINE 25 MG/ML
INJECTION, SOLUTION INTRAVENOUS
Status: DISCONTINUED
Start: 2022-03-07 | End: 2022-03-07 | Stop reason: WASHOUT

## 2022-03-07 RX ADMIN — Medication 10.8 MILLICURIE: at 06:59

## 2022-03-07 RX ADMIN — REGADENOSON 0.4 MG: 0.08 INJECTION, SOLUTION INTRAVENOUS at 08:34

## 2022-03-07 RX ADMIN — Medication 32.8 MILLICURIE: at 08:43

## 2022-03-08 ENCOUNTER — TELEPHONE (OUTPATIENT)
Dept: FAMILY MEDICINE | Facility: OTHER | Age: 67
End: 2022-03-08
Payer: COMMERCIAL

## 2022-03-08 DIAGNOSIS — R94.39 ABNORMAL STRESS TEST: Primary | ICD-10-CM

## 2022-03-08 LAB
CV BLOOD PRESSURE: 72 MMHG
CV STRESS MAX HR HE: 91
NUC STRESS EJECTION FRACTION: 68 %
RATE PRESSURE PRODUCT: NORMAL
STRESS ECHO BASELINE DIASTOLIC HE: 75
STRESS ECHO BASELINE HR: 55 BPM
STRESS ECHO BASELINE SYSTOLIC BP: 140
STRESS ECHO CALCULATED PERCENT HR: 60 %
STRESS ECHO LAST STRESS DIASTOLIC BP: 65
STRESS ECHO LAST STRESS SYSTOLIC BP: 110
STRESS ECHO TARGET HR: 152

## 2022-03-08 NOTE — TELEPHONE ENCOUNTER
Reason for call:  RESULTS    1. What is the test that was ordered? Stress test  2. Who ordered your test? Zoe Yrn   3. When was the test performed?  03/09/22  Description: Pt would like call back to have results of her stress test. States she can see them on My Chart but doesn't understand them. She would just like to make sure she is ok for her surgery. She can stop in later today if needed.   Was an appointment offered for this a call? No  If Yes :  Appointment type                 Date    Preferred method for responding to this message: Telephone Call  What is your phone number ? If calling between 12:00 & 2:00 can call cell [phone], before or after that please call home phone [phone]. Can give info to  also if he answers.    If we cannot reach you directly, may we leave a detailed response at the number you provided? Yes  Can this message wait until your PCP/Provider returns if not available today?N/A provider is in today

## 2022-03-08 NOTE — TELEPHONE ENCOUNTER
I attempted to call Aileen Perez with the results of her stress test.  She should follow up with cardiology and possible have an angiogram.  Does she have a preference where she would like to be seen?  If she wants to discuss more, please add her on as a phone visit today.  Just let her know it may take me a little bit to get back to her.      Zoe ARORA FNP-BC  Family Nurse Practitioner

## 2022-03-11 NOTE — NURSING NOTE
"Chief Complaint   Patient presents with     Pre-Op Exam       Initial /70   Pulse 78   Temp 97.8  F (36.6  C) (Tympanic)   Ht 1.651 m (5' 5\")   Wt 74.4 kg (164 lb)   SpO2 99%   BMI 27.29 kg/m   Estimated body mass index is 27.29 kg/m  as calculated from the following:    Height as of this encounter: 1.651 m (5' 5\").    Weight as of this encounter: 74.4 kg (164 lb).  Medication Reconciliation: complete  Melissa Molina LPN  "

## 2022-03-14 ENCOUNTER — OFFICE VISIT (OUTPATIENT)
Dept: CARDIOLOGY | Facility: OTHER | Age: 67
End: 2022-03-14
Attending: NURSE PRACTITIONER
Payer: COMMERCIAL

## 2022-03-14 VITALS
RESPIRATION RATE: 20 BRPM | TEMPERATURE: 97.7 F | SYSTOLIC BLOOD PRESSURE: 130 MMHG | WEIGHT: 164 LBS | BODY MASS INDEX: 27.32 KG/M2 | OXYGEN SATURATION: 98 % | HEART RATE: 75 BPM | DIASTOLIC BLOOD PRESSURE: 70 MMHG | HEIGHT: 65 IN

## 2022-03-14 DIAGNOSIS — R94.39 ABNORMAL STRESS TEST: Primary | ICD-10-CM

## 2022-03-14 DIAGNOSIS — E55.9 VITAMIN D DEFICIENCY: ICD-10-CM

## 2022-03-14 DIAGNOSIS — R94.39 ABNORMAL STRESS TEST: ICD-10-CM

## 2022-03-14 DIAGNOSIS — N85.8 UTERINE MASS: ICD-10-CM

## 2022-03-14 DIAGNOSIS — E78.2 MIXED HYPERLIPIDEMIA: ICD-10-CM

## 2022-03-14 PROCEDURE — 99205 OFFICE O/P NEW HI 60 MIN: CPT | Performed by: INTERNAL MEDICINE

## 2022-03-14 PROCEDURE — G0463 HOSPITAL OUTPT CLINIC VISIT: HCPCS

## 2022-03-14 ASSESSMENT — PAIN SCALES - GENERAL: PAINLEVEL: NO PAIN (0)

## 2022-03-14 NOTE — PROGRESS NOTES
[facility] HEART CARE   CARDIOLOGY CONSULT     Aileen Szymanski   1955  [id number]    Poppy Haynes     Chief Complaint   Patient presents with     Consult     Heart Problem     Coronary Artery Disease          HPI:   Mrs. Szymanski is a 67-year-old female who is being seen by cardiology in follow-up to her stress test, 3/7/2022.  She has a uterine mass with plans for surgical removal through [facility].  She had a preop on 3/1/2022 and was noted to have an abnormal EKG.  As result a result, a stress test was ordered.    She has had ongoing knee issues.  She believes her original knee replacement was undersized.  She describes significant angulation of her left leg following the surgery.  She has been having a hard time walking.  Ultimately, the lower plate was increased in size with almost complete resolution of symptoms.  At one point, the patella was replaced.  She had a CT scan of chest, abdomen, and pelvis on 10/26/2021.  It showed a markedly enlarged uterus and ultrasound was recommended in follow-up.  She is known to have a fibroid uterus since 2007.  She saw OB/GYN on 11/20/2021.  She has a history of a gastric lap band and lost approximately 90 pounds which she has been able to keep off.  She had been having increasing acid reflux and x1 mL of fluid was removed with almost complete resolution of her symptoms.  Patient was described to being somewhat anxious about her uterine mass.  It was decided she would want surgical resection of her uterine mass.    On 3/1/2022, she was seen for surgical clearance and routine EKG obtained.  EKG showed both PAC's and PVC's.  The computer reported inferior/anterior infarct.  In looking back at previous EKG's, the anterior infarct dates back as far 7/14/2014.  She does not have any history of stenting or bypass.  She does not endorse any concerns for angina to date.  She denies chest pain, chest tightness, arm pain, neck pain, teeth, back, or intrascapular  discomfort.  She has not been having exertional dyspnea or fatigue.  She has not had worsening heartburn.  She denies any symptoms.    We reviewed her stress test today.  Her stress test shows a fixed defect which I believe is artifact.  Her ejection fraction is normal.  There were no wall motion abnormality.  Her EKG shows an infarct rather than concerns for ischemia.  Based on these findings, I did not recommend further testing.    IMAGING RESULTS:   Stress test on 3/7/22:      The nuclear stress test is probably abnormal.     There is a small area of mild ischemia in the apical segment(s) of the left ventricle.     The left ventricular ejection fraction at rest is 72%.  The left ventricular ejection fraction at stress is 68%.     There is no prior study for comparison.      CURRENT MEDICATIONS:   Prior to Admission medications    Medication Sig Start Date End Date Taking? Authorizing Provider   acetaminophen (TYLENOL) 325 MG tablet Take 500 mg by mouth 3 times daily    Reported, Patient   amoxicillin (AMOXIL) 500 MG capsule TAKE FOUR CAPSULES BY MOUTH ONCE 60 MINS PRIOR TO DENTAL PROCEDURE.  Patient not taking: Reported on 3/1/2022 9/15/21   Poppy Haynes PA   clonazePAM (KLONOPIN) 1 MG tablet TAKE 1 TABLET BY MOUTH AT BEDTIME 2/9/22   Poppy Haynes PA   escitalopram (LEXAPRO) 20 MG tablet TAKE 1 TABLET DAILY BY MOUTH 12/20/21   Vane Jimenez MD   estradiol (ESTRACE) 0.1 MG/GM vaginal cream Insert one gram vaginally every other night. 2/17/22   Reported, Patient   MELATONIN PO Take 10 mg by mouth nightly as needed     Reported, Patient   multivitamin, therapeutic (THERA-VIT) TABS tablet Take 1 tablet by mouth daily    Reported, Patient   omeprazole (PRILOSEC) 20 MG DR capsule TAKE 1 CAPSULE (20 MG) BY MOUTH DAILY  Patient not taking: Reported on 3/1/2022 9/1/21   Poppy Haynes PA   simvastatin (ZOCOR) 40 MG tablet Take 1 tablet (40 mg) by mouth At Bedtime 11/15/21   Poppy Haynes PA    sucralfate (CARAFATE) 1 GM/10ML suspension Take 10 mLs (1 g) by mouth 4 times daily  Patient not taking: Reported on 3/1/2022 11/15/21   Poppy Haynes PA   vitamin D3 (CHOLECALCIFEROL) 06135 units (250 mcg) capsule Take 1 capsule by mouth daily    Reported, Patient       ALLERGIES:   Allergies   Allergen Reactions     Lovenox [Enoxaparin] Other (See Comments)     Bleeding        PAST MEDICAL HISTORY:   Past Medical History:   Diagnosis Date     Arthrofibrosis of knee joint, left 12/27/2019    Added automatically from request for surgery [id number]     Dizziness and giddiness 07/31/2007     Enthesopathy of knee, unspecified 06/13/2002     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    Allergy to Lovenox. Due to knee replacement surgery.     Infection of total knee replacement (H) 12/27/2019    Added automatically from request for surgery [id number]     Intramural leiomyoma of uterus 03/07/2011     Lung granuloma (H) 03/27/2013    Overview:  Noted on chest xray 3/2013 Noted on chest xray 3/2013     Major depressive disorder without psychotic features 08/31/2017     Major depressive disorder, recurrent episode (H) 06/22/2006    Overview:  IMO Update 10/11 IMO Update 10/11     Mixed hyperlipidemia 09/19/2016     Nonallopathic lesion of cervical region, not elsewhere classified 07/25/2001     Nonallopathic lesion of thoracic region, not elsewhere classified 06/13/2002     Osteoarthrosis, pelvic region and thigh 12/05/2006    Overview:  IMO Update 10/11 IMO Update 10/11     Pain in joint, lower leg 06/24/2002     Postmenopausal bleeding 04/27/2011     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        PAST SURGICAL HISTORY:   Past Surgical History:   Procedure Laterality Date     ARTHROPLASTY REVISION KNEE Left 10/2016     CLOSED REDUCTION NOSE N/A 11/07/2017    Procedure: CLOSED REDUCTION NOSE;  CLOSED REDUCTION NASAL SEPTAL  FRACTURE;  Surgeon: Destiny Vanegas MD;  Location: [facility] OR     COLONOSCOPY  04/13/2010    Dr Quevedo; negative      ERCP W/ PLASTIC STENT PLACEMENT N/A 01/24/2008     ERCP W/ SPHICTEROTOMY N/A 12/10/2007     HYSTEROSCOPY DIAGNOSTIC N/A 04/27/2011     LAPAROSCOPIC CHOLECYSTECTOMY N/A 12/03/2007     LAPAROSCOPIC GASTRIC BANDING N/A 04/23/2008     LAPAROSCOPIC TUBAL LIGATION Bilateral 1978     left knee replacement  07/21/2014    Dr Quinones/ [place]     REPLACEMENT TOTAL KNEE Left 05/14/2021    Complex revision left total knee     REPLACEMENT TOTAL KNEE Left 05/06/2020    left knee replacement after infection        FAMILY HISTORY:   Family History   Problem Relation Age of Onset     Lung Cancer Mother      Heart Disease Mother      Chronic Obstructive Pulmonary Disease Father         SOCIAL HISTORY:   Social History     Socioeconomic History     Marital status:      Spouse name: Not on file     Number of children: Not on file     Years of education: Not on file     Highest education level: Not on file   Occupational History     Not on file   Tobacco Use     Smoking status: Never Smoker     Smokeless tobacco: Never Used   Substance and Sexual Activity     Alcohol use: Yes     Comment: rare     Drug use: No     Sexual activity: Not Currently     Partners: Male     Birth control/protection: Post-menopausal   Other Topics Concern     Parent/sibling w/ CABG, MI or angioplasty before 65F 55M? Yes      Service Not Asked     Blood Transfusions No     Comment: PERMITS     Caffeine Concern Not Asked     Occupational Exposure Not Asked     Hobby Hazards Not Asked     Sleep Concern Not Asked     Stress Concern Not Asked     Weight Concern Not Asked     Special Diet Not Asked     Back Care Not Asked     Exercise Not Asked     Bike Helmet Not Asked     Seat Belt Not Asked     Self-Exams Not Asked   Social History Narrative     Not on file     Social Determinants of Health     Financial Resource Strain: Not on  file   Food Insecurity: Not on file   Transportation Needs: Not on file   Physical Activity: Not on file   Stress: Not on file   Social Connections: Not on file   Intimate Partner Violence: Not on file   Housing Stability: Not on file          ROS:   CONSTITUTIONAL: No weight loss, fever, chills, weakness or fatigue.   HEENT: Eyes: No visual changes. Ears, Nose, Throat: No hearing loss, congestion or difficulty swallowing.   CARDIOVASCULAR: No chest pain, chest pressure or chest discomfort. No palpitations or lower extremity edema.   RESPIRATORY: No shortness of breath, dyspnea upon exertion, cough or sputum production.   GASTROINTESTINAL: No abdominal pain. No anorexia, nausea, vomiting or diarrhea.   NEUROLOGICAL: No headache, lightheadedness, dizziness, syncope, ataxia or weakness.   HEMATOLOGIC: No anemia, bleeding or bruising.   PSYCHIATRIC: No history of depression or anxiety.   ENDOCRINOLOGIC: No reports of sweating, cold or heat intolerance. No polyuria or polydipsia.   SKIN: No abnormal rashes or itching.       PHYSICAL EXAM:   GENERAL: The patient is a well-developed, well-nourished, in no apparent distress. Alert and oriented x3.   HEENT: Head is normocephalic and atraumatic. Eyes are symmetrical with normal visual tracking.  HEART: Regular rate and rhythm, S1S2 present without murmur, rub or gallop.   LUNGS: Respirations regular and unlabored. Clear to auscultation.   EXTREMITIES: No peripheral edema present.   NEUROLOGIC: Alert and oriented X3.    SKIN: No jaundice. No rashes or visible skin lesions present.        LAB RESULTS:   Office Visit on 03/01/2022   Component Date Value Ref Range Status     Cholesterol 03/01/2022 175  <200 mg/dL Final     Triglycerides 03/01/2022 90  <150 mg/dL Final     Direct Measure HDL 03/01/2022 79  >=50 mg/dL Final     LDL Cholesterol Calculated 03/01/2022 78  <=100 mg/dL Final     Non HDL Cholesterol 03/01/2022 96  <130 mg/dL Final     Patient Fasting > 8hrs? 03/01/2022  Yes   Final   Lab on 03/01/2022   Component Date Value Ref Range Status     Sodium 03/01/2022 140  133 - 144 mmol/L Final     Potassium 03/01/2022 3.5  3.4 - 5.3 mmol/L Final     Chloride 03/01/2022 108  94 - 109 mmol/L Final     Carbon Dioxide (CO2) 03/01/2022 25  20 - 32 mmol/L Final     Anion Gap 03/01/2022 7  3 - 14 mmol/L Final     Urea Nitrogen 03/01/2022 21  7 - 30 mg/dL Final     Creatinine 03/01/2022 0.92  0.52 - 1.04 mg/dL Final     Calcium 03/01/2022 8.8  8.5 - 10.1 mg/dL Final     Glucose 03/01/2022 133 (A) 70 - 99 mg/dL Final     GFR Estimate 03/01/2022 67  >60 mL/min/1.73m2 Final     WBC Count 03/01/2022 5.2  4.0 - 11.0 10e3/uL Final     RBC Count 03/01/2022 4.67  3.80 - 5.20 10e6/uL Final     Hemoglobin 03/01/2022 11.0 (A) 11.7 - 15.7 g/dL Final     Hematocrit 03/01/2022 36.3  35.0 - 47.0 % Final     MCV 03/01/2022 78  78 - 100 fL Final     MCH 03/01/2022 23.6 (A) 26.5 - 33.0 pg Final     MCHC 03/01/2022 30.3 (A) 31.5 - 36.5 g/dL Final     RDW 03/01/2022 16.9 (A) 10.0 - 15.0 % Final     Platelet Count 03/01/2022 234  150 - 450 10e3/uL Final     % Neutrophils 03/01/2022 50  % Final     % Lymphocytes 03/01/2022 36  % Final     % Monocytes 03/01/2022 11  % Final     % Eosinophils 03/01/2022 3  % Final     % Basophils 03/01/2022 0  % Final     % Immature Granulocytes 03/01/2022 0  % Final     NRBCs per 100 WBC 03/01/2022 0  <1 /100 Final     Absolute Neutrophils 03/01/2022 2.7  1.6 - 8.3 10e3/uL Final     Absolute Lymphocytes 03/01/2022 1.9  0.8 - 5.3 10e3/uL Final     Absolute Monocytes 03/01/2022 0.6  0.0 - 1.3 10e3/uL Final     Absolute Eosinophils 03/01/2022 0.1  0.0 - 0.7 10e3/uL Final     Absolute Basophils 03/01/2022 0.0  0.0 - 0.2 10e3/uL Final     Absolute Immature Granulocytes 03/01/2022 0.0  <=0.4 10e3/uL Final     Absolute NRBCs 03/01/2022 0.0  10e3/uL Final          ASSESSMENT:       ICD-10-CM    1. Abnormal stress test  R94.39    2. Mixed hyperlipidemia  E78.2    3. Uterine mass  N85.8    4.  Vitamin D deficiency  E55.9          PLAN:   1.  Concern for an abnormal stress test: As mentioned above, the patient has been asymptomatic denying any anginal symptoms.  She denies shortness of breath or heartburn.  She does not have any heart history.  There is a minor defect in the stress test which I believe is artifact.  This was explained.  The images as well as EKGs were reviewed.  Patient okay to proceed with surgery with out additional work-up or testing.  2.  Symptoms of a heart attack/angina were discussed.  If she has any symptoms in the future, she will let us know.  3.  Follow-up in the future on as-needed basis.    Total time spent on day of visit, including review of tests, obtaining/reviewing separately obtained history, ordering medications/tests/procedures, communicating with PCP/consultants, and documenting in electronic medical record: 70 minutes.           Thank you for allowing me to participate in the care of your patient. Please do not hesitate to contact me if you have any questions.     Vito Brizuela, DO

## 2022-03-14 NOTE — NURSING NOTE
"Chief Complaint   Patient presents with     Consult     Heart Problem     Coronary Artery Disease       Initial /70   Pulse 75   Temp 97.7  F (36.5  C) (Tympanic)   Resp 20   Ht 1.651 m (5' 5\")   Wt 74.4 kg (164 lb)   SpO2 98%   BMI 27.29 kg/m   Estimated body mass index is 27.29 kg/m  as calculated from the following:    Height as of this encounter: 1.651 m (5' 5\").    Weight as of this encounter: 74.4 kg (164 lb).  Medication Reconciliation: complete  Morena Baltazar LPN    "

## 2022-03-14 NOTE — PATIENT INSTRUCTIONS
Thank you for allowing Dr. Brizuela and our  team to participate in your care. Please call our office at [phone] with scheduling questions or if you need to cancel or change your appointment. With any other questions or concerns you may call Erica cardiology nurse at [phone].       If you experience chest pain, chest pressure, chest tightness, shortness of breath, fainting, lightheadedness, nausea, vomiting, or other concerning symptoms, please report to the Emergency Department or call 911. These symptoms may be emergent, and best treated in the Emergency Department.    Follow up as needed.     You will have an echocardiogram performed.  This is an ultrasound of the heart, that evaluates heart function.  The hospital scheduling department will call to schedule you for this test.

## 2022-03-15 DIAGNOSIS — Z01.818 PRE-PROCEDURAL EXAMINATION: ICD-10-CM

## 2022-03-16 RX ORDER — AMOXICILLIN 500 MG/1
CAPSULE ORAL
Qty: 4 CAPSULE | Refills: 0 | Status: SHIPPED | OUTPATIENT
Start: 2022-03-16 | End: 2022-09-09

## 2022-03-16 NOTE — TELEPHONE ENCOUNTER
amoxicillin      Last Written Prescription Date:  9/15/21  Last Fill Quantity: 4,   # refills: 0  Last Office Visit: 3/8/22  Future Office visit:    Next 5 appointments (look out 90 days)    Mar 30, 2022  9:40 AM  (Arrive by 9:25 AM)  Pre-Op physical with ULYSSES Landaverde CNP  [facility] ([facility] [place] ) [address]  [place] [address]  [phone]

## 2022-03-17 DIAGNOSIS — F33.2 SEVERE EPISODE OF RECURRENT MAJOR DEPRESSIVE DISORDER, WITHOUT PSYCHOTIC FEATURES (H): ICD-10-CM

## 2022-03-17 RX ORDER — ESCITALOPRAM OXALATE 20 MG/1
TABLET ORAL
Qty: 90 TABLET | Refills: 1 | Status: SHIPPED | OUTPATIENT
Start: 2022-03-17 | End: 2022-08-26

## 2022-03-17 NOTE — TELEPHONE ENCOUNTER
escitalopram (LEXAPRO) 20 MG tablet      Last Written Prescription Date:  12/20/21  Last Fill Quantity: 90,   # refills: 0  Last Office Visit: 03/01/22  Future Office visit:    Next 5 appointments (look out 90 days)    Mar 30, 2022  9:40 AM  (Arrive by 9:25 AM)  Pre-Op physical with ULYSSES Landaverde CNP  [facility] [place] ([facility] [place] ) [facility] [address]  [place] [address]  [phone]           Routing refill request to provider for review/approval because:  Medication last signed by provider who is no longer here.

## 2022-03-19 ENCOUNTER — HEALTH MAINTENANCE LETTER (OUTPATIENT)
Age: 67
End: 2022-03-19

## 2022-03-29 NOTE — PROGRESS NOTES
[facility] [place]  [address] [place] [address]  Phone: [phone]  Primary Provider: Poppy Haynes  Pre-op Performing Provider: JUNE MONET      PREOPERATIVE EVALUATION:  Today's date: 3/30/2022    Aileen Szymanski is a 67 year old female who presents for a preoperative evaluation.    Surgical Information:  Surgery/Procedure: Hysterectomy  Surgery Location: [facility]  Surgeon: Dr. Baeza  Surgery Date: 4/8/22  Time of Surgery: TBD  Where patient plans to recover: At home with family  Fax number for surgical facility: Note does not need to be faxed, will be available electronically in Epic.    Type of Anesthesia Anticipated: to be determined    Assessment & Plan     The proposed surgical procedure is considered INTERMEDIATE risk.    Preop general physical exam  Uterine mass  Cleared   - Basic metabolic panel; Future  - CBC with platelets and differential; Future        Risks and Recommendations:  The patient has the following additional risks and recommendations for perioperative complications:  History of abnormal stress test.  Followed up with cardiology to review EKG and stress test - Okay to proceed with procedure no further assessment at this time.       Medication Instructions:  Patient is to take all scheduled medications on the day of surgery EXCEPT for modifications listed below:   - Statins: Continue taking on the day of surgery.    - Benzodiazepines: Continue without modification.    RECOMMENDATION:  APPROVAL GIVEN to proceed with proposed procedure, without further diagnostic evaluation.              Subjective     HPI related to upcoming procedure: Uterine mass       Preop Questions 3/30/2022   1. Have you ever had a heart attack or stroke? YES - silent heartattack 2014   2. Have you ever had surgery on your heart or blood vessels, such as a stent placement, a coronary artery bypass, or surgery on an artery in your head, neck, heart, or legs? No   3. Do  you have chest pain with activity? No   4. Do you have a history of  heart failure? No   5. Do you currently have a cold, bronchitis or symptoms of other infection? No   6. Do you have a cough, shortness of breath, or wheezing? No   7. Do you or anyone in your family have previous history of blood clots? No   8. Do you or does anyone in your family have a serious bleeding problem such as prolonged bleeding following surgeries or cuts? No   9. Have you ever had problems with anemia or been told to take iron pills? YES - many years ago   10. Have you had any abnormal blood loss such as black, tarry or bloody stools, or abnormal vaginal bleeding? No   11. Have you ever had a blood transfusion? YES - 2 years ago after knee surgery    11a. Have you ever had a transfusion reaction? No   12. Are you willing to have a blood transfusion if it is medically needed before, during, or after your surgery? Yes   13. Have you or any of your relatives ever had problems with anesthesia? No   14. Do you have sleep apnea, excessive snoring or daytime drowsiness? No   15. Do you have any artifical heart valves or other implanted medical devices like a pacemaker, defibrillator, or continuous glucose monitor? No   16. Do you have artificial joints? YES - left knee   17. Are you allergic to latex? No     Health Care Directive:  Patient has a Health Care Directive on file      Preoperative Review of :   reviewed - controlled substances reflected in medication list.      Status of Chronic Conditions:  See problem list for active medical problems.  Problems all longstanding and stable, except as noted/documented.  See ROS for pertinent symptoms related to these conditions.    DEPRESSION - Patient has a long history of Depression of moderate severity requiring medication for control with recent symptoms being stable..Current symptoms of depression include none.     HYPERLIPIDEMIA - Patient has a long history of significant Hyperlipidemia  requiring medication for treatment with recent good control. Patient reports no problems or side effects with the medication.       Review of Systems  CONSTITUTIONAL: NEGATIVE for fever, chills, change in weight  INTEGUMENTARY/SKIN: NEGATIVE for worrisome rashes, moles or lesions  EYES: NEGATIVE for vision changes or irritation  ENT/MOUTH: NEGATIVE for ear, mouth and throat problems  RESP: NEGATIVE for significant cough or SOB  CV: NEGATIVE for chest pain, palpitations or peripheral edema  GI: NEGATIVE for nausea, abdominal pain, heartburn, or change in bowel habits  : NEGATIVE for frequency, dysuria, or hematuria  MUSCULOSKELETAL: NEGATIVE for significant arthralgias or myalgia  NEURO: NEGATIVE for weakness, dizziness or paresthesias  ENDOCRINE: NEGATIVE for temperature intolerance, skin/hair changes  HEME: NEGATIVE for bleeding problems  PSYCHIATRIC: NEGATIVE for changes in mood or affect    Patient Active Problem List    Diagnosis Date Noted     Uterine mass 03/14/2022     Priority: Medium     Vitamin D deficiency 03/14/2022     Priority: Medium     Cystocele and rectocele with incomplete uterovaginal prolapse 01/17/2022     Priority: Medium     Formatting of this note might be different from the original.  Added automatically from request for surgery [id number]       DEIRDRE (stress urinary incontinence, female) 01/17/2022     Priority: Medium     Formatting of this note might be different from the original.  Added automatically from request for surgery [id number]       Failure of total knee arthroplasty, sequela 04/22/2021     Priority: Medium     Formatting of this note might be different from the original.  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     Priority: Medium     Arthrofibrosis of knee joint, left 12/27/2019     Priority: Medium     Added automatically from request for surgery [id number]       Major depressive disorder without psychotic features  08/31/2017     Priority: Medium     Mixed hyperlipidemia 09/19/2016     Priority: Medium     Lung granuloma (H) 03/27/2013     Priority: Medium     Overview:   Noted on chest xray 3/2013  Noted on chest xray 3/2013       Intramural leiomyoma of uterus 03/07/2011     Priority: Medium     Osteoarthrosis, pelvic region and thigh 12/05/2006     Priority: Medium     Overview:   IMO Update 10/11  IMO Update 10/11        Past Medical History:   Diagnosis Date     Arthrofibrosis of knee joint, left 12/27/2019    Added automatically from request for surgery [id number]     Dizziness and giddiness 07/31/2007     Enthesopathy of knee, unspecified 06/13/2002     Failed total left knee replacement (H) 12/27/2019    Added automatically from request for surgery [id number]     History of blood transfusion 2014?    Allergy to Lovenox. Due to knee replacement surgery.     Infection of total knee replacement (H) 12/27/2019    Added automatically from request for surgery [id number]     Intramural leiomyoma of uterus 03/07/2011     Lung granuloma (H) 03/27/2013    Overview:  Noted on chest xray 3/2013 Noted on chest xray 3/2013     Major depressive disorder without psychotic features 08/31/2017     Major depressive disorder, recurrent episode (H) 06/22/2006    Overview:  IMO Update 10/11 IMO Update 10/11     Mixed hyperlipidemia 09/19/2016     Nonallopathic lesion of cervical region, not elsewhere classified 07/25/2001     Nonallopathic lesion of thoracic region, not elsewhere classified 06/13/2002     Osteoarthrosis, pelvic region and thigh 12/05/2006    Overview:  IMO Update 10/11 IMO Update 10/11     Pain in joint, lower leg 06/24/2002     Postmenopausal bleeding 04/27/2011     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     Past Surgical History:   Procedure Laterality Date     ARTHROPLASTY REVISION KNEE Left 10/2016     CLOSED REDUCTION NOSE N/A 11/07/2017    Procedure: CLOSED REDUCTION NOSE;  CLOSED REDUCTION NASAL SEPTAL FRACTURE;  Surgeon:  Destiny Vanegas MD;  Location: [facility] OR     COLONOSCOPY  04/13/2010    Dr Quevedo; negative      ERCP W/ PLASTIC STENT PLACEMENT N/A 01/24/2008     ERCP W/ SPHICTEROTOMY N/A 12/10/2007     HYSTEROSCOPY DIAGNOSTIC N/A 04/27/2011     LAPAROSCOPIC CHOLECYSTECTOMY N/A 12/03/2007     LAPAROSCOPIC GASTRIC BANDING N/A 04/23/2008     LAPAROSCOPIC TUBAL LIGATION Bilateral 1978     left knee replacement  07/21/2014    Dr Quinones/ [facility]     REPLACEMENT TOTAL KNEE Left 05/14/2021    Complex revision left total knee     REPLACEMENT TOTAL KNEE Left 05/06/2020    left knee replacement after infection     Current Outpatient Medications   Medication Sig Dispense Refill     amoxicillin (AMOXIL) 500 MG capsule TAKE FOUR CAPSULES BY MOUTHONCE 60 MINS PRIOR TO DENTAL PROCEDURE. 4 capsule 0     clonazePAM (KLONOPIN) 1 MG tablet TAKE 1 TABLET BY MOUTH AT BEDTIME 30 tablet 4     escitalopram (LEXAPRO) 20 MG tablet TAKE 1 TABLET DAILY BY MOUTH 90 tablet 1     estradiol (ESTRACE) 0.1 MG/GM vaginal cream Insert one gram vaginally every other night.       MELATONIN PO Take 10 mg by mouth nightly as needed        multivitamin, therapeutic (THERA-VIT) TABS tablet Take 1 tablet by mouth daily       simvastatin (ZOCOR) 40 MG tablet Take 1 tablet (40 mg) by mouth At Bedtime 90 tablet 3     vitamin D3 (CHOLECALCIFEROL) 27903 units (250 mcg) capsule Take 1 capsule by mouth daily         Allergies   Allergen Reactions     Lovenox [Enoxaparin] Other (See Comments)     Bleeding        Social History     Tobacco Use     Smoking status: Never Smoker     Smokeless tobacco: Never Used   Substance Use Topics     Alcohol use: Yes     Comment: rare     Family History   Problem Relation Age of Onset     Lung Cancer Mother      Heart Disease Mother      Chronic Obstructive Pulmonary Disease Father      History   Drug Use No         Objective     /70   Pulse 87   Temp 97.7  F (36.5  C) (Tympanic)   Resp 18   Wt 76.2 kg (168 lb)   SpO2 98%    BMI 27.96 kg/m      Physical Exam    GENERAL APPEARANCE: healthy, alert and no distress     EYES: EOMI, PERRL     HENT: ear canals and TM's normal and nose and mouth without ulcers or lesions     NECK: no adenopathy, no asymmetry, masses, or scars and thyroid normal to palpation     RESP: lungs clear to auscultation - no rales, rhonchi or wheezes     CV: regular rates and rhythm, normal S1 S2, no S3 or S4 and no murmur, click or rub     ABDOMEN:  soft, nontender, no HSM or masses and bowel sounds normal     MS: extremities normal- no gross deformities noted, no evidence of inflammation in joints, FROM in all extremities.     SKIN: no suspicious lesions or rashes     NEURO: Normal strength and tone, sensory exam grossly normal, mentation intact and speech normal     PSYCH: mentation appears normal. and affect normal/bright     LYMPHATICS: No cervical adenopathy    Recent Labs   Lab Test 03/01/22  0908 06/04/21  1312 05/03/21  1639   HGB 11.0* 10.1* 11.6*    420 240     --  139   POTASSIUM 3.5  --  4.0   CR 0.92  --  0.92        Diagnostics:  Recent Results (from the past 24 hour(s))   Basic metabolic panel    Collection Time: 03/30/22  9:34 AM   Result Value Ref Range    Sodium 139 133 - 144 mmol/L    Potassium 3.6 3.4 - 5.3 mmol/L    Chloride 108 94 - 109 mmol/L    Carbon Dioxide (CO2) 27 20 - 32 mmol/L    Anion Gap 4 3 - 14 mmol/L    Urea Nitrogen 20 7 - 30 mg/dL    Creatinine 0.84 0.52 - 1.04 mg/dL    Calcium 8.6 8.5 - 10.1 mg/dL    Glucose 73 70 - 99 mg/dL    GFR Estimate 75 >60 mL/min/1.73m2   CBC with platelets and differential    Collection Time: 03/30/22  9:34 AM   Result Value Ref Range    WBC Count 4.8 4.0 - 11.0 10e3/uL    RBC Count 4.50 3.80 - 5.20 10e6/uL    Hemoglobin 10.7 (L) 11.7 - 15.7 g/dL    Hematocrit 35.3 35.0 - 47.0 %    MCV 78 78 - 100 fL    MCH 23.8 (L) 26.5 - 33.0 pg    MCHC 30.3 (L) 31.5 - 36.5 g/dL    RDW 16.9 (H) 10.0 - 15.0 %    Platelet Count 223 150 - 450 10e3/uL    %  Neutrophils 54 %    % Lymphocytes 32 %    % Monocytes 10 %    % Eosinophils 3 %    % Basophils 1 %    % Immature Granulocytes 0 %    NRBCs per 100 WBC 0 <1 /100    Absolute Neutrophils 2.6 1.6 - 8.3 10e3/uL    Absolute Lymphocytes 1.6 0.8 - 5.3 10e3/uL    Absolute Monocytes 0.5 0.0 - 1.3 10e3/uL    Absolute Eosinophils 0.1 0.0 - 0.7 10e3/uL    Absolute Basophils 0.0 0.0 - 0.2 10e3/uL    Absolute Immature Granulocytes 0.0 <=0.4 10e3/uL    Absolute NRBCs 0.0 10e3/uL      No EKG this visit, completed in the last 90 days.    Revised Cardiac Risk Index (RCRI):  The patient has the following serious cardiovascular risks for perioperative complications:   - No serious cardiac risks = 0 points     RCRI Interpretation: 1 point: Class II (low risk - 0.9% complication rate)           Signed Electronically by: ULYSSES Santana CNP  Copy of this evaluation report is provided to requesting physician.

## 2022-03-30 ENCOUNTER — LAB (OUTPATIENT)
Dept: LAB | Facility: OTHER | Age: 67
End: 2022-03-30
Attending: NURSE PRACTITIONER
Payer: MEDICARE

## 2022-03-30 ENCOUNTER — OFFICE VISIT (OUTPATIENT)
Dept: FAMILY MEDICINE | Facility: OTHER | Age: 67
End: 2022-03-30
Attending: NURSE PRACTITIONER
Payer: MEDICARE

## 2022-03-30 VITALS
HEART RATE: 87 BPM | BODY MASS INDEX: 27.96 KG/M2 | TEMPERATURE: 97.7 F | WEIGHT: 168 LBS | OXYGEN SATURATION: 98 % | SYSTOLIC BLOOD PRESSURE: 128 MMHG | RESPIRATION RATE: 18 BRPM | DIASTOLIC BLOOD PRESSURE: 70 MMHG

## 2022-03-30 DIAGNOSIS — N85.8 UTERINE MASS: ICD-10-CM

## 2022-03-30 DIAGNOSIS — Z01.818 PREOP GENERAL PHYSICAL EXAM: ICD-10-CM

## 2022-03-30 DIAGNOSIS — Z01.818 PREOP GENERAL PHYSICAL EXAM: Primary | ICD-10-CM

## 2022-03-30 LAB
ANION GAP SERPL CALCULATED.3IONS-SCNC: 4 MMOL/L (ref 3–14)
BASOPHILS # BLD AUTO: 0 10E3/UL (ref 0–0.2)
BASOPHILS NFR BLD AUTO: 1 %
BUN SERPL-MCNC: 20 MG/DL (ref 7–30)
CALCIUM SERPL-MCNC: 8.6 MG/DL (ref 8.5–10.1)
CHLORIDE BLD-SCNC: 108 MMOL/L (ref 94–109)
CO2 SERPL-SCNC: 27 MMOL/L (ref 20–32)
CREAT SERPL-MCNC: 0.84 MG/DL (ref 0.52–1.04)
EOSINOPHIL # BLD AUTO: 0.1 10E3/UL (ref 0–0.7)
EOSINOPHIL NFR BLD AUTO: 3 %
ERYTHROCYTE [DISTWIDTH] IN BLOOD BY AUTOMATED COUNT: 16.9 % (ref 10–15)
GFR SERPL CREATININE-BSD FRML MDRD: 75 ML/MIN/1.73M2
GLUCOSE BLD-MCNC: 73 MG/DL (ref 70–99)
HCT VFR BLD AUTO: 35.3 % (ref 35–47)
HGB BLD-MCNC: 10.7 G/DL (ref 11.7–15.7)
IMM GRANULOCYTES # BLD: 0 10E3/UL
IMM GRANULOCYTES NFR BLD: 0 %
LYMPHOCYTES # BLD AUTO: 1.6 10E3/UL (ref 0.8–5.3)
LYMPHOCYTES NFR BLD AUTO: 32 %
MCH RBC QN AUTO: 23.8 PG (ref 26.5–33)
MCHC RBC AUTO-ENTMCNC: 30.3 G/DL (ref 31.5–36.5)
MCV RBC AUTO: 78 FL (ref 78–100)
MONOCYTES # BLD AUTO: 0.5 10E3/UL (ref 0–1.3)
MONOCYTES NFR BLD AUTO: 10 %
NEUTROPHILS # BLD AUTO: 2.6 10E3/UL (ref 1.6–8.3)
NEUTROPHILS NFR BLD AUTO: 54 %
NRBC # BLD AUTO: 0 10E3/UL
NRBC BLD AUTO-RTO: 0 /100
PLATELET # BLD AUTO: 223 10E3/UL (ref 150–450)
POTASSIUM BLD-SCNC: 3.6 MMOL/L (ref 3.4–5.3)
RBC # BLD AUTO: 4.5 10E6/UL (ref 3.8–5.2)
SODIUM SERPL-SCNC: 139 MMOL/L (ref 133–144)
WBC # BLD AUTO: 4.8 10E3/UL (ref 4–11)

## 2022-03-30 PROCEDURE — 99213 OFFICE O/P EST LOW 20 MIN: CPT | Performed by: NURSE PRACTITIONER

## 2022-03-30 PROCEDURE — 36415 COLL VENOUS BLD VENIPUNCTURE: CPT | Mod: ZL

## 2022-03-30 PROCEDURE — 80048 BASIC METABOLIC PNL TOTAL CA: CPT | Mod: ZL

## 2022-03-30 PROCEDURE — 85025 COMPLETE CBC W/AUTO DIFF WBC: CPT | Mod: ZL

## 2022-03-30 PROCEDURE — G0463 HOSPITAL OUTPT CLINIC VISIT: HCPCS

## 2022-03-30 ASSESSMENT — PAIN SCALES - GENERAL: PAINLEVEL: NO PAIN (0)

## 2022-03-30 NOTE — PATIENT INSTRUCTIONS
Preparing for Your Surgery  Getting started  A nurse will call you to review your health history and instructions. They will give you an arrival time based on your scheduled surgery time. Please be ready to share:    Your doctor's clinic name and phone number    Your medical, surgical and anesthesia history    A list of allergies and sensitivities    A list of medicines, including herbal treatments and over-the-counter drugs    Whether the patient has a legal guardian (ask how to send us the papers in advance)  Please tell us if you're pregnant--or if there's any chance you might be pregnant. Some surgeries may injure a fetus (unborn baby), so they require a pregnancy test. Surgeries that are safe for a fetus don't always need a test, and you can choose whether to have one.   If you have a child who's having surgery, please ask for a copy of Preparing for Your Child's Surgery.    Preparing for surgery    Within 30 days of surgery: Have a pre-op exam (sometimes called an H&P, or History and Physical). This can be done at a clinic or pre-operative center.  ? If you're having a , you may not need this exam. Talk to your care team.    At your pre-op exam, talk to your care team about all medicines you take. If you need to stop any medicines before surgery, ask when to start taking them again.  ? We do this for your safety. Many medicines can make you bleed too much during surgery. Some change how well surgery (anesthesia) drugs work.    Call your insurance company to let them know you're having surgery. (If you don't have insurance, call [phone].)    Call your clinic if there's any change in your health. This includes signs of a cold or flu (sore throat, runny nose, cough, rash, fever). It also includes a scrape or scratch near the surgery site.    If you have questions on the day of surgery, call your hospital or surgery center.  COVID testing  You may need to be tested for COVID-19 before having  surgery. If so, your surgical team will give you instructions for scheduling this test, separate from your preoperative history and physical.  Eating and drinking guidelines  For your safety: Unless your surgeon tells you otherwise, follow the guidelines below.    Eat and drink as usual until 8 hours before surgery. After that, no food or milk.    Drink clear liquids until 2 hours before surgery. These are liquids you can see through, like water, Gatorade and Propel Water. You may also have black coffee and tea (no cream or milk).    Nothing by mouth within 2 hours of surgery. This includes gum, candy and breath mints.    If you drink alcohol: Stop drinking it the night before surgery.    If your care team tells you to take medicine on the morning of surgery, it's okay to take it with a sip of water.  Preventing infection    Shower or bathe the night before and morning of your surgery. Follow the instructions your clinic gave you. (If no instructions, use regular soap.)    Don't shave or clip hair near your surgery site. We'll remove the hair if needed.    Don't smoke or vape the morning of surgery. You may chew nicotine gum up to 2 hours before surgery. A nicotine patch is okay.  ? Note: Some surgeries require you to completely quit smoking and nicotine. Check with your surgeon.    Your care team will make every effort to keep you safe from infection. We will:  ? Clean our hands often with soap and water (or an alcohol-based hand rub).  ? Clean the skin at your surgery site with a special soap that kills germs.  ? Give you a special gown to keep you warm. (Cold raises the risk of infection.)  ? Wear special hair covers, masks, gowns and gloves during surgery.  ? Give antibiotic medicine, if prescribed. Not all surgeries need antibiotics.  What to bring on the day of surgery    Photo ID and insurance card    Copy of your health care directive, if you have one    Glasses and hearing aides (bring cases)  ? You can't  wear contacts during surgery    Inhaler and eye drops, if you use them (tell us about these when you arrive)    CPAP machine or breathing device, if you use them    A few personal items, if spending the night    If you have . . .  ? A pacemaker, ICD (cardiac defibrillator) or other implant: Bring the ID card.  ? An implanted stimulator: Bring the remote control.  ? A legal guardian: Bring a copy of the certified (court-stamped) guardianship papers.  Please remove any jewelry, including body piercings. Leave jewelry and other valuables at home.  If you're going home the day of surgery    You must have a responsible adult drive you home. They should stay with you overnight as well.    If you don't have someone to stay with you, and you aren't safe to go home alone, we may keep you overnight. Insurance often won't pay for this.  After surgery  If it's hard to control your pain or you need more pain medicine, please call your surgeon's office.  Questions?   If you have any questions for your care team, list them here: _________________________________________________________________________________________________________________________________________________________________________ ____________________________________ ____________________________________ ____________________________________  For informational purposes only. Not to replace the advice of your health care provider. Copyright   2003, 2019 [facility]. All rights reserved. Clinically reviewed by Sachi De La Rosa MD. [employer] [id number] - REV 07/21.

## 2022-04-08 ENCOUNTER — TRANSFERRED RECORDS (OUTPATIENT)
Dept: HEALTH INFORMATION MANAGEMENT | Facility: CLINIC | Age: 67
End: 2022-04-08

## 2022-04-11 ENCOUNTER — HOSPITAL ENCOUNTER (EMERGENCY)
Facility: HOSPITAL | Age: 67
Discharge: HOME | End: 2022-04-11
Attending: NURSE PRACTITIONER | Admitting: NURSE PRACTITIONER
Payer: MEDICARE

## 2022-04-11 VITALS
HEART RATE: 59 BPM | OXYGEN SATURATION: 98 % | DIASTOLIC BLOOD PRESSURE: 75 MMHG | RESPIRATION RATE: 16 BRPM | TEMPERATURE: 97.9 F | SYSTOLIC BLOOD PRESSURE: 131 MMHG

## 2022-04-11 DIAGNOSIS — Z46.6 ENCOUNTER FOR FOLEY CATHETER REMOVAL: Primary | ICD-10-CM

## 2022-04-11 DIAGNOSIS — R33.9 URINARY RETENTION: ICD-10-CM

## 2022-04-11 PROCEDURE — G0463 HOSPITAL OUTPT CLINIC VISIT: HCPCS

## 2022-04-11 PROCEDURE — 99213 OFFICE O/P EST LOW 20 MIN: CPT | Performed by: NURSE PRACTITIONER

## 2022-04-11 RX ORDER — IBUPROFEN 800 MG/1
TABLET, FILM COATED ORAL
COMMUNITY
Start: 2022-04-09 | End: 2023-04-14

## 2022-04-11 ASSESSMENT — ENCOUNTER SYMPTOMS
FLANK PAIN: 0
DYSURIA: 0
HEMATURIA: 0
FEVER: 0
WOUND: 0
SHORTNESS OF BREATH: 0
PSYCHIATRIC NEGATIVE: 1
VOMITING: 0
CHILLS: 0
DIARRHEA: 0
NAUSEA: 0
ABDOMINAL PAIN: 0

## 2022-04-11 NOTE — ED NOTES
16 fr with 10cc balloon was removed from pt. All 10cc of saline was removed from balloon. 150cc of urine was emptied from bag. Pt tolerated well. Having pt drinking water and waiting to see if pt can void.

## 2022-04-11 NOTE — ED PROVIDER NOTES
History     Chief Complaint   Patient presents with     Post-op Problem     Unable to urinate after hysterectomy on 4/8/22     HPI  Aileen Szymanski is a 67 year old female who presents to urgent care (ambulatory) requesting bowen catheter removal.  Patient was experiencing retention post total vaginal hysterectomy, left salpingo-oophorectomy, bilateral uterosacral colpopexy, cystoscopy at [facility] on 4/8/2022.  Patient states Dr. Baeza told her to come in for bowen catheter removal today and see if retention has since subsided.  Denies any Bowen catheter concerns at this time. Denies any fever, chills, nausea, vomiting, diarrhea, shortness of breath or chest pain.  Denies any hematuria.  Denies any other concerns.     Allergies:  Allergies   Allergen Reactions     Lovenox [Enoxaparin] Other (See Comments)     Bleeding       Problem List:    Patient Active Problem List    Diagnosis Date Noted     Uterine mass 03/14/2022     Priority: Medium     Vitamin D deficiency 03/14/2022     Priority: Medium     Cystocele and rectocele with incomplete uterovaginal prolapse 01/17/2022     Priority: Medium     Formatting of this note might be different from the original.  Added automatically from request for surgery [id number]       DEIRDRE (stress urinary incontinence, female) 01/17/2022     Priority: Medium     Formatting of this note might be different from the original.  Added automatically from request for surgery [id number]       Failure of total knee arthroplasty, sequela 04/22/2021     Priority: Medium     Formatting of this note might be different from the original.  Added automatically from request for surgery [id number]       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     Priority: Medium     Arthrofibrosis of knee joint, left 12/27/2019     Priority: Medium     Added automatically from request for surgery [id number]       Major depressive disorder without psychotic features 08/31/2017     Priority: Medium     Mixed hyperlipidemia  09/19/2016     Priority: Medium     Lung granuloma (H) 03/27/2013     Priority: Medium     Overview:   Noted on chest xray 3/2013  Noted on chest xray 3/2013       Intramural leiomyoma of uterus 03/07/2011     Priority: Medium     Osteoarthrosis, pelvic region and thigh 12/05/2006     Priority: Medium     Overview:   IMO Update 10/11  IMO Update 10/11          Past Medical History:    Past Medical History:   Diagnosis Date     Arthrofibrosis of knee joint, left 12/27/2019     Dizziness and giddiness 07/31/2007     Enthesopathy of knee, unspecified 06/13/2002     Failed total left knee replacement (H) 12/27/2019     History of blood transfusion 2014?     Infection of total knee replacement (H) 12/27/2019     Intramural leiomyoma of uterus 03/07/2011     Lung granuloma (H) 03/27/2013     Major depressive disorder without psychotic features 08/31/2017     Major depressive disorder, recurrent episode (H) 06/22/2006     Mixed hyperlipidemia 09/19/2016     Nonallopathic lesion of cervical region, not elsewhere classified 07/25/2001     Nonallopathic lesion of thoracic region, not elsewhere classified 06/13/2002     Osteoarthrosis, pelvic region and thigh 12/05/2006     Pain in joint, lower leg 06/24/2002     Postmenopausal bleeding 04/27/2011     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       Past Surgical History:    Past Surgical History:   Procedure Laterality Date     ARTHROPLASTY REVISION KNEE Left 10/2016     CLOSED REDUCTION NOSE N/A 11/07/2017    Procedure: CLOSED REDUCTION NOSE;  CLOSED REDUCTION NASAL SEPTAL FRACTURE;  Surgeon: Destiny Vanegas MD;  Location: [facility]     COLONOSCOPY  04/13/2010    Dr Quevedo; negative      ERCP W/ PLASTIC STENT PLACEMENT N/A 01/24/2008     ERCP W/ SPHICTEROTOMY N/A 12/10/2007     HYSTEROSCOPY DIAGNOSTIC N/A 04/27/2011     LAPAROSCOPIC CHOLECYSTECTOMY N/A 12/03/2007     LAPAROSCOPIC GASTRIC BANDING N/A 04/23/2008     LAPAROSCOPIC TUBAL LIGATION Bilateral 1978     left knee  replacement  07/21/2014    Dr Quinones/ [facility]     REPLACEMENT TOTAL KNEE Left 05/14/2021    Complex revision left total knee     REPLACEMENT TOTAL KNEE Left 05/06/2020    left knee replacement after infection       Family History:    Family History   Problem Relation Age of Onset     Lung Cancer Mother      Heart Disease Mother      Chronic Obstructive Pulmonary Disease Father        Social History:  Marital Status:   [2]  Social History     Tobacco Use     Smoking status: Never Smoker     Smokeless tobacco: Never Used   Substance Use Topics     Alcohol use: Yes     Comment: rare     Drug use: No        Medications:    amoxicillin (AMOXIL) 500 MG capsule  clonazePAM (KLONOPIN) 1 MG tablet  escitalopram (LEXAPRO) 20 MG tablet  ibuprofen (ADVIL/MOTRIN) 800 MG tablet  MELATONIN PO  multivitamin, therapeutic (THERA-VIT) TABS tablet  simvastatin (ZOCOR) 40 MG tablet  vitamin D3 (CHOLECALCIFEROL) 26136 units (250 mcg) capsule      Review of Systems   Constitutional: Negative for chills and fever.   Respiratory: Negative for shortness of breath.    Cardiovascular: Negative for chest pain.   Gastrointestinal: Negative for abdominal pain, diarrhea, nausea and vomiting.   Genitourinary: Positive for vaginal bleeding (mild post hysterectomy, no concerns.). Negative for dysuria, flank pain, hematuria, pelvic pain, vaginal discharge and vaginal pain.        Calderon catheter in place post hysterectomy due to retention.    Musculoskeletal: Negative for gait problem.   Skin: Negative for wound.   Psychiatric/Behavioral: Negative.      Physical Exam   BP: 131/75  Pulse: 59  Temp: 97.9  F (36.6  C)  Resp: 16  SpO2: 98 %  AP: 64    Physical Exam  Vitals and nursing note reviewed.   Constitutional:       General: She is not in acute distress.     Appearance: Normal appearance. She is not ill-appearing or toxic-appearing.   Cardiovascular:      Rate and Rhythm: Normal rate and regular rhythm.      Pulses: Normal pulses.       Heart sounds: Normal heart sounds.   Pulmonary:      Effort: Pulmonary effort is normal.      Breath sounds: Normal breath sounds.   Genitourinary:     Comments: Calderon catheter removed.   Neurological:      Mental Status: She is alert.   Psychiatric:         Mood and Affect: Mood normal.       ED Course     No results found for this or any previous visit (from the past 24 hour(s)).    Medications - No data to display    Assessments & Plan (with Medical Decision Making)     I have reviewed the nursing notes.    I have reviewed the findings, diagnosis, plan and need for follow up with the patient.  (Z46.6) Encounter for Calderon catheter removal  (primary encounter diagnosis)  (R33.9) Urinary retention  Patient ambulatory with a nontoxic appearance.  Calderon catheter removed in urgent care.  Patient was bladder scanned prior to PVR and noted to have 102 ml in bladder and PVR indicated 15 ml left.  Patient able to keep Calderon catheter out at this time as patient displays she is able to void adequately.  Education given on retention and if/when to follow up.  Patient to follow-up with primary care provider or return to urgent care-ED with any worsening in condition or additional concerns.  Patient is in agreement with treatment plan.    New Prescriptions    No medications on file     Final diagnoses:   Encounter for Calderon catheter removal   Urinary retention     4/11/2022   [facility]     Claudia Manuel NP  04/11/22 [id number]

## 2022-04-11 NOTE — ED TRIAGE NOTES
Pt presents with request for cath to be taken out. Pt had a hysterectomy on Friday (04/08/2022). Pt also had a rectocele and cystocele repair. Had one failed attempt to void on 04/08/22. Surgeon instructed pt to have it removed today and try to void.

## 2022-05-14 ENCOUNTER — HEALTH MAINTENANCE LETTER (OUTPATIENT)
Age: 67
End: 2022-05-14

## 2022-06-16 ENCOUNTER — OFFICE VISIT (OUTPATIENT)
Dept: CHIROPRACTIC MEDICINE | Facility: OTHER | Age: 67
End: 2022-06-16
Attending: CHIROPRACTOR
Payer: COMMERCIAL

## 2022-06-16 DIAGNOSIS — M99.02 SEGMENTAL AND SOMATIC DYSFUNCTION OF THORACIC REGION: ICD-10-CM

## 2022-06-16 DIAGNOSIS — M54.2 CERVICALGIA: ICD-10-CM

## 2022-06-16 DIAGNOSIS — M99.01 SEGMENTAL AND SOMATIC DYSFUNCTION OF CERVICAL REGION: Primary | ICD-10-CM

## 2022-06-16 DIAGNOSIS — M99.03 SEGMENTAL AND SOMATIC DYSFUNCTION OF LUMBAR REGION: ICD-10-CM

## 2022-06-16 PROCEDURE — 98941 CHIROPRACT MANJ 3-4 REGIONS: CPT | Mod: AT | Performed by: CHIROPRACTOR

## 2022-06-17 DIAGNOSIS — F51.01 PRIMARY INSOMNIA: ICD-10-CM

## 2022-06-17 RX ORDER — CLONAZEPAM 1 MG/1
TABLET ORAL
Qty: 30 TABLET | Refills: 4 | Status: SHIPPED | OUTPATIENT
Start: 2022-06-17 | End: 2022-11-29

## 2022-06-17 NOTE — TELEPHONE ENCOUNTER
Klonopin  Last Written Prescription Date: 2/9/22  Last Fill Quantity: 30 # of Refills: 4  Last Office Visit: 3/30/22

## 2022-07-14 ENCOUNTER — OFFICE VISIT (OUTPATIENT)
Dept: CHIROPRACTIC MEDICINE | Facility: OTHER | Age: 67
End: 2022-07-14
Attending: CHIROPRACTOR
Payer: COMMERCIAL

## 2022-07-14 DIAGNOSIS — M99.03 SEGMENTAL AND SOMATIC DYSFUNCTION OF LUMBAR REGION: Primary | ICD-10-CM

## 2022-07-14 DIAGNOSIS — M54.50 ACUTE BILATERAL LOW BACK PAIN WITHOUT SCIATICA: ICD-10-CM

## 2022-07-14 DIAGNOSIS — M99.01 SEGMENTAL AND SOMATIC DYSFUNCTION OF CERVICAL REGION: ICD-10-CM

## 2022-07-14 DIAGNOSIS — M99.02 SEGMENTAL AND SOMATIC DYSFUNCTION OF THORACIC REGION: ICD-10-CM

## 2022-07-14 PROCEDURE — 98941 CHIROPRACT MANJ 3-4 REGIONS: CPT | Mod: AT | Performed by: CHIROPRACTOR

## 2022-08-15 DIAGNOSIS — K21.9 GASTROESOPHAGEAL REFLUX DISEASE WITHOUT ESOPHAGITIS: ICD-10-CM

## 2022-08-17 NOTE — TELEPHONE ENCOUNTER
OMEPRAZOLE 20MG DR CAPSULE       Last Written Prescription Date:  Unknown   Last Fill Quantity: ,   # refills:   Last Office Visit: 3/30/22  Future Office visit:         Routing refill request to provider for review/approval because:    Drug not active on patient's medication list    The original prescription was discontinued on 3/14/2022 by Morena Baltazar LPN. Renewing this prescription may not be appropriate.

## 2022-08-22 ENCOUNTER — TRANSFERRED RECORDS (OUTPATIENT)
Dept: HEALTH INFORMATION MANAGEMENT | Facility: CLINIC | Age: 67
End: 2022-08-22

## 2022-08-23 ENCOUNTER — OFFICE VISIT (OUTPATIENT)
Dept: CHIROPRACTIC MEDICINE | Facility: OTHER | Age: 67
End: 2022-08-23
Attending: CHIROPRACTOR
Payer: COMMERCIAL

## 2022-08-23 DIAGNOSIS — M99.03 SEGMENTAL AND SOMATIC DYSFUNCTION OF LUMBAR REGION: Primary | ICD-10-CM

## 2022-08-23 DIAGNOSIS — M54.50 ACUTE BILATERAL LOW BACK PAIN WITHOUT SCIATICA: ICD-10-CM

## 2022-08-23 DIAGNOSIS — M99.01 SEGMENTAL AND SOMATIC DYSFUNCTION OF CERVICAL REGION: ICD-10-CM

## 2022-08-23 DIAGNOSIS — M99.02 SEGMENTAL AND SOMATIC DYSFUNCTION OF THORACIC REGION: ICD-10-CM

## 2022-08-23 PROCEDURE — 98941 CHIROPRACT MANJ 3-4 REGIONS: CPT | Mod: AT | Performed by: CHIROPRACTOR

## 2022-08-25 DIAGNOSIS — F33.2 SEVERE EPISODE OF RECURRENT MAJOR DEPRESSIVE DISORDER, WITHOUT PSYCHOTIC FEATURES (H): ICD-10-CM

## 2022-08-26 RX ORDER — ESCITALOPRAM OXALATE 20 MG/1
TABLET ORAL
Qty: 90 TABLET | Refills: 1 | Status: SHIPPED | OUTPATIENT
Start: 2022-08-26 | End: 2023-03-01

## 2022-09-04 ENCOUNTER — HEALTH MAINTENANCE LETTER (OUTPATIENT)
Age: 67
End: 2022-09-04

## 2022-09-07 DIAGNOSIS — Z01.818 PRE-PROCEDURAL EXAMINATION: ICD-10-CM

## 2022-09-08 NOTE — TELEPHONE ENCOUNTER
amoxicillin (AMOXIL) 500 MG capsule      Last Written Prescription Date:  3/16/22  Last Fill Quantity: 4,   # refills: 0  Last Office Visit: 3/30/22  Future Office visit:       Routing refill request to provider for review/approval because:  Drug not on the [facility], [facility] or [facility] refill protocol or controlled substance

## 2022-09-09 RX ORDER — AMOXICILLIN 500 MG/1
CAPSULE ORAL
Qty: 4 CAPSULE | Refills: 0 | Status: SHIPPED | OUTPATIENT
Start: 2022-09-09 | End: 2023-05-23

## 2022-09-13 ENCOUNTER — OFFICE VISIT (OUTPATIENT)
Dept: CHIROPRACTIC MEDICINE | Facility: OTHER | Age: 67
End: 2022-09-13
Attending: CHIROPRACTOR
Payer: COMMERCIAL

## 2022-09-13 DIAGNOSIS — M99.01 SEGMENTAL AND SOMATIC DYSFUNCTION OF CERVICAL REGION: Primary | ICD-10-CM

## 2022-09-13 DIAGNOSIS — M99.03 SEGMENTAL AND SOMATIC DYSFUNCTION OF LUMBAR REGION: ICD-10-CM

## 2022-09-13 DIAGNOSIS — M99.02 SEGMENTAL AND SOMATIC DYSFUNCTION OF THORACIC REGION: ICD-10-CM

## 2022-09-13 DIAGNOSIS — M54.2 CERVICALGIA: ICD-10-CM

## 2022-09-13 PROCEDURE — 98941 CHIROPRACT MANJ 3-4 REGIONS: CPT | Mod: AT | Performed by: CHIROPRACTOR

## 2022-09-20 ENCOUNTER — OFFICE VISIT (OUTPATIENT)
Dept: CHIROPRACTIC MEDICINE | Facility: OTHER | Age: 67
End: 2022-09-20
Attending: CHIROPRACTOR
Payer: COMMERCIAL

## 2022-09-20 DIAGNOSIS — M54.50 ACUTE BILATERAL LOW BACK PAIN WITHOUT SCIATICA: ICD-10-CM

## 2022-09-20 DIAGNOSIS — M99.01 SEGMENTAL AND SOMATIC DYSFUNCTION OF CERVICAL REGION: ICD-10-CM

## 2022-09-20 DIAGNOSIS — M99.03 SEGMENTAL AND SOMATIC DYSFUNCTION OF LUMBAR REGION: Primary | ICD-10-CM

## 2022-09-20 DIAGNOSIS — M99.02 SEGMENTAL AND SOMATIC DYSFUNCTION OF THORACIC REGION: ICD-10-CM

## 2022-09-20 PROCEDURE — 98941 CHIROPRACT MANJ 3-4 REGIONS: CPT | Mod: AT | Performed by: CHIROPRACTOR

## 2022-09-20 NOTE — PROGRESS NOTES
Subjective Finding:    Chief compalint: Patient presents with:  Neck Pain: With arm pain    , Pain Scale: 5/10, Intensity: sharp and ache, Duration: 1 month, Change since last visit: worse, Radiating: no.    Date of injury:     Activities that the pain restricts:   Home/household activities: no.  Work duties: no.  Hobbies/social: no.  Sleep: no.  Makes symptoms better: activity and rest.  Makes symptoms worse: lumbar extension and lumbar flexion.  Have you seen anyone else for the symptoms? No.  Work related: no.  Automobile related injury: no.    Objective and Assessment:    Posture Analysis:   High shoulder: .  Head tilt: .  High iliac crest: .  Head carriage: neutral.  Thoracic Kyphosis: neutral.  Lumbar Lordosis: neutral.    Lumbar Range of Motion: flexion decreased and extension decreased.  Cervical Range of Motion: flexion decreased.  Thoracic Range of Motion: .  Extremity Range of Motion: .    Palpation:   Quad lumb: right, referred pain: no    Segmental dysfunction pre-treatment: C2, T6, T7, L4 and L5.    Assessment post-treatment:  Cervical: ROM increased.  Thoracic: pain and tenderness decreased.  Lumbar: pain and tenderness decreased.    Comments: .        Complicating Factors: .    Plan / Procedure:    Expected release date: .  Treatment plan: prn.  Instructed patient: ice 20 minutes every other hour as needed.  Short term goals: reduce pain and increase ROM.  Long term goals: increase patient functional independence.  Prognosis: excellent.

## 2022-10-04 ENCOUNTER — OFFICE VISIT (OUTPATIENT)
Dept: CHIROPRACTIC MEDICINE | Facility: OTHER | Age: 67
End: 2022-10-04
Attending: CHIROPRACTOR
Payer: COMMERCIAL

## 2022-10-04 DIAGNOSIS — M99.03 SEGMENTAL AND SOMATIC DYSFUNCTION OF LUMBAR REGION: Primary | ICD-10-CM

## 2022-10-04 DIAGNOSIS — M54.50 ACUTE BILATERAL LOW BACK PAIN WITHOUT SCIATICA: ICD-10-CM

## 2022-10-04 DIAGNOSIS — M99.01 SEGMENTAL AND SOMATIC DYSFUNCTION OF CERVICAL REGION: ICD-10-CM

## 2022-10-04 DIAGNOSIS — M99.02 SEGMENTAL AND SOMATIC DYSFUNCTION OF THORACIC REGION: ICD-10-CM

## 2022-10-04 PROCEDURE — 98941 CHIROPRACT MANJ 3-4 REGIONS: CPT | Mod: AT | Performed by: CHIROPRACTOR

## 2022-10-10 ENCOUNTER — HOSPITAL ENCOUNTER (EMERGENCY)
Facility: HOSPITAL | Age: 67
Discharge: HOME | End: 2022-10-10
Attending: PHYSICIAN ASSISTANT | Admitting: PHYSICIAN ASSISTANT
Payer: MEDICARE

## 2022-10-10 VITALS
OXYGEN SATURATION: 97 % | HEART RATE: 68 BPM | SYSTOLIC BLOOD PRESSURE: 130 MMHG | RESPIRATION RATE: 16 BRPM | TEMPERATURE: 97.4 F | DIASTOLIC BLOOD PRESSURE: 78 MMHG

## 2022-10-10 DIAGNOSIS — N39.0 UTI (URINARY TRACT INFECTION): ICD-10-CM

## 2022-10-10 DIAGNOSIS — N39.0 URINARY TRACT INFECTION WITHOUT HEMATURIA, SITE UNSPECIFIED: ICD-10-CM

## 2022-10-10 LAB
ALBUMIN UR-MCNC: 20 MG/DL
APPEARANCE UR: CLEAR
BACTERIA #/AREA URNS HPF: ABNORMAL /HPF
BILIRUB UR QL STRIP: NEGATIVE
COLOR UR AUTO: YELLOW
GLUCOSE UR STRIP-MCNC: NEGATIVE MG/DL
HGB UR QL STRIP: ABNORMAL
HYALINE CASTS: 1 /LPF
KETONES UR STRIP-MCNC: NEGATIVE MG/DL
LEUKOCYTE ESTERASE UR QL STRIP: ABNORMAL
MUCOUS THREADS #/AREA URNS LPF: PRESENT /LPF
NITRATE UR QL: NEGATIVE
PH UR STRIP: 5 [PH] (ref 4.7–8)
RBC URINE: 7 /HPF
SP GR UR STRIP: 1.03 (ref 1–1.03)
SQUAMOUS EPITHELIAL: 2 /HPF
UROBILINOGEN UR STRIP-MCNC: 2 MG/DL
WBC URINE: 64 /HPF

## 2022-10-10 PROCEDURE — 81001 URINALYSIS AUTO W/SCOPE: CPT | Performed by: PHYSICIAN ASSISTANT

## 2022-10-10 PROCEDURE — 81001 URINALYSIS AUTO W/SCOPE: CPT | Performed by: EMERGENCY MEDICINE

## 2022-10-10 PROCEDURE — G0463 HOSPITAL OUTPT CLINIC VISIT: HCPCS

## 2022-10-10 PROCEDURE — 87086 URINE CULTURE/COLONY COUNT: CPT | Performed by: PHYSICIAN ASSISTANT

## 2022-10-10 PROCEDURE — 99213 OFFICE O/P EST LOW 20 MIN: CPT | Performed by: PHYSICIAN ASSISTANT

## 2022-10-10 RX ORDER — CEFDINIR 300 MG/1
300 CAPSULE ORAL 2 TIMES DAILY
Qty: 14 CAPSULE | Refills: 0 | Status: SHIPPED | OUTPATIENT
Start: 2022-10-10 | End: 2022-10-17

## 2022-10-10 ASSESSMENT — ENCOUNTER SYMPTOMS
CHILLS: 0
FEVER: 0
HEMATURIA: 0
DYSURIA: 1
FREQUENCY: 1
FLANK PAIN: 0

## 2022-10-10 NOTE — ED PROVIDER NOTES
History     Chief Complaint   Patient presents with     Rule out Urinary Tract Infection     HPI  Aileen Szymanski is a 67 year old female who presents to urgent care for evaluation of possible UTI.  Patient states that over the past 4 to 5 days she has had burning with urination, increased urinary frequency, urgency and difficulty establishing a urine stream.  Patient states that she did have surgery on April 8 of this year involving a hysterectomy and sling in her bladder.  Patient denies any fevers, chills, hematuria, abdominal pain, vaginal discharge, or any other associated symptoms.    Allergies:  Allergies   Allergen Reactions     Lovenox [Enoxaparin] Other (See Comments)     Bleeding       Problem List:    Patient Active Problem List    Diagnosis Date Noted     Uterine mass 03/14/2022     Priority: Medium     Vitamin D deficiency 03/14/2022     Priority: Medium     Cystocele and rectocele with incomplete uterovaginal prolapse 01/17/2022     Priority: Medium     Formatting of this note might be different from the original.  Added automatically from request for surgery [id number]       DEIRDRE (stress urinary incontinence, female) 01/17/2022     Priority: Medium     Formatting of this note might be different from the original.  Added automatically from request for surgery [id number]       Failure of total knee arthroplasty, sequela 04/22/2021     Priority: Medium     Formatting of this note might be different from the original.  Added automatically from request for surgery [id number]       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     Priority: Medium     Arthrofibrosis of knee joint, left 12/27/2019     Priority: Medium     Added automatically from request for surgery [id number]       Major depressive disorder without psychotic features 08/31/2017     Priority: Medium     Mixed hyperlipidemia 09/19/2016     Priority: Medium     Lung granuloma (H) 03/27/2013     Priority: Medium     Overview:   Noted on chest xray  3/2013  Noted on chest xray 3/2013       Intramural leiomyoma of uterus 03/07/2011     Priority: Medium     Osteoarthrosis, pelvic region and thigh 12/05/2006     Priority: Medium     Overview:   IMO Update 10/11  IMO Update 10/11          Past Medical History:    Past Medical History:   Diagnosis Date     Arthrofibrosis of knee joint, left 12/27/2019     Dizziness and giddiness 07/31/2007     Enthesopathy of knee, unspecified 06/13/2002     Failed total left knee replacement (H) 12/27/2019     History of blood transfusion 2014?     Infection of total knee replacement (H) 12/27/2019     Intramural leiomyoma of uterus 03/07/2011     Lung granuloma (H) 03/27/2013     Major depressive disorder without psychotic features 08/31/2017     Major depressive disorder, recurrent episode (H) 06/22/2006     Mixed hyperlipidemia 09/19/2016     Nonallopathic lesion of cervical region, not elsewhere classified 07/25/2001     Nonallopathic lesion of thoracic region, not elsewhere classified 06/13/2002     Osteoarthrosis, pelvic region and thigh 12/05/2006     Pain in joint, lower leg 06/24/2002     Postmenopausal bleeding 04/27/2011     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       Past Surgical History:    Past Surgical History:   Procedure Laterality Date     ARTHROPLASTY REVISION KNEE Left 10/2016     CLOSED REDUCTION NOSE N/A 11/07/2017    Procedure: CLOSED REDUCTION NOSE;  CLOSED REDUCTION NASAL SEPTAL FRACTURE;  Surgeon: Destiny Vanegas MD;  Location: [facility]     COLONOSCOPY  04/13/2010    Dr Quevedo; negative      ERCP W/ PLASTIC STENT PLACEMENT N/A 01/24/2008     ERCP W/ SPHICTEROTOMY N/A 12/10/2007     HYSTEROSCOPY DIAGNOSTIC N/A 04/27/2011     LAPAROSCOPIC CHOLECYSTECTOMY N/A 12/03/2007     LAPAROSCOPIC GASTRIC BANDING N/A 04/23/2008     LAPAROSCOPIC TUBAL LIGATION Bilateral 1978     left knee replacement  07/21/2014    Dr Quinones/ [place]     REPLACEMENT TOTAL KNEE Left 05/14/2021    Complex revision left total  knee     REPLACEMENT TOTAL KNEE Left 05/06/2020    left knee replacement after infection       Family History:    Family History   Problem Relation Age of Onset     Lung Cancer Mother      Heart Disease Mother      Chronic Obstructive Pulmonary Disease Father        Social History:  Marital Status:   [2]  Social History     Tobacco Use     Smoking status: Never     Smokeless tobacco: Never   Substance Use Topics     Alcohol use: Yes     Comment: rare     Drug use: No        Medications:    amoxicillin (AMOXIL) 500 MG capsule  cefdinir (OMNICEF) 300 MG capsule  clonazePAM (KLONOPIN) 1 MG tablet  escitalopram (LEXAPRO) 20 MG tablet  ibuprofen (ADVIL/MOTRIN) 800 MG tablet  MELATONIN PO  multivitamin, therapeutic (THERA-VIT) TABS tablet  simvastatin (ZOCOR) 40 MG tablet  vitamin D3 (CHOLECALCIFEROL) 15338 units (250 mcg) capsule          Review of Systems   Constitutional: Negative for chills and fever.   Genitourinary: Positive for decreased urine volume, dysuria, frequency and urgency. Negative for flank pain, hematuria, pelvic pain and vaginal discharge.   All other systems reviewed and are negative.      Physical Exam   BP: 130/78  Pulse: 68  Temp: 97.4  F (36.3  C)  Resp: 16  SpO2: 97 %      Physical Exam  Vitals and nursing note reviewed.   Constitutional:       General: She is not in acute distress.     Appearance: Normal appearance. She is not ill-appearing or toxic-appearing.   Cardiovascular:      Rate and Rhythm: Regular rhythm.      Heart sounds: Normal heart sounds.   Pulmonary:      Breath sounds: Normal breath sounds.   Abdominal:      Tenderness: There is no right CVA tenderness or left CVA tenderness.   Neurological:      Mental Status: She is oriented to person, place, and time.         ED Course                 Procedures             Critical Care time:               Results for orders placed or performed during the hospital encounter of 10/10/22 (from the past 24 hour(s))   UA with Microscopic  reflex to Culture    Specimen: Urine, Midstream   Result Value Ref Range    Color Urine Yellow Colorless, Straw, Light Yellow, Yellow    Appearance Urine Clear Clear    Glucose Urine Negative Negative mg/dL    Bilirubin Urine Negative Negative    Ketones Urine Negative Negative mg/dL    Specific Gravity Urine 1.031 1.003 - 1.035    Blood Urine Trace (A) Negative    pH Urine 5.0 4.7 - 8.0    Protein Albumin Urine 20 (A) Negative mg/dL    Urobilinogen Urine 2.0 Normal, 2.0 mg/dL    Nitrite Urine Negative Negative    Leukocyte Esterase Urine Large (A) Negative    Bacteria Urine Few (A) None Seen /HPF    Mucus Urine Present (A) None Seen /LPF    RBC Urine 7 (H) <=2 /HPF    WBC Urine 64 (H) <=5 /HPF    Squamous Epithelials Urine 2 (H) <=1 /HPF    Hyaline Casts Urine 1 <=2 /LPF    Narrative    Urine Culture ordered based on laboratory criteria       Medications - No data to display    Assessments & Plan (with Medical Decision Making)   #1.  UTI    Discussed exam findings as well as UA results with patient.  Patient is prescribed course of cefdinir for urinary tract infection.  She has urine culture pending will be known immediate change in antibiotic.  I recommend patient follow-up with her urologist when available.  Any additional concerns patient can return to urgent care or follow-up with primary care provider.  Patient verbalized understanding and agreement of plan.    I have reviewed the nursing notes.    I have reviewed the findings, diagnosis, plan and need for follow up with the patient.    Discharge Medication List as of 10/10/2022  5:58 PM      START taking these medications    Details   cefdinir (OMNICEF) 300 MG capsule Take 1 capsule (300 mg) by mouth 2 times daily for 7 days, Disp-14 capsule, R-0, E-Prescribe             Final diagnoses:   UTI (urinary tract infection)       10/10/2022   [facility]     Nuno Mendoza PA-C  10/10/22 [id number]

## 2022-10-10 NOTE — ED TRIAGE NOTES
Pt presents with burning after urination, unable to start a stream since 4-5 days ago.Took Ibuprofen at 0845 this morning.

## 2022-10-13 LAB — BACTERIA UR CULT: ABNORMAL

## 2022-10-17 ENCOUNTER — OFFICE VISIT (OUTPATIENT)
Dept: FAMILY MEDICINE | Facility: OTHER | Age: 67
End: 2022-10-17
Attending: NURSE PRACTITIONER
Payer: COMMERCIAL

## 2022-10-17 DIAGNOSIS — M25.511 ACUTE PAIN OF RIGHT SHOULDER: Primary | ICD-10-CM

## 2022-10-17 PROCEDURE — G0463 HOSPITAL OUTPT CLINIC VISIT: HCPCS | Performed by: NURSE PRACTITIONER

## 2022-10-17 PROCEDURE — 99213 OFFICE O/P EST LOW 20 MIN: CPT | Performed by: NURSE PRACTITIONER

## 2022-10-17 RX ORDER — POLYSACCHARIDE-IRON COMPLEX 150 MG/1
150 CAPSULE ORAL DAILY
COMMUNITY
Start: 2022-04-09 | End: 2023-04-14

## 2022-10-17 RX ORDER — ACETAMINOPHEN 500 MG
1000 TABLET ORAL
COMMUNITY
Start: 2022-04-09

## 2022-10-17 RX ORDER — PHENOL 1.4 %
1 AEROSOL, SPRAY (ML) MUCOUS MEMBRANE AT BEDTIME
COMMUNITY
End: 2023-04-14

## 2022-10-17 NOTE — NURSING NOTE
"Chief Complaint   Patient presents with     Musculoskeletal Problem     Right shoulder, neck pain       Initial There were no vitals taken for this visit. Estimated body mass index is 27.96 kg/m  as calculated from the following:    Height as of 3/14/22: 1.651 m (5' 5\").    Weight as of 3/30/22: 76.2 kg (168 lb).  Medication Reconciliation: complete  Nafisa Cline LPN    "

## 2022-10-17 NOTE — PROGRESS NOTES
"  Assessment & Plan     Acute pain of right shoulder  Discussed options.  She would like to start with physical therapy and continue with chiropractor care  Offered imaging, but declined today.  Concerns for a possible tear, which would not be seen on XR.  If pain continues consider imaging XR/MRI and  referral to orthopedics  Discussed continued self care   - Physical Therapy Referral; Future  - diclofenac (VOLTAREN) 1 % topical gel; Apply 4 g topically 4 times daily       BMI:   Estimated body mass index is 27.96 kg/m  as calculated from the following:    Height as of 3/14/22: 1.651 m (5' 5\").    Weight as of 3/30/22: 76.2 kg (168 lb).       See Patient Instructions    No follow-ups on file.    ULYSSES Santana [facility] [place]    Subjective   Aileen Jimenez is a 67 year old, presenting for the following health issues:  Musculoskeletal Problem (Right shoulder, neck pain)      HPI     Concern - right shoulder pain   Onset: 3-4 weeks   Description: hurts with certain movements   Intensity: moderate  Progression of Symptoms:  same and intermittent  Accompanying Signs & Symptoms: pain radiates to right neck   Previous history of similar problem: no   Precipitating factors:        Worsened by: sleeping, hurts at night, certain movevemts   Alleviating factors:        Improved by: 2 tylenol   Therapies tried and outcome: tylenol   Pain started in bicep and shoulder  - did follow up with chiropractor   Concerns for pain starting in the posterior shoulder radiates up to neck and shoulder and arm   Fall three weeks ago landed on her buttocks         Review of Systems   CONSTITUTIONAL: NEGATIVE for fever, chills, change in weight  INTEGUMENTARY/SKIN: NEGATIVE for worrisome rashes, moles or lesions  RESP: mild cough due to cold   CV: NEGATIVE for chest pain, palpitations or peripheral edema  MUSCULOSKELETAL: right shoulder and arm pain  NEURO: weakness into right arm       Objective    There were " no vitals taken for this visit.  There is no height or weight on file to calculate BMI.  Physical Exam   GENERAL: healthy, alert and no distress  RESP: regular non-labored   CV: regular rates, peripheral pulses strong and no peripheral edema  MS: peripheral pulses normal and tenderness to palpation lateral and anterior right shoulder   SKIN: no suspicious lesions or rashes  NEURO: slight weakness of right hand grasp, sensory exam grossly normal and mentation intact  PSYCH: mentation appears normal, affect normal/bright    Discussed imaging - can consider in the future

## 2022-10-18 ENCOUNTER — TELEPHONE (OUTPATIENT)
Dept: FAMILY MEDICINE | Facility: OTHER | Age: 67
End: 2022-10-18

## 2022-10-18 ENCOUNTER — OFFICE VISIT (OUTPATIENT)
Dept: CHIROPRACTIC MEDICINE | Facility: OTHER | Age: 67
End: 2022-10-18
Attending: CHIROPRACTOR
Payer: COMMERCIAL

## 2022-10-18 DIAGNOSIS — M99.02 SEGMENTAL AND SOMATIC DYSFUNCTION OF THORACIC REGION: ICD-10-CM

## 2022-10-18 DIAGNOSIS — M99.01 SEGMENTAL AND SOMATIC DYSFUNCTION OF CERVICAL REGION: ICD-10-CM

## 2022-10-18 DIAGNOSIS — M99.03 SEGMENTAL AND SOMATIC DYSFUNCTION OF LUMBAR REGION: Primary | ICD-10-CM

## 2022-10-18 DIAGNOSIS — M54.2 CERVICALGIA: ICD-10-CM

## 2022-10-18 PROCEDURE — 98941 CHIROPRACT MANJ 3-4 REGIONS: CPT | Mod: AT | Performed by: CHIROPRACTOR

## 2022-10-18 NOTE — TELEPHONE ENCOUNTER
Received a PA from [employer] for Diclofenac Sodium 1% gel. Not EPA enabled. Completed insurance form and faxed to [facility]. Waiting for a response.

## 2022-10-20 NOTE — TELEPHONE ENCOUNTER
Received APPROVAL from [employer] for Diclofenac Sodium 1% gel. Effective 07/21/2022-10/19/2023. Forms scanned to Epic.

## 2022-10-25 ENCOUNTER — HOSPITAL ENCOUNTER (OUTPATIENT)
Dept: PHYSICAL THERAPY | Facility: HOSPITAL | Age: 67
Setting detail: THERAPIES SERIES
Discharge: HOME | End: 2022-10-25
Attending: NURSE PRACTITIONER
Payer: MEDICARE

## 2022-10-25 DIAGNOSIS — M25.511 ACUTE PAIN OF RIGHT SHOULDER: ICD-10-CM

## 2022-10-25 PROCEDURE — 97110 THERAPEUTIC EXERCISES: CPT | Mod: GP

## 2022-10-25 PROCEDURE — 97162 PT EVAL MOD COMPLEX 30 MIN: CPT | Mod: GP

## 2022-10-25 PROCEDURE — 97035 APP MDLTY 1+ULTRASOUND EA 15: CPT | Mod: GP

## 2022-10-25 NOTE — PROGRESS NOTES
10/25/22 1000   General Information   Type of Visit Initial OP Ortho PT Evaluation   Start of Care Date 10/25/22   Referring Physician ULYSSES Clarke   Orders Evaluate and Treat   Date of Order 10/17/22   Certification Required? Yes   Medical Diagnosis R shoulder pain   Surgical/Medical history reviewed Yes   Precautions/Limitations no known precautions/limitations   Weight-Bearing Status - RUE full weight-bearing   Body Part(s)   Body Part(s) Shoulder   Presentation and Etiology   Pertinent history of current problem (include personal factors and/or comorbidities that impact the POC) Pt fell in her yard and caught self with arms to help break fall onto her bottom, with weight going through RUE greater than L   Impairments A. Pain;F. Decreased strength and endurance   Functional Limitations perform activities of daily living;perform desired leisure / sports activities   Symptom Location R anterior shoulder at bicipital groove, posterior shoulder at lateral border of scapula, superior border of scapula radiating into neck   How/Where did it occur With a fall   Onset date of current episode/exacerbation 09/26/22   Chronicity New   Pain rating (0-10 point scale) Best (/10);Worst (/10)   Best (/10) 0   Worst (/10) 10   Pain quality A. Sharp;E. Shooting;C. Aching   Frequency of pain/symptoms B. Intermittent   Pain/symptoms are: Worse during the day   Pain/symptoms exacerbated by J. ADL;K. Home tasks;G. Certain positions   Pain/symptoms eased by C. Rest;F. Certain positions   Prior Level of Function   Prior Level of Function-Mobility Independent   Prior Level of Function-ADLs Independent   Functional Level Prior Comment Independent   Current Level of Function   Current Community Support Family/friend caregiver   Patient role/employment history F. Retired   Fall Risk Screen   Fall screen completed by PT   Have you fallen 2 or more times in the past year? Yes   Have you fallen and had an injury in the past year?  Yes   Is patient a fall risk? Yes;Department fall risk interventions implemented   Abuse Screen (yes response referral indicated)   Feels Unsafe at Home or Work/School no   Feels Threatened by Someone no   Does Anyone Try to Keep You From Having Contact with Others or Doing Things Outside Your Home? no   Physical Signs of Abuse Present no   Shoulder Objective Findings   Side (if bilateral, select both right and left) Right   Cervical Screen (ROM, quadrant) ROM is WFL; tenderpoint pain at R lateral neck in scalenes, SCM, upper trapezius   Scapulothoracic Rhythm Decreased on R with abduction, no scapular winging noted   Shoulder Special Tests Comments + drop arm test for pain   Palpation Pain with palpation over bicipital groove, but severe pain with palpation over latissimus and lateral scapular border   Observation Swings RUE freely during ambulation, slightly modified method of donning shirt   Right Shoulder Flexion AROM WFL   Right Shoulder Abduction AROM WFL   Right Shoulder ER AROM WFL   Right Shoulder Flexion Strength 4/5   Right Shoulder Abduction Strength 4/5   Right Shoulder ER Strength 4/5   Right Shoulder IR Strength 5/5   Right Shoulder Extension Strength 5/5   Planned Therapy Interventions   Planned Therapy Interventions joint mobilization;manual therapy;neuromuscular re-education;ROM;strengthening   Planned Therapy Interventions Comment Kinesiotape as needed   Planned Modality Interventions   Planned Modality Interventions Electrical stimulation;TENS;Ultrasound   Clinical Impression   Criteria for Skilled Therapeutic Interventions Met yes, treatment indicated   PT Diagnosis R shoulder complex pain   Influenced by the following impairments tenderpoint pain, pain with range of motion   Functional limitations due to impairments decreased functional activity tolerance   Clinical Presentation Evolving/Changing   Clinical Presentation Rationale Clinical judgement   Clinical Decision Making (Complexity)  Moderate complexity   Therapy Frequency 2 times/Week   Predicted Duration of Therapy Intervention (days/wks) 8 weeks   Risk & Benefits of therapy have been explained Yes   Patient, Family & other staff in agreement with plan of care Yes   Clinical Impression Comments Pt is a 67 y.o. female who suffered a R shoulder complex injury as a result of a fall on 9/26/22.  She is experiencing pain limiting routine activities at home, including ability to sleep.  Her ROM is WFL but painful arc is present.  R shoulder strength is decreased.  She will benefit from skilled PT intervention to address pain, decreased strength, and functional activity tolerance in order for patient to resume all previous activities without difficulty.   ORTHO GOALS   PT Ortho Eval Goals 1;2;3;4   Ortho Goal 1   Goal Identifier STG 1   Goal Description Patient will report decreased R shoulder complex pain to 7/10 at worst with routine activities.   Target Date 11/22/22   Ortho Goal 2   Goal Identifier STG 2   Goal Description Patient will demonstrate full AROM with decreased pain to no greater than 5/10.   Target Date 11/22/22   Ortho Goal 3   Goal Identifier STG 3   Goal Description Patient will be independent and compliant with HEP.   Target Date 11/22/22   Ortho Goal 4   Goal Identifier LTG 1   Goal Description Patient will report resumption of all previous activities with little to no pain.   Target Date 12/20/22   Total Evaluation Time   PT Galina Moderate Complexity Minutes (62534) 30   Therapy Certification   Certification date from 10/25/22   Certification date to 12/20/22   Medical Diagnosis R shoulder pain   I certify the need for these services furnished under this plan of treatment and while under my care. (Physician co-signature of this document indicates review and certification of the therapy plan).

## 2022-10-28 ENCOUNTER — HOSPITAL ENCOUNTER (OUTPATIENT)
Dept: PHYSICAL THERAPY | Facility: HOSPITAL | Age: 67
Setting detail: THERAPIES SERIES
Discharge: HOME | End: 2022-10-28
Attending: NURSE PRACTITIONER
Payer: MEDICARE

## 2022-10-28 PROCEDURE — 97035 APP MDLTY 1+ULTRASOUND EA 15: CPT | Mod: GP

## 2022-10-28 PROCEDURE — 97140 MANUAL THERAPY 1/> REGIONS: CPT | Mod: GP

## 2022-11-02 ENCOUNTER — HOSPITAL ENCOUNTER (OUTPATIENT)
Dept: PHYSICAL THERAPY | Facility: HOSPITAL | Age: 67
Setting detail: THERAPIES SERIES
Discharge: HOME | End: 2022-11-02
Attending: PHYSICIAN ASSISTANT
Payer: MEDICARE

## 2022-11-02 PROCEDURE — 97035 APP MDLTY 1+ULTRASOUND EA 15: CPT | Mod: GP,CQ

## 2022-11-04 ENCOUNTER — HOSPITAL ENCOUNTER (OUTPATIENT)
Dept: PHYSICAL THERAPY | Facility: HOSPITAL | Age: 67
Setting detail: THERAPIES SERIES
Discharge: HOME | End: 2022-11-04
Attending: PHYSICIAN ASSISTANT
Payer: MEDICARE

## 2022-11-04 PROCEDURE — 97140 MANUAL THERAPY 1/> REGIONS: CPT | Mod: GP

## 2022-11-04 PROCEDURE — 97035 APP MDLTY 1+ULTRASOUND EA 15: CPT | Mod: GP

## 2022-11-07 ENCOUNTER — HOSPITAL ENCOUNTER (OUTPATIENT)
Dept: PHYSICAL THERAPY | Facility: HOSPITAL | Age: 67
Setting detail: THERAPIES SERIES
Discharge: HOME | End: 2022-11-07
Attending: PHYSICIAN ASSISTANT
Payer: MEDICARE

## 2022-11-07 DIAGNOSIS — E78.2 MIXED HYPERLIPIDEMIA: ICD-10-CM

## 2022-11-07 PROCEDURE — 97140 MANUAL THERAPY 1/> REGIONS: CPT | Mod: GP,CQ

## 2022-11-07 PROCEDURE — 97035 APP MDLTY 1+ULTRASOUND EA 15: CPT | Mod: GP,CQ

## 2022-11-08 RX ORDER — SIMVASTATIN 40 MG
40 TABLET ORAL AT BEDTIME
Qty: 90 TABLET | Refills: 0 | Status: SHIPPED | OUTPATIENT
Start: 2022-11-08 | End: 2022-11-30

## 2022-11-09 ENCOUNTER — HOSPITAL ENCOUNTER (OUTPATIENT)
Dept: PHYSICAL THERAPY | Facility: HOSPITAL | Age: 67
Setting detail: THERAPIES SERIES
Discharge: HOME | End: 2022-11-09
Attending: PHYSICIAN ASSISTANT
Payer: MEDICARE

## 2022-11-09 PROCEDURE — 97035 APP MDLTY 1+ULTRASOUND EA 15: CPT | Mod: GP

## 2022-11-09 PROCEDURE — 97140 MANUAL THERAPY 1/> REGIONS: CPT | Mod: GP

## 2022-11-14 ENCOUNTER — HOSPITAL ENCOUNTER (OUTPATIENT)
Dept: PHYSICAL THERAPY | Facility: HOSPITAL | Age: 67
Setting detail: THERAPIES SERIES
Discharge: HOME | End: 2022-11-14
Attending: PHYSICIAN ASSISTANT
Payer: MEDICARE

## 2022-11-14 PROCEDURE — 97110 THERAPEUTIC EXERCISES: CPT | Mod: GP,CQ

## 2022-11-14 PROCEDURE — 97140 MANUAL THERAPY 1/> REGIONS: CPT | Mod: GP,CQ

## 2022-11-16 ENCOUNTER — HOSPITAL ENCOUNTER (OUTPATIENT)
Dept: PHYSICAL THERAPY | Facility: HOSPITAL | Age: 67
Setting detail: THERAPIES SERIES
Discharge: HOME | End: 2022-11-16
Attending: PHYSICIAN ASSISTANT
Payer: MEDICARE

## 2022-11-16 PROCEDURE — 97140 MANUAL THERAPY 1/> REGIONS: CPT | Mod: GP

## 2022-11-16 PROCEDURE — 97110 THERAPEUTIC EXERCISES: CPT | Mod: GP

## 2022-11-21 ENCOUNTER — HOSPITAL ENCOUNTER (OUTPATIENT)
Dept: PHYSICAL THERAPY | Facility: HOSPITAL | Age: 67
Setting detail: THERAPIES SERIES
Discharge: HOME | End: 2022-11-21
Attending: PHYSICIAN ASSISTANT
Payer: MEDICARE

## 2022-11-21 PROCEDURE — 97140 MANUAL THERAPY 1/> REGIONS: CPT | Mod: GP,CQ

## 2022-11-23 ENCOUNTER — HOSPITAL ENCOUNTER (OUTPATIENT)
Dept: PHYSICAL THERAPY | Facility: HOSPITAL | Age: 67
Setting detail: THERAPIES SERIES
Discharge: HOME | End: 2022-11-23
Attending: PHYSICIAN ASSISTANT
Payer: MEDICARE

## 2022-11-23 PROCEDURE — 97110 THERAPEUTIC EXERCISES: CPT | Mod: GP

## 2022-11-23 PROCEDURE — 97140 MANUAL THERAPY 1/> REGIONS: CPT | Mod: GP

## 2022-11-23 NOTE — PROGRESS NOTES
[facility]    Outpatient Physical Therapy Progress Note  Patient: Aileen Szymanski  : 1955    Beginning/End Dates of Reporting Period:  10/25/22 to 22    Referring Provider: UYLSSES Clarke    Therapy Diagnosis: R shoulder pain     Client Self Report: Pt denies any shoulder pain at rest but states she is having some discomfort with pressing down, like when releasing seat belt.    Objective Measurements:  R shoulder PROM is WFL for flexion, extension, abduction, IR and ER      Goals:  Goal Identifier STG 1   Goal Description Patient will report decreased R shoulder complex pain to 7/10 at worst with routine activities.   Target Date 22   Date Met  22   Progress (detail required for progress note): Goal met.     Goal Identifier STG 2   Goal Description Patient will demonstrate full AROM with decreased pain to no greater than 5/10.   Target Date 22   Date Met      Progress (detail required for progress note): Good progress, but not yet met. Continue goal through 22.     Goal Identifier STG 3   Goal Description Patient will be independent and compliant with HEP.   Target Date 22   Date Met  22   Progress (detail required for progress note): Goal met.     Goal Identifier LTG 1   Goal Description Patient will report resumption of all previous activities with little to no pain.   Target Date 22   Date Met      Progress (detail required for progress note):       Pt has been seen 2x/week since 10/25/22 to address R shoulder pain, decreased ROM and decreased strength.  PROM today improved after treatment to WFL for flexion, rotation, and abduction.  Strength remains decreased but is improving with ther ex and intervention.  Pt is making good progress toward goals and goals have been updated to reflect progress.  Plan to continue with established POC 2x/week  through 12/20/22 to further progress toward goal completion.      Plan:  Continue therapy per current plan of care.    Discharge:  No

## 2022-11-28 ENCOUNTER — HOSPITAL ENCOUNTER (OUTPATIENT)
Dept: PHYSICAL THERAPY | Facility: HOSPITAL | Age: 67
Setting detail: THERAPIES SERIES
Discharge: HOME | End: 2022-11-28
Attending: PHYSICIAN ASSISTANT
Payer: MEDICARE

## 2022-11-28 DIAGNOSIS — E78.2 MIXED HYPERLIPIDEMIA: ICD-10-CM

## 2022-11-28 PROCEDURE — 97110 THERAPEUTIC EXERCISES: CPT | Mod: GP

## 2022-11-29 DIAGNOSIS — F51.01 PRIMARY INSOMNIA: ICD-10-CM

## 2022-11-29 RX ORDER — CLONAZEPAM 1 MG/1
1 TABLET ORAL AT BEDTIME
Qty: 30 TABLET | Refills: 0 | Status: SHIPPED | OUTPATIENT
Start: 2022-11-29 | End: 2022-12-30

## 2022-11-29 NOTE — TELEPHONE ENCOUNTER
Klonopin      Last Written Prescription Date:  10.31.22  Last Fill Quantity: #30,   # refills: 0  Last Office Visit: 5.3.21 with you  Future Office visit:       Routing refill request to provider for review/approval because:  Drug not on the [facility], P or [facility] refill protocol or controlled substance

## 2022-11-30 RX ORDER — SIMVASTATIN 40 MG
40 TABLET ORAL AT BEDTIME
Qty: 90 TABLET | Refills: 0 | Status: SHIPPED | OUTPATIENT
Start: 2022-11-30 | End: 2023-05-16

## 2022-12-29 DIAGNOSIS — F51.01 PRIMARY INSOMNIA: ICD-10-CM

## 2022-12-30 RX ORDER — CLONAZEPAM 1 MG/1
1 TABLET ORAL AT BEDTIME
Qty: 30 TABLET | Refills: 0 | Status: SHIPPED | OUTPATIENT
Start: 2022-12-30 | End: 2023-02-03

## 2022-12-30 NOTE — TELEPHONE ENCOUNTER
Klonopin      Last Written Prescription Date:  11/29/22  Last Fill Quantity: 30,   # refills: 0  Last Office Visit: 10/17/22  Future Office visit:

## 2023-01-10 ENCOUNTER — NURSE TRIAGE (OUTPATIENT)
Dept: FAMILY MEDICINE | Facility: OTHER | Age: 68
End: 2023-01-10

## 2023-01-10 NOTE — TELEPHONE ENCOUNTER
Pt calling and wants Doxcyline prescribed like she had last time when she was in [facility].C/O of price of bill for the visit.    She is only do go a few drops yesterday.Went good amount this AM and since then just some drops. Feels urgency and unable to urinate.No fever. She had as bladder sling and just had a cystogram. She did have some urine left in her bladder after urinating at an appt.    Did advise pt to be seen in ED. She is asking to see PCP tomorrow or [facility] tomorrow?    Again advised ED and will send message.   She asks a detailed message be left with call back.    Call back either home or cell.    Please advise.    Khushi Hanson RN          Reason for Disposition    [1] Unable to urinate (or only a few drops) > 4 hours AND [2] bladder feels very full (e.g., palpable bladder or strong urge to urinate)    [1] Decreased urination and [2] drinking very little AND [2] dehydration suspected (e.g., dark urine, no urine > 12 hours, very dry mouth, very lightheaded)    Additional Information    Negative: Shock suspected (e.g., cold/pale/clammy skin, too weak to stand, low BP, rapid pulse)    Negative: Sounds like a life-threatening emergency to the triager    Negative: Followed a female genital area injury (e.g., vagina, vulva)    Negative: Followed a male genital area injury (e.g., penis, scrotum)    Negative: Vaginal discharge    Negative: Pus (white, yellow) or bloody discharge from end of penis    Negative: [1] Taking antibiotic for urinary tract infection (UTI) AND [2] female    Negative: [1] Taking antibiotic for urinary tract infection (UTI) AND [2] male    Negative: [1] Pain or burning with passing urine (urination) AND [2] pregnant    Negative: [1] Pain or burning with passing urine (urination) AND [2] postpartum (from 0 to 6 weeks after delivery)    Negative: [1] Pain or burning with passing urine (urination) AND [2] female    Negative: [1] Pain or burning with passing urine (urination) AND [2] male     "Negative: Pain or itching in the vulvar area    Negative: Pain in scrotum is main symptom    Negative: Blood in the urine is main symptom    Negative: Symptoms arising from use of a urinary catheter (e.g., coude, Calderon)    Answer Assessment - Initial Assessment Questions  1. SYMPTOM: \"What's the main symptom you're concerned about?\" (e.g., frequency, incontinence)      Unable to urinate, urgency and dribbling, about 6-8 drops  2. ONSET: \"When did the    start?\"      yesterday  3. PAIN: \"Is there any pain?\" If Yes, ask: \"How bad is it?\" (Scale: 1-10; mild, moderate, severe)      no  4. CAUSE: \"What do you think is causing the symptoms?\"      UTI   5. OTHER SYMPTOMS: \"Do you have any other symptoms?\" (e.g., fever, flank pain, blood in urine, pain with urination)    no  6. PREGNANCY: \"Is there any chance you are pregnant?\" \"When was your last menstrual period?\"      na    Protocols used: URINARY SYMPTOMS-A-AH      "

## 2023-01-10 NOTE — TELEPHONE ENCOUNTER
LM for call back on both phones. Feliciano will see pt tomorrow.    Please schedule with call back.    Khushi Hanson RN

## 2023-01-10 NOTE — TELEPHONE ENCOUNTER
Next 5 appointments (look out 90 days)    Jan 11, 2023 10:00 AM  (Arrive by 9:45 AM)  SHORT with ULYSSES Landaverde CNP  [facility] [place] ([facility] [place] ) [address]  [place] [address]  [phone]        Khushi Hanson RN

## 2023-01-24 NOTE — PROGRESS NOTES
[facility] Rehabilitation [facility]    Outpatient Physical Therapy Discharge Note  Patient: Aileen Szymanski  : 1955    Beginning/End Dates of Reporting Period:  10/25/22 to 22    Referring Provider: ULYSSES Clarke CNP    Therapy Diagnosis: R shoulder complex pain     Client Self Report: No new issues.  Pt reports she is able to sleep on her R shoulder and sleep through the night without waking due to pain.  She would like to hold therapy for a few weeks due to some other committments going on.  She indicates that she will call to reschedule.    Goals:  Goal Identifier STG 1   Goal Description Patient will report decreased R shoulder complex pain to 7/10 at worst with routine activities.   Target Date 22   Date Met  22   Progress (detail required for progress note): Goal met.     Goal Identifier STG 2   Goal Description Patient will demonstrate full AROM with decreased pain to no greater than 5/10.   Target Date 22   Date Met      Progress (detail required for progress note): Good progress, but not yet met. Continue goal through 22.     Goal Identifier STG 3   Goal Description Patient will be independent and compliant with HEP.   Target Date 22   Date Met  22   Progress (detail required for progress note): Goal met.     Goal Identifier LTG 1   Goal Description Patient will report resumption of all previous activities with little to no pain.   Target Date 22   Date Met      Progress (detail required for progress note):         Plan:  Discharge from therapy.    Discharge:  Pt has not returned to PT since 22, at which time she was making good progress toward goals with some goal completion achieved.  Pt has not called to reschedule and will be discharged from PT at this time.    Reason for Discharge: Patient has failed to schedule further appointments.    Equipment  Issued: FAUSTINO    Discharge Plan: Patient to continue home program.

## 2023-02-03 DIAGNOSIS — F51.01 PRIMARY INSOMNIA: ICD-10-CM

## 2023-02-03 RX ORDER — CLONAZEPAM 1 MG/1
1 TABLET ORAL AT BEDTIME
Qty: 30 TABLET | Refills: 3 | Status: SHIPPED | OUTPATIENT
Start: 2023-02-03 | End: 2023-06-09

## 2023-02-03 NOTE — TELEPHONE ENCOUNTER
Clonazepam      Last Written Prescription Date:  12/30/22  Last Fill Quantity: 30,   # refills: 0  Last Office Visit: 10/17/22  Future Office visit:       Routing refill request to provider for review/approval because:

## 2023-02-21 DIAGNOSIS — F33.2 SEVERE EPISODE OF RECURRENT MAJOR DEPRESSIVE DISORDER, WITHOUT PSYCHOTIC FEATURES (H): ICD-10-CM

## 2023-02-24 NOTE — TELEPHONE ENCOUNTER
escitalopram (LEXAPRO) 20 MG tablet 90 tablet 1 8/26/2022     Last Office Visit: 10/17/22  Future Office visit:        Routing refill request to provider for review/approval because:

## 2023-03-01 RX ORDER — ESCITALOPRAM OXALATE 20 MG/1
TABLET ORAL
Qty: 90 TABLET | Refills: 1 | Status: SHIPPED | OUTPATIENT
Start: 2023-03-01 | End: 2023-08-14

## 2023-03-06 ENCOUNTER — OFFICE VISIT (OUTPATIENT)
Dept: CHIROPRACTIC MEDICINE | Facility: OTHER | Age: 68
End: 2023-03-06
Attending: CHIROPRACTOR
Payer: COMMERCIAL

## 2023-03-06 DIAGNOSIS — M54.50 ACUTE BILATERAL LOW BACK PAIN WITHOUT SCIATICA: ICD-10-CM

## 2023-03-06 DIAGNOSIS — M99.02 SEGMENTAL AND SOMATIC DYSFUNCTION OF THORACIC REGION: ICD-10-CM

## 2023-03-06 DIAGNOSIS — M99.03 SEGMENTAL AND SOMATIC DYSFUNCTION OF LUMBAR REGION: Primary | ICD-10-CM

## 2023-03-06 DIAGNOSIS — M99.01 SEGMENTAL AND SOMATIC DYSFUNCTION OF CERVICAL REGION: ICD-10-CM

## 2023-03-06 PROCEDURE — 98941 CHIROPRACT MANJ 3-4 REGIONS: CPT | Mod: AT | Performed by: CHIROPRACTOR

## 2023-04-10 ENCOUNTER — NURSE TRIAGE (OUTPATIENT)
Dept: FAMILY MEDICINE | Facility: OTHER | Age: 68
End: 2023-04-10

## 2023-04-10 NOTE — TELEPHONE ENCOUNTER
"Patient requesting an appt for syncope event that occurred  3 weeks ago while in [place].   Patient reports being unconscious for less than 15 seconds.   Patient reports hitting her head on the left side and still has a small bump.   Denies any chest pain, difficulty breathing, dizziness, lightheadedness, weakness, blood in stool or urine, or any other symptoms.   Patient is scheduled this week with covering provider due to PCP being out.   Next 5 appointments (look out 90 days)    Apr 14, 2023  9:30 AM  (Arrive by 9:15 AM)  SHORT with ULYSSES Landaverde CNP  [facility] [place] ([facility] [place] ) [address]  [place] [address]  [phone]            Additional Information    Negative: Still unconscious    Negative: Difficult to awaken or acting confused (e.g., disoriented, slurred speech)    Negative: Shock suspected (e.g., cold/pale/clammy skin, too weak to stand, low BP, rapid pulse)    Negative: Difficulty breathing    Negative: Bluish (or gray) lips or face now    Negative: Chest pain    Negative: Extra heart beats or heart is beating fast (i.e.,\"palpitations\")    Negative: Bleeding (e.g., vomiting blood, rectal bleeding or tarry stools, severe vaginal bleeding)(Exception: fainted from sight of small amount of blood; small cut or abrasion)    Negative: Fainted suddenly after medicine, allergic food or bee sting    Negative: Age > 50 years (Exception: occurred > 1 hour ago AND now feels completely fine)    Negative: History of heart problems (e.g., congestive heart failure, heart attack)    Negative: [1] Fainted > 15 minutes ago AND [2] still feels too weak or dizzy to stand    Negative: Sounds like a life-threatening emergency to the triager    Negative: [1] Diabetes mellitus AND [2] fainting from low blood sugar (i.e., < 70 mg/dl or 3.9 mmol/l)    Negative: Seizure suspected (e.g., muscle jerking or shaking followed by confusion)    Negative: Heat exhaustion " "suspected (i.e., dehydration from heat exposure)    Negative: [1] Fainted > 15 minutes ago AND [2] still looks pale (pale skin, pallor)    Negative: [1] Fainted > 15 minutes ago AND [2] still feels weak or dizzy    Negative: Occurred during exercise    Negative: Any head or face injury    Negative: Pregnant or possibly pregnant    Negative: Fainted 2 times in one day    Negative: [1] Drinking very little AND [2] dehydration suspected (e.g., no urine > 12 hours, very dry mouth, very lightheaded)    Negative: [1] Age > 50 years  AND [2] now alert and feels fine    Negative: Patient sounds very sick or weak to the triager    Negative: [1] All other patients AND [2] now alert and feels fine  (Exception: SIMPLE FAINT due to stress, pain, prolonged standing, or suddenly standing)    Answer Assessment - Initial Assessment Questions  1. ONSET: \"How long were you unconscious?\" (minutes) \"When did it happen?\"      Less than 15-20 seconds. 3/18/23  2. CONTENT: \"What happened during period of unconsciousness?\" (e.g., seizure activity)       No seizure activity  3. MENTAL STATUS: \"Alert and oriented now?\" (oriented x 3 = name, month, location)       Alert and oriented  4. TRIGGER: \"What do you think caused the fainting?\" \"What were you doing just before you fainted?\"  (e.g., exercise, sudden standing up, prolonged standing)      Unsure of what caused. Standing looking at pictures outside during a  while in [place]. Standing for 10 minutes.   5. RECURRENT SYMPTOM: \"Have you ever passed out before?\" If Yes, ask: \"When was the last time?\" and \"What happened that time?\"       No   6. INJURY: \"Did you sustain any injury during the fall?\"       Bump on head right below the crown on the left side. Still has a little bump.   7. CARDIAC SYMPTOMS: \"Have you had any of the following symptoms: chest pain, difficulty breathing, palpitations?\"      No   8. NEUROLOGIC SYMPTOMS: \"Have you had any of the following symptoms: headache, " "numbness, vertigo, weakness?\"      No   9. GI SYMPTOMS: \"Have you had any of the following symptoms: abdominal pain, vomiting, diarrhea, blood in stools?\"      No   10. OTHER SYMPTOMS: \"Do you have any other symptoms?\"        No   11. PREGNANCY: \"Is there any chance you are pregnant?\" \"When was your last menstrual period?\"        No    Protocols used: [id number]-F-IT      "

## 2023-04-11 NOTE — PROGRESS NOTES
Assessment & Plan     Dysuria  Start treatment for dysuria  Urine culture pending   - UA reflex to Microscopic and Culture - [place]; Future  - cephALEXin (KEFLEX) 500 MG capsule; Take 1 capsule (500 mg) by mouth 2 times daily for 7 days    Syncope, unspecified syncope type  Syncope while in [place] 3 weeks ago.  She was able to get up and move around.  She was not evaluated by medical providers at that time.  She was able to complete her tour.  She did hit her head.  No symptoms today.  She is advices to go to the ER if it happens again.    Offered ziopatch for heart monitor -declined today.  Should consider in the future if happens again or any concerning symptoms     Alternating constipation and diarrhea  Continue to work on staying hydrated   Increase fiber and eating a healthy diet   Stay active     Encounter for screening mammogram for breast cancer  Due for screening   - MA SCREENING DIGITAL BILATERAL ([place]); Future    Special screening for malignant neoplasms, colon  Due for screening   - COLOGUARD([employer]); Future    Osteopenia, unspecified location  Asymptomatic menopause  Due for screening  - DX Hip/Pelvis/Spine; Future    Mixed hyperlipidemia  Due for screening  Stopped statin due to worsening leg pain  Discussed trying to take every other day for now   - Lipid Profile (Chol, Trig, HDL, LDL calc); Future  - Lipid Profile (Chol, Trig, HDL, LDL calc)    Severe episode of recurrent major depressive disorder, without psychotic features (H)  Stable on current medications  - Comprehensive metabolic panel (BMP + Alb, Alk Phos, ALT, AST, Total. Bili, TP); Future  - Comprehensive metabolic panel (BMP + Alb, Alk Phos, ALT, AST, Total. Bili, TP)        Ordering of each unique test  I spent a total of 39 minutes on the day of the visit.   Time spent by me doing chart review, history and exam, documentation and further activities per the note       See Patient Instructions    No follow-ups on file.    Zoe  ULYSSES Art CNP  [facility] - [place]    Subjective   Aileen Jimenez is a 68 year old, presenting for the following health issues:  Syncope and UTI         View : No data to display.              HPI     Concern - Syncope  Onset: 3 weeks ago  Description: Patient started to feel dizzy and sweaty and knew she needed to sit down and her eyes rolled back and she hit her head on the cement. She came to right away and a lady gave her a candy. Pt did refuse medical attention.   Intensity: severe  Progression of Symptoms:  improving  Accompanying Signs & Symptoms: Dizziness and sweaty  Previous history of similar problem: Had dizzy spells but has never passed out  Precipitating factors:        Worsened by: None  Alleviating factors:        Improved by: Rest   Therapies tried and outcome: None  She states she ate prior to syncope episode, flight light headed just prior.  She did hit her head.  But felt fine as when she work up.  She was not seen by anyone while in [place] after incident.  She was able to get up and finished her tour.   Denies any current headaches or neck pain  Denies dizziness     Genitourinary - Female  Onset/Duration: 4/10/63664  Description:   Painful urination (Dysuria): No           Frequency: No  Blood in urine (Hematuria): No  Delay in urine (Hesitency): YES  Intensity: moderate  Progression of Symptoms:  same  Accompanying Signs & Symptoms:  Fever/chills: No  Flank pain: No  Nausea and vomiting: No  Vaginal symptoms: none  Abdominal/Pelvic Pain: No  History:   History of frequent UTI s: YES  History of kidney stones: No  Sexually Active: No  Possibility of pregnancy: No  Precipitating or alleviating factors: None  Therapies tried and outcome: Cranberry juice and staying hydrated         Review of Systems   CONSTITUTIONAL: NEGATIVE for fever, chills, change in weight  INTEGUMENTARY/SKIN: NEGATIVE for worrisome rashes, moles or lesions  EYES: NEGATIVE for vision changes or  irritation  ENT/MOUTH: NEGATIVE for ear, mouth and throat problems  RESP: NEGATIVE for significant cough or SOB  CV: NEGATIVE for chest pain, palpitations or peripheral edema  GI: constipation  : dysuria  MUSCULOSKELETAL: NEGATIVE for significant arthralgias or myalgia  NEURO: syncope in [place]  PSYCHIATRIC: NEGATIVE for changes in mood or affect      Objective    /76 (BP Location: Right arm, Patient Position: Sitting, Cuff Size: Adult Regular)   Pulse 61   Temp 97.5  F (36.4  C) (Tympanic)   Wt 79.8 kg (176 lb)   SpO2 96%   BMI 29.29 kg/m    Body mass index is 29.29 kg/m .  Physical Exam   GENERAL: alert and no distress  EYES: Eyes grossly normal to inspection, PERRL and conjunctivae and sclerae normal  HENT: ear canals and TM's normal, nose and mouth without ulcers or lesions  NECK: no adenopathy, no asymmetry, masses, or scars and thyroid normal to palpation  RESP: lungs clear to auscultation - no rales, rhonchi or wheezes  CV: regular rate and rhythm, normal S1 S2, no S3 or S4, no murmur, click or rub, no peripheral edema and peripheral pulses strong  ABDOMEN: soft, nontender, no hepatosplenomegaly, no masses and bowel sounds normal  MS: no gross musculoskeletal defects noted, no edema  SKIN: no suspicious lesions or rashes  NEURO: Normal strength and tone, sensory exam grossly normal, mentation intact, speech normal and cranial nerves 2-12 intact  PSYCH: mentation appears normal, affect normal/bright  LYMPH: normal ant/post cervical, supraclavicular nodes    Results for orders placed or performed in visit on 04/14/23   Lipid Profile (Chol, Trig, HDL, LDL calc)     Status: Abnormal   Result Value Ref Range    Cholesterol 218 (H) <200 mg/dL    Triglycerides 104 <150 mg/dL    Direct Measure HDL 72 >=50 mg/dL    LDL Cholesterol Calculated 125 (H) <=100 mg/dL    Non HDL Cholesterol 146 (H) <130 mg/dL    Narrative    Cholesterol  Desirable:  <200 mg/dL    Triglycerides  Normal:  Less than 150  mg/dL  Borderline High:  150-199 mg/dL  High:  200-499 mg/dL  Very High:  Greater than or equal to 500 mg/dL    Direct Measure HDL  Female:  Greater than or equal to 50 mg/dL   Male:  Greater than or equal to 40 mg/dL    LDL Cholesterol  Desirable:  <100mg/dL  Above Desirable:  100-129 mg/dL   Borderline High:  130-159 mg/dL   High:  160-189 mg/dL   Very High:  >= 190 mg/dL    Non HDL Cholesterol  Desirable:  130 mg/dL  Above Desirable:  130-159 mg/dL  Borderline High:  160-189 mg/dL  High:  190-219 mg/dL  Very High:  Greater than or equal to 220 mg/dL   Comprehensive metabolic panel (BMP + Alb, Alk Phos, ALT, AST, Total. Bili, TP)     Status: Abnormal   Result Value Ref Range    Sodium 140 136 - 145 mmol/L    Potassium 4.2 3.4 - 5.3 mmol/L    Chloride 104 98 - 107 mmol/L    Carbon Dioxide (CO2) 27 22 - 29 mmol/L    Anion Gap 9 7 - 15 mmol/L    Urea Nitrogen 18.9 8.0 - 23.0 mg/dL    Creatinine 0.91 0.51 - 0.95 mg/dL    Calcium 9.1 8.8 - 10.2 mg/dL    Glucose 101 (H) 70 - 99 mg/dL    Alkaline Phosphatase 102 35 - 104 U/L    AST 21 10 - 35 U/L    ALT 14 10 - 35 U/L    Protein Total 7.3 6.4 - 8.3 g/dL    Albumin 3.8 3.5 - 5.2 g/dL    Bilirubin Total 0.5 <=1.2 mg/dL    GFR Estimate 68 >60 mL/min/1.73m2   Results for orders placed or performed in visit on 04/14/23   UA reflex to Microscopic and Culture - [place]     Status: Abnormal    Specimen: Urine, NOS   Result Value Ref Range    Color Urine Light Yellow Colorless, Straw, Light Yellow, Yellow    Appearance Urine Slightly Cloudy (A) Clear    Glucose Urine Negative Negative mg/dL    Bilirubin Urine Negative Negative    Ketones Urine Negative Negative mg/dL    Specific Gravity Urine 1.016 1.003 - 1.035    Blood Urine Negative Negative    pH Urine 6.5 4.7 - 8.0    Protein Albumin Urine Negative Negative mg/dL    Urobilinogen Urine Normal Normal, 2.0 mg/dL    Nitrite Urine Positive (A) Negative    Leukocyte Esterase Urine Moderate (A) Negative    Bacteria Urine Few (A)  None Seen /HPF    Mucus Urine Present (A) None Seen /LPF    RBC Urine 3 (H) <=2 /HPF    WBC Urine 86 (H) <=5 /HPF    Squamous Epithelials Urine 0 <=1 /HPF    Narrative    Urine Culture ordered based on laboratory criteria

## 2023-04-14 ENCOUNTER — LAB (OUTPATIENT)
Dept: FAMILY MEDICINE | Facility: OTHER | Age: 68
End: 2023-04-14

## 2023-04-14 ENCOUNTER — OFFICE VISIT (OUTPATIENT)
Dept: FAMILY MEDICINE | Facility: OTHER | Age: 68
End: 2023-04-14
Attending: NURSE PRACTITIONER
Payer: MEDICARE

## 2023-04-14 VITALS
TEMPERATURE: 97.5 F | WEIGHT: 176 LBS | OXYGEN SATURATION: 96 % | SYSTOLIC BLOOD PRESSURE: 118 MMHG | BODY MASS INDEX: 29.29 KG/M2 | DIASTOLIC BLOOD PRESSURE: 76 MMHG | HEART RATE: 61 BPM

## 2023-04-14 DIAGNOSIS — Z12.11 SPECIAL SCREENING FOR MALIGNANT NEOPLASMS, COLON: ICD-10-CM

## 2023-04-14 DIAGNOSIS — Z12.31 ENCOUNTER FOR SCREENING MAMMOGRAM FOR BREAST CANCER: ICD-10-CM

## 2023-04-14 DIAGNOSIS — E78.2 MIXED HYPERLIPIDEMIA: ICD-10-CM

## 2023-04-14 DIAGNOSIS — R55 SYNCOPE, UNSPECIFIED SYNCOPE TYPE: ICD-10-CM

## 2023-04-14 DIAGNOSIS — M85.80 OSTEOPENIA, UNSPECIFIED LOCATION: ICD-10-CM

## 2023-04-14 DIAGNOSIS — R19.8 ALTERNATING CONSTIPATION AND DIARRHEA: ICD-10-CM

## 2023-04-14 DIAGNOSIS — R30.0 DYSURIA: Primary | ICD-10-CM

## 2023-04-14 DIAGNOSIS — Z78.0 ASYMPTOMATIC MENOPAUSE: ICD-10-CM

## 2023-04-14 DIAGNOSIS — F33.2 SEVERE EPISODE OF RECURRENT MAJOR DEPRESSIVE DISORDER, WITHOUT PSYCHOTIC FEATURES (H): ICD-10-CM

## 2023-04-14 LAB
ALBUMIN SERPL BCG-MCNC: 3.8 G/DL (ref 3.5–5.2)
ALP SERPL-CCNC: 102 U/L (ref 35–104)
ALT SERPL W P-5'-P-CCNC: 14 U/L (ref 10–35)
ANION GAP SERPL CALCULATED.3IONS-SCNC: 9 MMOL/L (ref 7–15)
AST SERPL W P-5'-P-CCNC: 21 U/L (ref 10–35)
BILIRUB SERPL-MCNC: 0.5 MG/DL
BUN SERPL-MCNC: 18.9 MG/DL (ref 8–23)
CALCIUM SERPL-MCNC: 9.1 MG/DL (ref 8.8–10.2)
CHLORIDE SERPL-SCNC: 104 MMOL/L (ref 98–107)
CHOLEST SERPL-MCNC: 218 MG/DL
CREAT SERPL-MCNC: 0.91 MG/DL (ref 0.51–0.95)
DEPRECATED HCO3 PLAS-SCNC: 27 MMOL/L (ref 22–29)
GFR SERPL CREATININE-BSD FRML MDRD: 68 ML/MIN/1.73M2
GLUCOSE SERPL-MCNC: 101 MG/DL (ref 70–99)
HDLC SERPL-MCNC: 72 MG/DL
LDLC SERPL CALC-MCNC: 125 MG/DL
NONHDLC SERPL-MCNC: 146 MG/DL
POTASSIUM SERPL-SCNC: 4.2 MMOL/L (ref 3.4–5.3)
PROT SERPL-MCNC: 7.3 G/DL (ref 6.4–8.3)
SODIUM SERPL-SCNC: 140 MMOL/L (ref 136–145)
TRIGL SERPL-MCNC: 104 MG/DL

## 2023-04-14 PROCEDURE — 80061 LIPID PANEL: CPT | Mod: ZL | Performed by: NURSE PRACTITIONER

## 2023-04-14 PROCEDURE — 36415 COLL VENOUS BLD VENIPUNCTURE: CPT | Mod: ZL | Performed by: NURSE PRACTITIONER

## 2023-04-14 PROCEDURE — G0463 HOSPITAL OUTPT CLINIC VISIT: HCPCS | Mod: 25

## 2023-04-14 PROCEDURE — G0463 HOSPITAL OUTPT CLINIC VISIT: HCPCS

## 2023-04-14 PROCEDURE — 80053 COMPREHEN METABOLIC PANEL: CPT | Mod: ZL | Performed by: NURSE PRACTITIONER

## 2023-04-14 PROCEDURE — 99214 OFFICE O/P EST MOD 30 MIN: CPT | Performed by: NURSE PRACTITIONER

## 2023-04-14 RX ORDER — CEPHALEXIN 500 MG/1
500 CAPSULE ORAL 2 TIMES DAILY
Qty: 14 CAPSULE | Refills: 0 | Status: SHIPPED | OUTPATIENT
Start: 2023-04-14 | End: 2023-04-21

## 2023-04-14 ASSESSMENT — ANXIETY QUESTIONNAIRES
GAD7 TOTAL SCORE: 0
IF YOU CHECKED OFF ANY PROBLEMS ON THIS QUESTIONNAIRE, HOW DIFFICULT HAVE THESE PROBLEMS MADE IT FOR YOU TO DO YOUR WORK, TAKE CARE OF THINGS AT HOME, OR GET ALONG WITH OTHER PEOPLE: NOT DIFFICULT AT ALL
1. FEELING NERVOUS, ANXIOUS, OR ON EDGE: NOT AT ALL
4. TROUBLE RELAXING: NOT AT ALL
6. BECOMING EASILY ANNOYED OR IRRITABLE: NOT AT ALL
7. FEELING AFRAID AS IF SOMETHING AWFUL MIGHT HAPPEN: NOT AT ALL
5. BEING SO RESTLESS THAT IT IS HARD TO SIT STILL: NOT AT ALL
GAD7 TOTAL SCORE: 0
2. NOT BEING ABLE TO STOP OR CONTROL WORRYING: NOT AT ALL
3. WORRYING TOO MUCH ABOUT DIFFERENT THINGS: NOT AT ALL

## 2023-04-14 ASSESSMENT — PATIENT HEALTH QUESTIONNAIRE - PHQ9: SUM OF ALL RESPONSES TO PHQ QUESTIONS 1-9: 2

## 2023-04-14 ASSESSMENT — PAIN SCALES - GENERAL: PAINLEVEL: MILD PAIN (3)

## 2023-04-14 NOTE — PATIENT INSTRUCTIONS
Try using statin every other day to see if symptoms improve    To the ER if any episodes of syncope or any concerns  Consider Zio patch for continuous heart monitor

## 2023-04-17 ENCOUNTER — TELEPHONE (OUTPATIENT)
Dept: FAMILY MEDICINE | Facility: OTHER | Age: 68
End: 2023-04-17

## 2023-04-17 DIAGNOSIS — R30.0 DYSURIA: Primary | ICD-10-CM

## 2023-04-17 RX ORDER — CIPROFLOXACIN 500 MG/1
500 TABLET, FILM COATED ORAL 2 TIMES DAILY
Qty: 10 TABLET | Refills: 0 | Status: SHIPPED | OUTPATIENT
Start: 2023-04-17 | End: 2023-04-22

## 2023-04-17 NOTE — TELEPHONE ENCOUNTER
DATE:  4/17/2023   TIME OF RECEIPT FROM LAB:  0929  LAB TEST:  Urine Culture  LAB VALUE:  positive for EColi  RESULTS GIVEN WITH READ-BACK TO (PROVIDER):  Zoe Pool CNP  TIME LAB VALUE REPORTED TO PROVIDER:   0929

## 2023-04-17 NOTE — CONFIDENTIAL NOTE
Called Alieen Perez - stop Keflex and start cipro - reviewed culture report   No improvement with symptoms on Keflex     Zoe ARORA FNP-BC  Family Nurse Practitioner

## 2023-04-24 RX ORDER — TOLTERODINE TARTRATE 2 MG/1
TABLET, EXTENDED RELEASE ORAL
COMMUNITY
Start: 2023-04-19

## 2023-04-24 NOTE — PROGRESS NOTES
[facility] - [place]  [address]  [place] [address]  Phone: [phone]  Primary Provider: Poppy Haynes  Pre-op Performing Provider: JUNE MONET      PREOPERATIVE EVALUATION:  Today's date: 4/25/2023    Aileen Szymanski is a 68 year old female who presents for a preoperative evaluation.      4/14/2023     9:22 AM   Additional Questions   Roomed by Roberto Manuel   Accompanied by N/A     Surgical Information:  Surgery/Procedure: Laparotomy with Caban retropubic urethropexy  Surgery Location: [facility]  Surgeon: Dr. Baeza  Surgery Date: 5/13/23  Time of Surgery: TBD  Where patient plans to recover: At home with family  Fax number for surgical facility: Note does not need to be faxed, will be available electronically in Epic.    Assessment & Plan     The proposed surgical procedure is considered INTERMEDIATE risk.    Preop general physical exam  DEIRDRE (stress urinary incontinence, female)  Cystocele and rectocele with incomplete uterovaginal prolapse  Cleared for procedure   - EKG 12-lead complete w/read - (Clinic Performed)  - CBC with platelets and differential; Future  - Basic metabolic panel; Future  - UA reflex to Microscopic and Culture - [place]; Future            - No identified additional risk factors other than previously addressed    Antiplatelet or Anticoagulation Medication Instructions:  No aspirin or anticoagulation therapy     Additional Medication Instructions:  Patient is on no additional chronic medications  hold vitamins for 2 weeks     RECOMMENDATION:  APPROVAL GIVEN to proceed with proposed procedure, without further diagnostic evaluation.    Ordering of each unique test  Prescription drug management  I spent a total of 37 minutes on the day of the visit.   Time spent by me doing chart review, history and exam, documentation and further activities per the note      Subjective     HPI related to upcoming procedure: prolapsed uterovaginal         4/22/2023     10:19 AM   Preop Questions   1. Have you ever had a heart attack or stroke? YES - YEARS AGO - noted on EKG years ago   2. Have you ever had surgery on your heart or blood vessels, such as a stent placement, a coronary artery bypass, or surgery on an artery in your head, neck, heart, or legs? No   3. Do you have chest pain with activity? No   4. Do you have a history of  heart failure? No   5. Do you currently have a cold, bronchitis or symptoms of other infection? No   6. Do you have a cough, shortness of breath, or wheezing? No   7. Do you or anyone in your family have previous history of blood clots? No   8. Do you or does anyone in your family have a serious bleeding problem such as prolonged bleeding following surgeries or cuts? No   9. Have you ever had problems with anemia or been told to take iron pills? YES - past - no current treatment    10. Have you had any abnormal blood loss such as black, tarry or bloody stools, or abnormal vaginal bleeding? No   11. Have you ever had a blood transfusion? YES - after knee replacement and during knee surgery    11a. Have you ever had a transfusion reaction? No   12. Are you willing to have a blood transfusion if it is medically needed before, during, or after your surgery? Yes   13. Have you or any of your relatives ever had problems with anesthesia? No   14. Do you have sleep apnea, excessive snoring or daytime drowsiness? No   15. Do you have any artifical heart valves or other implanted medical devices like a pacemaker, defibrillator, or continuous glucose monitor? No   16. Do you have artificial joints? YES - LEFT tka   17. Are you allergic to latex? No       Health Care Directive:  Patient has a Health Care Directive on file      Preoperative Review of :   reviewed - controlled substances reflected in medication list.      Status of Chronic Conditions:  See problem list for active medical problems.  Problems all longstanding and stable, except as  noted/documented.  See ROS for pertinent symptoms related to these conditions.    DEPRESSION - Patient has a long history of Depression of moderate severity requiring medication for control with recent symptoms being waxing and waning..Current symptoms of depression include situational depression - friend is not doing well.     HYPERLIPIDEMIA - Patient has a long history of significant Hyperlipidemia requiring medication for treatment with recent fair control. Patient reports yes if taking daily problems or side effects with the medication. - now takes every other day     SLEEP PROBLEM - Patient has a longstanding history of insomnia.. Patient has tried OTC medications with limited success. She uses clonazepam 1 mg at bedtime for insomnia.        Review of Systems  CONSTITUTIONAL: NEGATIVE for fever, chills, change in weight  INTEGUMENTARY/SKIN: NEGATIVE for worrisome rashes, moles or lesions  EYES: NEGATIVE for vision changes or irritation  ENT/MOUTH: NEGATIVE for ear, mouth and throat problems  RESP: NEGATIVE for significant cough or SOB  CV: HX of PVCs and palpitations  GI: NEGATIVE for nausea, abdominal pain, heartburn, or change in bowel habits  : NEGATIVE for frequency, dysuria, or hematuria  MUSCULOSKELETAL: NEGATIVE for significant arthralgias or myalgia  NEURO: syncope over a month ago - no further episodes   ENDOCRINE: NEGATIVE for temperature intolerance, skin/hair changes  HEME: NEGATIVE for bleeding problems  PSYCHIATRIC: HX anxiety and HX depression    Patient Active Problem List    Diagnosis Date Noted     Cystocele with rectocele 04/18/2023     Priority: Medium     Formatting of this note might be different from the original.  Added automatically from request for surgery [id number]       Uterine mass 03/14/2022     Priority: Medium     Vitamin D deficiency 03/14/2022     Priority: Medium     Cystocele and rectocele with incomplete uterovaginal prolapse 01/17/2022     Priority: Medium     Formatting of  this note might be different from the original.  Added automatically from request for surgery [id number]       DEIRDRE (stress urinary incontinence, female) 01/17/2022     Priority: Medium     Formatting of this note might be different from the original.  Added automatically from request for surgery [id number]       Failure of total knee arthroplasty, sequela 04/22/2021     Priority: Medium     Formatting of this note might be different from the original.  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     Priority: Medium     Arthrofibrosis of knee joint, left 12/27/2019     Priority: Medium     Added automatically from request for surgery [id number]       Major depressive disorder without psychotic features 08/31/2017     Priority: Medium     Mixed hyperlipidemia 09/19/2016     Priority: Medium     Lung granuloma (H) 03/27/2013     Priority: Medium     Overview:   Noted on chest xray 3/2013  Noted on chest xray 3/2013       Intramural leiomyoma of uterus 03/07/2011     Priority: Medium     Osteoarthrosis, pelvic region and thigh 12/05/2006     Priority: Medium     Overview:   IMO Update 10/11  IMO Update 10/11        Past Medical History:   Diagnosis Date     Arthrofibrosis of knee joint, left 12/27/2019    Added automatically from request for surgery [id number]     Depressive disorder     Many years     Dizziness and giddiness 07/31/2007     Enthesopathy of knee, unspecified 06/13/2002     Failed total left knee replacement (H) 12/27/2019    Added automatically from request for surgery [id number]     History of blood transfusion 2014?    Allergy to Lovenox. Due to knee replacement surgery.     Infection of total knee replacement (H) 12/27/2019    Added automatically from request for surgery [id number]     Intramural leiomyoma of uterus 03/07/2011     Lung granuloma (H) 03/27/2013    Overview:  Noted on chest xray 3/2013 Noted on chest xray 3/2013     Major depressive disorder without  psychotic features 08/31/2017     Major depressive disorder, recurrent episode (H) 06/22/2006    Overview:  IMO Update 10/11 IMO Update 10/11     Mixed hyperlipidemia 09/19/2016     Nonallopathic lesion of cervical region, not elsewhere classified 07/25/2001     Nonallopathic lesion of thoracic region, not elsewhere classified 06/13/2002     Osteoarthrosis, pelvic region and thigh 12/05/2006    Overview:  IMO Update 10/11 IMO Update 10/11     Pain in joint, lower leg 06/24/2002     Postmenopausal bleeding 04/27/2011     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     Past Surgical History:   Procedure Laterality Date     ABDOMEN SURGERY  2007     ARTHROPLASTY REVISION KNEE Left 10/2016     CLOSED REDUCTION NOSE N/A 11/07/2017    Procedure: CLOSED REDUCTION NOSE;  CLOSED REDUCTION NASAL SEPTAL FRACTURE;  Surgeon: Destiny Vanegas MD;  Location: [facility] OR     COLONOSCOPY  04/13/2010    Dr Quevedo; negative      ERCP W/ PLASTIC STENT PLACEMENT N/A 01/24/2008     ERCP W/ SPHICTEROTOMY N/A 12/10/2007     GENITOURINARY SURGERY  4/8//22     HYSTERECTOMY N/A 04/08/2022     HYSTEROSCOPY DIAGNOSTIC N/A 04/27/2011     LAPAROSCOPIC CHOLECYSTECTOMY N/A 12/03/2007     LAPAROSCOPIC GASTRIC BANDING N/A 04/23/2008     LAPAROSCOPIC TUBAL LIGATION Bilateral 1978     left knee replacement  07/21/2014    Dr Quniones/ [place]     REPLACEMENT TOTAL KNEE Left 05/14/2021    Complex revision left total knee     REPLACEMENT TOTAL KNEE Left 05/06/2020    left knee replacement after infection     Current Outpatient Medications   Medication Sig Dispense Refill     acetaminophen (TYLENOL) 500 MG tablet Take 1,000 mg by mouth       amoxicillin (AMOXIL) 500 MG capsule TAKE FOUR CAPSULES BY MOUTHONCE 60 MINS PRIOR TO DENTAL PROCEDURE. 4 capsule 0     clonazePAM (KLONOPIN) 1 MG tablet Take 1 tablet (1 mg) by mouth At Bedtime 30 tablet 3     escitalopram (LEXAPRO) 20 MG tablet TAKE ONE TABLET BY MOUTH DAILY 90 tablet 1     multivitamin, therapeutic  "(THERA-VIT) TABS tablet Take 1 tablet by mouth daily       simvastatin (ZOCOR) 40 MG tablet TAKE 1 TABLET (40 MG) BY MOUTH AT BEDTIME (Patient taking differently: Take 40 mg by mouth every other day) 90 tablet 0     tolterodine (DETROL) 2 MG tablet          Allergies   Allergen Reactions     Lovenox [Enoxaparin] Other (See Comments)     Bleeding     Estradiol Other (See Comments)     Vaginal itching from cream        Social History     Tobacco Use     Smoking status: Never     Smokeless tobacco: Never   Vaping Use     Vaping status: Never Used   Substance Use Topics     Alcohol use: Yes     Comment: Rarely     Family History   Problem Relation Age of Onset     Lung Cancer Mother      Heart Disease Mother      Other Cancer Mother         Heart disease and bone cancer     Chronic Obstructive Pulmonary Disease Father      Other Cancer Father         Lung and COPD     Osteoporosis Paternal Grandmother      Other Cancer Sister         Lung and brain cancer     History   Drug Use No         Objective     /70   Pulse 72   Temp 98.1  F (36.7  C) (Tympanic)   Ht 1.651 m (5' 5\")   Wt 79.8 kg (176 lb)   SpO2 96%   BMI 29.29 kg/m      Physical Exam    GENERAL APPEARANCE: healthy, alert and no distress     EYES: EOMI, PERRL     HENT: ear canals and TM's normal and nose and mouth without ulcers or lesions     NECK: no adenopathy, no asymmetry, masses, or scars and thyroid normal to palpation     RESP: lungs clear to auscultation - no rales, rhonchi or wheezes     CV: regular rates and rhythm, normal S1 S2, no S3 or S4 and no murmur, click or rub     ABDOMEN:  soft, nontender, no HSM or masses and bowel sounds normal     MS: extremities normal- no gross deformities noted, no evidence of inflammation in joints, FROM in all extremities.     SKIN: no suspicious lesions or rashes     NEURO: Normal strength and tone, sensory exam grossly normal, mentation intact and speech normal     PSYCH: mentation appears normal. and " affect normal/bright     LYMPHATICS: No cervical adenopathy    Recent Labs   Lab Test 04/14/23  1020 03/30/22  0934 03/01/22  0908   HGB  --  10.7* 11.0*   PLT  --  223 234    139 140   POTASSIUM 4.2 3.6 3.5   CR 0.91 0.84 0.92        Diagnostics:  Recent Results (from the past 24 hour(s))   Basic metabolic panel    Collection Time: 04/25/23  8:47 AM   Result Value Ref Range    Sodium 142 136 - 145 mmol/L    Potassium 3.7 3.4 - 5.3 mmol/L    Chloride 105 98 - 107 mmol/L    Carbon Dioxide (CO2) 30 (H) 22 - 29 mmol/L    Anion Gap 7 7 - 15 mmol/L    Urea Nitrogen 20.7 8.0 - 23.0 mg/dL    Creatinine 0.99 (H) 0.51 - 0.95 mg/dL    Calcium 9.3 8.8 - 10.2 mg/dL    Glucose 80 70 - 99 mg/dL    GFR Estimate 61 >60 mL/min/1.73m2   CBC with platelets and differential    Collection Time: 04/25/23  8:47 AM   Result Value Ref Range    WBC Count 5.1 4.0 - 11.0 10e3/uL    RBC Count 4.71 3.80 - 5.20 10e6/uL    Hemoglobin 13.7 11.7 - 15.7 g/dL    Hematocrit 42.2 35.0 - 47.0 %    MCV 90 78 - 100 fL    MCH 29.1 26.5 - 33.0 pg    MCHC 32.5 31.5 - 36.5 g/dL    RDW 12.4 10.0 - 15.0 %    Platelet Count 199 150 - 450 10e3/uL    % Neutrophils 60 %    % Lymphocytes 29 %    % Monocytes 7 %    % Eosinophils 3 %    % Basophils 1 %    % Immature Granulocytes 0 %    NRBCs per 100 WBC 0 <1 /100    Absolute Neutrophils 3.1 1.6 - 8.3 10e3/uL    Absolute Lymphocytes 1.5 0.8 - 5.3 10e3/uL    Absolute Monocytes 0.4 0.0 - 1.3 10e3/uL    Absolute Eosinophils 0.1 0.0 - 0.7 10e3/uL    Absolute Basophils 0.0 0.0 - 0.2 10e3/uL    Absolute Immature Granulocytes 0.0 <=0.4 10e3/uL    Absolute NRBCs 0.0 10e3/uL      EKG: NSR possibe inferior and anteroseptal infarct age undetermined    She does have a history of frequent PVCs - non-noted on EKG today and normal heart sounds     Revised Cardiac Risk Index (RCRI):  The patient has the following serious cardiovascular risks for perioperative complications:   - No serious cardiac risks = 0 points     RCRI  Interpretation: 1 point: Class II (low risk - 0.9% complication rate)           Signed Electronically by: ULYSSES Santana CNP  Copy of this evaluation report is provided to requesting physician.

## 2023-04-25 ENCOUNTER — LAB (OUTPATIENT)
Dept: LAB | Facility: OTHER | Age: 68
End: 2023-04-25
Attending: NURSE PRACTITIONER
Payer: MEDICARE

## 2023-04-25 ENCOUNTER — TELEPHONE (OUTPATIENT)
Dept: FAMILY MEDICINE | Facility: OTHER | Age: 68
End: 2023-04-25

## 2023-04-25 ENCOUNTER — OFFICE VISIT (OUTPATIENT)
Dept: FAMILY MEDICINE | Facility: OTHER | Age: 68
End: 2023-04-25
Attending: NURSE PRACTITIONER
Payer: COMMERCIAL

## 2023-04-25 VITALS
DIASTOLIC BLOOD PRESSURE: 70 MMHG | SYSTOLIC BLOOD PRESSURE: 120 MMHG | HEIGHT: 65 IN | TEMPERATURE: 98.1 F | WEIGHT: 176 LBS | BODY MASS INDEX: 29.32 KG/M2 | OXYGEN SATURATION: 96 % | HEART RATE: 72 BPM

## 2023-04-25 DIAGNOSIS — Z01.818 PREOP GENERAL PHYSICAL EXAM: ICD-10-CM

## 2023-04-25 DIAGNOSIS — N81.2 CYSTOCELE AND RECTOCELE WITH INCOMPLETE UTEROVAGINAL PROLAPSE: ICD-10-CM

## 2023-04-25 DIAGNOSIS — N39.3 SUI (STRESS URINARY INCONTINENCE, FEMALE): ICD-10-CM

## 2023-04-25 DIAGNOSIS — Z01.818 PREOP GENERAL PHYSICAL EXAM: Primary | ICD-10-CM

## 2023-04-25 PROBLEM — N81.6 CYSTOCELE WITH RECTOCELE: Status: ACTIVE | Noted: 2023-04-18

## 2023-04-25 PROBLEM — N81.10 CYSTOCELE WITH RECTOCELE: Status: ACTIVE | Noted: 2023-04-18

## 2023-04-25 LAB
ANION GAP SERPL CALCULATED.3IONS-SCNC: 7 MMOL/L (ref 7–15)
BASOPHILS # BLD AUTO: 0 10E3/UL (ref 0–0.2)
BASOPHILS NFR BLD AUTO: 1 %
BUN SERPL-MCNC: 20.7 MG/DL (ref 8–23)
CALCIUM SERPL-MCNC: 9.3 MG/DL (ref 8.8–10.2)
CHLORIDE SERPL-SCNC: 105 MMOL/L (ref 98–107)
CREAT SERPL-MCNC: 0.99 MG/DL (ref 0.51–0.95)
DEPRECATED HCO3 PLAS-SCNC: 30 MMOL/L (ref 22–29)
EOSINOPHIL # BLD AUTO: 0.1 10E3/UL (ref 0–0.7)
EOSINOPHIL NFR BLD AUTO: 3 %
ERYTHROCYTE [DISTWIDTH] IN BLOOD BY AUTOMATED COUNT: 12.4 % (ref 10–15)
GFR SERPL CREATININE-BSD FRML MDRD: 61 ML/MIN/1.73M2
GLUCOSE SERPL-MCNC: 80 MG/DL (ref 70–99)
HCT VFR BLD AUTO: 42.2 % (ref 35–47)
HGB BLD-MCNC: 13.7 G/DL (ref 11.7–15.7)
IMM GRANULOCYTES # BLD: 0 10E3/UL
IMM GRANULOCYTES NFR BLD: 0 %
LYMPHOCYTES # BLD AUTO: 1.5 10E3/UL (ref 0.8–5.3)
LYMPHOCYTES NFR BLD AUTO: 29 %
MCH RBC QN AUTO: 29.1 PG (ref 26.5–33)
MCHC RBC AUTO-ENTMCNC: 32.5 G/DL (ref 31.5–36.5)
MCV RBC AUTO: 90 FL (ref 78–100)
MONOCYTES # BLD AUTO: 0.4 10E3/UL (ref 0–1.3)
MONOCYTES NFR BLD AUTO: 7 %
NEUTROPHILS # BLD AUTO: 3.1 10E3/UL (ref 1.6–8.3)
NEUTROPHILS NFR BLD AUTO: 60 %
NRBC # BLD AUTO: 0 10E3/UL
NRBC BLD AUTO-RTO: 0 /100
PLATELET # BLD AUTO: 199 10E3/UL (ref 150–450)
POTASSIUM SERPL-SCNC: 3.7 MMOL/L (ref 3.4–5.3)
RBC # BLD AUTO: 4.71 10E6/UL (ref 3.8–5.2)
SODIUM SERPL-SCNC: 142 MMOL/L (ref 136–145)
WBC # BLD AUTO: 5.1 10E3/UL (ref 4–11)

## 2023-04-25 PROCEDURE — G0463 HOSPITAL OUTPT CLINIC VISIT: HCPCS | Mod: 25

## 2023-04-25 PROCEDURE — 85025 COMPLETE CBC W/AUTO DIFF WBC: CPT | Mod: ZL

## 2023-04-25 PROCEDURE — 80048 BASIC METABOLIC PNL TOTAL CA: CPT | Mod: ZL

## 2023-04-25 PROCEDURE — 99214 OFFICE O/P EST MOD 30 MIN: CPT | Performed by: NURSE PRACTITIONER

## 2023-04-25 PROCEDURE — 93010 ELECTROCARDIOGRAM REPORT: CPT | Mod: 77 | Performed by: INTERNAL MEDICINE

## 2023-04-25 PROCEDURE — 36415 COLL VENOUS BLD VENIPUNCTURE: CPT | Mod: ZL

## 2023-04-25 PROCEDURE — 93005 ELECTROCARDIOGRAM TRACING: CPT

## 2023-04-25 NOTE — PATIENT INSTRUCTIONS
For informational purposes only. Not to replace the advice of your health care provider. Copyright   2003,  [facility]. All rights reserved. Clinically reviewed by Sachi De La Rosa MD. [employer] [id number] - REV .  Preparing for Your Surgery  Getting started  A nurse will call you to review your health history and instructions. They will give you an arrival time based on your scheduled surgery time. Please be ready to share:  Your doctor's clinic name and phone number  Your medical, surgical, and anesthesia history  A list of allergies and sensitivities  A list of medicines, including herbal treatments and over-the-counter drugs  Whether the patient has a legal guardian (ask how to send us the papers in advance)  Please tell us if you're pregnant--or if there's any chance you might be pregnant. Some surgeries may injure a fetus (unborn baby), so they require a pregnancy test. Surgeries that are safe for a fetus don't always need a test, and you can choose whether to have one.   If you have a child who's having surgery, please ask for a copy of Preparing for Your Child's Surgery.    Preparing for surgery  Within 10 to 30 days of surgery: Have a pre-op exam (sometimes called an H&P, or History and Physical). This can be done at a clinic or pre-operative center.  If you're having a , you may not need this exam. Talk to your care team.  At your pre-op exam, talk to your care team about all medicines you take. If you need to stop any medicines before surgery, ask when to start taking them again.  We do this for your safety. Many medicines can make you bleed too much during surgery. Some change how well surgery (anesthesia) drugs work.  Call your insurance company to let them know you're having surgery. (If you don't have insurance, call [phone].)  Call your clinic if there's any change in your health. This includes signs of a cold or flu (sore throat, runny nose, cough, rash, fever). It  also includes a scrape or scratch near the surgery site.  If you have questions on the day of surgery, call your hospital or surgery center.  Eating and drinking guidelines  For your safety: Unless your surgeon tells you otherwise, follow the guidelines below.  Eat and drink as usual until 8 hours before you arrive for surgery. After that, no food or milk.  Drink clear liquids until 2 hours before you arrive. These are liquids you can see through, like water, Gatorade, and Propel Water. They also include plain black coffee and tea (no cream or milk), candy, and breath mints. You can spit out gum when you arrive.  If you drink alcohol: Stop drinking it the night before surgery.  If your care team tells you to take medicine on the morning of surgery, it's okay to take it with a sip of water.  Preventing infection  Shower or bathe the night before and morning of your surgery. Follow the instructions your clinic gave you. (If no instructions, use regular soap.)  Don't shave or clip hair near your surgery site. We'll remove the hair if needed.  Don't smoke or vape the morning of surgery. You may chew nicotine gum up to 2 hours before surgery. A nicotine patch is okay.  Note: Some surgeries require you to completely quit smoking and nicotine. Check with your surgeon.  Your care team will make every effort to keep you safe from infection. We will:  Clean our hands often with soap and water (or an alcohol-based hand rub).  Clean the skin at your surgery site with a special soap that kills germs.  Give you a special gown to keep you warm. (Cold raises the risk of infection.)  Wear special hair covers, masks, gowns and gloves during surgery.  Give antibiotic medicine, if prescribed. Not all surgeries need antibiotics.  What to bring on the day of surgery  Photo ID and insurance card  Copy of your health care directive, if you have one  Glasses and hearing aids (bring cases)  You can't wear contacts during surgery  Inhaler and  eye drops, if you use them (tell us about these when you arrive)  CPAP machine or breathing device, if you use them  A few personal items, if spending the night  If you have . . .  A pacemaker, ICD (cardiac defibrillator) or other implant: Bring the ID card.  An implanted stimulator: Bring the remote control.  A legal guardian: Bring a copy of the certified (court-stamped) guardianship papers.  Please remove any jewelry, including body piercings. Leave jewelry and other valuables at home.  If you're going home the day of surgery  You must have a responsible adult drive you home. They should stay with you overnight as well.  If you don't have someone to stay with you, and you aren't safe to go home alone, we may keep you overnight. Insurance often won't pay for this.  After surgery  If it's hard to control your pain or you need more pain medicine, please call your surgeon's office.  Questions?   If you have any questions for your care team, list them here: _________________________________________________________________________________________________________________________________________________________________________ ____________________________________ ____________________________________ ____________________________________    How to Take Your Medication Before Surgery  - STOP taking all vitamins and herbal supplements 14 days before surgery.

## 2023-04-25 NOTE — Clinical Note
Good morning  I faxed over an EKG for you to review.  Aileen Jimenez has a history of PVCs and abnormal EKGs.  She is having a procedure done.  She does not have any chest pain or any new symptoms for years.  With no new symptoms I was planning on clearing her for the procedures.  Do you have any concerns?  Zoe ARORA FNP-BC Family Nurse Practitioner

## 2023-05-02 ENCOUNTER — OFFICE VISIT (OUTPATIENT)
Dept: CHIROPRACTIC MEDICINE | Facility: OTHER | Age: 68
End: 2023-05-02
Payer: COMMERCIAL

## 2023-05-02 DIAGNOSIS — M99.02 SEGMENTAL AND SOMATIC DYSFUNCTION OF THORACIC REGION: ICD-10-CM

## 2023-05-02 DIAGNOSIS — M99.03 SEGMENTAL AND SOMATIC DYSFUNCTION OF LUMBAR REGION: ICD-10-CM

## 2023-05-02 DIAGNOSIS — M99.01 SEGMENTAL AND SOMATIC DYSFUNCTION OF CERVICAL REGION: Primary | ICD-10-CM

## 2023-05-02 DIAGNOSIS — M54.2 CERVICALGIA: ICD-10-CM

## 2023-05-02 PROCEDURE — 98941 CHIROPRACT MANJ 3-4 REGIONS: CPT | Mod: AT | Performed by: CHIROPRACTOR

## 2023-05-02 NOTE — PROGRESS NOTES
Subjective Finding:    Chief compalint: Patient presents with:  Neck Pain: From a fall while on a trip.  Hit head on the ground after passing out.  , Pain Scale: 4/10, Intensity: sharp and ache, Duration: 1/2 month, Change since last visit: worse, Radiating: no.    Date of injury:     Activities that the pain restricts:   Home/household activities: no.  Work duties: no.  Hobbies/social: no.  Sleep: no.  Makes symptoms better: activity and rest.  Makes symptoms worse: lumbar extension and lumbar flexion.  Have you seen anyone else for the symptoms? No.  Work related: no.  Automobile related injury: no.    Objective and Assessment:    Posture Analysis:   High shoulder: .  Head tilt: .  High iliac crest: .  Head carriage: neutral.  Thoracic Kyphosis: neutral.  Lumbar Lordosis: neutral.    Lumbar Range of Motion: flexion decreased and extension decreased.  Cervical Range of Motion: flexion decreased.  Thoracic Range of Motion: .  Extremity Range of Motion: .    Palpation:   Quad lumb: right, referred pain: no  c spine subocc mm        Segmental dysfunction pre-treatment: C2, T6, T7, L4 and L5.    Assessment post-treatment:  Cervical: ROM increased.  Thoracic: pain and tenderness decreased.  Lumbar: pain and tenderness decreased.    Comments: .        Complicating Factors: .    Plan / Procedure:      53005    CMT C T L spine as indicated above        Expected release date: .  Treatment plan: prn.  Instructed patient: ice 20 minutes every other hour as needed.  Short term goals: reduce pain and increase ROM.  Long term goals: increase patient functional independence.  Prognosis: excellent.

## 2023-05-03 ENCOUNTER — LAB (OUTPATIENT)
Dept: LAB | Facility: OTHER | Age: 68
End: 2023-05-03
Payer: MEDICARE

## 2023-05-03 DIAGNOSIS — Z01.818 PREOP GENERAL PHYSICAL EXAM: ICD-10-CM

## 2023-05-03 LAB
BACTERIA #/AREA URNS HPF: ABNORMAL /HPF
MUCOUS THREADS #/AREA URNS LPF: PRESENT /LPF
RBC URINE: 0 /HPF
SQUAMOUS EPITHELIAL: 3 /HPF
WBC URINE: 2 /HPF

## 2023-05-03 PROCEDURE — 81015 MICROSCOPIC EXAM OF URINE: CPT | Mod: ZL

## 2023-05-11 ENCOUNTER — HOSPITAL ENCOUNTER (EMERGENCY)
Facility: HOSPITAL | Age: 68
Discharge: HOME | End: 2023-05-11
Payer: MEDICARE

## 2023-05-11 VITALS
DIASTOLIC BLOOD PRESSURE: 87 MMHG | OXYGEN SATURATION: 97 % | RESPIRATION RATE: 17 BRPM | HEART RATE: 64 BPM | SYSTOLIC BLOOD PRESSURE: 146 MMHG | TEMPERATURE: 98.6 F

## 2023-05-11 DIAGNOSIS — R33.9 URINARY RETENTION: ICD-10-CM

## 2023-05-11 PROCEDURE — G0463 HOSPITAL OUTPT CLINIC VISIT: HCPCS

## 2023-05-11 PROCEDURE — 99213 OFFICE O/P EST LOW 20 MIN: CPT

## 2023-05-11 ASSESSMENT — ENCOUNTER SYMPTOMS
APPETITE CHANGE: 0
DIZZINESS: 0
NAUSEA: 0
ACTIVITY CHANGE: 0
DIARRHEA: 0
DIFFICULTY URINATING: 1
ABDOMINAL PAIN: 0
DYSURIA: 0
VOMITING: 0
FEVER: 0
COUGH: 0
SHORTNESS OF BREATH: 0
CHILLS: 0

## 2023-05-12 ENCOUNTER — HOSPITAL ENCOUNTER (EMERGENCY)
Facility: HOSPITAL | Age: 68
Discharge: HOME | End: 2023-05-12
Attending: PHYSICIAN ASSISTANT | Admitting: PHYSICIAN ASSISTANT
Payer: MEDICARE

## 2023-05-12 VITALS
RESPIRATION RATE: 16 BRPM | TEMPERATURE: 98.4 F | SYSTOLIC BLOOD PRESSURE: 117 MMHG | HEART RATE: 60 BPM | OXYGEN SATURATION: 95 % | DIASTOLIC BLOOD PRESSURE: 67 MMHG

## 2023-05-12 DIAGNOSIS — R32 POSTOPERATIVE URINARY INCONTINENCE: Primary | ICD-10-CM

## 2023-05-12 DIAGNOSIS — N99.89 POSTOPERATIVE URINARY INCONTINENCE: Primary | ICD-10-CM

## 2023-05-12 LAB
ALBUMIN UR-MCNC: NEGATIVE MG/DL
APPEARANCE UR: CLEAR
BILIRUB UR QL STRIP: NEGATIVE
COLOR UR AUTO: NORMAL
GLUCOSE UR STRIP-MCNC: NEGATIVE MG/DL
HGB UR QL STRIP: NEGATIVE
KETONES UR STRIP-MCNC: NEGATIVE MG/DL
LEUKOCYTE ESTERASE UR QL STRIP: NEGATIVE
NITRATE UR QL: NEGATIVE
PH UR STRIP: 5.5 [PH] (ref 4.7–8)
RBC URINE: 1 /HPF
SP GR UR STRIP: 1.01 (ref 1–1.03)
SQUAMOUS EPITHELIAL: 0 /HPF
UROBILINOGEN UR STRIP-MCNC: NORMAL MG/DL
WBC URINE: 1 /HPF

## 2023-05-12 PROCEDURE — G0463 HOSPITAL OUTPT CLINIC VISIT: HCPCS

## 2023-05-12 PROCEDURE — 81001 URINALYSIS AUTO W/SCOPE: CPT | Performed by: PHYSICIAN ASSISTANT

## 2023-05-12 PROCEDURE — 99213 OFFICE O/P EST LOW 20 MIN: CPT | Performed by: PHYSICIAN ASSISTANT

## 2023-05-12 PROCEDURE — 250N000009 HC RX 250: Performed by: PHYSICIAN ASSISTANT

## 2023-05-12 RX ORDER — LIDOCAINE HYDROCHLORIDE 20 MG/ML
JELLY TOPICAL ONCE
Status: COMPLETED | OUTPATIENT
Start: 2023-05-12 | End: 2023-05-12

## 2023-05-12 RX ADMIN — LIDOCAINE HYDROCHLORIDE: 20 JELLY TOPICAL at 20:47

## 2023-05-12 ASSESSMENT — ACTIVITIES OF DAILY LIVING (ADL): ADLS_ACUITY_SCORE: 35

## 2023-05-12 ASSESSMENT — ENCOUNTER SYMPTOMS
FLANK PAIN: 0
DYSURIA: 0
HEMATURIA: 0
FEVER: 0

## 2023-05-12 NOTE — ED NOTES
Pt was bladder scanned and had a volume on 322mls after straight cath pt has 0 ml in bladder and was able to get 350ml out of straight cath

## 2023-05-12 NOTE — ED TRIAGE NOTES
Pt reports with c/o having a bladder sling repair yesterday was able to void for most of the day and now was unable to from 5 and has been unable to until walked into the UC   Has had quite a bit of pain was given a straight cath but was in too much pain to do it   Pt has a follow up on June 29th      Triage Assessment     Row Name 05/11/23 0372       Triage Assessment (Adult)    Airway WDL WDL       Respiratory WDL    Respiratory WDL WDL       Skin Circulation/Temperature WDL    Skin Circulation/Temperature WDL WDL       Cardiac WDL    Cardiac WDL WDL       Peripheral/Neurovascular WDL    Peripheral Neurovascular WDL WDL       Cognitive/Neuro/Behavioral WDL    Cognitive/Neuro/Behavioral WDL WDL

## 2023-05-12 NOTE — ED TRIAGE NOTES
Patient had a bladder sling repair yesterday. She was able to void for most of the day and then was unable to. She had quite a bit of pain associated with this. Tylenol helped with this. She was sent home with a straight cath in case she needed to straight cath herself. She did not want to do this at this time (due to the pain).      Triage Assessment     Row Name 05/11/23 6617       Triage Assessment (Adult)    Airway WDL WDL       Respiratory WDL    Respiratory WDL WDL       Skin Circulation/Temperature WDL    Skin Circulation/Temperature WDL WDL       Cardiac WDL    Cardiac WDL WDL       Peripheral/Neurovascular WDL    Peripheral Neurovascular WDL WDL       Cognitive/Neuro/Behavioral WDL    Cognitive/Neuro/Behavioral WDL WDL

## 2023-05-12 NOTE — DISCHARGE INSTRUCTIONS
Call your urologist tomorrow.     Sit and try to void every 2 hours     If you develop fevers, chills, abdominal pain, and issues with straight cathing please return

## 2023-05-12 NOTE — ED PROVIDER NOTES
History     Chief Complaint   Patient presents with     Urinary Retention     HPI  Aileen Szymanski is a 68 year old female who presents to the urgent care with complaints of urinary retention after having a bladder sling repair yesterday. She notes that she did void post up but was having issues. The nurse at discharge provided her with straight cath supplies and instruction. She denies abd pain, fevers, chills, dizziness, and n/v/d. She emptied bladder upon standing in lobby, had a history of incontinence prior to surgery. She would like a bladder scan today and Is willing to straight cath at home.     Allergies:  Allergies   Allergen Reactions     Lovenox [Enoxaparin] Other (See Comments)     Bleeding     Estradiol Other (See Comments)     Vaginal itching from cream       Problem List:    Patient Active Problem List    Diagnosis Date Noted     Cystocele with rectocele 04/18/2023     Priority: Medium     Formatting of this note might be different from the original.  Added automatically from request for surgery [id number]       Uterine mass 03/14/2022     Priority: Medium     Vitamin D deficiency 03/14/2022     Priority: Medium     Cystocele and rectocele with incomplete uterovaginal prolapse 01/17/2022     Priority: Medium     Formatting of this note might be different from the original.  Added automatically from request for surgery [id number]       DEIRDRE (stress urinary incontinence, female) 01/17/2022     Priority: Medium     Formatting of this note might be different from the original.  Added automatically from request for surgery [id number]       Failure of total knee arthroplasty, sequela 04/22/2021     Priority: Medium     Formatting of this note might be different from the original.  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     Priority: Medium     Arthrofibrosis of knee joint, left 12/27/2019     Priority: Medium     Added automatically from request for  surgery [id number]       Major depressive disorder without psychotic features 08/31/2017     Priority: Medium     Mixed hyperlipidemia 09/19/2016     Priority: Medium     Lung granuloma (H) 03/27/2013     Priority: Medium     Overview:   Noted on chest xray 3/2013  Noted on chest xray 3/2013       Intramural leiomyoma of uterus 03/07/2011     Priority: Medium     Osteoarthrosis, pelvic region and thigh 12/05/2006     Priority: Medium     Overview:   IMO Update 10/11  IMO Update 10/11          Past Medical History:    Past Medical History:   Diagnosis Date     Arthrofibrosis of knee joint, left 12/27/2019     Depressive disorder      Dizziness and giddiness 07/31/2007     Enthesopathy of knee, unspecified 06/13/2002     Failed total left knee replacement (H) 12/27/2019     History of blood transfusion 2014?     Infection of total knee replacement (H) 12/27/2019     Intramural leiomyoma of uterus 03/07/2011     Lung granuloma (H) 03/27/2013     Major depressive disorder without psychotic features 08/31/2017     Major depressive disorder, recurrent episode (H) 06/22/2006     Mixed hyperlipidemia 09/19/2016     Nonallopathic lesion of cervical region, not elsewhere classified 07/25/2001     Nonallopathic lesion of thoracic region, not elsewhere classified 06/13/2002     Osteoarthrosis, pelvic region and thigh 12/05/2006     Pain in joint, lower leg 06/24/2002     Postmenopausal bleeding 04/27/2011     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       Past Surgical History:    Past Surgical History:   Procedure Laterality Date     ABDOMEN SURGERY  2007     ARTHROPLASTY REVISION KNEE Left 10/2016     CLOSED REDUCTION NOSE N/A 11/07/2017    Procedure: CLOSED REDUCTION NOSE;  CLOSED REDUCTION NASAL SEPTAL FRACTURE;  Surgeon: Destiny Vanegas MD;  Location: [facility] OR     COLONOSCOPY  04/13/2010    Dr Quevedo; negative      ERCP W/ PLASTIC STENT PLACEMENT N/A 01/24/2008     ERCP W/ SPHICTEROTOMY N/A 12/10/2007     GENITOURINARY  SURGERY  4/8//22     HYSTERECTOMY N/A 04/08/2022     HYSTEROSCOPY DIAGNOSTIC N/A 04/27/2011     LAPAROSCOPIC CHOLECYSTECTOMY N/A 12/03/2007     LAPAROSCOPIC GASTRIC BANDING N/A 04/23/2008     LAPAROSCOPIC TUBAL LIGATION Bilateral 1978     left knee replacement  07/21/2014    Dr Quinones/ [facility]     REPLACEMENT TOTAL KNEE Left 05/14/2021    Complex revision left total knee     REPLACEMENT TOTAL KNEE Left 05/06/2020    left knee replacement after infection       Family History:    Family History   Problem Relation Age of Onset     Lung Cancer Mother      Heart Disease Mother      Other Cancer Mother         Heart disease and bone cancer     Chronic Obstructive Pulmonary Disease Father      Other Cancer Father         Lung and COPD     Osteoporosis Paternal Grandmother      Other Cancer Sister         Lung and brain cancer       Social History:  Marital Status:   [2]  Social History     Tobacco Use     Smoking status: Never     Smokeless tobacco: Never   Vaping Use     Vaping status: Never Used   Substance Use Topics     Alcohol use: Yes     Comment: Rarely     Drug use: No        Medications:    acetaminophen (TYLENOL) 500 MG tablet  amoxicillin (AMOXIL) 500 MG capsule  clonazePAM (KLONOPIN) 1 MG tablet  escitalopram (LEXAPRO) 20 MG tablet  multivitamin, therapeutic (THERA-VIT) TABS tablet  simvastatin (ZOCOR) 40 MG tablet  tolterodine (DETROL) 2 MG tablet          Review of Systems   Constitutional: Negative for activity change, appetite change, chills and fever.   Respiratory: Negative for cough and shortness of breath.    Gastrointestinal: Negative for abdominal pain, diarrhea, nausea and vomiting.   Genitourinary: Positive for difficulty urinating. Negative for dysuria and urgency.   Neurological: Negative for dizziness.   All other systems reviewed and are negative.      Physical Exam   BP: 146/87  Pulse: 64  Temp: 98.6  F (37  C)  Resp: 17  SpO2: 97 %      Physical Exam  Vitals and nursing note  reviewed.   Constitutional:       General: She is not in acute distress.     Appearance: Normal appearance. She is not ill-appearing.   Cardiovascular:      Rate and Rhythm: Normal rate and regular rhythm.      Pulses: Normal pulses.      Heart sounds:      No friction rub.   Pulmonary:      Effort: Pulmonary effort is normal. No respiratory distress.      Breath sounds: Normal breath sounds. No stridor. No wheezing or rhonchi.   Abdominal:      General: Abdomen is flat. Bowel sounds are normal.      Palpations: Abdomen is soft.      Tenderness: There is no abdominal tenderness. There is no right CVA tenderness or left CVA tenderness.   Skin:     General: Skin is warm and dry.      Capillary Refill: Capillary refill takes less than 2 seconds.   Neurological:      General: No focal deficit present.      Mental Status: She is alert and oriented to person, place, and time.         ED Course                 Procedures                  No results found for this or any previous visit (from the past 24 hour(s)).    Medications - No data to display    Assessments & Plan (with Medical Decision Making)     I have reviewed the nursing notes.    I have reviewed the findings, diagnosis, plan and need for follow up with the patient.  Aileen Szymanski is a 68 year old female who presents to the urgent care with complaints of urinary retention after having a bladder sling repair yesterday. She notes that she did void post up but was having issues. The nurse at discharge provided her with straight cath supplies and instruction. She denies abd pain, fevers, chills, dizziness, and n/v/d. She emptied bladder upon standing in lobby, had a history of incontinence prior to surgery. She would like a bladder scan today and Is willing to straight cath at home.       MDM: VSS and afebrile. Abdomen soft. She is not ill or toxic appearing. Bladder scanner shows 320ml. Patient requesting a bowen, not indicated at this time. I did discuss this  with MELVIN Lim in ED and he is in agreement. Patient is willing to straight cath at home. She will sit and attempt to void every 1-2 hours.     (R33.9) Urinary retention  Plan: attempt to void every 1-2 hours. Straight cath as needed. Call urologist tomorrow. Return with any fevers, chills, abd pain, or concerns. Understanding verbalized.         New Prescriptions    No medications on file       Final diagnoses:   Urinary retention       5/11/2023   [facility]     Rnai Billingsley NP  05/11/23 7743

## 2023-05-13 DIAGNOSIS — E78.2 MIXED HYPERLIPIDEMIA: ICD-10-CM

## 2023-05-13 LAB — NONINV COLON CA DNA+OCC BLD SCRN STL QL: NEGATIVE

## 2023-05-13 NOTE — ED TRIAGE NOTES
Was seen yesterday and straight cath. Went home and didn't help and remains incont.  Was told to come back and get bowen if continued to happen. No fever or abdominal pain.    Pt wants a leg bag

## 2023-05-13 NOTE — ED TRIAGE NOTES
Was instructed by HCP to come in to get a temporary bowen for 5 days.     Jessa Behrman, LPN

## 2023-05-13 NOTE — ED PROVIDER NOTES
History     Chief Complaint   Patient presents with     urinary incont.      HPI  Aileen Szymanski is a 68 year old female who who is s/p laparotomy, Caban retropubic urethropexy, cystoscopy by Dr. Louie Baeza ([facility]) on 5/10/23 .      She was just seen in our  urgent care last night due to bladder incontinence, straight cathed for 350 mL urine.  Unfortunately she has not been able to provide her own straight caths since being discharged from the hospital or  last night.  So she returns to the urgent care again this evening due to continuous episodes of urinary incontinence and inquiring about Calderon placement.  Denies any increased abdominal pain, fevers, chills, illness.    Allergies:  Allergies   Allergen Reactions     Lovenox [Enoxaparin] Other (See Comments)     Bleeding     Estradiol Other (See Comments)     Vaginal itching from cream       Problem List:    Patient Active Problem List    Diagnosis Date Noted     Cystocele with rectocele 04/18/2023     Priority: Medium     Formatting of this note might be different from the original.  Added automatically from request for surgery [id number]       Uterine mass 03/14/2022     Priority: Medium     Vitamin D deficiency 03/14/2022     Priority: Medium     Cystocele and rectocele with incomplete uterovaginal prolapse 01/17/2022     Priority: Medium     Formatting of this note might be different from the original.  Added automatically from request for surgery [id number]       DEIRDRE (stress urinary incontinence, female) 01/17/2022     Priority: Medium     Formatting of this note might be different from the original.  Added automatically from request for surgery [id number]       Failure of total knee arthroplasty, sequela 04/22/2021     Priority: Medium     Formatting of this note might be different from the original.  Added automatically from request for surgery [id number]       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     Priority: Medium     Arthrofibrosis of knee  joint, left 12/27/2019     Priority: Medium     Added automatically from request for surgery [id number]       Major depressive disorder without psychotic features 08/31/2017     Priority: Medium     Mixed hyperlipidemia 09/19/2016     Priority: Medium     Lung granuloma (H) 03/27/2013     Priority: Medium     Overview:   Noted on chest xray 3/2013  Noted on chest xray 3/2013       Intramural leiomyoma of uterus 03/07/2011     Priority: Medium     Osteoarthrosis, pelvic region and thigh 12/05/2006     Priority: Medium     Overview:   IMO Update 10/11  IMO Update 10/11          Past Medical History:    Past Medical History:   Diagnosis Date     Arthrofibrosis of knee joint, left 12/27/2019     Depressive disorder      Dizziness and giddiness 07/31/2007     Enthesopathy of knee, unspecified 06/13/2002     Failed total left knee replacement (H) 12/27/2019     History of blood transfusion 2014?     Infection of total knee replacement (H) 12/27/2019     Intramural leiomyoma of uterus 03/07/2011     Lung granuloma (H) 03/27/2013     Major depressive disorder without psychotic features 08/31/2017     Major depressive disorder, recurrent episode (H) 06/22/2006     Mixed hyperlipidemia 09/19/2016     Nonallopathic lesion of cervical region, not elsewhere classified 07/25/2001     Nonallopathic lesion of thoracic region, not elsewhere classified 06/13/2002     Osteoarthrosis, pelvic region and thigh 12/05/2006     Pain in joint, lower leg 06/24/2002     Postmenopausal bleeding 04/27/2011     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       Past Surgical History:    Past Surgical History:   Procedure Laterality Date     ABDOMEN SURGERY  2007     ARTHROPLASTY REVISION KNEE Left 10/2016     CLOSED REDUCTION NOSE N/A 11/07/2017    Procedure: CLOSED REDUCTION NOSE;  CLOSED REDUCTION NASAL SEPTAL FRACTURE;  Surgeon: Destiny Vanegas MD;  Location: [facility]     COLONOSCOPY  04/13/2010    Dr Quevedo; negative      ERCP W/ PLASTIC STENT  PLACEMENT N/A 01/24/2008     ERCP W/ SPHICTEROTOMY N/A 12/10/2007     GENITOURINARY SURGERY  4/8//22     HYSTERECTOMY N/A 04/08/2022     HYSTEROSCOPY DIAGNOSTIC N/A 04/27/2011     LAPAROSCOPIC CHOLECYSTECTOMY N/A 12/03/2007     LAPAROSCOPIC GASTRIC BANDING N/A 04/23/2008     LAPAROSCOPIC TUBAL LIGATION Bilateral 1978     left knee replacement  07/21/2014    Dr Quinones/ [place]     REPLACEMENT TOTAL KNEE Left 05/14/2021    Complex revision left total knee     REPLACEMENT TOTAL KNEE Left 05/06/2020    left knee replacement after infection       Family History:    Family History   Problem Relation Age of Onset     Lung Cancer Mother      Heart Disease Mother      Other Cancer Mother         Heart disease and bone cancer     Chronic Obstructive Pulmonary Disease Father      Other Cancer Father         Lung and COPD     Osteoporosis Paternal Grandmother      Other Cancer Sister         Lung and brain cancer       Social History:  Marital Status:   [2]  Social History     Tobacco Use     Smoking status: Never     Smokeless tobacco: Never   Vaping Use     Vaping status: Never Used   Substance Use Topics     Alcohol use: Yes     Comment: Rarely     Drug use: No        Medications:    acetaminophen (TYLENOL) 500 MG tablet  amoxicillin (AMOXIL) 500 MG capsule  clonazePAM (KLONOPIN) 1 MG tablet  escitalopram (LEXAPRO) 20 MG tablet  multivitamin, therapeutic (THERA-VIT) TABS tablet  simvastatin (ZOCOR) 40 MG tablet  tolterodine (DETROL) 2 MG tablet          Review of Systems   Constitutional: Negative for fever.   Genitourinary: Negative for dysuria, flank pain, hematuria and pelvic pain.        Significant/profound urinary incontinence  She is unable to perform her own straight cath   Skin:        Denies any cellulitis or drainage from her surgical incision, also no increased pain       Physical Exam   BP: 117/67  Pulse: 60  Temp: 98.4  F (36.9  C)  Resp: 16  SpO2: 95 %      Physical Exam  Vitals and nursing note  reviewed.   Constitutional:       Appearance: Normal appearance.   HENT:      Head: Normocephalic.   Cardiovascular:      Rate and Rhythm: Normal rate and regular rhythm.   Pulmonary:      Effort: Pulmonary effort is normal.   Abdominal:      General: There is no distension.      Palpations: Abdomen is soft.      Tenderness: There is no abdominal tenderness. There is no guarding.      Comments: Suprapubic surgical incision with Steri-Strips intact    Expected ecchymosis, no cellulitis or drainage, nontender   Skin:     General: Skin is warm and dry.   Neurological:      General: No focal deficit present.      Mental Status: She is alert.   Psychiatric:         Mood and Affect: Mood normal.         ED Course       Results for orders placed or performed during the hospital encounter of 05/12/23 (from the past 24 hour(s))   UA with Microscopic reflex to Culture    Specimen: Urine, Catheter   Result Value Ref Range    Color Urine Light Yellow Colorless, Straw, Light Yellow, Yellow    Appearance Urine Clear Clear    Glucose Urine Negative Negative mg/dL    Bilirubin Urine Negative Negative    Ketones Urine Negative Negative mg/dL    Specific Gravity Urine 1.015 1.003 - 1.035    Blood Urine Negative Negative    pH Urine 5.5 4.7 - 8.0    Protein Albumin Urine Negative Negative mg/dL    Urobilinogen Urine Normal Normal, 2.0 mg/dL    Nitrite Urine Negative Negative    Leukocyte Esterase Urine Negative Negative    RBC Urine 1 <=2 /HPF    WBC Urine 1 <=5 /HPF    Squamous Epithelials Urine 0 <=1 /HPF    Narrative    Urine Culture not indicated       Medications   lidocaine (XYLOCAINE) 2 % external gel ( Topical $Given 5/12/23 2047)       Assessments & Plan (with Medical Decision Making)   Aileen Szymanski is a 68 year old female who who is s/p laparotomy, Caban retropubic urethropexy, cystoscopy by Dr. Louie Baeza ([facility]) on 5/10/23 .  Has returned to the urgent care x2 since being discharged from the hospital due  to profound/significant urinary incontinence.    -Straight cath x1 on 5/11/2023 with 350 mL, but patient unable to perform on straight cath    -Discussed likely option for Calderon placement due to suspected bladder spasms postoperatively.    -Contacted [facility] (Dr. Cole Chanel) as Dr. Baeza is not on call. She agreed and recommended Calderon placement at this time    -Urgent care nursing staff placed Calderon without complications and urine output, Calderon education provided    -[facility] will contact patient early next week to coordinate/schedule appointment at the end of the week for reevaluation and likely Calderon removal    -UA obtained, shows no signs of infection  -Surgical incision clean/dry/intact, no signs of infection as well    -Patient verbally educated on their diagnoses and has no urther questions or concerns  -Patient understands to seek further medical evaluation if symptoms worsen      -Follow-Up: [facility] will contact her next week to coordinate/schedule appointment.    I have reviewed the nursing notes.    I have reviewed the findings, diagnosis, plan and need for follow up with the patient.    Discharge Medication List as of 5/12/2023  9:32 PM          Final diagnoses:   Postoperative urinary incontinence       5/12/2023   [facility]     Andrew Gilmore PA-C  05/12/23 5230

## 2023-05-13 NOTE — DISCHARGE INSTRUCTIONS
Discussed her case with [facility], also recommended Bowen placement at this time    Follow nursing staff instructions for bowen management    Monitor urine output daily, return to the emergency room or contact the OB/GYN department at [facility] if you notice low UOP, abdominal pain, or fevers.    [facility] will contact you next week to schedule appointment at end of week for evaluation and likely bowen removal.

## 2023-05-14 ENCOUNTER — NURSE TRIAGE (OUTPATIENT)
Dept: NURSING | Facility: CLINIC | Age: 68
End: 2023-05-14

## 2023-05-14 NOTE — TELEPHONE ENCOUNTER
"Nurse Triage SBAR    Is this a 2nd Level Triage? NO    Situation: Patient calling with question about connecting leg bag.  Consent: not needed    Background:  Pt has a catheter as of the other night.  Asked for a leg bag.  States \"I don't know what to pull to attach the short tubing to\"  Writer able to review AVS and no specifics to a certain catheter mentioned.  Writer connected pt to ER she was seen at in [place] and they state they will be able to walk her through connection of the leg bag to her catheter.      Estela Riggs RN [facility] 5/14/2023 8:37 AM                              Reason for Disposition    General information question, no triage required and triager able to answer question    Protocols used: INFORMATION ONLY CALL - NO TRIAGE-A-      "

## 2023-05-15 NOTE — TELEPHONE ENCOUNTER
Simvastatin       Last Written Prescription Date:  11/30/202  Last Fill Quantity: 90,   # refills: 0  Last Office Visit: 4/25/2023  Future Office visit:

## 2023-05-16 RX ORDER — SIMVASTATIN 40 MG
40 TABLET ORAL AT BEDTIME
Qty: 90 TABLET | Refills: 1 | Status: SHIPPED | OUTPATIENT
Start: 2023-05-16

## 2023-05-21 DIAGNOSIS — Z01.818 PRE-PROCEDURAL EXAMINATION: ICD-10-CM

## 2023-05-23 RX ORDER — AMOXICILLIN 500 MG/1
CAPSULE ORAL
Qty: 4 CAPSULE | Refills: 0 | Status: SHIPPED | OUTPATIENT
Start: 2023-05-23

## 2023-05-23 NOTE — TELEPHONE ENCOUNTER
Amoxicillin      Last Written Prescription Date:  9/9/22  Last Fill Quantity: 4,   # refills: 0  Last Office Visit: 4/25/23  Future Office visit:           
done

## 2023-06-03 ENCOUNTER — HEALTH MAINTENANCE LETTER (OUTPATIENT)
Age: 68
End: 2023-06-03

## 2023-06-07 DIAGNOSIS — F51.01 PRIMARY INSOMNIA: ICD-10-CM

## 2023-06-08 ENCOUNTER — LAB (OUTPATIENT)
Dept: LAB | Facility: OTHER | Age: 68
End: 2023-06-08
Payer: MEDICARE

## 2023-06-08 DIAGNOSIS — R32 URINARY INCONTINENCE: ICD-10-CM

## 2023-06-08 DIAGNOSIS — R32 URINARY INCONTINENCE: Primary | ICD-10-CM

## 2023-06-08 LAB
ALBUMIN UR-MCNC: 70 MG/DL
APPEARANCE UR: ABNORMAL
BILIRUB UR QL STRIP: NEGATIVE
COLOR UR AUTO: YELLOW
GLUCOSE UR STRIP-MCNC: NEGATIVE MG/DL
HGB UR QL STRIP: ABNORMAL
KETONES UR STRIP-MCNC: NEGATIVE MG/DL
LEUKOCYTE ESTERASE UR QL STRIP: ABNORMAL
NITRATE UR QL: POSITIVE
PH UR STRIP: 5.5 [PH] (ref 4.7–8)
SP GR UR STRIP: 1.02 (ref 1–1.03)
UROBILINOGEN UR STRIP-MCNC: NORMAL MG/DL

## 2023-06-08 PROCEDURE — 81003 URINALYSIS AUTO W/O SCOPE: CPT | Mod: ZL

## 2023-06-08 PROCEDURE — 87086 URINE CULTURE/COLONY COUNT: CPT | Mod: ZL

## 2023-06-09 RX ORDER — CLONAZEPAM 1 MG/1
1 TABLET ORAL AT BEDTIME
Qty: 30 TABLET | Refills: 3 | Status: SHIPPED | OUTPATIENT
Start: 2023-06-09 | End: 2023-10-06

## 2023-06-09 NOTE — TELEPHONE ENCOUNTER
Klonopin      Last Written Prescription Date:  5.9.23  Last Fill Quantity: #30,   # refills: 0  Last Office Visit: 5.3.21  Future Office visit:       Routing refill request to provider for review/approval because:  Drug not on the [facility], P or [facility] refill protocol or controlled substance

## 2023-06-10 LAB — BACTERIA UR CULT: ABNORMAL

## 2023-06-15 ENCOUNTER — LAB (OUTPATIENT)
Dept: LAB | Facility: OTHER | Age: 68
End: 2023-06-15
Payer: MEDICARE

## 2023-06-15 DIAGNOSIS — R32 UNSPECIFIED URINARY INCONTINENCE: Primary | ICD-10-CM

## 2023-06-15 DIAGNOSIS — R32 UNSPECIFIED URINARY INCONTINENCE: ICD-10-CM

## 2023-06-15 LAB
ALBUMIN UR-MCNC: NEGATIVE MG/DL
APPEARANCE UR: CLEAR
BILIRUB UR QL STRIP: NEGATIVE
COLOR UR AUTO: YELLOW
GLUCOSE UR STRIP-MCNC: NEGATIVE MG/DL
HGB UR QL STRIP: NEGATIVE
HYALINE CASTS: 9 /LPF
KETONES UR STRIP-MCNC: NEGATIVE MG/DL
LEUKOCYTE ESTERASE UR QL STRIP: NEGATIVE
MUCOUS THREADS #/AREA URNS LPF: PRESENT /LPF
NITRATE UR QL: NEGATIVE
PH UR STRIP: 5 [PH] (ref 4.7–8)
RBC URINE: 1 /HPF
SP GR UR STRIP: 1.02 (ref 1–1.03)
SQUAMOUS EPITHELIAL: 5 /HPF
UROBILINOGEN UR STRIP-MCNC: NORMAL MG/DL
WBC URINE: 1 /HPF

## 2023-06-15 PROCEDURE — 87086 URINE CULTURE/COLONY COUNT: CPT | Mod: ZL

## 2023-06-15 PROCEDURE — 81001 URINALYSIS AUTO W/SCOPE: CPT | Mod: ZL

## 2023-06-17 LAB — BACTERIA UR CULT: NORMAL

## 2023-07-03 DIAGNOSIS — R30.0 DYSURIA: Primary | ICD-10-CM

## 2023-07-13 ENCOUNTER — LAB (OUTPATIENT)
Dept: LAB | Facility: OTHER | Age: 68
End: 2023-07-13
Payer: MEDICARE

## 2023-07-13 DIAGNOSIS — R30.0 DYSURIA: ICD-10-CM

## 2023-07-13 PROCEDURE — 87086 URINE CULTURE/COLONY COUNT: CPT | Mod: ZL

## 2023-07-14 LAB — BACTERIA UR CULT: NORMAL

## 2023-07-19 ENCOUNTER — HOSPITAL ENCOUNTER (OUTPATIENT)
Dept: BONE DENSITY | Facility: HOSPITAL | Age: 68
Discharge: HOME | End: 2023-07-19
Attending: NURSE PRACTITIONER
Payer: MEDICARE

## 2023-07-19 ENCOUNTER — ANCILLARY PROCEDURE (OUTPATIENT)
Dept: MAMMOGRAPHY | Facility: OTHER | Age: 68
End: 2023-07-19
Attending: NURSE PRACTITIONER
Payer: MEDICARE

## 2023-07-19 DIAGNOSIS — Z78.0 ASYMPTOMATIC MENOPAUSE: ICD-10-CM

## 2023-07-19 DIAGNOSIS — Z12.31 ENCOUNTER FOR SCREENING MAMMOGRAM FOR BREAST CANCER: ICD-10-CM

## 2023-07-19 DIAGNOSIS — M85.80 OSTEOPENIA, UNSPECIFIED LOCATION: ICD-10-CM

## 2023-07-19 PROCEDURE — 77080 DXA BONE DENSITY AXIAL: CPT

## 2023-07-19 PROCEDURE — 77067 SCR MAMMO BI INCL CAD: CPT | Mod: TC

## 2023-08-13 DIAGNOSIS — F33.2 SEVERE EPISODE OF RECURRENT MAJOR DEPRESSIVE DISORDER, WITHOUT PSYCHOTIC FEATURES (H): ICD-10-CM

## 2023-08-14 RX ORDER — ESCITALOPRAM OXALATE 20 MG/1
TABLET ORAL
Qty: 90 TABLET | Refills: 0 | Status: SHIPPED | OUTPATIENT
Start: 2023-08-14

## 2023-08-14 NOTE — TELEPHONE ENCOUNTER
Lexapro       Last Written Prescription Date:  03/01/2023  Last Fill Quantity: 90,   # refills: 1  Last Office Visit: 04/14/2023  Future Office visit:       Routing refill request to provider for review/approval because:

## 2023-09-14 ENCOUNTER — TELEPHONE (OUTPATIENT)
Dept: FAMILY MEDICINE | Facility: OTHER | Age: 68
End: 2023-09-14

## 2023-09-14 NOTE — TELEPHONE ENCOUNTER
Patient calling back she hasn't heard back from the care team yet and she really wants to leave an urine sample before the end of the day.     Patient contact number is [phone]

## 2023-09-14 NOTE — TELEPHONE ENCOUNTER
Attempted to call patient back twice to inform message has been sent to PCP. Busy signal both times.

## 2023-09-14 NOTE — TELEPHONE ENCOUNTER
Patient called clinic with symptoms of burning with urination. Patient reports extensive bladder history. Patient reports chronic UTI's. Patient asking for possible lab only appointment. Patient reports testing positive for COVID with symptoms starting on 09/06/23. Patient denies any fever or back pain.  PCP to advise on lab only appointment.  UA pended.

## 2023-10-01 ENCOUNTER — HOSPITAL ENCOUNTER (EMERGENCY)
Facility: HOSPITAL | Age: 68
Discharge: HOME | End: 2023-10-01
Attending: PHYSICIAN ASSISTANT | Admitting: PHYSICIAN ASSISTANT
Payer: MEDICARE

## 2023-10-01 VITALS
BODY MASS INDEX: 29.99 KG/M2 | SYSTOLIC BLOOD PRESSURE: 132 MMHG | RESPIRATION RATE: 16 BRPM | TEMPERATURE: 97.3 F | HEIGHT: 65 IN | OXYGEN SATURATION: 97 % | HEART RATE: 69 BPM | DIASTOLIC BLOOD PRESSURE: 68 MMHG | WEIGHT: 180 LBS

## 2023-10-01 DIAGNOSIS — N39.0 ACUTE LOWER UTI: ICD-10-CM

## 2023-10-01 LAB
ALBUMIN UR-MCNC: 70 MG/DL
APPEARANCE UR: ABNORMAL
BACTERIA #/AREA URNS HPF: ABNORMAL /HPF
BILIRUB UR QL STRIP: NEGATIVE
COLOR UR AUTO: YELLOW
GLUCOSE UR STRIP-MCNC: NEGATIVE MG/DL
HGB UR QL STRIP: ABNORMAL
KETONES UR STRIP-MCNC: NEGATIVE MG/DL
LEUKOCYTE ESTERASE UR QL STRIP: ABNORMAL
MUCOUS THREADS #/AREA URNS LPF: PRESENT /LPF
NITRATE UR QL: POSITIVE
PH UR STRIP: 5.5 [PH] (ref 4.7–8)
RBC URINE: 5 /HPF
SP GR UR STRIP: 1.02 (ref 1–1.03)
SQUAMOUS EPITHELIAL: 0 /HPF
UROBILINOGEN UR STRIP-MCNC: NORMAL MG/DL
WBC CLUMPS #/AREA URNS HPF: PRESENT /HPF
WBC URINE: >182 /HPF

## 2023-10-01 PROCEDURE — 87186 SC STD MICRODIL/AGAR DIL: CPT | Performed by: PHYSICIAN ASSISTANT

## 2023-10-01 PROCEDURE — 99213 OFFICE O/P EST LOW 20 MIN: CPT | Performed by: PHYSICIAN ASSISTANT

## 2023-10-01 PROCEDURE — 81001 URINALYSIS AUTO W/SCOPE: CPT | Performed by: PHYSICIAN ASSISTANT

## 2023-10-01 PROCEDURE — G0463 HOSPITAL OUTPT CLINIC VISIT: HCPCS

## 2023-10-01 RX ORDER — LINACLOTIDE 145 UG/1
1 CAPSULE, GELATIN COATED ORAL DAILY
COMMUNITY
Start: 2023-09-25

## 2023-10-01 RX ORDER — CIPROFLOXACIN 500 MG/1
500 TABLET, FILM COATED ORAL 2 TIMES DAILY
Qty: 10 TABLET | Refills: 0 | Status: SHIPPED | OUTPATIENT
Start: 2023-10-01 | End: 2023-10-06

## 2023-10-01 ASSESSMENT — ACTIVITIES OF DAILY LIVING (ADL): ADLS_ACUITY_SCORE: 35

## 2023-10-01 NOTE — DISCHARGE INSTRUCTIONS
Take the Cipro as prescribed for your bladder infection.   Increase fluids and rest.   Return here with any new or worsening symptoms.

## 2023-10-01 NOTE — ED TRIAGE NOTES
"Pt presents with c/o UTI Pt states \"Reoccurring UTI for like the 5th time\" Pt reports being seen previously for UTI.   Pt reports being prescribed bactrim. Finished last dose on Monday last week. Symptoms include, urinary urgency, pain, incontinence.  Denies headaches, fever, N/V. Denies otc medications.         "

## 2023-10-01 NOTE — ED PROVIDER NOTES
History     Chief Complaint   Patient presents with    Urinary Frequency    Cystitis     HPI  Aileen Szymanski is a 68 year old female who presents with dysuria and urgency since this am, similar to prior UTIs. She was just treated for a UTI 2 weeks ago with bactrim and did not feel this helped. Denies flank pain, n/v, or fevers/chills.     Per urine culture and sensitivity reviewed at [facility], bactrim was sensitive. Past urine cultures here show macrobid as intermediate. Cipro sensitive per last 2 urine cultures here and at [facility].     Allergies:  Allergies   Allergen Reactions    Lovenox [Enoxaparin] Other (See Comments)     Bleeding    Estradiol Other (See Comments)     Vaginal itching from cream       Problem List:    Patient Active Problem List    Diagnosis Date Noted    Cystocele with rectocele 04/18/2023     Priority: Medium     Formatting of this note might be different from the original.  Added automatically from request for surgery [id number]      Uterine mass 03/14/2022     Priority: Medium    Vitamin D deficiency 03/14/2022     Priority: Medium    Cystocele and rectocele with incomplete uterovaginal prolapse 01/17/2022     Priority: Medium     Formatting of this note might be different from the original.  Added automatically from request for surgery [id number]      DEIRDRE (stress urinary incontinence, female) 01/17/2022     Priority: Medium     Formatting of this note might be different from the original.  Added automatically from request for surgery [id number]      Failure of total knee arthroplasty, sequela 04/22/2021     Priority: Medium     Formatting of this note might be different from the original.  Added automatically from request for surgery [id number]      Prosthetic joint infection (H24) 02/18/2020     Priority: Medium    Arthrofibrosis of knee joint, left 12/27/2019     Priority: Medium     Added automatically from request for surgery [id number]      Major depressive disorder without psychotic features  08/31/2017     Priority: Medium    Mixed hyperlipidemia 09/19/2016     Priority: Medium    Lung granuloma (H) 03/27/2013     Priority: Medium     Overview:   Noted on chest xray 3/2013  Noted on chest xray 3/2013      Intramural leiomyoma of uterus 03/07/2011     Priority: Medium    Osteoarthrosis, pelvic region and thigh 12/05/2006     Priority: Medium     Overview:   IMO Update 10/11  IMO Update 10/11          Past Medical History:    Past Medical History:   Diagnosis Date    Arthrofibrosis of knee joint, left 12/27/2019    Depressive disorder     Dizziness and giddiness 07/31/2007    Enthesopathy of knee, unspecified 06/13/2002    Failed total left knee replacement (H) 12/27/2019    History of blood transfusion 2014?    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       Past Surgical History:    Past Surgical History:   Procedure Laterality Date    ABDOMEN SURGERY  2007    ARTHROPLASTY REVISION KNEE Left 10/2016    CLOSED REDUCTION NOSE N/A 11/07/2017    Procedure: CLOSED REDUCTION NOSE;  CLOSED REDUCTION NASAL SEPTAL FRACTURE;  Surgeon: Destiny Vanegas MD;  Location: [facility]    COLONOSCOPY  04/13/2010    Dr Quevedo; negative     ERCP W/ PLASTIC STENT PLACEMENT N/A 01/24/2008    ERCP W/ SPHICTEROTOMY N/A 12/10/2007    GENITOURINARY SURGERY  4/8//22    HYSTERECTOMY N/A 04/08/2022    HYSTEROSCOPY DIAGNOSTIC N/A 04/27/2011    LAPAROSCOPIC  "CHOLECYSTECTOMY N/A 12/03/2007    LAPAROSCOPIC GASTRIC BANDING N/A 04/23/2008    LAPAROSCOPIC TUBAL LIGATION Bilateral 1978    left knee replacement  07/21/2014    Dr Quinones/ [place]    REPLACEMENT TOTAL KNEE Left 05/14/2021    Complex revision left total knee    REPLACEMENT TOTAL KNEE Left 05/06/2020    left knee replacement after infection       Family History:    Family History   Problem Relation Age of Onset    Lung Cancer Mother     Heart Disease Mother     Other Cancer Mother         Heart disease and bone cancer    Chronic Obstructive Pulmonary Disease Father     Other Cancer Father         Lung and COPD    Osteoporosis Paternal Grandmother     Other Cancer Sister         Lung and brain cancer       Social History:  Marital Status:   [2]  Social History     Tobacco Use    Smoking status: Never    Smokeless tobacco: Never   Vaping Use    Vaping Use: Never used   Substance Use Topics    Alcohol use: Yes     Comment: Rarely    Drug use: No        Medications:    ciprofloxacin (CIPRO) 500 MG tablet  LINZESS 145 MCG capsule  acetaminophen (TYLENOL) 500 MG tablet  amoxicillin (AMOXIL) 500 MG capsule  clonazePAM (KLONOPIN) 1 MG tablet  escitalopram (LEXAPRO) 20 MG tablet  multivitamin, therapeutic (THERA-VIT) TABS tablet  simvastatin (ZOCOR) 40 MG tablet  tolterodine (DETROL) 2 MG tablet          Review of Systems   All other systems reviewed and are negative.      Physical Exam   BP: 132/68  Pulse: 69  Temp: 97.3  F (36.3  C)  Resp: 16  Height: 165.1 cm (5' 5\")  Weight: 81.6 kg (180 lb)  SpO2: 97 %      Physical Exam  Vitals and nursing note reviewed.   Constitutional:       General: She is not in acute distress.     Appearance: She is well-developed. She is not ill-appearing, toxic-appearing or diaphoretic.   HENT:      Head: Normocephalic and atraumatic.      Mouth/Throat:      Pharynx: No oropharyngeal exudate.   Cardiovascular:      Rate and Rhythm: Normal rate and regular rhythm.      Heart " sounds: Normal heart sounds. No murmur heard.     No friction rub. No gallop.   Pulmonary:      Effort: Pulmonary effort is normal. No respiratory distress.      Breath sounds: Normal breath sounds. No wheezing or rales.   Chest:      Chest wall: No tenderness.   Abdominal:      General: Bowel sounds are normal. There is no distension.      Palpations: Abdomen is soft. There is no mass.      Tenderness: There is no abdominal tenderness. There is no right CVA tenderness, left CVA tenderness, guarding or rebound.   Musculoskeletal:         General: Normal range of motion.      Cervical back: Normal range of motion and neck supple.   Lymphadenopathy:      Cervical: No cervical adenopathy.   Skin:     General: Skin is warm and dry.      Coloration: Skin is not pale.      Findings: No erythema or rash.   Neurological:      Mental Status: She is alert and oriented to person, place, and time.      Cranial Nerves: No cranial nerve deficit.   Psychiatric:         Behavior: Behavior normal.         Thought Content: Thought content normal.         Judgment: Judgment normal.         ED Course                 Procedures         Results for orders placed or performed during the hospital encounter of 10/01/23 (from the past 24 hour(s))   UA Macroscopic with reflex to Microscopic and Culture    Specimen: Urine, Clean Catch   Result Value Ref Range    Color Urine Yellow Colorless, Straw, Light Yellow, Yellow    Appearance Urine Cloudy (A) Clear    Glucose Urine Negative Negative mg/dL    Bilirubin Urine Negative Negative    Ketones Urine Negative Negative mg/dL    Specific Gravity Urine 1.025 1.003 - 1.035    Blood Urine Small (A) Negative    pH Urine 5.5 4.7 - 8.0    Protein Albumin Urine 70 (A) Negative mg/dL    Urobilinogen Urine Normal Normal, 2.0 mg/dL    Nitrite Urine Positive (A) Negative    Leukocyte Esterase Urine Large (A) Negative    Bacteria Urine Few (A) None Seen /HPF    WBC Clumps Urine Present (A) None Seen /HPF     Mucus Urine Present (A) None Seen /LPF    RBC Urine 5 (H) <=2 /HPF    WBC Urine >182 (H) <=5 /HPF    Squamous Epithelials Urine 0 <=1 /HPF    Narrative    Urine Culture ordered based on laboratory criteria       Medications - No data to display    Assessments & Plan (with Medical Decision Making)   Pt is non-toxic appearing and in NAD. UA is consistent with acute lower UTI. Reviewed past urine cultures and sensitivities. Will start her on Cipro 500mg BID x 5 days. She was discharged home following in stable condition.     Plan:  Take the Cipro as prescribed for your bladder infection.   Increase fluids and rest.   Return here with any new or worsening symptoms.     I have reviewed the nursing notes.    I have reviewed the findings, diagnosis, plan and need for follow up with the patient.    Discharge Medication List as of 10/1/2023 11:23 AM        START taking these medications    Details   ciprofloxacin (CIPRO) 500 MG tablet Take 1 tablet (500 mg) by mouth 2 times daily for 5 days, Disp-10 tablet, R-0, E-Prescribe             Final diagnoses:   Acute lower UTI       10/1/2023   [facility]

## 2023-10-03 LAB — BACTERIA UR CULT: ABNORMAL

## 2023-10-04 DIAGNOSIS — F51.01 PRIMARY INSOMNIA: ICD-10-CM

## 2023-10-06 RX ORDER — CLONAZEPAM 1 MG/1
1 TABLET ORAL AT BEDTIME
Qty: 30 TABLET | Refills: 0 | Status: SHIPPED | OUTPATIENT
Start: 2023-10-06

## 2023-10-06 NOTE — TELEPHONE ENCOUNTER
Last seen in clinic 4/25/2023  Last seen by PCP 5/3/2021  [place] [facility] [place]  [place] PDMP reviewed and appropriate    Vane Gil MD

## 2023-10-10 ENCOUNTER — TELEPHONE (OUTPATIENT)
Dept: FAMILY MEDICINE | Facility: OTHER | Age: 68
End: 2023-10-10

## 2023-10-10 NOTE — TELEPHONE ENCOUNTER
Spoke with  asked him to have Aileen Jimenez call back.  Needs to schedule physical with [place] first available.

## 2023-11-03 DIAGNOSIS — F33.2 SEVERE EPISODE OF RECURRENT MAJOR DEPRESSIVE DISORDER, WITHOUT PSYCHOTIC FEATURES (H): ICD-10-CM

## 2023-11-03 RX ORDER — ESCITALOPRAM OXALATE 20 MG/1
TABLET ORAL
Qty: 90 TABLET | Refills: 0 | OUTPATIENT
Start: 2023-11-03

## 2023-11-03 NOTE — TELEPHONE ENCOUNTER
Lexapro      Last Written Prescription Date:  8/14/23  Last Fill Quantity: 90,   # refills: 0  Last Office Visit: 4/25/23  Future Office visit:       Routing refill request to provider for review/approval because:

## 2023-11-08 DIAGNOSIS — F33.2 SEVERE EPISODE OF RECURRENT MAJOR DEPRESSIVE DISORDER, WITHOUT PSYCHOTIC FEATURES (H): ICD-10-CM

## 2023-11-08 RX ORDER — ESCITALOPRAM OXALATE 20 MG/1
TABLET ORAL
Qty: 90 TABLET | Refills: 0 | OUTPATIENT
Start: 2023-11-08

## 2023-11-09 DIAGNOSIS — F51.01 PRIMARY INSOMNIA: ICD-10-CM

## 2023-11-10 RX ORDER — CLONAZEPAM 1 MG/1
1 TABLET ORAL AT BEDTIME
Qty: 30 TABLET | Refills: 0 | OUTPATIENT
Start: 2023-11-10

## 2023-11-10 NOTE — TELEPHONE ENCOUNTER
Klonopin      Last Written Prescription Date:  10/6/23  Last Fill Quantity: 30,   # refills: 0  Last Office Visit: 4/25/23  Future Office visit:       Routing refill request to provider for review/approval because:

## 2023-11-16 ENCOUNTER — OFFICE VISIT (OUTPATIENT)
Dept: CHIROPRACTIC MEDICINE | Facility: OTHER | Age: 68
End: 2023-11-16
Payer: COMMERCIAL

## 2023-11-16 DIAGNOSIS — M99.03 SEGMENTAL AND SOMATIC DYSFUNCTION OF LUMBAR REGION: Primary | ICD-10-CM

## 2023-11-16 DIAGNOSIS — M54.50 ACUTE BILATERAL LOW BACK PAIN WITHOUT SCIATICA: ICD-10-CM

## 2023-11-16 DIAGNOSIS — M99.02 SEGMENTAL AND SOMATIC DYSFUNCTION OF THORACIC REGION: ICD-10-CM

## 2023-11-16 DIAGNOSIS — M99.01 SEGMENTAL AND SOMATIC DYSFUNCTION OF CERVICAL REGION: ICD-10-CM

## 2023-11-16 PROCEDURE — 98941 CHIROPRACT MANJ 3-4 REGIONS: CPT | Mod: AT | Performed by: CHIROPRACTOR

## 2023-11-16 NOTE — PROGRESS NOTES
Subjective Finding:    Chief compalint: Patient presents with:  Back Pain: Tightness in low back bilaterally.  Also tightness in c spine worse on the right , Pain Scale: 7/10, Intensity: dull and ache, Duration: 2 days, Radiating: no.    Date of injury:     Activities that the pain restricts:   Home/household/hobbies/social activities: Yes.  Work duties: Yes.  Sleep: No.  Makes symptoms better: rest.  Makes symptoms worse: walking.  Have you seen anyone else for the symptoms? No.  Work related: No.  Automobile related injury: No.    Objective and Assessment:    Posture Analysis:   High shoulder: .  Head tilt: .  High iliac crest: left.  Head carriage: neutral.  Thoracic Kyphosis: neutral.  Lumbar Lordosis: forward.    Lumbar Range of Motion: extension decreased, left lateral flexion decreased, and right lateral flexion decreased.  Cervical Range of Motion: left lateral flexion decreased and right lateral flexion decreased.  Thoracic Range of Motion: .  Extremity Range of Motion: .    Palpation:   Quad lumb: bilateral, referred pain: no  Lev scapulae: dull pain, referred pain: no    Segmental dysfunction pre-treatment and treatment area: C2, C3, T6, and L2.    Assessment post-treatment:  Cervical: ROM increased.  Thoracic: ROM increased.  Lumbar: ROM increased.    Comments: .      Complicating Factors: .    Procedure(s):  CMT:  11735 Chiropractic manipulative treatment 3-4 regions performed   Cervical: Diversified, See above for level, Supine, Thoracic: Diversified, See above for level, Prone, and Lumbar: Diversified, See above for level, Side posture    Modalities:  None performed this visit    Therapeutic procedures:  None    Plan:  Treatment plan: PRN.  Instructed patient: stretch as instructed at visit and walk 10 minutes.  Short term goals: reduce muscle spasm.  Long term goals: restore normal function.  Prognosis: excellent.

## 2023-11-30 ENCOUNTER — THERAPY VISIT (OUTPATIENT)
Dept: PHYSICAL THERAPY | Facility: HOSPITAL | Age: 68
End: 2023-11-30
Attending: PHYSICIAN ASSISTANT
Payer: MEDICARE

## 2023-11-30 DIAGNOSIS — N39.3 SUI (STRESS URINARY INCONTINENCE, FEMALE): ICD-10-CM

## 2023-11-30 DIAGNOSIS — M62.89 PELVIC FLOOR DYSFUNCTION IN FEMALE: Primary | ICD-10-CM

## 2023-11-30 DIAGNOSIS — N81.2 CYSTOCELE AND RECTOCELE WITH INCOMPLETE UTEROVAGINAL PROLAPSE: ICD-10-CM

## 2023-11-30 PROCEDURE — 97530 THERAPEUTIC ACTIVITIES: CPT | Mod: GP

## 2023-11-30 PROCEDURE — 97162 PT EVAL MOD COMPLEX 30 MIN: CPT | Mod: GP

## 2023-11-30 NOTE — PROGRESS NOTES
PHYSICAL THERAPY EVALUATION  Type of Visit: Evaluation and treatment    See electronic medical record for Abuse and Falls Screening details.  Dr Louie Lloyd PA-C [facility]   Subjective       Presenting condition or subjective complaint: Urinary incontinence and trouble having bowel movementss  Date of onset: 10/26/23    Relevant medical history: Bladder or bowel problems; Incontinence; Osteoarthritis; Overweight   Dates & types of surgery: Explained in chart . Numerous surgeries.    Prior diagnostic imaging/testing results: Other Bowel manometry   Prior therapy history for the same diagnosis, illness or injury: No      Prior Level of Function  Transfers: Independent  Ambulation: Independent  ADL: Independent  IADL:  active retired    Living Environment  Social support: With a significant other or spouse   Type of home: House   Stairs to enter the home: No       Ramp: No   Stairs inside the home: No       Help at home: None  Equipment owned: Four-point cane; Walker with wheels; Crutches; Grab bars; Raised toilet seat     Employment: No    Hobbies/Interests: Reading, baking, [employer] activities, eating out    Patient goals for therapy: Stop the urinary incontinence    Pain assessment: No signif back,hip or pelvic pain     Objective      PELVIC EVALUATION  ADDITIONAL HISTORY:  Sex assigned at birth: Female  Gender identity: Female    Pronouns: She/Her Hers      Bladder History:  Feels bladder filling: No  Triggers for feeling of inability to wait to go to the bathroom: Yes Running water, standing in kitchen making supper  How long can you wait to urinate: A minute at most  Gets up at night to urinate: Yes 1-2  Can stop the flow of urine when urinating: No  Volume of urine usually released: Medium   Other issues: Slow or hesitant urine stream; Bladder infections  Number of bladder infections in last 12 months: At least 4  Fluid intake per day: 12-24   Not enough 24 ounces    Medications taken for  bladder: Yes Detrol  no dry mouth - takes at bedtime - since around May   Activities causing urine leak: Hurrying to the bathroom due to a strong urge to urinate (pee)    Amount of urine typically leaked: 1 to 3 ounces before i get to the bathroom  Pads used to help with leaking: Yes I use this many pads per day: 1-2 I use this type/brand: Depends for Day and Depends Night [employer]  Started UI after hysterectomy 2022 with catheterization- 2 bladder  sling surgeries with failure.   Lap band 2008.  Signif urge in am.  Standing up creates signif urge as well.  Wore underwear with pad first time in 8 months.   Bowel History:  Frequency of bowel movement: Norm before was once a week  going every other day lately. Reports she is holding it until she really has to go. Then finger inserted  rectally helps.   Consistency of stool: Other All of the above  Ignores the urge to defecate: Sometimes  Other bowel issues: Straining to have bowel movement-childhood constipation.  Length of time spent trying to have a bowel movement: 10-15 minutes  Currently on Linzess. Multiple UTIs.  Hiprex helps with vit C for recurrent UTI.    Sexual Function History:  Sexual orientation: Straight    Sexually active: No  Lubrication used: No No  Pelvic pain:      Pain or difficulty with orgasms/erection/ejaculation:      State of menopause: Post-menopause (I am done with menopause)   Hormone medications: No    No HRT. Early menopause.   Are you currently pregnant: No, Number of previous pregnancies: 1, Number of deliveries: 1, If you have delivered before, did you have any of these issues during delivery: Episiotomy, Have you been diagnosed with pelvic prolapse or abdominal separation: Yes, Do you get regular exercise: No, Have you tried pelvic floor strengthening exercises for 4 weeks: No, Do you have any history of trauma that is relevant to your care that you d like to share: No   Pt reports she will perform a quick kegel  after she is done with  urination then will have more urination following.   Discussed reason for referral regarding pelvic health needs and external/internal pelvic floor muscle examination with patient/guardian.  Opportunity provided to ask questions and verbal consent for assessment and intervention was given.    PAIN:   POSTURE: Sitting Posture: Rounded shoulders, Thoracic kyphosis increased  LUMBAR SCREEN:   HIP SCREEN:  Strength:    Functional Strength Testing:     PELVIC/SI SCREEN:     PAIN PROVOCATION TEST:   PELVIS/SI SPECIAL TESTS:   BREATHING SYMMETRY:     PELVIC EXAM  External Visual Inspection:  With voluntary pelvic floor contraction:   Relaxation of PFM: Partial/delayed relaxation  With intra-abdominal pressure: Bearing down as defecation: Perineal descent no signif POP present     Integumentary:   Introitus: Loose, Scar tissue/episiotomy     External Digital Palpation per Perineum:           Internal Digital Palpation:  Per Vagina:  Tenderness  Tone: high  Digital Muscle Performance: P (Power): 4  E (Endurance): 7  R (Repetitions): 3  F (Fast Twitch): deferred    Per Rectum:  Deferred. Will cont assessment       Pelvic Organ Prolapse:   Anterior: grade 1- very minimal     ABDOMINAL ASSESSMENT  Diastasis Rectus Abdominis (RAÚL):      Abdominal Activation/Strength:     Scar:   Location/Type:   Mobility:     Fascial Tension/Restriction/Tone:       Assessment & Plan   CLINICAL IMPRESSIONS  Medical Diagnosis: UI, Rectocele, cystocele, slow transit constipation per Dr Kenya Iqbal    Treatment Diagnosis: PFM tightness, dysfunction   Impression/Assessment: Patient is a 68 year old female with urgency, UI, and constipation complaints.  The following significant findings have been identified: Decreased proprioception, Impaired muscle performance, and PFM tone, tenderness  . These impairments interfere with their ability to perform self care tasks, work tasks, and volunteer  as compared to previous level of function.     Clinical  Decision Making (Complexity):  Clinical Presentation: Evolving/Changing  Clinical Presentation Rationale: based on medical and personal factors listed in PT evaluation  Clinical Decision Making (Complexity): Moderate complexity    PLAN OF CARE  Treatment Interventions:  Modalities: Biofeedback  Interventions: Manual Therapy, Therapeutic Activity, Therapeutic Exercise    Long Term Goals     PT Goal 1  Goal Identifier: STG 1  Goal Description: Pt will demon indep HEP compliance  Target Date: 01/11/24  PT Goal 2  Goal Identifier: STG 2  Goal Description: Pt will have reduced UI incidences as evidenced by 50% usage of pads/briefs  daily  Target Date: 02/08/24  PT Goal 3  Goal Identifier: LTG  Goal Description: ADLS and volunterring not affected by UI, urge or constipatin  Goal Progress: w + 12      Frequency of Treatment: 1x/week  Duration of Treatment: 12 weeks    Recommended Referrals to Other Professionals: n/a  Education Assessment:   Learner/Method: No Barriers to Learning    Risks and benefits of evaluation/treatment have been explained.   Patient/Family/caregiver agrees with Plan of Care.     Evaluation Time:     PT Eval, Moderate Complexity Minutes (88341): 40       Signing Clinician: Masha Glover, PT      [facility]                                                                                   OUTPATIENT PHYSICAL THERAPY      PLAN OF TREATMENT FOR OUTPATIENT REHABILITATION   Patient's Last Name, First Name, Aileen Johnson YOB: 1955   Provider's Name   [facility]   Medical Record No.  [id number]     Onset Date: 10/26/23  Start of Care Date: 11/30/23     Medical Diagnosis:  UI, Rectocele, cystocele, [facility]      PT Treatment Diagnosis:  PFM tightness, dysfunction, constipation, Urinary urgency, UI  Plan of Treatment  Frequency/Duration: 1x/week/ 12 weeks    Certification date  from 11/30/23 to 02/22/24         See note for plan of treatment details and functional goals     Masha Glover, PT                         I CERTIFY THE NEED FOR THESE SERVICES FURNISHED UNDER        THIS PLAN OF TREATMENT AND WHILE UNDER MY CARE .             Physician Signature               Date    X_____________________________________________________                  Referring Provider:     Kenya Iqbal  PAC      Initial Assessment  See Epic Evaluation- Start of Care Date: 11/30/23

## 2023-12-05 ENCOUNTER — THERAPY VISIT (OUTPATIENT)
Dept: PHYSICAL THERAPY | Facility: HOSPITAL | Age: 68
End: 2023-12-05
Attending: PHYSICIAN ASSISTANT
Payer: MEDICARE

## 2023-12-05 DIAGNOSIS — M62.89 PELVIC FLOOR DYSFUNCTION IN FEMALE: Primary | ICD-10-CM

## 2023-12-05 DIAGNOSIS — N81.2 CYSTOCELE AND RECTOCELE WITH INCOMPLETE UTEROVAGINAL PROLAPSE: ICD-10-CM

## 2023-12-05 PROCEDURE — 97140 MANUAL THERAPY 1/> REGIONS: CPT | Mod: GP

## 2023-12-05 PROCEDURE — 97530 THERAPEUTIC ACTIVITIES: CPT | Mod: GP

## 2023-12-15 ENCOUNTER — THERAPY VISIT (OUTPATIENT)
Dept: PHYSICAL THERAPY | Facility: HOSPITAL | Age: 68
End: 2023-12-15
Attending: PHYSICIAN ASSISTANT
Payer: MEDICARE

## 2023-12-15 DIAGNOSIS — N39.3 SUI (STRESS URINARY INCONTINENCE, FEMALE): ICD-10-CM

## 2023-12-15 DIAGNOSIS — N81.2 CYSTOCELE AND RECTOCELE WITH INCOMPLETE UTEROVAGINAL PROLAPSE: ICD-10-CM

## 2023-12-15 DIAGNOSIS — M62.89 PELVIC FLOOR DYSFUNCTION IN FEMALE: Primary | ICD-10-CM

## 2023-12-15 PROCEDURE — 97530 THERAPEUTIC ACTIVITIES: CPT | Mod: GP

## 2023-12-15 PROCEDURE — 97140 MANUAL THERAPY 1/> REGIONS: CPT | Mod: GP

## 2023-12-26 DIAGNOSIS — Z01.818 PRE-PROCEDURAL EXAMINATION: ICD-10-CM

## 2023-12-26 NOTE — TELEPHONE ENCOUNTER
Amoxil      Last Written Prescription Date:  5/23/23  Last Fill Quantity: 4,   # refills: 0  Last Office Visit: 4/25/23  Future Office visit:       Routing refill request to provider for review/approval because:

## 2023-12-28 RX ORDER — AMOXICILLIN 500 MG/1
CAPSULE ORAL
Qty: 4 CAPSULE | Refills: 0 | OUTPATIENT
Start: 2023-12-28

## 2024-01-09 ENCOUNTER — THERAPY VISIT (OUTPATIENT)
Dept: PHYSICAL THERAPY | Facility: HOSPITAL | Age: 69
End: 2024-01-09
Attending: PHYSICIAN ASSISTANT
Payer: MEDICARE

## 2024-01-09 DIAGNOSIS — M62.89 PELVIC FLOOR DYSFUNCTION IN FEMALE: Primary | ICD-10-CM

## 2024-01-09 DIAGNOSIS — N81.2 CYSTOCELE AND RECTOCELE WITH INCOMPLETE UTEROVAGINAL PROLAPSE: ICD-10-CM

## 2024-01-09 PROCEDURE — 97530 THERAPEUTIC ACTIVITIES: CPT | Mod: GP

## 2024-01-09 PROCEDURE — 97110 THERAPEUTIC EXERCISES: CPT | Mod: GP

## 2024-01-26 ENCOUNTER — THERAPY VISIT (OUTPATIENT)
Dept: PHYSICAL THERAPY | Facility: HOSPITAL | Age: 69
End: 2024-01-26
Attending: PHYSICIAN ASSISTANT
Payer: MEDICARE

## 2024-01-26 DIAGNOSIS — N81.2 CYSTOCELE AND RECTOCELE WITH INCOMPLETE UTEROVAGINAL PROLAPSE: Primary | ICD-10-CM

## 2024-01-26 DIAGNOSIS — N39.3 SUI (STRESS URINARY INCONTINENCE, FEMALE): ICD-10-CM

## 2024-01-26 PROCEDURE — 97530 THERAPEUTIC ACTIVITIES: CPT | Mod: GP

## 2024-01-26 PROCEDURE — 97110 THERAPEUTIC EXERCISES: CPT | Mod: GP

## 2024-02-27 ENCOUNTER — THERAPY VISIT (OUTPATIENT)
Dept: PHYSICAL THERAPY | Facility: HOSPITAL | Age: 69
End: 2024-02-27
Attending: PHYSICIAN ASSISTANT
Payer: MEDICARE

## 2024-02-27 DIAGNOSIS — N39.3 SUI (STRESS URINARY INCONTINENCE, FEMALE): ICD-10-CM

## 2024-02-27 DIAGNOSIS — N81.2 CYSTOCELE AND RECTOCELE WITH INCOMPLETE UTEROVAGINAL PROLAPSE: Primary | ICD-10-CM

## 2024-02-27 PROCEDURE — 97110 THERAPEUTIC EXERCISES: CPT | Mod: GP

## 2024-02-27 PROCEDURE — 97530 THERAPEUTIC ACTIVITIES: CPT | Mod: GP

## 2024-03-11 ENCOUNTER — HOSPITAL ENCOUNTER (EMERGENCY)
Facility: HOSPITAL | Age: 69
Discharge: HOME | End: 2024-03-11
Attending: PHYSICIAN ASSISTANT
Payer: MEDICARE

## 2024-03-11 ENCOUNTER — APPOINTMENT (OUTPATIENT)
Dept: CT IMAGING | Facility: HOSPITAL | Age: 69
End: 2024-03-11
Attending: PHYSICIAN ASSISTANT
Payer: MEDICARE

## 2024-03-11 VITALS
SYSTOLIC BLOOD PRESSURE: 131 MMHG | DIASTOLIC BLOOD PRESSURE: 76 MMHG | WEIGHT: 196.21 LBS | RESPIRATION RATE: 20 BRPM | BODY MASS INDEX: 32.65 KG/M2 | HEART RATE: 72 BPM | TEMPERATURE: 100.2 F | OXYGEN SATURATION: 97 %

## 2024-03-11 DIAGNOSIS — D72.829 LEUKOCYTOSIS: ICD-10-CM

## 2024-03-11 DIAGNOSIS — Z98.84 LAP-BAND SURGERY STATUS: ICD-10-CM

## 2024-03-11 DIAGNOSIS — K21.9 GERD (GASTROESOPHAGEAL REFLUX DISEASE): ICD-10-CM

## 2024-03-11 LAB
ANION GAP SERPL CALCULATED.3IONS-SCNC: 10 MMOL/L (ref 7–15)
BASOPHILS # BLD AUTO: 0 10E3/UL (ref 0–0.2)
BASOPHILS NFR BLD AUTO: 0 %
BUN SERPL-MCNC: 23.6 MG/DL (ref 8–23)
CALCIUM SERPL-MCNC: 9.5 MG/DL (ref 8.8–10.2)
CHLORIDE SERPL-SCNC: 102 MMOL/L (ref 98–107)
CREAT SERPL-MCNC: 1 MG/DL (ref 0.51–0.95)
DEPRECATED HCO3 PLAS-SCNC: 26 MMOL/L (ref 22–29)
EGFRCR SERPLBLD CKD-EPI 2021: 60 ML/MIN/1.73M2
EOSINOPHIL # BLD AUTO: 0 10E3/UL (ref 0–0.7)
EOSINOPHIL NFR BLD AUTO: 0 %
ERYTHROCYTE [DISTWIDTH] IN BLOOD BY AUTOMATED COUNT: 13 % (ref 10–15)
FLUAV RNA SPEC QL NAA+PROBE: NEGATIVE
FLUBV RNA RESP QL NAA+PROBE: NEGATIVE
GLUCOSE SERPL-MCNC: 113 MG/DL (ref 70–99)
HCT VFR BLD AUTO: 39.8 % (ref 35–47)
HGB BLD-MCNC: 12.8 G/DL (ref 11.7–15.7)
HOLD SPECIMEN: NORMAL
IMM GRANULOCYTES # BLD: 0.1 10E3/UL
IMM GRANULOCYTES NFR BLD: 0 %
LIPASE SERPL-CCNC: 21 U/L (ref 13–60)
LYMPHOCYTES # BLD AUTO: 1.1 10E3/UL (ref 0–5.3)
LYMPHOCYTES NFR BLD AUTO: 6 %
MAGNESIUM SERPL-MCNC: 1.8 MG/DL (ref 1.7–2.3)
MCH RBC QN AUTO: 28.8 PG (ref 26.5–33)
MCHC RBC AUTO-ENTMCNC: 32.2 G/DL (ref 31.5–36.5)
MCV RBC AUTO: 89 FL (ref 78–100)
MONOCYTES # BLD AUTO: 0.9 10E3/UL (ref 0–1.3)
MONOCYTES NFR BLD AUTO: 5 %
NEUTROPHILS # BLD AUTO: 14.4 10E3/UL (ref 1.6–8.3)
NEUTROPHILS NFR BLD AUTO: 88 %
NRBC # BLD AUTO: 0 10E3/UL
NRBC BLD AUTO-RTO: 0 /100
PLATELET # BLD AUTO: 206 10E3/UL (ref 150–450)
POTASSIUM SERPL-SCNC: 4.4 MMOL/L (ref 3.4–5.3)
RBC # BLD AUTO: 4.45 10E6/UL (ref 3.8–5.2)
RSV RNA SPEC NAA+PROBE: NEGATIVE
SARS-COV-2 RNA RESP QL NAA+PROBE: NEGATIVE
SODIUM SERPL-SCNC: 138 MMOL/L (ref 135–145)
WBC # BLD AUTO: 16.4 10E3/UL (ref 4–11)

## 2024-03-11 PROCEDURE — 85025 COMPLETE CBC W/AUTO DIFF WBC: CPT | Performed by: PHYSICIAN ASSISTANT

## 2024-03-11 PROCEDURE — 74177 CT ABD & PELVIS W/CONTRAST: CPT | Mod: MA

## 2024-03-11 PROCEDURE — 83690 ASSAY OF LIPASE: CPT | Performed by: PHYSICIAN ASSISTANT

## 2024-03-11 PROCEDURE — 99284 EMERGENCY DEPT VISIT MOD MDM: CPT | Performed by: PHYSICIAN ASSISTANT

## 2024-03-11 PROCEDURE — 36415 COLL VENOUS BLD VENIPUNCTURE: CPT | Performed by: PHYSICIAN ASSISTANT

## 2024-03-11 PROCEDURE — 80048 BASIC METABOLIC PNL TOTAL CA: CPT | Performed by: PHYSICIAN ASSISTANT

## 2024-03-11 PROCEDURE — 250N000011 HC RX IP 250 OP 636: Performed by: PHYSICIAN ASSISTANT

## 2024-03-11 PROCEDURE — 83735 ASSAY OF MAGNESIUM: CPT | Performed by: PHYSICIAN ASSISTANT

## 2024-03-11 PROCEDURE — 87637 SARSCOV2&INF A&B&RSV AMP PRB: CPT | Performed by: PHYSICIAN ASSISTANT

## 2024-03-11 PROCEDURE — 99285 EMERGENCY DEPT VISIT HI MDM: CPT | Mod: 25

## 2024-03-11 RX ORDER — TRAZODONE HYDROCHLORIDE 50 MG/1
50 TABLET, FILM COATED ORAL AT BEDTIME
COMMUNITY

## 2024-03-11 RX ORDER — IOPAMIDOL 755 MG/ML
96 INJECTION, SOLUTION INTRAVASCULAR ONCE
Status: COMPLETED | OUTPATIENT
Start: 2024-03-11 | End: 2024-03-11

## 2024-03-11 RX ADMIN — IOPAMIDOL 96 ML: 755 INJECTION, SOLUTION INTRAVENOUS at 20:52

## 2024-03-11 ASSESSMENT — COLUMBIA-SUICIDE SEVERITY RATING SCALE - C-SSRS
2. HAVE YOU ACTUALLY HAD ANY THOUGHTS OF KILLING YOURSELF IN THE PAST MONTH?: NO
1. IN THE PAST MONTH, HAVE YOU WISHED YOU WERE DEAD OR WISHED YOU COULD GO TO SLEEP AND NOT WAKE UP?: NO
6. HAVE YOU EVER DONE ANYTHING, STARTED TO DO ANYTHING, OR PREPARED TO DO ANYTHING TO END YOUR LIFE?: NO

## 2024-03-11 ASSESSMENT — ACTIVITIES OF DAILY LIVING (ADL)
ADLS_ACUITY_SCORE: 33
ADLS_ACUITY_SCORE: 35

## 2024-03-11 ASSESSMENT — ENCOUNTER SYMPTOMS
FATIGUE: 0
FEVER: 0

## 2024-03-11 NOTE — ED TRIAGE NOTES
"Patient presents with complaints of thinking there is an issue with her lap band. She states there had been some med adjustments and that she had gained 30 lbs, so she had gone in and the provider added some fluid to the band. She states 2 days ago \"I shit the bed\" She states that she's having acid reflux as well as leg pain now as well too. She is convinced it's due to the lap band and not anything that is going around. She states she wants the band decreased but there's no one to do that and she couldn't get an appointment for 2 week. Explained we are unable to do that for her either so. She states she just wants an x-ray to see the lap band. Patient's weight in our chart show she's up 16 lbs in the last 5 months.         "

## 2024-03-11 NOTE — ED NOTES
Pt reports that she had recent weight gain from taking Remeron. Pt discontinued Remeron and started on trazodone. Pt reports that she has a 16yr hx of a lap band. Pt recently had 0.5 ml saline added to the band due to weight gain. Over the past 4-5 days, pt reports intermittent vomiting and 1 episode of diarrhea. This morning pt was dry heaving. Pt has been in contact with gastro at [facility] in [place] and reports she has an appointment on Thursday. She also reports that the provider is requesting an Xray of the lap band. Pt also having cramping in her legs that started today. Pt denies fevers, cough, sob, no abdominal pain.

## 2024-03-12 NOTE — ED PROVIDER NOTES
"  History     Chief Complaint   Patient presents with    Diarrhea     The history is provided by the patient.     Aileen Szymanski is a 69 year old female who presents to the emergency department with several complaints including worsening GERD, an episode of vomiting, as well as an episode of diarrhea.  The patient reports that she recently had her Lap-Band increase by 5 mL and believes now she has a blockage.  Lap-Band was placed approximate 16 years ago.  She is seeing general surgery this week.  No recent vomiting.  No significant pain.  No distention.  She has chronic urinary and fecal incontinence.  She tells me \"I know exactly what is wrong.  My lapband is too tight.\"    Allergies:  Allergies   Allergen Reactions    Lovenox [Enoxaparin] Other (See Comments)     Bleeding    Estradiol Other (See Comments)     Vaginal itching from cream       Problem List:    Patient Active Problem List    Diagnosis Date Noted    Pelvic floor dysfunction in female 11/30/2023     Priority: Medium    Cystocele with rectocele 04/18/2023     Priority: Medium     Formatting of this note might be different from the original.  Added automatically from request for surgery [id number]      Uterine mass 03/14/2022     Priority: Medium    Vitamin D deficiency 03/14/2022     Priority: Medium    Cystocele and rectocele with incomplete uterovaginal prolapse 01/17/2022     Priority: Medium     Formatting of this note might be different from the original.  Added automatically from request for surgery [id number]      DEIRDRE (stress urinary incontinence, female) 01/17/2022     Priority: Medium     Formatting of this note might be different from the original.  Added automatically from request for surgery [id number]      Failure of total knee arthroplasty, sequela 04/22/2021     Priority: Medium     Formatting of this note might be different from the original.  Added automatically from request for surgery [id number]      Prosthetic joint infection (H24) 02/18/2020 "     Priority: Medium    Arthrofibrosis of knee joint, left 12/27/2019     Priority: Medium     Added automatically from request for surgery [id number]      Major depressive disorder without psychotic features 08/31/2017     Priority: Medium    Mixed hyperlipidemia 09/19/2016     Priority: Medium    Lung granuloma (H) 03/27/2013     Priority: Medium     Overview:   Noted on chest xray 3/2013  Noted on chest xray 3/2013      Intramural leiomyoma of uterus 03/07/2011     Priority: Medium    Osteoarthrosis, pelvic region and thigh 12/05/2006     Priority: Medium     Overview:   IMO Update 10/11  IMO Update 10/11          Past Medical History:    Past Medical History:   Diagnosis Date    Arthrofibrosis of knee joint, left 12/27/2019    Depressive disorder     Dizziness and giddiness 07/31/2007    Enthesopathy of knee, unspecified 06/13/2002    Failed total left knee replacement (H24) 12/27/2019    History of blood transfusion 2014?    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       Past Surgical History:    Past Surgical History:   Procedure Laterality Date    ABDOMEN SURGERY  2007    ARTHROPLASTY REVISION KNEE Left 10/2016    CLOSED REDUCTION NOSE N/A 11/07/2017    Procedure: CLOSED REDUCTION NOSE;  CLOSED REDUCTION NASAL SEPTAL FRACTURE;  Surgeon: Destiny Vanegas MD;  Location: [facility]    COLONOSCOPY  04/13/2010    Dr Quevedo; negative      ERCP W/ PLASTIC STENT PLACEMENT N/A 01/24/2008    ERCP W/ SPHICTEROTOMY N/A 12/10/2007    GENITOURINARY SURGERY  4/8//22    HYSTERECTOMY N/A 04/08/2022    HYSTEROSCOPY DIAGNOSTIC N/A 04/27/2011    LAPAROSCOPIC CHOLECYSTECTOMY N/A 12/03/2007    LAPAROSCOPIC GASTRIC BANDING N/A 04/23/2008    LAPAROSCOPIC TUBAL LIGATION Bilateral 1978    left knee replacement  07/21/2014    Dr Quinones/ [place]    REPLACEMENT TOTAL KNEE Left 05/14/2021    Complex revision left total knee    REPLACEMENT TOTAL KNEE Left 05/06/2020    left knee replacement after infection       Family History:    Family History   Problem Relation Age of Onset    Lung Cancer Mother     Heart Disease Mother     Other Cancer Mother         Heart disease and bone cancer    Chronic Obstructive Pulmonary Disease Father     Other Cancer Father         Lung and COPD    Osteoporosis Paternal Grandmother     Other Cancer Sister         Lung and brain cancer       Social History:  Marital Status:   [2]  Social History     Tobacco Use    Smoking status: Never    Smokeless tobacco: Never   Vaping Use    Vaping Use: Never used   Substance Use Topics    Alcohol use: Yes     Comment: Rarely    Drug use: No        Medications:    escitalopram (LEXAPRO) 20 MG tablet  LINZESS 145 MCG capsule  simvastatin (ZOCOR) 40 MG tablet  traZODone (DESYREL) 50 MG tablet  acetaminophen (TYLENOL) 500 MG tablet  amoxicillin (AMOXIL) 500 MG capsule  clonazePAM (KLONOPIN) 1 MG tablet  multivitamin, therapeutic (THERA-VIT) TABS tablet  tolterodine (DETROL) 2 MG tablet          Review of Systems   Constitutional:  Negative for fatigue and fever.   Gastrointestinal:         Diarrhea is not new.  No significant abdominal pain.  New GERD       Physical Exam   BP: 103/62  Pulse: 93  Temp: 100.2  F (37.9  C)  Resp: 20  Weight: 89 kg (196 lb 3.4 oz)  SpO2: 94 %      Physical Exam  Vitals and nursing note reviewed.   Constitutional:       General: She is not in acute distress.      Appearance: Normal appearance. She is not ill-appearing, toxic-appearing or diaphoretic.      Comments: Smiling and talkative 69-year-old female found reclined in no distress   Cardiovascular:      Rate and Rhythm: Normal rate and regular rhythm.   Pulmonary:      Effort: Pulmonary effort is normal.   Abdominal:      General: There is no distension.      Palpations: Abdomen is soft.      Tenderness: There is no abdominal tenderness. There is no guarding.   Skin:     General: Skin is warm and dry.      Capillary Refill: Capillary refill takes less than 2 seconds.   Neurological:      General: No focal deficit present.      Mental Status: She is alert and oriented to person, place, and time.   Psychiatric:         Mood and Affect: Mood normal.         Behavior: Behavior normal.         ED Course     ED Course as of 03/11/24 2151   Mon Mar 11, 2024   2015 16,000 white count.  Plan will be for IV contrast cross-sectional imaging.     Procedures              Critical Care time:  none               Results for orders placed or performed during the hospital encounter of 03/11/24 (from the past 24 hour(s))   Symptomatic Influenza A/B, RSV, & SARS-CoV2 PCR (COVID-19) Nasopharyngeal    Specimen: Nasopharyngeal; Swab   Result Value Ref Range    Influenza A PCR Negative Negative    Influenza B PCR Negative Negative    RSV PCR Negative Negative    SARS CoV2 PCR Negative Negative    Narrative    Testing was performed using the Xpert Xpress CoV2/Flu/RSV Assay on the [employer] Gene[employer]pert Instrument. This test should be ordered for the detection of SARS-CoV-2, influenza, and RSV viruses in individuals who meet clinical and/or epidemiological criteria. Test performance is unknown in asymptomatic patients. This test is for in vitro diagnostic use under the FDA EUA for laboratories certified under CLIA to perform high or moderate complexity testing. This test has not been FDA cleared or approved. A negative result does not rule out the  presence of PCR inhibitors in the specimen or target RNA in concentration below the limit of detection for the assay. If only one viral target is positive but coinfection with multiple targets is suspected, the sample should be re-tested with another FDA cleared, approved, or authorized test, if coinfection would change clinical management. This test was validated by the [facility] [email or web address]. These laboratories are certified under the Clinical Laboratory Improvement Amendments of 1988 (CLIA-88) as qualified to perform high complexity laboratory testing.   CBC with platelets differential    Narrative    The following orders were created for panel order CBC with platelets differential.  Procedure                               Abnormality         Status                     ---------                               -----------         ------                     CBC with platelets and d...[[id number]]  Abnormal            Final result                 Please view results for these tests on the individual orders.   Basic metabolic panel   Result Value Ref Range    Sodium 138 135 - 145 mmol/L    Potassium 4.4 3.4 - 5.3 mmol/L    Chloride 102 98 - 107 mmol/L    Carbon Dioxide (CO2) 26 22 - 29 mmol/L    Anion Gap 10 7 - 15 mmol/L    Urea Nitrogen 23.6 (H) 8.0 - 23.0 mg/dL    Creatinine 1.00 (H) 0.51 - 0.95 mg/dL    GFR Estimate 60 (L) >60 mL/min/1.73m2    Calcium 9.5 8.8 - 10.2 mg/dL    Glucose 113 (H) 70 - 99 mg/dL   Magnesium   Result Value Ref Range    Magnesium 1.8 1.7 - 2.3 mg/dL   Lipase   Result Value Ref Range    Lipase 21 13 - 60 U/L   CBC with platelets and differential   Result Value Ref Range    WBC Count 16.4 (H) 4.0 - 11.0 10e3/uL    RBC Count 4.45 3.80 - 5.20 10e6/uL    Hemoglobin 12.8 11.7 - 15.7 g/dL    Hematocrit 39.8 35.0 - 47.0 %    MCV 89 78 - 100 fL    MCH 28.8 26.5 - 33.0 pg    MCHC 32.2 31.5 - 36.5 g/dL    RDW 13.0 10.0 - 15.0 %    Platelet Count 206 150 - 450 10e3/uL    % Neutrophils 88 %    %  Lymphocytes 6 %    % Monocytes 5 %    % Eosinophils 0 %    % Basophils 0 %    % Immature Granulocytes 0 %    NRBCs per 100 WBC 0 <1 /100    Absolute Neutrophils 14.4 (H) 1.6 - 8.3 10e3/uL    Absolute Lymphocytes 1.1 0.0 - 5.3 10e3/uL    Absolute Monocytes 0.9 0.0 - 1.3 10e3/uL    Absolute Eosinophils 0.0 0.0 - 0.7 10e3/uL    Absolute Basophils 0.0 0.0 - 0.2 10e3/uL    Absolute Immature Granulocytes 0.1 <=0.4 10e3/uL    Absolute NRBCs 0.0 10e3/uL   Extra Tube    Narrative    The following orders were created for panel order Extra Tube.  Procedure                               Abnormality         Status                     ---------                               -----------         ------                     Extra Blue Top Tube[[id number]]                              Final result               Extra Red Top Tube[[id number]]                               Final result               Extra Heparinized Syringe[[id number]]                        Final result                 Please view results for these tests on the individual orders.   Extra Blue Top Tube   Result Value Ref Range    Hold Specimen JIC    Extra Red Top Tube   Result Value Ref Range    Hold Specimen JIC    Extra Heparinized Syringe   Result Value Ref Range    Hold Specimen JIC        Medications   iopamidol (ISOVUE-370) solution 96 mL (96 mLs Intravenous $Given 3/11/24 2052)   sodium chloride (PF) 0.9% PF flush 50 mL (50 mLs Intravenous $Given 3/11/24 2052)       Assessments & Plan (with Medical Decision Making)   Patient declined leaving a urine as she is incontinent.  This is chronic.  She has no flank discomfort.  Laboratory evaluation shows leukocytosis of unknown significance.  CT with contrast shows no apparent intra-abdominal catastrophe.  There is a new hiatal hernia which is likely the etiology of the patient's new and worsening GERD.  I stressed the importance of her following up with the clinic tomorrow for repeat CBC.  She otherwise looks well.  She  has no other significant concerns for me.  Her abdomen is soft and nontender.  She was in our emergency department for several hours and did well.  She read most of the time.  She requested discharge on several occasions.  This is reasonable.  She was willing to sign out AMA.  However received a CT scan findings prior to that happening.  She requested no further evaluation or treatment from the emergency department.  Strict return precautions were provided.    The patient has also agreed to schedule a follow up appointment with she primary provider for re-evaluation and further management tomorrow. She verbalized an understanding of the results of our workup and agree with the complete discharge plan including instructions for return and follow up.  There is no indication for further investigation or treatment on an emergent basis.  There is no reasonably foreseeable injury that would be associated with discharge and close outpatient follow-up.      This document was prepared using a combination of typing and voice generated software.  While every attempt was made for accuracy, spelling and grammatical errors may exist.   I have reviewed the nursing notes.    I have reviewed the findings, diagnosis, plan and need for follow up with the patient.           Medical Decision Making  The patient's presentation was of moderate complexity (an undiagnosed new problem with uncertain diagnosis).    The patient's evaluation involved:  ordering and/or review of 3+ test(s) in this encounter (multiple labs and images)    The patient's management necessitated only low risk treatment.        Discharge Medication List as of 3/11/2024  9:29 PM          Final diagnoses:   GERD (gastroesophageal reflux disease)   LAP-BAND surgery status   Leukocytosis       3/11/2024   [facility]       [place] PA-C  03/11/24 0354

## 2024-03-12 NOTE — DISCHARGE INSTRUCTIONS
You have elected to leave the emergency department before the results of her imaging.  Significant pathology in her abdomen has not been ruled out at this time.  Please return here for any new or worsening symptoms.

## 2024-03-28 ENCOUNTER — THERAPY VISIT (OUTPATIENT)
Dept: PHYSICAL THERAPY | Facility: HOSPITAL | Age: 69
End: 2024-03-28
Attending: PHYSICIAN ASSISTANT
Payer: MEDICARE

## 2024-03-28 DIAGNOSIS — N39.3 SUI (STRESS URINARY INCONTINENCE, FEMALE): ICD-10-CM

## 2024-03-28 DIAGNOSIS — N81.2 CYSTOCELE AND RECTOCELE WITH INCOMPLETE UTEROVAGINAL PROLAPSE: Primary | ICD-10-CM

## 2024-03-28 PROCEDURE — 97110 THERAPEUTIC EXERCISES: CPT | Mod: GP

## 2024-03-28 PROCEDURE — 97530 THERAPEUTIC ACTIVITIES: CPT | Mod: GP

## 2024-04-11 ENCOUNTER — OFFICE VISIT (OUTPATIENT)
Dept: CHIROPRACTIC MEDICINE | Facility: OTHER | Age: 69
End: 2024-04-11
Attending: CHIROPRACTOR
Payer: COMMERCIAL

## 2024-04-11 DIAGNOSIS — M99.02 SEGMENTAL AND SOMATIC DYSFUNCTION OF THORACIC REGION: ICD-10-CM

## 2024-04-11 DIAGNOSIS — M99.03 SEGMENTAL AND SOMATIC DYSFUNCTION OF LUMBAR REGION: Primary | ICD-10-CM

## 2024-04-11 DIAGNOSIS — M54.50 ACUTE BILATERAL LOW BACK PAIN WITHOUT SCIATICA: ICD-10-CM

## 2024-04-11 DIAGNOSIS — M99.01 SEGMENTAL AND SOMATIC DYSFUNCTION OF CERVICAL REGION: ICD-10-CM

## 2024-04-11 PROCEDURE — 98941 CHIROPRACT MANJ 3-4 REGIONS: CPT | Mod: AT | Performed by: CHIROPRACTOR

## 2024-04-12 NOTE — PROGRESS NOTES
Subjective Finding:    Chief compalint: Patient presents with:  Back Pain: Low back and neck pain   , Pain Scale: 3/10, Intensity: dull, Duration: 1 weeks, Radiating: bilateral buttock.    Date of injury:     Activities that the pain restricts:   Home/household/hobbies/social activities: Yes.  Work duties: Yes.  Sleep: No.  Makes symptoms better: rest.  Makes symptoms worse: cervical extension and cervical flexion.  Have you seen anyone else for the symptoms? No.  Work related: No.  Automobile related injury: No.    Objective and Assessment:    Posture Analysis:   High shoulder: .  Head tilt: .  High iliac crest: .  Head carriage: forward.  Thoracic Kyphosis: neutral.  Lumbar Lordosis: neutral.    Lumbar Range of Motion: extension decreased.  Cervical Range of Motion: left rotation decreased and right rotation decreased.  Thoracic Range of Motion: .  Extremity Range of Motion: .    Palpation:   Quad lumb: left, referred pain: no    Segmental dysfunction pre-treatment and treatment area: C4, T3, L5, and PSIS Left.    Assessment post-treatment:  Cervical: ROM increased.  Thoracic: ROM increased.  Lumbar: ROM increased.    Comments: .      Complicating Factors: .    Procedure(s):  CMT:  56519 Chiropractic manipulative treatment 3-4 regions performed   Cervical: Diversified, See above for level, Supine, Thoracic: Diversified, See above for level, Prone, and Lumbar: Diversified, See above for level, Side posture    Modalities:  None performed this visit    Therapeutic procedures:  None    Plan:  Treatment plan: PRN.  Instructed patient: stretch as instructed at visit.  Short term goals: increase ROM.  Long term goals: restore normal function.  Prognosis: very good.

## 2024-04-28 ENCOUNTER — HEALTH MAINTENANCE LETTER (OUTPATIENT)
Age: 69
End: 2024-04-28

## 2024-04-30 ENCOUNTER — OFFICE VISIT (OUTPATIENT)
Dept: CHIROPRACTIC MEDICINE | Facility: OTHER | Age: 69
End: 2024-04-30
Attending: CHIROPRACTOR
Payer: COMMERCIAL

## 2024-04-30 DIAGNOSIS — M99.01 SEGMENTAL AND SOMATIC DYSFUNCTION OF CERVICAL REGION: Primary | ICD-10-CM

## 2024-04-30 DIAGNOSIS — M54.2 CERVICALGIA: ICD-10-CM

## 2024-04-30 DIAGNOSIS — M99.02 SEGMENTAL AND SOMATIC DYSFUNCTION OF THORACIC REGION: ICD-10-CM

## 2024-04-30 DIAGNOSIS — M99.03 SEGMENTAL AND SOMATIC DYSFUNCTION OF LUMBAR REGION: ICD-10-CM

## 2024-04-30 PROCEDURE — 98941 CHIROPRACT MANJ 3-4 REGIONS: CPT | Mod: AT | Performed by: CHIROPRACTOR

## 2024-05-01 NOTE — PROGRESS NOTES
Subjective Finding:    Chief compalint: Patient presents with:  Back Pain: With neck pain from standing   , Pain Scale: 4/10, Intensity: dull, Duration: 4 days, Radiating: bilateral buttock.    Date of injury:     Activities that the pain restricts:   Home/household/hobbies/social activities: Yes.  Work duties: No.  Sleep: No.  Makes symptoms better: rest.  Makes symptoms worse: walking.  Have you seen anyone else for the symptoms? No.  Work related: No.  Automobile related injury: No.    Objective and Assessment:    Posture Analysis:   High shoulder: .  Head tilt: .  High iliac crest: .  Head carriage: neutral.  Thoracic Kyphosis: neutral.  Lumbar Lordosis: forward.    Lumbar Range of Motion: extension decreased.  Cervical Range of Motion: left lateral flexion decreased and right lateral flexion decreased.  Thoracic Range of Motion: .  Extremity Range of Motion: .    Palpation:   Quad lumb: bilateral, referred pain: no  Traps: sharp pain, referred pain: no    Segmental dysfunction pre-treatment and treatment area: C6, C7, T4, and L5.    Assessment post-treatment:  Cervical: ROM increased.  Thoracic: ROM increased.  Lumbar: ROM increased.    Comments: .      Complicating Factors: .    Procedure(s):  CMT:  13365 Chiropractic manipulative treatment 3-4 regions performed   Cervical: Diversified, See above for level, Supine, Thoracic: Diversified, See above for level, Prone, and Lumbar: Diversified, See above for level, Side posture    Modalities:  None performed this visit    Therapeutic procedures:  None    Plan:  Treatment plan: PRN.  Instructed patient: stretch as instructed at visit.  Short term goals: reduce pain.  Long term goals: restore normal function.  Prognosis: very good.

## 2024-05-02 ENCOUNTER — THERAPY VISIT (OUTPATIENT)
Dept: PHYSICAL THERAPY | Facility: HOSPITAL | Age: 69
End: 2024-05-02
Attending: NURSE PRACTITIONER
Payer: MEDICARE

## 2024-05-02 DIAGNOSIS — N39.3 SUI (STRESS URINARY INCONTINENCE, FEMALE): ICD-10-CM

## 2024-05-02 DIAGNOSIS — N81.2 CYSTOCELE AND RECTOCELE WITH INCOMPLETE UTEROVAGINAL PROLAPSE: Primary | ICD-10-CM

## 2024-05-02 PROCEDURE — 97530 THERAPEUTIC ACTIVITIES: CPT | Mod: GP

## 2024-05-16 ENCOUNTER — THERAPY VISIT (OUTPATIENT)
Dept: PHYSICAL THERAPY | Facility: HOSPITAL | Age: 69
End: 2024-05-16
Attending: NURSE PRACTITIONER
Payer: MEDICARE

## 2024-05-16 DIAGNOSIS — N81.2 CYSTOCELE AND RECTOCELE WITH INCOMPLETE UTEROVAGINAL PROLAPSE: Primary | ICD-10-CM

## 2024-05-16 DIAGNOSIS — N39.3 SUI (STRESS URINARY INCONTINENCE, FEMALE): ICD-10-CM

## 2024-05-16 PROCEDURE — 97530 THERAPEUTIC ACTIVITIES: CPT | Mod: GP

## 2024-06-21 ENCOUNTER — HOSPITAL ENCOUNTER (EMERGENCY)
Facility: HOSPITAL | Age: 69
Discharge: HOME | End: 2024-06-22
Attending: EMERGENCY MEDICINE | Admitting: EMERGENCY MEDICINE
Payer: MEDICARE

## 2024-06-21 DIAGNOSIS — R31.9 HEMATURIA, UNSPECIFIED TYPE: ICD-10-CM

## 2024-06-21 PROCEDURE — 99284 EMERGENCY DEPT VISIT MOD MDM: CPT | Mod: 25 | Performed by: EMERGENCY MEDICINE

## 2024-06-21 PROCEDURE — 99284 EMERGENCY DEPT VISIT MOD MDM: CPT | Mod: 25

## 2024-06-21 ASSESSMENT — COLUMBIA-SUICIDE SEVERITY RATING SCALE - C-SSRS
2. HAVE YOU ACTUALLY HAD ANY THOUGHTS OF KILLING YOURSELF IN THE PAST MONTH?: NO
6. HAVE YOU EVER DONE ANYTHING, STARTED TO DO ANYTHING, OR PREPARED TO DO ANYTHING TO END YOUR LIFE?: NO
1. IN THE PAST MONTH, HAVE YOU WISHED YOU WERE DEAD OR WISHED YOU COULD GO TO SLEEP AND NOT WAKE UP?: NO

## 2024-06-22 ENCOUNTER — APPOINTMENT (OUTPATIENT)
Dept: ULTRASOUND IMAGING | Facility: HOSPITAL | Age: 69
End: 2024-06-22
Attending: EMERGENCY MEDICINE
Payer: MEDICARE

## 2024-06-22 VITALS
OXYGEN SATURATION: 95 % | TEMPERATURE: 97.7 F | SYSTOLIC BLOOD PRESSURE: 125 MMHG | RESPIRATION RATE: 16 BRPM | DIASTOLIC BLOOD PRESSURE: 72 MMHG | HEART RATE: 62 BPM

## 2024-06-22 LAB
ALBUMIN UR-MCNC: 100 MG/DL
APPEARANCE UR: ABNORMAL
BILIRUB UR QL STRIP: NEGATIVE
COLOR UR AUTO: YELLOW
GLUCOSE UR STRIP-MCNC: NEGATIVE MG/DL
HGB UR QL STRIP: ABNORMAL
KETONES UR STRIP-MCNC: NEGATIVE MG/DL
LEUKOCYTE ESTERASE UR QL STRIP: ABNORMAL
MUCOUS THREADS #/AREA URNS LPF: PRESENT /LPF
NITRATE UR QL: NEGATIVE
PH UR STRIP: 5.5 [PH] (ref 4.7–8)
RBC URINE: >182 /HPF
SP GR UR STRIP: 1.02 (ref 1–1.03)
SQUAMOUS EPITHELIAL: 0 /HPF
UROBILINOGEN UR STRIP-MCNC: NORMAL MG/DL
WBC URINE: 4 /HPF

## 2024-06-22 PROCEDURE — 87086 URINE CULTURE/COLONY COUNT: CPT | Performed by: EMERGENCY MEDICINE

## 2024-06-22 PROCEDURE — 76705 ECHO EXAM OF ABDOMEN: CPT | Mod: 26 | Performed by: EMERGENCY MEDICINE

## 2024-06-22 PROCEDURE — 81001 URINALYSIS AUTO W/SCOPE: CPT | Performed by: EMERGENCY MEDICINE

## 2024-06-22 PROCEDURE — 76705 ECHO EXAM OF ABDOMEN: CPT | Mod: TC

## 2024-06-22 PROCEDURE — 96360 HYDRATION IV INFUSION INIT: CPT

## 2024-06-22 PROCEDURE — 258N000003 HC RX IP 258 OP 636: Mod: JZ | Performed by: EMERGENCY MEDICINE

## 2024-06-22 RX ADMIN — SODIUM CHLORIDE 1000 ML: 9 INJECTION, SOLUTION INTRAVENOUS at 01:23

## 2024-06-22 ASSESSMENT — ACTIVITIES OF DAILY LIVING (ADL)
ADLS_ACUITY_SCORE: 35
ADLS_ACUITY_SCORE: 35

## 2024-06-22 NOTE — ED TRIAGE NOTES
Patient presents with c/o having bladder botox for overactive bladder on 6/12/24. Patient reports that Friday she felt like she developed a UTI, was seen Monday at that time was retaining urine and had a UTI. Calderon was placed on Monday, no complications until today. Patient reports internal right sided groin pain, when sitting patient patient reports burning in urethra. Patient reports that urine appears darker than it was previously, denies any changes in frequency of urine or amount.

## 2024-06-23 ASSESSMENT — ENCOUNTER SYMPTOMS
CHILLS: 0
SHORTNESS OF BREATH: 0
COUGH: 0
FEVER: 0

## 2024-06-23 NOTE — ED PROVIDER NOTES
History     Chief Complaint   Patient presents with    Catheter Problem     HPI  Aileen Szymanski is a 69 year old female who is here with concern for hematuria.  Had a Calderon placed not too long ago after she had some urinary retention.  Not a lot of urine is come out so she is concerned there is a problem with the Calderon and is concerned that she continues to have bladder spasms.  Has received Botox injections for the bladder spasms.    Allergies:  Allergies   Allergen Reactions    Lovenox [Enoxaparin] Other (See Comments)     Bleeding    Estradiol Other (See Comments)     Vaginal itching from cream       Problem List:    Patient Active Problem List    Diagnosis Date Noted    Pelvic floor dysfunction in female 11/30/2023     Priority: Medium    Cystocele with rectocele 04/18/2023     Priority: Medium     Formatting of this note might be different from the original.  Added automatically from request for surgery [id number]      Uterine mass 03/14/2022     Priority: Medium    Vitamin D deficiency 03/14/2022     Priority: Medium    Cystocele and rectocele with incomplete uterovaginal prolapse 01/17/2022     Priority: Medium     Formatting of this note might be different from the original.  Added automatically from request for surgery [id number]      DEIRDRE (stress urinary incontinence, female) 01/17/2022     Priority: Medium     Formatting of this note might be different from the original.  Added automatically from request for surgery [id number]      Failure of total knee arthroplasty, sequela 04/22/2021     Priority: Medium     Formatting of this note might be different from the original.  Added automatically from request for surgery [id number]      Prosthetic joint infection (H24) 02/18/2020     Priority: Medium    Arthrofibrosis of knee joint, left 12/27/2019     Priority: Medium     Added automatically from request for surgery [id number]      Major depressive disorder without psychotic features 08/31/2017     Priority: Medium     Mixed hyperlipidemia 09/19/2016     Priority: Medium    Lung granuloma (H) 03/27/2013     Priority: Medium     Overview:   Noted on chest xray 3/2013  Noted on chest xray 3/2013      Intramural leiomyoma of uterus 03/07/2011     Priority: Medium    Osteoarthrosis, pelvic region and thigh 12/05/2006     Priority: Medium     Overview:   IMO Update 10/11  IMO Update 10/11          Past Medical History:    Past Medical History:   Diagnosis Date    Arthrofibrosis of knee joint, left 12/27/2019    Depressive disorder     Dizziness and giddiness 07/31/2007    Enthesopathy of knee, unspecified 06/13/2002    Failed total left knee replacement (H24) 12/27/2019    History of blood transfusion 2014?    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       Past Surgical History:    Past Surgical History:   Procedure Laterality Date    ABDOMEN SURGERY  2007    ARTHROPLASTY REVISION KNEE Left 10/2016    CLOSED REDUCTION NOSE N/A 11/07/2017    Procedure: CLOSED REDUCTION NOSE;  CLOSED REDUCTION NASAL SEPTAL FRACTURE;  Surgeon: Destiny Vanegas MD;  Location: [facility]    COLONOSCOPY  04/13/2010    Dr Quevedo; negative     ERCP W/ PLASTIC STENT PLACEMENT N/A 01/24/2008    ERCP W/ SPHICTEROTOMY N/A 12/10/2007    GENITOURINARY SURGERY  4/8//22    HYSTERECTOMY N/A 04/08/2022    HYSTEROSCOPY DIAGNOSTIC N/A 04/27/2011    LAPAROSCOPIC CHOLECYSTECTOMY N/A  12/03/2007    LAPAROSCOPIC GASTRIC BANDING N/A 04/23/2008    LAPAROSCOPIC TUBAL LIGATION Bilateral 1978    left knee replacement  07/21/2014    Dr Quinones/ [facility]    REPLACEMENT TOTAL KNEE Left 05/14/2021    Complex revision left total knee    REPLACEMENT TOTAL KNEE Left 05/06/2020    left knee replacement after infection       Family History:    Family History   Problem Relation Age of Onset    Lung Cancer Mother     Heart Disease Mother     Other Cancer Mother         Heart disease and bone cancer    Chronic Obstructive Pulmonary Disease Father     Other Cancer Father         Lung and COPD    Osteoporosis Paternal Grandmother     Other Cancer Sister         Lung and brain cancer       Social History:  Marital Status:   [2]  Social History     Tobacco Use    Smoking status: Never    Smokeless tobacco: Never   Vaping Use    Vaping status: Never Used   Substance Use Topics    Alcohol use: Yes     Comment: Rarely    Drug use: No        Medications:    acetaminophen (TYLENOL) 500 MG tablet  amoxicillin (AMOXIL) 500 MG capsule  clonazePAM (KLONOPIN) 1 MG tablet  escitalopram (LEXAPRO) 20 MG tablet  LINZESS 145 MCG capsule  multivitamin, therapeutic (THERA-VIT) TABS tablet  simvastatin (ZOCOR) 40 MG tablet  tolterodine (DETROL) 2 MG tablet  traZODone (DESYREL) 50 MG tablet          Review of Systems   Constitutional:  Negative for chills and fever.   Respiratory:  Negative for cough and shortness of breath.    All other systems reviewed and are negative.      Physical Exam   BP: 127/77  Pulse: 63  Temp: 97.7  F (36.5  C)  Resp: 16  SpO2: 95 %      Physical Exam  Constitutional:       General: She is not in acute distress.     Appearance: She is not diaphoretic.   HENT:      Head: Normocephalic and atraumatic.      Right Ear: External ear normal.      Left Ear: External ear normal.      Nose: No congestion or rhinorrhea.      Mouth/Throat:      Pharynx: Oropharynx is clear. No oropharyngeal exudate.   Eyes:       General: No scleral icterus.     Pupils: Pupils are equal, round, and reactive to light.   Cardiovascular:      Rate and Rhythm: Normal rate and regular rhythm.      Heart sounds: Normal heart sounds.   Pulmonary:      Effort: No respiratory distress.      Breath sounds: Normal breath sounds.   Abdominal:      General: Bowel sounds are normal.      Palpations: Abdomen is soft.      Tenderness: There is no abdominal tenderness.   Musculoskeletal:         General: No tenderness.      Cervical back: Normal range of motion and neck supple.      Right lower leg: No edema.      Left lower leg: No edema.   Skin:     General: Skin is warm.      Capillary Refill: Capillary refill takes less than 2 seconds.      Findings: No rash.   Neurological:      Mental Status: Mental status is at baseline.      Cranial Nerves: No cranial nerve deficit.   Psychiatric:         Mood and Affect: Mood normal.         Behavior: Behavior normal.         ED Course        Procedures    Results for orders placed during the hospital encounter of 06/21/24    POC US ABDOMEN LIMITED    Impression  [facility] Procedure Note    Limited Bedside ED Ultrasound of the Urinary Bladder:    PERFORMED BY: Dr. Isma Sutton MD  INDICATIONS:  Possible obstrucion and/or mass  PROBE: Low frequency convex probe  BODY LOCATION:  Abdomen  FINDINGS:  Visualization of the bladder in longitudinal and transverse views demonstrated a contracted state.  The bladder dimensions measured:  cm width X  height cm X  cm length.  INTERPRETATION:  Total calculated volume was estimated at 0 cc.  The evaluation was normal without evidence of obstruction or mass.  No bladder distention noted. Calderon balloon in place.  IMAGE DOCUMENTATION: Images were archived to PACs system.           Critical Care time:               No results found for this or any previous visit (from the past 24 hour(s)).    Medications   sodium chloride 0.9% BOLUS 1,000 mL (0 mLs Intravenous Stopped  6/22/24 0074)       Assessments & Plan (with Medical Decision Making)     I have reviewed the nursing notes.    I have reviewed the findings, diagnosis, plan and need for follow up with the patient.          Medical Decision Making  The patient's presentation was of moderate complexity (a chronic illness mild to moderate exacerbation, progression, or side effect of treatment).    The patient's evaluation involved:  ordering and/or review of 3+ test(s) in this encounter (see separate area of note for details)    The patient's management necessitated moderate risk (prescription drug management including medications given in the ED).    69-year-old female here with bladder spasms and hematuria.  Patient was placed on correct antibiotics, I verify sensitivities.  Calderon is definitely in bladder as verified by me ultrasound.  Dark urine started to clear after.  Patient was given 1 bag of IV fluids.  I recommended that she follow-up with her urologist early this coming week.  As well as continue her antibiotics.    Discharge Medication List as of 6/22/2024  2:17 AM          Final diagnoses:   Hematuria, unspecified type       6/21/2024   [facility]       Isma Sutton MD  06/23/24 2279

## 2024-06-24 LAB — BACTERIA UR CULT: NO GROWTH

## 2024-07-16 ENCOUNTER — HOSPITAL ENCOUNTER (EMERGENCY)
Facility: HOSPITAL | Age: 69
Discharge: HOME | End: 2024-07-16
Payer: MEDICARE

## 2024-07-16 VITALS
HEART RATE: 65 BPM | SYSTOLIC BLOOD PRESSURE: 143 MMHG | BODY MASS INDEX: 32.26 KG/M2 | WEIGHT: 193.6 LBS | HEIGHT: 65 IN | TEMPERATURE: 97.9 F | DIASTOLIC BLOOD PRESSURE: 69 MMHG | OXYGEN SATURATION: 98 % | RESPIRATION RATE: 18 BRPM

## 2024-07-16 DIAGNOSIS — J01.90 ACUTE SINUSITIS WITH SYMPTOMS > 10 DAYS: ICD-10-CM

## 2024-07-16 LAB
FLUAV RNA SPEC QL NAA+PROBE: NEGATIVE
FLUBV RNA RESP QL NAA+PROBE: NEGATIVE
RSV RNA SPEC NAA+PROBE: NEGATIVE
SARS-COV-2 RNA RESP QL NAA+PROBE: NEGATIVE

## 2024-07-16 PROCEDURE — 99213 OFFICE O/P EST LOW 20 MIN: CPT

## 2024-07-16 PROCEDURE — 87637 SARSCOV2&INF A&B&RSV AMP PRB: CPT

## 2024-07-16 PROCEDURE — G0463 HOSPITAL OUTPT CLINIC VISIT: HCPCS

## 2024-07-16 RX ORDER — FLUTICASONE PROPIONATE 50 MCG
1 SPRAY, SUSPENSION (ML) NASAL DAILY
Qty: 16 G | Refills: 0 | Status: SHIPPED | OUTPATIENT
Start: 2024-07-16 | End: 2024-07-21

## 2024-07-16 RX ORDER — BENZONATATE 100 MG/1
100 CAPSULE ORAL 3 TIMES DAILY PRN
Qty: 21 CAPSULE | Refills: 0 | Status: SHIPPED | OUTPATIENT
Start: 2024-07-16

## 2024-07-16 ASSESSMENT — ENCOUNTER SYMPTOMS
COUGH: 1
SINUS PRESSURE: 1
CHILLS: 0
SINUS PAIN: 1
TROUBLE SWALLOWING: 0
APPETITE CHANGE: 0
ACTIVITY CHANGE: 0
SHORTNESS OF BREATH: 0
FEVER: 0

## 2024-07-16 ASSESSMENT — COLUMBIA-SUICIDE SEVERITY RATING SCALE - C-SSRS
1. IN THE PAST MONTH, HAVE YOU WISHED YOU WERE DEAD OR WISHED YOU COULD GO TO SLEEP AND NOT WAKE UP?: NO
2. HAVE YOU ACTUALLY HAD ANY THOUGHTS OF KILLING YOURSELF IN THE PAST MONTH?: NO
6. HAVE YOU EVER DONE ANYTHING, STARTED TO DO ANYTHING, OR PREPARED TO DO ANYTHING TO END YOUR LIFE?: NO

## 2024-07-16 NOTE — DISCHARGE INSTRUCTIONS
Augmentin 2 times daily for 7 days. Yogurt or probiotic while taking.  Flonase nasal spray once daily for 5 days.   Tessalon every 8 hours as needed for cough.   Return with any new or concerning symptoms.   Follow up in the clinic as needed.

## 2024-07-16 NOTE — ED TRIAGE NOTES
"Patient presents with complaints of a cough and sinus pressure for about 2 weeks now. She states that she's gotten a cough in the last 3 days as well and this am she has the reddened eyes. \"Well I brought my  in a few weeks ago for his sinus issues as he's been sick for over a month and he got a CT scan\"     Triage Assessment (Adult)       Row Name 07/16/24 0759          Peripheral/Neurovascular WDL    Peripheral Neurovascular WDL WDL        Cognitive/Neuro/Behavioral WDL    Cognitive/Neuro/Behavioral WDL WDL                     "

## 2024-07-16 NOTE — ED PROVIDER NOTES
History     Chief Complaint   Patient presents with    Cough    Nasal Congestion     HPI  Aileen Szymanski is a 69 year old female who presents to the urgent care with a 2 week history of sinus pressure and congestion. Has had an increased cough for the last 3 days with redness to eyes. She denies fevers, chills, chest pain, shortness of breath, and decreased oral intake. No recent abx. Has been taking advil cold and sinus once per day with relief along with nyquil.     Allergies:  Allergies   Allergen Reactions    Lovenox [Enoxaparin] Other (See Comments)     Bleeding    Estradiol Other (See Comments)     Vaginal itching from cream       Problem List:    Patient Active Problem List    Diagnosis Date Noted    Pelvic floor dysfunction in female 11/30/2023     Priority: Medium    Cystocele with rectocele 04/18/2023     Priority: Medium     Formatting of this note might be different from the original.  Added automatically from request for surgery [id number]      Uterine mass 03/14/2022     Priority: Medium    Vitamin D deficiency 03/14/2022     Priority: Medium    Cystocele and rectocele with incomplete uterovaginal prolapse 01/17/2022     Priority: Medium     Formatting of this note might be different from the original.  Added automatically from request for surgery [id number]      DEIRDRE (stress urinary incontinence, female) 01/17/2022     Priority: Medium     Formatting of this note might be different from the original.  Added automatically from request for surgery [id number]      Failure of total knee arthroplasty, sequela 04/22/2021     Priority: Medium     Formatting of this note might be different from the original.  Added automatically from request for surgery [id number]      Prosthetic joint infection (H24) 02/18/2020     Priority: Medium    Arthrofibrosis of knee joint, left 12/27/2019     Priority: Medium     Added automatically from request for surgery [id number]      Major depressive disorder without psychotic features  08/31/2017     Priority: Medium    Mixed hyperlipidemia 09/19/2016     Priority: Medium    Lung granuloma (H) 03/27/2013     Priority: Medium     Overview:   Noted on chest xray 3/2013  Noted on chest xray 3/2013      Intramural leiomyoma of uterus 03/07/2011     Priority: Medium    Osteoarthrosis, pelvic region and thigh 12/05/2006     Priority: Medium     Overview:   IMO Update 10/11  IMO Update 10/11          Past Medical History:    Past Medical History:   Diagnosis Date    Arthrofibrosis of knee joint, left 12/27/2019    Depressive disorder     Dizziness and giddiness 07/31/2007    Enthesopathy of knee, unspecified 06/13/2002    Failed total left knee replacement (H24) 12/27/2019    History of blood transfusion 2014?    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       Past Surgical History:    Past Surgical History:   Procedure Laterality Date    ABDOMEN SURGERY  2007    ARTHROPLASTY REVISION KNEE Left 10/2016    CLOSED REDUCTION NOSE N/A 11/07/2017    Procedure: CLOSED REDUCTION NOSE;  CLOSED REDUCTION NASAL SEPTAL FRACTURE;  Surgeon: Destiny Vanegas MD;  Location: [facility]    COLONOSCOPY  04/13/2010    Dr Quevedo; negative     ERCP W/ PLASTIC STENT PLACEMENT N/A 01/24/2008    ERCP W/ SPHICTEROTOMY N/A 12/10/2007    GENITOURINARY SURGERY  4/8//22    HYSTERECTOMY N/A 04/08/2022    HYSTEROSCOPY DIAGNOSTIC N/A 04/27/2011     LAPAROSCOPIC CHOLECYSTECTOMY N/A 12/03/2007    LAPAROSCOPIC GASTRIC BANDING N/A 04/23/2008    LAPAROSCOPIC TUBAL LIGATION Bilateral 1978    left knee replacement  07/21/2014    Dr Quinones/ [facility]    REPLACEMENT TOTAL KNEE Left 05/14/2021    Complex revision left total knee    REPLACEMENT TOTAL KNEE Left 05/06/2020    left knee replacement after infection       Family History:    Family History   Problem Relation Age of Onset    Lung Cancer Mother     Heart Disease Mother     Other Cancer Mother         Heart disease and bone cancer    Chronic Obstructive Pulmonary Disease Father     Other Cancer Father         Lung and COPD    Osteoporosis Paternal Grandmother     Other Cancer Sister         Lung and brain cancer       Social History:  Marital Status:   [2]  Social History     Tobacco Use    Smoking status: Never    Smokeless tobacco: Never   Vaping Use    Vaping status: Never Used   Substance Use Topics    Alcohol use: Yes     Comment: Rarely    Drug use: No        Medications:    amoxicillin-clavulanate (AUGMENTIN) 875-125 MG tablet  benzonatate (TESSALON) 100 MG capsule  fluticasone (FLONASE) 50 MCG/ACT nasal spray  acetaminophen (TYLENOL) 500 MG tablet  amoxicillin (AMOXIL) 500 MG capsule  clonazePAM (KLONOPIN) 1 MG tablet  escitalopram (LEXAPRO) 20 MG tablet  LINZESS 145 MCG capsule  multivitamin, therapeutic (THERA-VIT) TABS tablet  simvastatin (ZOCOR) 40 MG tablet  tolterodine (DETROL) 2 MG tablet  traZODone (DESYREL) 50 MG tablet          Review of Systems   Constitutional:  Negative for activity change, appetite change, chills and fever.   HENT:  Positive for congestion, sinus pressure and sinus pain. Negative for ear pain and trouble swallowing.    Respiratory:  Positive for cough. Negative for shortness of breath.    Cardiovascular:  Negative for chest pain.   All other systems reviewed and are negative.      Physical Exam   BP: 143/69  Pulse: 65  Temp: 97.9  F (36.6  C)  Resp: 18  Height:  "165.1 cm (5' 5\")  Weight: 87.8 kg (193 lb 9.6 oz)  SpO2: 98 %      Physical Exam  Vitals and nursing note reviewed.   Constitutional:       General: She is not in acute distress.     Appearance: Normal appearance. She is not ill-appearing or toxic-appearing.   HENT:      Head:      Jaw: No trismus.      Right Ear: Tympanic membrane is not erythematous.      Left Ear: Tympanic membrane is not erythematous.      Nose: Congestion present.      Right Sinus: Maxillary sinus tenderness and frontal sinus tenderness present.      Left Sinus: Maxillary sinus tenderness and frontal sinus tenderness present.      Mouth/Throat:      Mouth: Mucous membranes are moist.      Pharynx: Oropharynx is clear. No oropharyngeal exudate or posterior oropharyngeal erythema.   Eyes:      General:         Right eye: No discharge.         Left eye: No discharge.      Conjunctiva/sclera:      Right eye: Right conjunctiva is injected.      Left eye: Left conjunctiva is injected.   Cardiovascular:      Rate and Rhythm: Normal rate and regular rhythm.      Heart sounds: Normal heart sounds. No murmur heard.  Pulmonary:      Effort: Pulmonary effort is normal.      Breath sounds: Normal breath sounds. No wheezing, rhonchi or rales.   Neurological:      Mental Status: She is alert.         ED Course        Procedures       No results found for this or any previous visit (from the past 24 hour(s)).    Medications - No data to display    Assessments & Plan (with Medical Decision Making)     I have reviewed the nursing notes.    I have reviewed the findings, diagnosis, plan and need for follow up with the patient.  Aileen Szymanski is a 69 year old female who presents to the urgent care with a 2 week history of sinus pressure and congestion. Has had an increased cough for the last 3 days with redness to eyes. She denies fevers, chills, chest pain, shortness of breath, and decreased oral intake. No recent abx. Has been taking advil cold and sinus once " per day with relief along with nyquil.     MDM: vital signs normal, afebrile. Non toxic in appearance with no noted distress. Lungs clear, heart tones regular. Given her length of symptoms with sinus pressure and congestion, will treat with augmentin, floanse, and tessalon. Shared decision making to not do a cxr today. Supportive measures and return precautions discussed. She is in agreement with plan.     (J01.90) Acute sinusitis with symptoms > 10 days  Plan: Augmentin 2 times daily for 7 days. Yogurt or probiotic while taking.  Flonase nasal spray once daily for 5 days.   Tessalon every 8 hours as needed for cough.   Return with any new or concerning symptoms.   Follow up in the clinic as needed. Understanding verbalized.     Discharge Medication List as of 7/16/2024  9:33 AM        START taking these medications    Details   amoxicillin-clavulanate (AUGMENTIN) 875-125 MG tablet Take 1 tablet by mouth 2 times daily for 7 days, Disp-14 tablet, R-0, E-Prescribe      benzonatate (TESSALON) 100 MG capsule Take 1 capsule (100 mg) by mouth 3 times daily as needed for cough, Disp-21 capsule, R-0, E-Prescribe      fluticasone (FLONASE) 50 MCG/ACT nasal spray Spray 1 spray into both nostrils daily for 5 days, Disp-16 g, R-0, E-Prescribe             Final diagnoses:   Acute sinusitis with symptoms > 10 days       7/16/2024   [facility]       Rani Billingsley NP  07/16/24 0943

## 2024-08-03 ENCOUNTER — HOSPITAL ENCOUNTER (EMERGENCY)
Facility: HOSPITAL | Age: 69
Discharge: HOME | End: 2024-08-03
Attending: NURSE PRACTITIONER | Admitting: NURSE PRACTITIONER
Payer: MEDICARE

## 2024-08-03 VITALS
SYSTOLIC BLOOD PRESSURE: 144 MMHG | TEMPERATURE: 97.7 F | HEART RATE: 76 BPM | OXYGEN SATURATION: 96 % | DIASTOLIC BLOOD PRESSURE: 83 MMHG | RESPIRATION RATE: 18 BRPM

## 2024-08-03 DIAGNOSIS — N30.01 ACUTE CYSTITIS WITH HEMATURIA: Primary | ICD-10-CM

## 2024-08-03 LAB
ALBUMIN UR-MCNC: 20 MG/DL
APPEARANCE UR: ABNORMAL
BACTERIA #/AREA URNS HPF: ABNORMAL /HPF
BILIRUB UR QL STRIP: NEGATIVE
COLOR UR AUTO: YELLOW
GLUCOSE UR STRIP-MCNC: NEGATIVE MG/DL
HGB UR QL STRIP: ABNORMAL
KETONES UR STRIP-MCNC: NEGATIVE MG/DL
LEUKOCYTE ESTERASE UR QL STRIP: ABNORMAL
MUCOUS THREADS #/AREA URNS LPF: PRESENT /LPF
NITRATE UR QL: NEGATIVE
PH UR STRIP: 5 [PH] (ref 4.7–8)
RBC URINE: 7 /HPF
SP GR UR STRIP: 1.02 (ref 1–1.03)
SQUAMOUS EPITHELIAL: 0 /HPF
TRANSITIONAL EPI: 3 /HPF
UROBILINOGEN UR STRIP-MCNC: NORMAL MG/DL
WBC CLUMPS #/AREA URNS HPF: PRESENT /HPF
WBC URINE: >182 /HPF

## 2024-08-03 PROCEDURE — 87086 URINE CULTURE/COLONY COUNT: CPT | Performed by: NURSE PRACTITIONER

## 2024-08-03 PROCEDURE — 81001 URINALYSIS AUTO W/SCOPE: CPT | Performed by: NURSE PRACTITIONER

## 2024-08-03 PROCEDURE — 87186 SC STD MICRODIL/AGAR DIL: CPT | Performed by: NURSE PRACTITIONER

## 2024-08-03 PROCEDURE — 99213 OFFICE O/P EST LOW 20 MIN: CPT | Performed by: NURSE PRACTITIONER

## 2024-08-03 PROCEDURE — G0463 HOSPITAL OUTPT CLINIC VISIT: HCPCS

## 2024-08-03 RX ORDER — NITROFURANTOIN 25; 75 MG/1; MG/1
100 CAPSULE ORAL 2 TIMES DAILY
Qty: 10 CAPSULE | Refills: 0 | Status: SHIPPED | OUTPATIENT
Start: 2024-08-03 | End: 2024-08-08

## 2024-08-03 ASSESSMENT — ENCOUNTER SYMPTOMS
ABDOMINAL PAIN: 0
HEMATURIA: 1
WEAKNESS: 0
FREQUENCY: 0
CHILLS: 0
DIARRHEA: 0
NAUSEA: 0
FLANK PAIN: 0
VOMITING: 0
DYSURIA: 1
PSYCHIATRIC NEGATIVE: 1
HEADACHES: 0
FEVER: 0
SHORTNESS OF BREATH: 0

## 2024-08-03 NOTE — ED PROVIDER NOTES
History     Chief Complaint   Patient presents with    Urinary Problem     HPI  Aileen Szymanski is a 69 year old female who presents to urgent care today (ambulatory) with complaints of dysuria and urinary retention.  Second bladder sling placed on 5/2023, had several catheters and now self cath since 3-4 times per day.  Denies any fever, chills, nausea, vomiting, diarrhea, SOB or chest pain.  Denies any abdominal pain, pelvic pain or flank pain.  Denies any genital sores, rashes, irritation or redness.  Has scheduled urology appointment coming up.  No other concerns.    Allergies:  Allergies   Allergen Reactions    Lovenox [Enoxaparin] Other (See Comments)     Bleeding    Estradiol Other (See Comments)     Vaginal itching from cream       Problem List:    Patient Active Problem List    Diagnosis Date Noted    Pelvic floor dysfunction in female 11/30/2023     Priority: Medium    Cystocele with rectocele 04/18/2023     Priority: Medium     Formatting of this note might be different from the original.  Added automatically from request for surgery [id number]      Uterine mass 03/14/2022     Priority: Medium    Vitamin D deficiency 03/14/2022     Priority: Medium    Cystocele and rectocele with incomplete uterovaginal prolapse 01/17/2022     Priority: Medium     Formatting of this note might be different from the original.  Added automatically from request for surgery [id number]      DEIRDRE (stress urinary incontinence, female) 01/17/2022     Priority: Medium     Formatting of this note might be different from the original.  Added automatically from request for surgery [id number]      Failure of total knee arthroplasty, sequela 04/22/2021     Priority: Medium     Formatting of this note might be different from the original.  Added automatically from request for surgery [id number]      Prosthetic joint infection (H24) 02/18/2020     Priority: Medium    Arthrofibrosis of knee joint, left 12/27/2019     Priority: Medium     Added  automatically from request for surgery [id number]      Major depressive disorder without psychotic features 08/31/2017     Priority: Medium    Mixed hyperlipidemia 09/19/2016     Priority: Medium    Lung granuloma (H) 03/27/2013     Priority: Medium     Overview:   Noted on chest xray 3/2013  Noted on chest xray 3/2013      Intramural leiomyoma of uterus 03/07/2011     Priority: Medium    Osteoarthrosis, pelvic region and thigh 12/05/2006     Priority: Medium     Overview:   IMO Update 10/11  IMO Update 10/11          Past Medical History:    Past Medical History:   Diagnosis Date    Arthrofibrosis of knee joint, left 12/27/2019    Depressive disorder     Dizziness and giddiness 07/31/2007    Enthesopathy of knee, unspecified 06/13/2002    Failed total left knee replacement (H24) 12/27/2019    History of blood transfusion 2014?    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       Past Surgical History:    Past Surgical History:   Procedure Laterality Date    ABDOMEN SURGERY  2007    ARTHROPLASTY REVISION KNEE Left 10/2016    CLOSED REDUCTION NOSE N/A 11/07/2017    Procedure: CLOSED REDUCTION NOSE;  CLOSED REDUCTION NASAL SEPTAL FRACTURE;  Surgeon: Destiny Vanegas MD;  Location: [facility]    COLONOSCOPY  04/13/2010    Dr Quevedo; negative     ERCP W/ PLASTIC STENT PLACEMENT N/A 01/24/2008    ERCP W/ SPHICTEROTOMY N/A 12/10/2007     GENITOURINARY SURGERY  4/8//22    HYSTERECTOMY N/A 04/08/2022    HYSTEROSCOPY DIAGNOSTIC N/A 04/27/2011    LAPAROSCOPIC CHOLECYSTECTOMY N/A 12/03/2007    LAPAROSCOPIC GASTRIC BANDING N/A 04/23/2008    LAPAROSCOPIC TUBAL LIGATION Bilateral 1978    left knee replacement  07/21/2014    Dr Quinones/ [place] [facility]    REPLACEMENT TOTAL KNEE Left 05/14/2021    Complex revision left total knee    REPLACEMENT TOTAL KNEE Left 05/06/2020    left knee replacement after infection       Family History:    Family History   Problem Relation Age of Onset    Lung Cancer Mother     Heart Disease Mother     Other Cancer Mother         Heart disease and bone cancer    Chronic Obstructive Pulmonary Disease Father     Other Cancer Father         Lung and COPD    Osteoporosis Paternal Grandmother     Other Cancer Sister         Lung and brain cancer       Social History:  Marital Status:   [2]  Social History     Tobacco Use    Smoking status: Never    Smokeless tobacco: Never   Vaping Use    Vaping status: Never Used   Substance Use Topics    Alcohol use: Yes     Comment: Rarely    Drug use: No        Medications:    benzonatate (TESSALON) 100 MG capsule  escitalopram (LEXAPRO) 20 MG tablet  nitroFURantoin macrocrystal-monohydrate (MACROBID) 100 MG capsule  simvastatin (ZOCOR) 40 MG tablet  acetaminophen (TYLENOL) 500 MG tablet  amoxicillin (AMOXIL) 500 MG capsule  clonazePAM (KLONOPIN) 1 MG tablet  LINZESS 145 MCG capsule  multivitamin, therapeutic (THERA-VIT) TABS tablet  tolterodine (DETROL) 2 MG tablet  traZODone (DESYREL) 50 MG tablet      Review of Systems   Constitutional:  Negative for chills and fever.   Respiratory:  Negative for shortness of breath.    Cardiovascular:  Negative for chest pain.   Gastrointestinal:  Negative for abdominal pain, diarrhea, nausea and vomiting.   Genitourinary:  Positive for decreased urine volume, dysuria and hematuria. Negative for flank pain, frequency, genital sores, pelvic pain, vaginal  bleeding, vaginal discharge and vaginal pain.   Musculoskeletal:  Negative for gait problem.   Skin:  Negative for rash.   Neurological:  Negative for weakness and headaches.   Psychiatric/Behavioral: Negative.       Physical Exam   BP: 144/83  Pulse: 76  Temp: 97.7  F (36.5  C)  Resp: 18  SpO2: 96 %    Physical Exam  Vitals and nursing note reviewed.   Constitutional:       General: She is not in acute distress.     Appearance: Normal appearance. She is not ill-appearing or toxic-appearing.   Cardiovascular:      Rate and Rhythm: Normal rate and regular rhythm.      Pulses: Normal pulses.      Heart sounds: Normal heart sounds.   Pulmonary:      Effort: Pulmonary effort is normal.      Breath sounds: Normal breath sounds.   Abdominal:      General: Bowel sounds are normal.      Palpations: Abdomen is soft.      Tenderness: There is no abdominal tenderness. There is no right CVA tenderness or left CVA tenderness.   Neurological:      Mental Status: She is alert.   Psychiatric:         Mood and Affect: Mood normal.       ED Course     Results for orders placed or performed during the hospital encounter of 08/03/24 (from the past 24 hour(s))   UA with Microscopic reflex to Culture    Specimen: Urine, Midstream   Result Value Ref Range    Color Urine Yellow Colorless, Straw, Light Yellow, Yellow    Appearance Urine Cloudy (A) Clear    Glucose Urine Negative Negative mg/dL    Bilirubin Urine Negative Negative    Ketones Urine Negative Negative mg/dL    Specific Gravity Urine 1.017 1.003 - 1.035    Blood Urine Small (A) Negative    pH Urine 5.0 4.7 - 8.0    Protein Albumin Urine 20 (A) Negative mg/dL    Urobilinogen Urine Normal Normal, 2.0 mg/dL    Nitrite Urine Negative Negative    Leukocyte Esterase Urine Large (A) Negative    Bacteria Urine Moderate (A) None Seen /HPF    WBC Clumps Urine Present (A) None Seen /HPF    Mucus Urine Present (A) None Seen /LPF    RBC Urine 7 (H) <=2 /HPF    WBC Urine >182 (H) <=5 /HPF     Squamous Epithelials Urine 0 <=1 /HPF    Transitional Epithelials Urine 3 (H) <=1 /HPF    Narrative    Urine Culture ordered based on laboratory criteria       Medications - No data to display    Assessments & Plan (with Medical Decision Making)     I have reviewed the nursing notes.    I have reviewed the findings, diagnosis, plan and need for follow up with the patient.  (N30.01) Acute cystitis with hematuria  (primary encounter diagnosis)  Plan:   Patient ambulatory with a nontoxic appearance.  Patient arrived with dysuria, retention and hematuria.  Patient self caths 3-4 times per day.  Denies any fever, chills, nausea, vomiting, diarrhea, shortness of breath or chest pain.  No genital sores, rashes, irritation or redness.  No vaginal odor.  No abdominal tenderness or CVA tenderness.  UA indicates urinary tract infection, will treat with nitrofurantoin which was susceptible for last culture of E. coli ESBL.  Patient to take nitrofurantoin as ordered.  Push fluids.  Continue self cathing as previously ordered.  Go to scheduled urology appointment.  Follow-up with primary care provider or return to urgent care/ED with any worsening in condition or additional concerns.  Patient in agreement treatment plan.    Discharge Medication List as of 8/3/2024  8:58 AM        START taking these medications    Details   nitroFURantoin macrocrystal-monohydrate (MACROBID) 100 MG capsule Take 1 capsule (100 mg) by mouth 2 times daily for 5 days, Disp-10 capsule, R-0, E-Prescribe           Final diagnoses:   Acute cystitis with hematuria     8/3/2024   [facility]       Claudia Manuel NP  08/03/24 [id number]

## 2024-08-03 NOTE — ED NOTES
Jossie PARKER NP to be seen in Urgent Care at this time. Patient self caths at home. Specimen cup provided to patient for UA.

## 2024-08-03 NOTE — DISCHARGE INSTRUCTIONS
Nitrofurantoin as ordered  - Take entire course of antibiotic even if you start to feel better.  - Antibiotics can cause stomach upset including nausea and diarrhea. Read your bottle or ask the pharmacist if antibiotic can be taken with food to help prevent nausea. If you have symptoms of diarrhea you can take an over-the-counter probiotic and/or increase foods with probiotics such as yogurt, Luis Armando, sauerkraut.    Push fluids    Go to your scheduled urology appointment    Follow up with primary care provider or return to urgent care/ED with any worsening in condition or additional concerns.

## 2024-08-03 NOTE — ED TRIAGE NOTES
Pt presents with c/o having increased irritation and burning while voiding   But denies any pain or any other issues   S/x have been on going since July patient does have appt with urology coming up

## 2024-08-05 LAB — BACTERIA UR CULT: ABNORMAL

## 2024-09-22 ENCOUNTER — HOSPITAL ENCOUNTER (EMERGENCY)
Facility: HOSPITAL | Age: 69
Discharge: HOME | End: 2024-09-22
Attending: EMERGENCY MEDICINE
Payer: MEDICARE

## 2024-09-22 ENCOUNTER — APPOINTMENT (OUTPATIENT)
Dept: GENERAL RADIOLOGY | Facility: HOSPITAL | Age: 69
End: 2024-09-22
Attending: EMERGENCY MEDICINE
Payer: MEDICARE

## 2024-09-22 VITALS
HEART RATE: 57 BPM | RESPIRATION RATE: 18 BRPM | DIASTOLIC BLOOD PRESSURE: 90 MMHG | TEMPERATURE: 97.9 F | SYSTOLIC BLOOD PRESSURE: 147 MMHG | OXYGEN SATURATION: 97 %

## 2024-09-22 DIAGNOSIS — S20.212A RIB CONTUSION, LEFT, INITIAL ENCOUNTER: ICD-10-CM

## 2024-09-22 DIAGNOSIS — S01.81XA LACERATION OF FOREHEAD, INITIAL ENCOUNTER: ICD-10-CM

## 2024-09-22 PROCEDURE — 99283 EMERGENCY DEPT VISIT LOW MDM: CPT | Performed by: EMERGENCY MEDICINE

## 2024-09-22 PROCEDURE — 250N000013 HC RX MED GY IP 250 OP 250 PS 637: Performed by: EMERGENCY MEDICINE

## 2024-09-22 PROCEDURE — 99283 EMERGENCY DEPT VISIT LOW MDM: CPT

## 2024-09-22 PROCEDURE — 71101 X-RAY EXAM UNILAT RIBS/CHEST: CPT | Mod: LT

## 2024-09-22 RX ORDER — ACETAMINOPHEN 325 MG/1
650 TABLET ORAL ONCE
Status: COMPLETED | OUTPATIENT
Start: 2024-09-22 | End: 2024-09-22

## 2024-09-22 RX ORDER — IBUPROFEN 200 MG
400 TABLET ORAL ONCE
Status: COMPLETED | OUTPATIENT
Start: 2024-09-22 | End: 2024-09-22

## 2024-09-22 RX ADMIN — IBUPROFEN 400 MG: 200 TABLET, FILM COATED ORAL at 12:06

## 2024-09-22 RX ADMIN — ACETAMINOPHEN 650 MG: 325 TABLET ORAL at 12:06

## 2024-09-22 ASSESSMENT — ACTIVITIES OF DAILY LIVING (ADL): ADLS_ACUITY_SCORE: 35

## 2024-09-22 NOTE — ED PROVIDER NOTES
EMERGENCY DEPARTMENT ENCOUNTER      NAME: Aileen Szymanski  AGE: 69 year old female  YOB: 1955  MRN: [id number]  EVALUATION DATE & TIME: 2024 11:37 AM    PCP: Steff Lyons    ED PROVIDER: Nav Veliz M.D.      Chief Complaint   Patient presents with    Fall         FINAL IMPRESSION:  1. Rib contusion, left, initial encounter    2. Laceration of forehead, initial encounter          ED COURSE & MEDICAL DECISION MAKIN year old female presents to the Emergency Department for evaluation of fall with head injury and chest wall injury.  Patient is vitally stable and well-appearing when she arrives to the emergency department.  She sustained what sounds a mechanical fall today without any loss of consciousness.  She has a small superficial appearing laceration superior and lateral to the left brow.  Patient preferred Steri-Strips for this versus sutures which I think is reasonable given superficial nature and good approximation.  Offered the patient a head CT which she declined.  I think this is reasonable given no loss of consciousness, headache, confusion, or neurological deficit.  We did specifically discuss some observation and return precautions related to the head trauma.  Patient also reports left chest wall pain which is her area of greatest concern.  There is no visible external trauma here, no crepitance, no point tenderness diffuse tenderness along the left lower rib margin.  X-rays on my preliminary review show no evidence of pneumothorax or obvious displaced rib fracture.  Patient also has some mild contusion to her left hand and right leg, she does not think anything is seriously injured in these areas, she has normal range of motion and weightbearing status and is comfortable deferring x-rays at this time.  Patient requesting to leave almost immediately upon completion of her chest imaging although radiology has not yet had time to review.  Did discuss that we will try to  look out for the results and contact her if there are any results particularly anything requiring a change to the treatment course.  Return precautions were reviewed.  Patient was discharged in stable condition.    At the conclusion of the encounter I discussed the results of all of the tests and the disposition. The questions were answered. The patient or family acknowledged understanding and was agreeable with the care plan.           MEDICATIONS GIVEN IN THE EMERGENCY:  Medications   acetaminophen (TYLENOL) tablet 650 mg (650 mg Oral $Given 9/22/24 1206)   ibuprofen (ADVIL/MOTRIN) tablet 400 mg (400 mg Oral $Given 9/22/24 1206)       NEW PRESCRIPTIONS STARTED AT TODAY'S ER VISIT  Discharge Medication List as of 9/22/2024 12:48 PM             =================================================================    HPI    Patient information was obtained from: Patient      Aileen Szymanski is a 69 year old female who presents to this ED today for evaluation of fall.  Patient lost her balance while trying to enter a door, she says she is dealing with left knee problems on a chronic basis and this caused some of her instability and fall.  She was able to get up with assistance.  She did strike her head as well as bruised her left chest wall, hand, and right knee.  She is most concerned about pain in her left chest wall which is little worse when she tries to move around.  No breathing difficulty.  No abdominal pain.  No back pain.  She has been able to bear weight on both knees normally.  She is not concerned about major injury to her hand or knee and does not suspect a fracture.      REVIEW OF SYSTEMS   All systems reviewed and negative except as noted in HPI.    PAST MEDICAL HISTORY:  Past Medical History:   Diagnosis Date    Arthrofibrosis of knee joint, left 12/27/2019    Added automatically from request for surgery [id number]    Depressive disorder     Many years    Dizziness and giddiness 07/31/2007    Enthesopathy of  knee, unspecified 06/13/2002    Failed total left knee replacement (H24) 12/27/2019    Added automatically from request for surgery [id number]    History of blood transfusion 2014?    Allergy to Lovenox. Due to knee replacement surgery.    Infection of total knee replacement  (H24) 12/27/2019    Added automatically from request for surgery [id number]    Intramural leiomyoma of uterus 03/07/2011    Lung granuloma (H) 03/27/2013    Overview:  Noted on chest xray 3/2013 Noted on chest xray 3/2013    Major depressive disorder without psychotic features 08/31/2017    Major depressive disorder, recurrent episode (H24) 06/22/2006    Overview:  IMO Update 10/11 IMO Update 10/11    Mixed hyperlipidemia 09/19/2016    Nonallopathic lesion of cervical region, not elsewhere classified 07/25/2001    Nonallopathic lesion of thoracic region, not elsewhere classified 06/13/2002    Osteoarthrosis, pelvic region and thigh 12/05/2006    Overview:  IMO Update 10/11 IMO Update 10/11    Pain in joint, lower leg 06/24/2002    Postmenopausal bleeding 04/27/2011    Prosthetic joint infection (H24) 02/18/2020       PAST SURGICAL HISTORY:  Past Surgical History:   Procedure Laterality Date    ABDOMEN SURGERY  2007    ARTHROPLASTY REVISION KNEE Left 10/2016    CLOSED REDUCTION NOSE N/A 11/07/2017    Procedure: CLOSED REDUCTION NOSE;  CLOSED REDUCTION NASAL SEPTAL FRACTURE;  Surgeon: Destiny Vanegas MD;  Location: [facility]    COLONOSCOPY  04/13/2010    Dr Quevedo; negative     ERCP W/ PLASTIC STENT PLACEMENT N/A 01/24/2008    ERCP W/ SPHICTEROTOMY N/A 12/10/2007    GENITOURINARY SURGERY  4/8//22    HYSTERECTOMY N/A 04/08/2022    HYSTEROSCOPY DIAGNOSTIC N/A 04/27/2011    LAPAROSCOPIC CHOLECYSTECTOMY N/A 12/03/2007    LAPAROSCOPIC GASTRIC BANDING N/A 04/23/2008    LAPAROSCOPIC TUBAL LIGATION Bilateral 1978    left knee replacement  07/21/2014    Dr Quinones/ [place]    REPLACEMENT TOTAL KNEE Left 05/14/2021    Complex revision left total knee     REPLACEMENT TOTAL KNEE Left 05/06/2020    left knee replacement after infection           CURRENT MEDICATIONS:    No current facility-administered medications for this encounter.     Current Outpatient Medications   Medication Sig Dispense Refill    acetaminophen (TYLENOL) 500 MG tablet Take 1,000 mg by mouth      amoxicillin (AMOXIL) 500 MG capsule TAKE FOUR CAPSULES BY MOUTH ONCE 60 MINS PRIOR TODENTAL PROCEDURE. (Patient not taking: Reported on 3/11/2024) 4 capsule 0    benzonatate (TESSALON) 100 MG capsule Take 1 capsule (100 mg) by mouth 3 times daily as needed for cough 21 capsule 0    clonazePAM (KLONOPIN) 1 MG tablet TAKE 1 TABLET (1 MG) BY MOUTH AT BEDTIME (Patient not taking: Reported on 3/11/2024) 30 tablet 0    escitalopram (LEXAPRO) 20 MG tablet TAKE ONE TABLET BY MOUTH DAILY 90 tablet 0    LINZESS 145 MCG capsule Take 1 capsule by mouth daily      multivitamin, therapeutic (THERA-VIT) TABS tablet Take 1 tablet by mouth daily      simvastatin (ZOCOR) 40 MG tablet TAKE 1 TABLET (40 MG) BY MOUTH AT BEDTIME 90 tablet 1    tolterodine (DETROL) 2 MG tablet  (Patient not taking: Reported on 3/11/2024)      traZODone (DESYREL) 50 MG tablet Take 50 mg by mouth at bedtime           ALLERGIES:  Allergies   Allergen Reactions    Lovenox [Enoxaparin] Other (See Comments)     Bleeding    Estradiol Other (See Comments)     Vaginal itching from cream       FAMILY HISTORY:  Family History   Problem Relation Age of Onset    Lung Cancer Mother     Heart Disease Mother     Other Cancer Mother         Heart disease and bone cancer    Chronic Obstructive Pulmonary Disease Father     Other Cancer Father         Lung and COPD    Osteoporosis Paternal Grandmother     Other Cancer Sister         Lung and brain cancer       SOCIAL HISTORY:   Social History     Socioeconomic History    Marital status:    Tobacco Use    Smoking status: Never    Smokeless tobacco: Never   Vaping Use    Vaping status: Never Used   Substance  and Sexual Activity    Alcohol use: Yes     Comment: Rarely    Drug use: No    Sexual activity: Not Currently     Partners: Male     Birth control/protection: Post-menopausal, None   Other Topics Concern    Parent/sibling w/ CABG, MI or angioplasty before 65F 55M? Yes     Comment: Mother/ heart attack    Blood Transfusions No     Comment: PERMITS     Social Determinants of Health     Financial Resource Strain: Low Risk  (10/10/2023)    [facility]    Overall Financial Resource Strain (CARDIA)     Difficulty of Paying Living Expenses: Not hard at all   Food Insecurity: No Food Insecurity (6/21/2024)    Received from [facility]    Hunger Vital Sign     Worried About Running Out of Food in the Last Year: Never true     Ran Out of Food in the Last Year: Never true   Transportation Needs: No Transportation Needs (6/21/2024)    Received from [facility]    PRAPARE - Transportation     Lack of Transportation (Medical): No     Lack of Transportation (Non-Medical): No   Interpersonal Safety: Not At Risk (5/8/2023)    Received from [facility]    EH IP Custom IPV     Do you feel UNSAFE in any of your personal relationships with your family members or any other acquaintances?: No   Housing Stability: Low Risk  (6/21/2024)    Received from [facility]    Housing Stability Vital Sign     Unable to Pay for Housing in the Last Year: No     Number of Times Moved in the Last Year: 0     Homeless in the Last Year: No       VITALS:  /90   Pulse 57   Temp 97.9  F (36.6  C) (Tympanic)   Resp 18   SpO2 97%     PHYSICAL EXAM    Constitutional: Well developed, Well nourished, NAD  HENT: There is a 2 cm laceration which is superficial appearing, lateral and superior to the left eyebrow.  There is  a tiny underlying hematoma.  This is not gaping or deep.  There is no underlying palpable skull defect or deformity.  No midline cervical spine tenderness.  Patient is ranging her neck with no discomfort.  Eyes: EOMI, Conjunctiva normal.  Respiratory: Breathing comfortably on room air. Speaks full sentences easily. Lungs clear to ascultation.  Cardiovascular: Normal heart rate, Regular rhythm. No peripheral edema.  Abdomen: Soft, nontender  Musculoskeletal: There is trace soft tissue swelling to the lateral hand without any palpable underlying bony deformity.  Normal ability to flex and extend all digits on the left hand as well as flex and extend the left wrist.  There is mild soft tissue bruising to the right anterior knee with normal range of motion, no palpable bony deformities and normal weightbearing.  There is no visible external chest wall or back trauma.  Integument: Warm, Dry.  Neurologic: Alert & awake, Normal motor function, Normal sensory function, No focal deficits noted.   Psychiatric: Cooperative. Affect appropriate.       RADIOLOGY:  Reviewed all pertinent imaging. Please see official radiology report.  Ribs XR, unilat 3 views + PA chest,  left    (Results Pending)         Nav Veliz M.D.  Emergency Medicine  [facility]  [address] [place] [id number] [phone]  Dept: [phone]       Nav Veliz MD  09/22/24 [id number]

## 2024-09-22 NOTE — ED TRIAGE NOTES
Fell stepping over a door sill.  Fell onto left side hitting eyebrow, left rib/side, and left wrist, and right knee. Happened about thirty minutes ago.  Denies LOC or any vision changes. Did not take anything for pain. No blood thinners.

## 2024-09-22 NOTE — ED TRIAGE NOTES
SHIRA Billingsley CNP assessed patient in triage and determined patient not Urgent Care appropriate. Will be seen in Urgent Care.

## 2024-09-22 NOTE — DISCHARGE INSTRUCTIONS
You were seen in the emergency department for injuries to your chest wall and head after a fall.  Your evaluation is reassuring overall.  We reviewed your x-rays preliminarily and did not see any evidence of fracture.  The radiologist will look at this later and we will call you if needed if we identify any other injuries that were more subtle.  We discussed continuing you on Tylenol and ibuprofen every 6 hours as needed for continued pain management.  Since we are not doing a head CT today you need to return to the hospital right away if you develop any severe headache, difficulty walking, confusion, etc.  We placed a few Steri-Strips across the a superficial laceration to your left forehead.  They should fall off in about 5 to 7 days time.  It is okay to gently rinse the area with water.

## 2024-10-09 DIAGNOSIS — E78.2 MIXED HYPERLIPIDEMIA: ICD-10-CM

## 2024-10-09 RX ORDER — SIMVASTATIN 40 MG
40 TABLET ORAL AT BEDTIME
Qty: 90 TABLET | Refills: 1 | Status: SHIPPED | OUTPATIENT
Start: 2024-10-09

## 2024-10-09 NOTE — TELEPHONE ENCOUNTER
SIMVASTATIN 40MG TABLET       Last Written Prescription Date:  05/16/2023  Last Fill Quantity: 90,   # refills: 1  Last Office Visit: 05/03/2021  Future Office visit:       Routing refill request to provider for review/approval because:    Antihyperlipidemic agents Quxkal44/09/2024 03:17 PM   Protocol Details LDL on file in the past 12 months    Recent (12 mo) or future (90 days) visit within the authorizing provider's specialty        Kimberly Boecker, RN

## 2024-11-23 ENCOUNTER — HEALTH MAINTENANCE LETTER (OUTPATIENT)
Age: 69
End: 2024-11-23

## 2025-01-14 ENCOUNTER — OFFICE VISIT (OUTPATIENT)
Dept: CHIROPRACTIC MEDICINE | Facility: OTHER | Age: 70
End: 2025-01-14
Attending: CHIROPRACTOR
Payer: COMMERCIAL

## 2025-01-14 DIAGNOSIS — M54.50 ACUTE BILATERAL LOW BACK PAIN WITHOUT SCIATICA: ICD-10-CM

## 2025-01-14 DIAGNOSIS — M99.02 SEGMENTAL AND SOMATIC DYSFUNCTION OF THORACIC REGION: ICD-10-CM

## 2025-01-14 DIAGNOSIS — M99.01 SEGMENTAL AND SOMATIC DYSFUNCTION OF CERVICAL REGION: ICD-10-CM

## 2025-01-14 DIAGNOSIS — M99.03 SEGMENTAL AND SOMATIC DYSFUNCTION OF LUMBAR REGION: Primary | ICD-10-CM

## 2025-01-14 PROCEDURE — 98941 CHIROPRACT MANJ 3-4 REGIONS: CPT | Mod: AT | Performed by: CHIROPRACTOR

## 2025-01-14 NOTE — PROGRESS NOTES
Subjective Finding:    Chief compalint: Patient presents with:  Back Pain: With neck pain   , Pain Scale: 4/10, Intensity: dull, Duration: 1 weeks, Radiating:  bilateral buttock.    Date of injury:     Activities that the pain restricts:   Home/household/hobbies/social activities: Yes.  Work duties: Yes.  Sleep: No.  Makes symptoms better: rest.  Makes symptoms worse: activity.  Have you seen anyone else for the symptoms? No.  Work related: No.  Automobile related injury: No.    Objective and Assessment:    Posture Analysis:   High shoulder: .  Head tilt: .  High iliac crest: .  Head carriage: neutral.  Thoracic Kyphosis: neutral.  Lumbar Lordosis: forward.    Lumbar Range of Motion: extension decreased.  Cervical Range of Motion: left rotation decreased and right rotation decreased.  Thoracic Range of Motion: .  Extremity Range of Motion: .    Palpation:   Traps: sharp pain, referred pain: no    Segmental dysfunction pre-treatment and treatment area: C5, C7, T6, and L5.    Assessment post-treatment:  Cervical: ROM increased.  Thoracic: ROM increased.  Lumbar: ROM increased.    Comments: .      Complicating Factors: .    Procedure(s):  CMT:  14078 Chiropractic manipulative treatment 3-4 regions performed   Cervical: Diversified, See above for level, Supine, Thoracic: Diversified, See above for level, Prone, and Lumbar: Diversified, See above for level, Side posture    Modalities:  None performed this visit    Therapeutic procedures:  None    Plan:  Treatment plan: PRN.  Instructed patient: stretch as instructed at visit.  Short term goals: reduce pain.  Long term goals: increase ADL.  Prognosis: very good.

## 2025-05-11 ENCOUNTER — HEALTH MAINTENANCE LETTER (OUTPATIENT)
Age: 70
End: 2025-05-11

## 2025-05-15 ENCOUNTER — OFFICE VISIT (OUTPATIENT)
Dept: CHIROPRACTIC MEDICINE | Facility: OTHER | Age: 70
End: 2025-05-15
Attending: CHIROPRACTOR
Payer: COMMERCIAL

## 2025-05-15 DIAGNOSIS — M99.01 SEGMENTAL AND SOMATIC DYSFUNCTION OF CERVICAL REGION: ICD-10-CM

## 2025-05-15 DIAGNOSIS — M99.02 SEGMENTAL AND SOMATIC DYSFUNCTION OF THORACIC REGION: ICD-10-CM

## 2025-05-15 DIAGNOSIS — M54.50 ACUTE BILATERAL LOW BACK PAIN WITHOUT SCIATICA: ICD-10-CM

## 2025-05-15 DIAGNOSIS — M99.03 SEGMENTAL AND SOMATIC DYSFUNCTION OF LUMBAR REGION: Primary | ICD-10-CM

## 2025-05-15 PROCEDURE — 98941 CHIROPRACT MANJ 3-4 REGIONS: CPT | Mod: AT | Performed by: CHIROPRACTOR

## 2025-05-22 NOTE — PROGRESS NOTES
Subjective Finding:    Chief compalint: Patient presents with:  Back Pain , Pain Scale: 3/10, Intensity: sharp, Duration: 1 weeks, Radiating: no.    Date of injury:     Activities that the pain restricts:   Home/household/hobbies/social activities: Yes.  Work duties: No.  Sleep: No.  Makes symptoms better: rest.  Makes symptoms worse: activity.  Have you seen anyone else for the symptoms? No.  Work related: No.  Automobile related injury: No.    Objective and Assessment:    Posture Analysis:   High shoulder: .  Head tilt: .  High iliac crest: .  Head carriage: neutral.  Thoracic Kyphosis: neutral.  Lumbar Lordosis: forward.    Lumbar Range of Motion: extension decreased.  Cervical Range of Motion: .  Thoracic Range of Motion: .  Extremity Range of Motion: .    Palpation:   Quad lumb: bilateral, referred pain: no    Segmental dysfunction pre-treatment and treatment area: C2, T3, and L5.    Assessment post-treatment:  Cervical: ROM increased.  Thoracic: ROM increased.  Lumbar: ROM increased.    Comments: .      Complicating Factors: .    Procedure(s):  CMT:  24836 Chiropractic manipulative treatment 3-4 regions performed   Cervical: Diversified, See above for level, Supine, Thoracic: Diversified, See above for level, Prone, and Lumbar: Diversified, See above for level, Side posture    Modalities:  None performed this visit    Therapeutic procedures:  None    Plan:  Treatment plan: PRN.  Instructed patient: stretch as instructed at visit.  Short term goals: increase ROM.  Long term goals: increase ADL.  Prognosis: very good.

## 2025-07-31 ENCOUNTER — TELEPHONE (OUTPATIENT)
Dept: UROLOGY | Facility: OTHER | Age: 70
End: 2025-07-31

## 2025-07-31 ENCOUNTER — APPOINTMENT (OUTPATIENT)
Dept: ULTRASOUND IMAGING | Facility: HOSPITAL | Age: 70
End: 2025-07-31
Attending: STUDENT IN AN ORGANIZED HEALTH CARE EDUCATION/TRAINING PROGRAM
Payer: MEDICARE

## 2025-07-31 ENCOUNTER — HOSPITAL ENCOUNTER (EMERGENCY)
Facility: HOSPITAL | Age: 70
Discharge: HOME | End: 2025-07-31
Attending: STUDENT IN AN ORGANIZED HEALTH CARE EDUCATION/TRAINING PROGRAM
Payer: MEDICARE

## 2025-07-31 VITALS
TEMPERATURE: 97.6 F | RESPIRATION RATE: 18 BRPM | HEART RATE: 74 BPM | DIASTOLIC BLOOD PRESSURE: 89 MMHG | SYSTOLIC BLOOD PRESSURE: 136 MMHG | OXYGEN SATURATION: 97 %

## 2025-07-31 DIAGNOSIS — N39.0 COMPLICATED UTI (URINARY TRACT INFECTION): Primary | ICD-10-CM

## 2025-07-31 LAB
ALBUMIN UR-MCNC: 20 MG/DL
ANION GAP SERPL CALCULATED.3IONS-SCNC: 14 MMOL/L (ref 7–15)
APPEARANCE UR: ABNORMAL
BACTERIA #/AREA URNS HPF: ABNORMAL /HPF
BASOPHILS # BLD AUTO: 0 10E3/UL (ref 0–0.2)
BASOPHILS NFR BLD AUTO: 1 %
BILIRUB UR QL STRIP: NEGATIVE
BUN SERPL-MCNC: 24.3 MG/DL (ref 8–23)
CALCIUM SERPL-MCNC: 9.5 MG/DL (ref 8.8–10.4)
CHLORIDE SERPL-SCNC: 103 MMOL/L (ref 98–107)
COLOR UR AUTO: YELLOW
CREAT SERPL-MCNC: 1.05 MG/DL (ref 0.51–0.95)
EGFRCR SERPLBLD CKD-EPI 2021: 57 ML/MIN/1.73M2
EOSINOPHIL # BLD AUTO: 0.2 10E3/UL (ref 0–0.7)
EOSINOPHIL NFR BLD AUTO: 3 %
ERYTHROCYTE [DISTWIDTH] IN BLOOD BY AUTOMATED COUNT: 12.7 % (ref 10–15)
GLUCOSE SERPL-MCNC: 93 MG/DL (ref 70–99)
GLUCOSE UR STRIP-MCNC: NEGATIVE MG/DL
HCO3 SERPL-SCNC: 25 MMOL/L (ref 22–29)
HCT VFR BLD AUTO: 41.6 % (ref 35–47)
HGB BLD-MCNC: 13.3 G/DL (ref 11.7–15.7)
HGB UR QL STRIP: ABNORMAL
HOLD SPECIMEN: NORMAL
HYALINE CASTS: 21 /LPF
IMM GRANULOCYTES # BLD: 0 10E3/UL
IMM GRANULOCYTES NFR BLD: 1 %
KETONES UR STRIP-MCNC: NEGATIVE MG/DL
LEUKOCYTE ESTERASE UR QL STRIP: ABNORMAL
LYMPHOCYTES # BLD AUTO: 1.5 10E3/UL (ref 0.8–5.3)
LYMPHOCYTES NFR BLD AUTO: 22 %
MCH RBC QN AUTO: 29 PG (ref 26.5–33)
MCHC RBC AUTO-ENTMCNC: 32 G/DL (ref 31.5–36.5)
MCV RBC AUTO: 91 FL (ref 78–100)
MONOCYTES # BLD AUTO: 0.6 10E3/UL (ref 0–1.3)
MONOCYTES NFR BLD AUTO: 8 %
MUCOUS THREADS #/AREA URNS LPF: PRESENT /LPF
NEUTROPHILS # BLD AUTO: 4.3 10E3/UL (ref 1.6–8.3)
NEUTROPHILS NFR BLD AUTO: 66 %
NITRATE UR QL: POSITIVE
NRBC # BLD AUTO: 0 10E3/UL
NRBC BLD AUTO-RTO: 0 /100
PH UR STRIP: 5.5 [PH] (ref 4.7–8)
PLATELET # BLD AUTO: 211 10E3/UL (ref 150–450)
POTASSIUM SERPL-SCNC: 4.1 MMOL/L (ref 3.4–5.3)
RBC # BLD AUTO: 4.58 10E6/UL (ref 3.8–5.2)
RBC URINE: 16 /HPF
SODIUM SERPL-SCNC: 142 MMOL/L (ref 135–145)
SP GR UR STRIP: 1.02 (ref 1–1.03)
SQUAMOUS EPITHELIAL: 0 /HPF
UROBILINOGEN UR STRIP-MCNC: 2 MG/DL
WBC # BLD AUTO: 6.6 10E3/UL (ref 4–11)
WBC CLUMPS #/AREA URNS HPF: PRESENT /HPF
WBC URINE: >182 /HPF

## 2025-07-31 PROCEDURE — 99285 EMERGENCY DEPT VISIT HI MDM: CPT | Mod: 25 | Performed by: STUDENT IN AN ORGANIZED HEALTH CARE EDUCATION/TRAINING PROGRAM

## 2025-07-31 PROCEDURE — 76830 TRANSVAGINAL US NON-OB: CPT

## 2025-07-31 PROCEDURE — 80048 BASIC METABOLIC PNL TOTAL CA: CPT | Performed by: STUDENT IN AN ORGANIZED HEALTH CARE EDUCATION/TRAINING PROGRAM

## 2025-07-31 PROCEDURE — 76830 TRANSVAGINAL US NON-OB: CPT | Mod: 26 | Performed by: RADIOLOGY

## 2025-07-31 PROCEDURE — 96372 THER/PROPH/DIAG INJ SC/IM: CPT | Performed by: STUDENT IN AN ORGANIZED HEALTH CARE EDUCATION/TRAINING PROGRAM

## 2025-07-31 PROCEDURE — 99284 EMERGENCY DEPT VISIT MOD MDM: CPT | Performed by: STUDENT IN AN ORGANIZED HEALTH CARE EDUCATION/TRAINING PROGRAM

## 2025-07-31 PROCEDURE — 85025 COMPLETE CBC W/AUTO DIFF WBC: CPT | Performed by: STUDENT IN AN ORGANIZED HEALTH CARE EDUCATION/TRAINING PROGRAM

## 2025-07-31 PROCEDURE — 87086 URINE CULTURE/COLONY COUNT: CPT | Performed by: STUDENT IN AN ORGANIZED HEALTH CARE EDUCATION/TRAINING PROGRAM

## 2025-07-31 PROCEDURE — 76856 US EXAM PELVIC COMPLETE: CPT

## 2025-07-31 PROCEDURE — 250N000011 HC RX IP 250 OP 636: Performed by: STUDENT IN AN ORGANIZED HEALTH CARE EDUCATION/TRAINING PROGRAM

## 2025-07-31 PROCEDURE — 81001 URINALYSIS AUTO W/SCOPE: CPT | Performed by: STUDENT IN AN ORGANIZED HEALTH CARE EDUCATION/TRAINING PROGRAM

## 2025-07-31 PROCEDURE — 76856 US EXAM PELVIC COMPLETE: CPT | Mod: 26 | Performed by: RADIOLOGY

## 2025-07-31 PROCEDURE — 36415 COLL VENOUS BLD VENIPUNCTURE: CPT | Performed by: STUDENT IN AN ORGANIZED HEALTH CARE EDUCATION/TRAINING PROGRAM

## 2025-07-31 RX ORDER — CEFTRIAXONE SODIUM 1 G
1 VIAL (EA) INJECTION ONCE
Status: COMPLETED | OUTPATIENT
Start: 2025-07-31 | End: 2025-07-31

## 2025-07-31 RX ORDER — CIPROFLOXACIN 500 MG/1
500 TABLET, FILM COATED ORAL 2 TIMES DAILY
Qty: 14 TABLET | Refills: 0 | Status: SHIPPED | OUTPATIENT
Start: 2025-07-31 | End: 2025-08-07

## 2025-07-31 RX ADMIN — CEFTRIAXONE 1 G: 1 INJECTION, POWDER, FOR SOLUTION INTRAMUSCULAR; INTRAVENOUS at 15:27

## 2025-07-31 ASSESSMENT — ENCOUNTER SYMPTOMS
CHILLS: 0
FATIGUE: 0
DIARRHEA: 0
CONSTIPATION: 0
SHORTNESS OF BREATH: 0
VOMITING: 0
HEMATURIA: 1
FEVER: 0
NAUSEA: 0
DYSURIA: 0

## 2025-07-31 ASSESSMENT — ACTIVITIES OF DAILY LIVING (ADL)
ADLS_ACUITY_SCORE: 47
ADLS_ACUITY_SCORE: 47
ADLS_ACUITY_SCORE: 41
ADLS_ACUITY_SCORE: 47
ADLS_ACUITY_SCORE: 41

## 2025-07-31 NOTE — ED NOTES
Patient comes back to triage room with urine sample and reports that blood I coming from her vagina not urine.

## 2025-07-31 NOTE — ED PROVIDER NOTES
History     Chief Complaint   Patient presents with    Hematuria     HPI  Aileen Szymanski is a 70 year old female who presents to the emergency room for concerns of possible urinary tract infection as well as reported vaginal bleeding.  Patient states she has had significant pelvic history including a hysterectomy, multiple surgeries for bladder sling, removal.  She also has a history of IBS with constipation.  She also has a rectocele.  She denies systemic symptoms such as fevers, chills, nausea, vomiting.  She has been self cathing for quite some time but this has improved to where she is able to void on her own at times.  She does still self cath intermittently.  She denies any abdominal pain.    Allergies:  Allergies   Allergen Reactions    Lovenox [Enoxaparin] Other (See Comments)     Bleeding    Estradiol Other (See Comments)     Vaginal itching from cream       Problem List:    Patient Active Problem List    Diagnosis Date Noted    Pelvic floor dysfunction in female 11/30/2023     Priority: Medium    Cystocele with rectocele 04/18/2023     Priority: Medium     Formatting of this note might be different from the original.  Added automatically from request for surgery [id number]      Uterine mass 03/14/2022     Priority: Medium    Vitamin D deficiency 03/14/2022     Priority: Medium    Cystocele and rectocele with incomplete uterovaginal prolapse 01/17/2022     Priority: Medium     Formatting of this note might be different from the original.  Added automatically from request for surgery [id number]      DEIRDRE (stress urinary incontinence, female) 01/17/2022     Priority: Medium     Formatting of this note might be different from the original.  Added automatically from request for surgery [id number]      Failure of total knee arthroplasty, sequela 04/22/2021     Priority: Medium     Formatting of this note might be different from the original.  Added automatically from request for surgery [id number]      Prosthetic joint  infection 02/18/2020     Priority: Medium    Arthrofibrosis of knee joint, left 12/27/2019     Priority: Medium     Added automatically from request for surgery [id number]      Major depressive disorder without psychotic features 08/31/2017     Priority: Medium    Mixed hyperlipidemia 09/19/2016     Priority: Medium    Lung granuloma (H) 03/27/2013     Priority: Medium     Overview:   Noted on chest xray 3/2013  Noted on chest xray 3/2013      Intramural leiomyoma of uterus 03/07/2011     Priority: Medium    Osteoarthrosis, pelvic region and thigh 12/05/2006     Priority: Medium     Overview:   IMO Update 10/11  IMO Update 10/11          Past Medical History:    Past Medical History:   Diagnosis Date    Arthrofibrosis of knee joint, left 12/27/2019    Depressive disorder     Dizziness and giddiness 07/31/2007    Enthesopathy of knee, unspecified 06/13/2002    Failed total left knee replacement 12/27/2019    History of blood transfusion 2014?    Infection of total knee replacement 12/27/2019    Intramural leiomyoma of uterus 03/07/2011    Lung granuloma (H) 03/27/2013    Major depressive disorder without psychotic features 08/31/2017    Major depressive disorder, recurrent episode 06/22/2006    Mixed hyperlipidemia 09/19/2016    Nonallopathic lesion of cervical region, not elsewhere classified 07/25/2001    Nonallopathic lesion of thoracic region, not elsewhere classified 06/13/2002    Osteoarthrosis, pelvic region and thigh 12/05/2006    Pain in joint, lower leg 06/24/2002    Postmenopausal bleeding 04/27/2011    Prosthetic joint infection 02/18/2020       Past Surgical History:    Past Surgical History:   Procedure Laterality Date    ABDOMEN SURGERY  2007    ARTHROPLASTY REVISION KNEE Left 10/2016    CLOSED REDUCTION NOSE N/A 11/07/2017    Procedure: CLOSED REDUCTION NOSE;  CLOSED REDUCTION NASAL SEPTAL FRACTURE;  Surgeon: Destiny Vanegas MD;  Location: [facility]    COLONOSCOPY  04/13/2010    Dr Quevedo; negative      ERCP W/ PLASTIC STENT PLACEMENT N/A 01/24/2008    ERCP W/ SPHICTEROTOMY N/A 12/10/2007    GENITOURINARY SURGERY  4/8//22    HYSTERECTOMY N/A 04/08/2022    HYSTEROSCOPY DIAGNOSTIC N/A 04/27/2011    LAPAROSCOPIC CHOLECYSTECTOMY N/A 12/03/2007    LAPAROSCOPIC GASTRIC BANDING N/A 04/23/2008    LAPAROSCOPIC TUBAL LIGATION Bilateral 1978    left knee replacement  07/21/2014    Dr Quinones/ [place]    REPLACEMENT TOTAL KNEE Left 05/14/2021    Complex revision left total knee    REPLACEMENT TOTAL KNEE Left 05/06/2020    left knee replacement after infection       Family History:    Family History   Problem Relation Age of Onset    Lung Cancer Mother     Heart Disease Mother     Other Cancer Mother         Heart disease and bone cancer    Chronic Obstructive Pulmonary Disease Father     Other Cancer Father         Lung and COPD    Osteoporosis Paternal Grandmother     Other Cancer Sister         Lung and brain cancer       Social History:  Marital Status:   [2]  Social History     Tobacco Use    Smoking status: Never    Smokeless tobacco: Never   Vaping Use    Vaping status: Never Used   Substance Use Topics    Alcohol use: Yes     Comment: Rarely    Drug use: No        Medications:    ciprofloxacin (CIPRO) 500 MG tablet  acetaminophen (TYLENOL) 500 MG tablet  amoxicillin (AMOXIL) 500 MG capsule  benzonatate (TESSALON) 100 MG capsule  clonazePAM (KLONOPIN) 1 MG tablet  escitalopram (LEXAPRO) 20 MG tablet  LINZESS 145 MCG capsule  multivitamin, therapeutic (THERA-VIT) TABS tablet  simvastatin (ZOCOR) 40 MG tablet  tolterodine (DETROL) 2 MG tablet  traZODone (DESYREL) 50 MG tablet          Review of Systems   Constitutional:  Negative for chills, fatigue and fever.   Respiratory:  Negative for shortness of breath.    Gastrointestinal:  Negative for constipation, diarrhea, nausea and vomiting.   Genitourinary:  Positive for hematuria and vaginal bleeding. Negative for dysuria, vaginal discharge and vaginal pain.        Physical Exam   BP: 136/89  Pulse: 74  Temp: 97.6  F (36.4  C)  Resp: 18  SpO2: 97 %      Physical Exam  Constitutional:       Appearance: Normal appearance. She is not ill-appearing.   Pulmonary:      Effort: Pulmonary effort is normal. No respiratory distress.   Abdominal:      General: There is no distension.      Tenderness: There is no abdominal tenderness. There is no guarding or rebound.   Skin:     General: Skin is warm and dry.      Coloration: Skin is not pale.      Findings: No erythema.   Neurological:      Mental Status: She is alert.         ED Course               Critical Care time:  none     IV Antibiotics given and/or elevated Lactate of 0 and no sepsis note found - Delete this reminder and enter the sepsis note or '.edcms' before signing chart.>>>None         Recent Results (from the past 24 hours)   UA with Microscopic reflex to Culture    Specimen: Urine, Clean Catch   Result Value Ref Range    Color Urine Yellow Colorless, Straw, Light Yellow, Yellow    Appearance Urine Slightly Cloudy (A) Clear    Glucose Urine Negative Negative mg/dL    Bilirubin Urine Negative Negative    Ketones Urine Negative Negative mg/dL    Specific Gravity Urine 1.023 1.003 - 1.035    Blood Urine Moderate (A) Negative    pH Urine 5.5 4.7 - 8.0    Protein Albumin Urine 20 (A) Negative mg/dL    Urobilinogen Urine 2.0 (A) Normal mg/dL    Nitrite Urine Positive (A) Negative    Leukocyte Esterase Urine Large (A) Negative    Bacteria Urine Moderate (A) None Seen /HPF    WBC Clumps Urine Present (A) None Seen /HPF    Mucus Urine Present (A) None Seen /LPF    RBC Urine 16 (H) <=2 /HPF    WBC Urine >182 (H) <=5 /HPF    Squamous Epithelials Urine 0 <=1 /HPF    Hyaline Casts Urine 21 (H) <=2 /LPF    Narrative    Urine Culture ordered based on laboratory criteria   CBC with Platelets and Differential (Limited Occurrences)    Narrative    The following orders were created for panel order CBC with Platelets and Differential  (Limited Occurrences).  Procedure                               Abnormality         Status                     ---------                               -----------         ------                     CBC with platelets and ...[[id number]]                      In process                   Please view results for these tests on the individual orders.   Extra Tube    Narrative    The following orders were created for panel order Extra Tube.  Procedure                               Abnormality         Status                     ---------                               -----------         ------                     Extra Blue Top Tube[[id number]]                             In process                 Extra Red Top Tube[[id number]]                              In process                 Extra Heparinized Syringe[[id number]]                       In process                   Please view results for these tests on the individual orders.       Medications   cefTRIAXone (ROCEPHIN) in lidocaine 1% for IM administration 1 g (has no administration in time range)       Assessments & Plan (with Medical Decision Making)     I have reviewed the nursing notes.    I have reviewed the findings, diagnosis, plan and need for follow up with the patient.           Medical Decision Making  The patient's presentation was of straightforward complexity (a clearly self-limited or minor problem).    The patient's evaluation involved:  history and exam without other MDM data elements    The patient's management necessitated moderate risk (prescription drug management including medications given in the ED).    70-year-old female with significant pelvic history including previous bladder sling's, hysterectomy, presenting for concerns of possible UTI as well as vaginal bleeding.  She denies any significant pain.  She has self cath in the past and still does intermittently but is able to void by herself.  She does have stress incontinence as well.    On  exam she is nontoxic in appearance, afebrile, nontachycardic and normotensive.  Given presentation and concerns we will evaluate with a urinalysis to evaluate for UTI.  Given her vaginal bleeding and history of a hysterectomy, will evaluate with a pelvic ultrasound to rule out mass or other potential cause of her vaginal bleeding.  Other considerations were given such as vaginal atrophy.    Urinalysis does show positive nitrates, significant UTI.  Given her history we will treat as a complicated UTI with ceftriaxone here and a prescription for ciprofloxacin on outpatient side.    Ultrasound was unremarkable.  Patient appropriate for discharge home on ciprofloxacin to cover for UTI.  Patient will follow-up with urogynecologist as scheduled in the next 2 weeks.    New Prescriptions    CIPROFLOXACIN (CIPRO) 500 MG TABLET    Take 1 tablet (500 mg) by mouth 2 times daily for 7 days.       Final diagnoses:   Complicated UTI (urinary tract infection)       7/31/2025   [facility]       Pantera Garsia PA-C  07/31/25 [id number]

## 2025-07-31 NOTE — DISCHARGE INSTRUCTIONS
As discussed a prescription for antibiotics was sent to the [facility] pharmacy.  You may take this as prescribed starting tomorrow.  Ultimately following up with your urologist would be beneficial.    If you develop any new or worsening symptoms manage return for reevaluation.

## 2025-07-31 NOTE — ED TRIAGE NOTES
"Patient presents with c/o \"major bladder issue.\" Reports having an appt for installation or neuromodulator. Patient reports cloudy urine and that there is \"blood when she wipes\" that started last night. Reports long SX HX with bladder.         "

## 2025-08-02 LAB — BACTERIA UR CULT: ABNORMAL

## (undated) DEVICE — SWABSTICK-BENZOIN

## (undated) DEVICE — IRRIGATION-H2O 1000ML

## (undated) DEVICE — TOWEL-OR DISP 4PKS

## (undated) DEVICE — CANISTER-SUCTION 2000CC

## (undated) DEVICE — NDL-27G X 1 1/4" NON-SAFETY

## (undated) DEVICE — TUBING-SUCTION 20FT

## (undated) DEVICE — GLV-6.5 PROTEXIS PI CLASSIC LF/PF

## (undated) DEVICE — SPLINT-DENVER NASAL EXTERNAL

## (undated) DEVICE — SCD SLEEVE-KNEE REG.

## (undated) DEVICE — SUCTION TUBE-YANKAUR

## (undated) DEVICE — TUBE-SALEM SUMP 18FR STOMACH SUCTION

## (undated) DEVICE — BIN-ENT BIN

## (undated) DEVICE — SYRINGE-10CC FINGER

## (undated) DEVICE — MARKER-SKIN REG

## (undated) DEVICE — PACK-SET UP-CUSTOM

## (undated) DEVICE — IRRIGATION-NACL 1000ML

## (undated) DEVICE — LABEL-STERILE PREPRINTED FOR OR

## (undated) DEVICE — DRSG-SPONGE X-RAY 4 X 4

## (undated) RX ORDER — GLYCOPYRROLATE 0.2 MG/ML
INJECTION, SOLUTION INTRAMUSCULAR; INTRAVENOUS
Status: DISPENSED
Start: 2017-11-07

## (undated) RX ORDER — ONDANSETRON 2 MG/ML
INJECTION INTRAMUSCULAR; INTRAVENOUS
Status: DISPENSED
Start: 2017-11-07

## (undated) RX ORDER — DEXAMETHASONE SODIUM PHOSPHATE 10 MG/ML
INJECTION, SOLUTION INTRAMUSCULAR; INTRAVENOUS
Status: DISPENSED
Start: 2017-11-07

## (undated) RX ORDER — NEOSTIGMINE METHYLSULFATE 1 MG/ML
VIAL (ML) INJECTION
Status: DISPENSED
Start: 2017-11-07

## (undated) RX ORDER — ROCURONIUM BROMIDE 50 MG/5 ML
SYRINGE (ML) INTRAVENOUS
Status: DISPENSED
Start: 2017-11-07

## (undated) RX ORDER — FENTANYL CITRATE 50 UG/ML
INJECTION, SOLUTION INTRAMUSCULAR; INTRAVENOUS
Status: DISPENSED
Start: 2017-11-07

## (undated) RX ORDER — PROPOFOL 10 MG/ML
INJECTION, EMULSION INTRAVENOUS
Status: DISPENSED
Start: 2017-11-07

## (undated) RX ORDER — LIDOCAINE HYDROCHLORIDE 20 MG/ML
INJECTION, SOLUTION EPIDURAL; INFILTRATION; INTRACAUDAL; PERINEURAL
Status: DISPENSED
Start: 2017-11-07